# Patient Record
Sex: FEMALE | Race: WHITE | NOT HISPANIC OR LATINO | Employment: FULL TIME | URBAN - METROPOLITAN AREA
[De-identification: names, ages, dates, MRNs, and addresses within clinical notes are randomized per-mention and may not be internally consistent; named-entity substitution may affect disease eponyms.]

---

## 2017-01-31 ENCOUNTER — TRANSCRIBE ORDERS (OUTPATIENT)
Dept: ADMINISTRATIVE | Facility: HOSPITAL | Age: 52
End: 2017-01-31

## 2017-01-31 DIAGNOSIS — Z12.31 VISIT FOR SCREENING MAMMOGRAM: Primary | ICD-10-CM

## 2017-02-21 ENCOUNTER — HOSPITAL ENCOUNTER (OUTPATIENT)
Dept: RADIOLOGY | Age: 52
Discharge: HOME | End: 2017-02-21
Payer: COMMERCIAL

## 2017-02-21 DIAGNOSIS — Z12.31 VISIT FOR SCREENING MAMMOGRAM: ICD-10-CM

## 2017-02-21 PROCEDURE — G0202 SCR MAMMO BI INCL CAD: HCPCS

## 2017-05-12 ENCOUNTER — HOSPITAL ENCOUNTER (OUTPATIENT)
Facility: HOSPITAL | Age: 52
Setting detail: OBSERVATION
Discharge: HOME | End: 2017-05-13
Attending: EMERGENCY MEDICINE | Admitting: INTERNAL MEDICINE
Payer: COMMERCIAL

## 2017-05-12 ENCOUNTER — HOSPITAL ENCOUNTER (EMERGENCY)
Facility: HOSPITAL | Age: 52
Discharge: HOME | End: 2017-05-12
Attending: EMERGENCY MEDICINE | Admitting: EMERGENCY MEDICINE
Payer: COMMERCIAL

## 2017-05-12 ENCOUNTER — APPOINTMENT (EMERGENCY)
Dept: CT IMAGING | Facility: HOSPITAL | Age: 52
End: 2017-05-12
Payer: COMMERCIAL

## 2017-05-12 VITALS
OXYGEN SATURATION: 98 % | DIASTOLIC BLOOD PRESSURE: 84 MMHG | RESPIRATION RATE: 18 BRPM | WEIGHT: 173.5 LBS | SYSTOLIC BLOOD PRESSURE: 160 MMHG | TEMPERATURE: 98.3 F | BODY MASS INDEX: 30.73 KG/M2 | HEART RATE: 55 BPM

## 2017-05-12 DIAGNOSIS — R10.9 ABDOMINAL PAIN: Primary | ICD-10-CM

## 2017-05-12 DIAGNOSIS — R10.13 EPIGASTRIC PAIN: Primary | ICD-10-CM

## 2017-05-12 DIAGNOSIS — R11.2 NAUSEA & VOMITING: ICD-10-CM

## 2017-05-12 PROBLEM — K52.9 GASTROENTERITIS: Status: ACTIVE | Noted: 2017-05-12

## 2017-05-12 LAB
ALBUMIN SERPL BCP-MCNC: 3.7 G/DL (ref 3.5–5)
ALP SERPL-CCNC: 71 U/L (ref 46–116)
ALT SERPL W P-5'-P-CCNC: 26 U/L (ref 12–78)
ANION GAP SERPL CALCULATED.3IONS-SCNC: 14 MMOL/L (ref 4–13)
ANION GAP SERPL CALCULATED.3IONS-SCNC: 7 MMOL/L (ref 4–13)
AST SERPL W P-5'-P-CCNC: 45 U/L (ref 5–45)
BASOPHILS # BLD AUTO: 0.02 THOUSANDS/ΜL (ref 0–0.1)
BASOPHILS # BLD AUTO: 0.02 THOUSANDS/ΜL (ref 0–0.1)
BASOPHILS NFR BLD AUTO: 0 % (ref 0–1)
BASOPHILS NFR BLD AUTO: 0 % (ref 0–1)
BILIRUB SERPL-MCNC: 0.6 MG/DL (ref 0.2–1)
BUN SERPL-MCNC: 11 MG/DL (ref 5–25)
BUN SERPL-MCNC: 7 MG/DL (ref 5–25)
CALCIUM SERPL-MCNC: 10.3 MG/DL (ref 8.3–10.1)
CALCIUM SERPL-MCNC: 9.2 MG/DL (ref 8.3–10.1)
CHLORIDE SERPL-SCNC: 101 MMOL/L (ref 100–108)
CHLORIDE SERPL-SCNC: 104 MMOL/L (ref 100–108)
CLARITY, POC: CLEAR
CO2 SERPL-SCNC: 24 MMOL/L (ref 21–32)
CO2 SERPL-SCNC: 26 MMOL/L (ref 21–32)
COLOR, POC: YELLOW
CREAT SERPL-MCNC: 0.64 MG/DL (ref 0.6–1.3)
CREAT SERPL-MCNC: 0.96 MG/DL (ref 0.6–1.3)
EOSINOPHIL # BLD AUTO: 0.05 THOUSAND/ΜL (ref 0–0.61)
EOSINOPHIL # BLD AUTO: 0.19 THOUSAND/ΜL (ref 0–0.61)
EOSINOPHIL NFR BLD AUTO: 0 % (ref 0–6)
EOSINOPHIL NFR BLD AUTO: 2 % (ref 0–6)
ERYTHROCYTE [DISTWIDTH] IN BLOOD BY AUTOMATED COUNT: 12.9 % (ref 11.6–15.1)
ERYTHROCYTE [DISTWIDTH] IN BLOOD BY AUTOMATED COUNT: 13 % (ref 11.6–15.1)
EXT BILIRUBIN, UA: NEGATIVE
EXT BLOOD URINE: NEGATIVE
EXT GLUCOSE, UA: NEGATIVE
EXT KETONES: NEGATIVE
EXT NITRITE, UA: NEGATIVE
EXT PH, UA: 8.5
EXT PROTEIN, UA: 100
EXT SPECIFIC GRAVITY, UA: 1
EXT UROBILINOGEN: 0.2
GFR SERPL CREATININE-BSD FRML MDRD: >60 ML/MIN/1.73SQ M
GFR SERPL CREATININE-BSD FRML MDRD: >60 ML/MIN/1.73SQ M
GLUCOSE SERPL-MCNC: 124 MG/DL (ref 65–140)
GLUCOSE SERPL-MCNC: 97 MG/DL (ref 65–140)
HCT VFR BLD AUTO: 42.4 % (ref 34.8–46.1)
HCT VFR BLD AUTO: 46.8 % (ref 34.8–46.1)
HGB BLD-MCNC: 14.3 G/DL (ref 11.5–15.4)
HGB BLD-MCNC: 15.9 G/DL (ref 11.5–15.4)
LACTATE SERPL-SCNC: 2 MMOL/L (ref 0.5–2)
LIPASE SERPL-CCNC: 80 U/L (ref 73–393)
LIPASE SERPL-CCNC: 98 U/L (ref 73–393)
LYMPHOCYTES # BLD AUTO: 1.77 THOUSANDS/ΜL (ref 0.6–4.47)
LYMPHOCYTES # BLD AUTO: 2.01 THOUSANDS/ΜL (ref 0.6–4.47)
LYMPHOCYTES NFR BLD AUTO: 14 % (ref 14–44)
LYMPHOCYTES NFR BLD AUTO: 22 % (ref 14–44)
MCH RBC QN AUTO: 31.1 PG (ref 26.8–34.3)
MCH RBC QN AUTO: 31.1 PG (ref 26.8–34.3)
MCHC RBC AUTO-ENTMCNC: 33.7 G/DL (ref 31.4–37.4)
MCHC RBC AUTO-ENTMCNC: 34 G/DL (ref 31.4–37.4)
MCV RBC AUTO: 91 FL (ref 82–98)
MCV RBC AUTO: 92 FL (ref 82–98)
MONOCYTES # BLD AUTO: 0.44 THOUSAND/ΜL (ref 0.17–1.22)
MONOCYTES # BLD AUTO: 0.45 THOUSAND/ΜL (ref 0.17–1.22)
MONOCYTES NFR BLD AUTO: 3 % (ref 4–12)
MONOCYTES NFR BLD AUTO: 5 % (ref 4–12)
NEUTROPHILS # BLD AUTO: 11.58 THOUSANDS/ΜL (ref 1.85–7.62)
NEUTROPHILS # BLD AUTO: 5.66 THOUSANDS/ΜL (ref 1.85–7.62)
NEUTS SEG NFR BLD AUTO: 71 % (ref 43–75)
NEUTS SEG NFR BLD AUTO: 83 % (ref 43–75)
PLATELET # BLD AUTO: 306 THOUSANDS/UL (ref 149–390)
PLATELET # BLD AUTO: 351 THOUSANDS/UL (ref 149–390)
PMV BLD AUTO: 10.4 FL (ref 8.9–12.7)
PMV BLD AUTO: 10.6 FL (ref 8.9–12.7)
POTASSIUM SERPL-SCNC: 3.9 MMOL/L (ref 3.5–5.3)
POTASSIUM SERPL-SCNC: 5.2 MMOL/L (ref 3.5–5.3)
PROT SERPL-MCNC: 6.8 G/DL (ref 6.4–8.2)
RBC # BLD AUTO: 4.6 MILLION/UL (ref 3.81–5.12)
RBC # BLD AUTO: 5.12 MILLION/UL (ref 3.81–5.12)
SODIUM SERPL-SCNC: 137 MMOL/L (ref 136–145)
SODIUM SERPL-SCNC: 139 MMOL/L (ref 136–145)
WBC # BLD AUTO: 14.11 THOUSAND/UL (ref 4.31–10.16)
WBC # BLD AUTO: 8.08 THOUSAND/UL (ref 4.31–10.16)
WBC # BLD EST: NEGATIVE 10*3/UL

## 2017-05-12 PROCEDURE — 36415 COLL VENOUS BLD VENIPUNCTURE: CPT | Performed by: PHYSICIAN ASSISTANT

## 2017-05-12 PROCEDURE — 96375 TX/PRO/DX INJ NEW DRUG ADDON: CPT

## 2017-05-12 PROCEDURE — 85025 COMPLETE CBC W/AUTO DIFF WBC: CPT | Performed by: PHYSICIAN ASSISTANT

## 2017-05-12 PROCEDURE — 74177 CT ABD & PELVIS W/CONTRAST: CPT

## 2017-05-12 PROCEDURE — 96376 TX/PRO/DX INJ SAME DRUG ADON: CPT

## 2017-05-12 PROCEDURE — 96361 HYDRATE IV INFUSION ADD-ON: CPT

## 2017-05-12 PROCEDURE — 96374 THER/PROPH/DIAG INJ IV PUSH: CPT

## 2017-05-12 PROCEDURE — 83690 ASSAY OF LIPASE: CPT | Performed by: EMERGENCY MEDICINE

## 2017-05-12 PROCEDURE — 80048 BASIC METABOLIC PNL TOTAL CA: CPT | Performed by: EMERGENCY MEDICINE

## 2017-05-12 PROCEDURE — 83605 ASSAY OF LACTIC ACID: CPT | Performed by: EMERGENCY MEDICINE

## 2017-05-12 PROCEDURE — 83690 ASSAY OF LIPASE: CPT | Performed by: PHYSICIAN ASSISTANT

## 2017-05-12 PROCEDURE — 81002 URINALYSIS NONAUTO W/O SCOPE: CPT | Performed by: PHYSICIAN ASSISTANT

## 2017-05-12 PROCEDURE — 85025 COMPLETE CBC W/AUTO DIFF WBC: CPT | Performed by: EMERGENCY MEDICINE

## 2017-05-12 PROCEDURE — 99284 EMERGENCY DEPT VISIT MOD MDM: CPT

## 2017-05-12 PROCEDURE — 80053 COMPREHEN METABOLIC PANEL: CPT | Performed by: PHYSICIAN ASSISTANT

## 2017-05-12 RX ORDER — ONDANSETRON 2 MG/ML
INJECTION INTRAMUSCULAR; INTRAVENOUS
Status: COMPLETED
Start: 2017-05-12 | End: 2017-05-12

## 2017-05-12 RX ORDER — MORPHINE SULFATE 4 MG/ML
4 INJECTION, SOLUTION INTRAMUSCULAR; INTRAVENOUS ONCE
Status: COMPLETED | OUTPATIENT
Start: 2017-05-12 | End: 2017-05-12

## 2017-05-12 RX ORDER — MAGNESIUM HYDROXIDE/ALUMINUM HYDROXICE/SIMETHICONE 120; 1200; 1200 MG/30ML; MG/30ML; MG/30ML
30 SUSPENSION ORAL EVERY 4 HOURS PRN
Status: DISCONTINUED | OUTPATIENT
Start: 2017-05-12 | End: 2017-05-12 | Stop reason: HOSPADM

## 2017-05-12 RX ORDER — SODIUM CHLORIDE 9 MG/ML
125 INJECTION, SOLUTION INTRAVENOUS CONTINUOUS
Status: DISCONTINUED | OUTPATIENT
Start: 2017-05-12 | End: 2017-05-13 | Stop reason: HOSPADM

## 2017-05-12 RX ORDER — OXYCODONE HYDROCHLORIDE AND ACETAMINOPHEN 5; 325 MG/1; MG/1
1 TABLET ORAL EVERY 6 HOURS PRN
Qty: 12 TABLET | Refills: 0 | Status: SHIPPED | OUTPATIENT
Start: 2017-05-12 | End: 2017-05-22

## 2017-05-12 RX ORDER — MORPHINE SULFATE 10 MG/ML
6 INJECTION, SOLUTION INTRAMUSCULAR; INTRAVENOUS ONCE
Status: COMPLETED | OUTPATIENT
Start: 2017-05-12 | End: 2017-05-12

## 2017-05-12 RX ORDER — PANTOPRAZOLE SODIUM 40 MG/1
40 TABLET, DELAYED RELEASE ORAL DAILY
Qty: 30 TABLET | Refills: 0 | Status: SHIPPED | OUTPATIENT
Start: 2017-05-12 | End: 2018-07-18 | Stop reason: ALTCHOICE

## 2017-05-12 RX ORDER — ONDANSETRON 2 MG/ML
4 INJECTION INTRAMUSCULAR; INTRAVENOUS ONCE
Status: COMPLETED | OUTPATIENT
Start: 2017-05-12 | End: 2017-05-12

## 2017-05-12 RX ADMIN — ALUMINUM HYDROXIDE, MAGNESIUM HYDROXIDE, AND SIMETHICONE 30 ML: 200; 200; 20 SUSPENSION ORAL at 13:57

## 2017-05-12 RX ADMIN — SODIUM CHLORIDE 1000 ML: 0.9 INJECTION, SOLUTION INTRAVENOUS at 10:56

## 2017-05-12 RX ADMIN — MORPHINE SULFATE 6 MG: 10 INJECTION, SOLUTION INTRAMUSCULAR; INTRAVENOUS at 11:08

## 2017-05-12 RX ADMIN — MORPHINE SULFATE 4 MG: 4 INJECTION, SOLUTION INTRAMUSCULAR; INTRAVENOUS at 14:29

## 2017-05-12 RX ADMIN — IOHEXOL 50 ML: 240 INJECTION, SOLUTION INTRATHECAL; INTRAVASCULAR; INTRAVENOUS; ORAL at 11:10

## 2017-05-12 RX ADMIN — HYDROMORPHONE HYDROCHLORIDE 1 MG: 1 INJECTION, SOLUTION INTRAMUSCULAR; INTRAVENOUS; SUBCUTANEOUS at 22:50

## 2017-05-12 RX ADMIN — LIDOCAINE HYDROCHLORIDE 15 ML: 20 SOLUTION ORAL; TOPICAL at 13:57

## 2017-05-12 RX ADMIN — ONDANSETRON 4 MG: 2 INJECTION INTRAMUSCULAR; INTRAVENOUS at 11:06

## 2017-05-12 RX ADMIN — SODIUM CHLORIDE 1000 ML: 0.9 INJECTION, SOLUTION INTRAVENOUS at 23:29

## 2017-05-12 RX ADMIN — ONDANSETRON 4 MG: 2 INJECTION INTRAMUSCULAR; INTRAVENOUS at 22:49

## 2017-05-12 RX ADMIN — IOHEXOL 100 ML: 350 INJECTION, SOLUTION INTRAVENOUS at 12:35

## 2017-05-13 ENCOUNTER — APPOINTMENT (OUTPATIENT)
Dept: ULTRASOUND IMAGING | Facility: HOSPITAL | Age: 52
End: 2017-05-13
Payer: COMMERCIAL

## 2017-05-13 VITALS
OXYGEN SATURATION: 95 % | HEART RATE: 64 BPM | BODY MASS INDEX: 30.51 KG/M2 | TEMPERATURE: 99.1 F | HEIGHT: 63 IN | WEIGHT: 172.18 LBS | DIASTOLIC BLOOD PRESSURE: 55 MMHG | RESPIRATION RATE: 18 BRPM | SYSTOLIC BLOOD PRESSURE: 105 MMHG

## 2017-05-13 LAB
ANION GAP SERPL CALCULATED.3IONS-SCNC: 9 MMOL/L (ref 4–13)
BUN SERPL-MCNC: 7 MG/DL (ref 5–25)
CALCIUM SERPL-MCNC: 8.6 MG/DL (ref 8.3–10.1)
CHLORIDE SERPL-SCNC: 105 MMOL/L (ref 100–108)
CO2 SERPL-SCNC: 25 MMOL/L (ref 21–32)
CREAT SERPL-MCNC: 0.63 MG/DL (ref 0.6–1.3)
ERYTHROCYTE [DISTWIDTH] IN BLOOD BY AUTOMATED COUNT: 12.7 % (ref 11.6–15.1)
GFR SERPL CREATININE-BSD FRML MDRD: >60 ML/MIN/1.73SQ M
GLUCOSE SERPL-MCNC: 98 MG/DL (ref 65–140)
HCT VFR BLD AUTO: 39.3 % (ref 34.8–46.1)
HGB BLD-MCNC: 13 G/DL (ref 11.5–15.4)
LACTATE SERPL-SCNC: 0.6 MMOL/L (ref 0.5–2)
MCH RBC QN AUTO: 30.7 PG (ref 26.8–34.3)
MCHC RBC AUTO-ENTMCNC: 33.1 G/DL (ref 31.4–37.4)
MCV RBC AUTO: 93 FL (ref 82–98)
PLATELET # BLD AUTO: 280 THOUSANDS/UL (ref 149–390)
PMV BLD AUTO: 11 FL (ref 8.9–12.7)
POTASSIUM SERPL-SCNC: 3.9 MMOL/L (ref 3.5–5.3)
RBC # BLD AUTO: 4.24 MILLION/UL (ref 3.81–5.12)
SODIUM SERPL-SCNC: 139 MMOL/L (ref 136–145)
WBC # BLD AUTO: 11.5 THOUSAND/UL (ref 4.31–10.16)

## 2017-05-13 PROCEDURE — 99285 EMERGENCY DEPT VISIT HI MDM: CPT

## 2017-05-13 PROCEDURE — 80048 BASIC METABOLIC PNL TOTAL CA: CPT | Performed by: PHYSICIAN ASSISTANT

## 2017-05-13 PROCEDURE — 83605 ASSAY OF LACTIC ACID: CPT | Performed by: PHYSICIAN ASSISTANT

## 2017-05-13 PROCEDURE — 85027 COMPLETE CBC AUTOMATED: CPT | Performed by: PHYSICIAN ASSISTANT

## 2017-05-13 PROCEDURE — 76705 ECHO EXAM OF ABDOMEN: CPT

## 2017-05-13 RX ORDER — FLUOXETINE HYDROCHLORIDE 20 MG/1
40 CAPSULE ORAL DAILY
Status: DISCONTINUED | OUTPATIENT
Start: 2017-05-13 | End: 2017-05-13 | Stop reason: HOSPADM

## 2017-05-13 RX ORDER — DICYCLOMINE HYDROCHLORIDE 10 MG/1
10 CAPSULE ORAL
Status: DISCONTINUED | OUTPATIENT
Start: 2017-05-13 | End: 2017-05-13 | Stop reason: HOSPADM

## 2017-05-13 RX ORDER — OXYCODONE HYDROCHLORIDE AND ACETAMINOPHEN 5; 325 MG/1; MG/1
1 TABLET ORAL EVERY 6 HOURS PRN
Status: DISCONTINUED | OUTPATIENT
Start: 2017-05-13 | End: 2017-05-13

## 2017-05-13 RX ORDER — SODIUM CHLORIDE 9 MG/ML
125 INJECTION, SOLUTION INTRAVENOUS CONTINUOUS
Status: DISCONTINUED | OUTPATIENT
Start: 2017-05-13 | End: 2017-05-13 | Stop reason: HOSPADM

## 2017-05-13 RX ORDER — ONDANSETRON 2 MG/ML
4 INJECTION INTRAMUSCULAR; INTRAVENOUS EVERY 6 HOURS PRN
Status: DISCONTINUED | OUTPATIENT
Start: 2017-05-13 | End: 2017-05-13 | Stop reason: HOSPADM

## 2017-05-13 RX ORDER — PRAVASTATIN SODIUM 20 MG
20 TABLET ORAL DAILY
Status: DISCONTINUED | OUTPATIENT
Start: 2017-05-13 | End: 2017-05-13 | Stop reason: HOSPADM

## 2017-05-13 RX ORDER — B-COMPLEX WITH VITAMIN C
1 TABLET ORAL
Status: DISCONTINUED | OUTPATIENT
Start: 2017-05-13 | End: 2017-05-13 | Stop reason: HOSPADM

## 2017-05-13 RX ORDER — DICYCLOMINE HYDROCHLORIDE 10 MG/1
10 CAPSULE ORAL 3 TIMES DAILY PRN
Qty: 30 CAPSULE | Refills: 0 | Status: ON HOLD | OUTPATIENT
Start: 2017-05-13 | End: 2017-06-07 | Stop reason: ALTCHOICE

## 2017-05-13 RX ORDER — ACETAMINOPHEN 325 MG/1
650 TABLET ORAL EVERY 6 HOURS PRN
Status: DISCONTINUED | OUTPATIENT
Start: 2017-05-13 | End: 2017-05-13 | Stop reason: HOSPADM

## 2017-05-13 RX ORDER — PANTOPRAZOLE SODIUM 40 MG/1
40 TABLET, DELAYED RELEASE ORAL
Status: DISCONTINUED | OUTPATIENT
Start: 2017-05-13 | End: 2017-05-13 | Stop reason: HOSPADM

## 2017-05-13 RX ORDER — OXYCODONE HYDROCHLORIDE AND ACETAMINOPHEN 5; 325 MG/1; MG/1
1 TABLET ORAL EVERY 4 HOURS PRN
Status: DISCONTINUED | OUTPATIENT
Start: 2017-05-13 | End: 2017-05-13 | Stop reason: HOSPADM

## 2017-05-13 RX ADMIN — PANTOPRAZOLE SODIUM 40 MG: 40 TABLET, DELAYED RELEASE ORAL at 06:24

## 2017-05-13 RX ADMIN — DICYCLOMINE HYDROCHLORIDE 10 MG: 10 CAPSULE ORAL at 00:51

## 2017-05-13 RX ADMIN — DICYCLOMINE HYDROCHLORIDE 10 MG: 10 CAPSULE ORAL at 06:24

## 2017-05-13 RX ADMIN — OYSTER SHELL CALCIUM WITH VITAMIN D 1 TABLET: 500; 200 TABLET, FILM COATED ORAL at 11:28

## 2017-05-13 RX ADMIN — SODIUM CHLORIDE 125 ML/HR: 0.9 INJECTION, SOLUTION INTRAVENOUS at 08:42

## 2017-05-13 RX ADMIN — OXYCODONE HYDROCHLORIDE AND ACETAMINOPHEN 1 TABLET: 5; 325 TABLET ORAL at 09:16

## 2017-05-13 RX ADMIN — OXYCODONE HYDROCHLORIDE AND ACETAMINOPHEN 1 TABLET: 5; 325 TABLET ORAL at 00:51

## 2017-05-13 RX ADMIN — OXYCODONE HYDROCHLORIDE AND ACETAMINOPHEN 1 TABLET: 5; 325 TABLET ORAL at 05:10

## 2017-05-13 RX ADMIN — SODIUM CHLORIDE 125 ML/HR: 0.9 INJECTION, SOLUTION INTRAVENOUS at 00:44

## 2017-05-13 RX ADMIN — ONDANSETRON 4 MG: 2 INJECTION INTRAMUSCULAR; INTRAVENOUS at 04:56

## 2017-05-13 RX ADMIN — DICYCLOMINE HYDROCHLORIDE 10 MG: 10 CAPSULE ORAL at 11:28

## 2017-05-13 RX ADMIN — Medication 1 TABLET: at 08:46

## 2017-05-13 RX ADMIN — FLUOXETINE 40 MG: 20 CAPSULE ORAL at 08:46

## 2017-05-13 RX ADMIN — PRAVASTATIN SODIUM 20 MG: 20 TABLET ORAL at 08:46

## 2017-05-13 RX ADMIN — OYSTER SHELL CALCIUM WITH VITAMIN D 1 TABLET: 500; 200 TABLET, FILM COATED ORAL at 08:46

## 2017-05-13 RX ADMIN — OXYCODONE HYDROCHLORIDE AND ACETAMINOPHEN 1 TABLET: 5; 325 TABLET ORAL at 13:51

## 2017-05-15 ENCOUNTER — ALLSCRIPTS OFFICE VISIT (OUTPATIENT)
Dept: OTHER | Facility: OTHER | Age: 52
End: 2017-05-15

## 2017-05-18 ENCOUNTER — ALLSCRIPTS OFFICE VISIT (OUTPATIENT)
Dept: OTHER | Facility: OTHER | Age: 52
End: 2017-05-18

## 2017-06-06 ENCOUNTER — ANESTHESIA EVENT (OUTPATIENT)
Dept: GASTROENTEROLOGY | Facility: HOSPITAL | Age: 52
End: 2017-06-06
Payer: COMMERCIAL

## 2017-06-07 ENCOUNTER — ANESTHESIA (OUTPATIENT)
Dept: GASTROENTEROLOGY | Facility: HOSPITAL | Age: 52
End: 2017-06-07
Payer: COMMERCIAL

## 2017-06-07 ENCOUNTER — HOSPITAL ENCOUNTER (OUTPATIENT)
Facility: HOSPITAL | Age: 52
Setting detail: OUTPATIENT SURGERY
Discharge: HOME | End: 2017-06-07
Attending: SURGERY | Admitting: SURGERY
Payer: COMMERCIAL

## 2017-06-07 VITALS
TEMPERATURE: 98.5 F | RESPIRATION RATE: 18 BRPM | HEART RATE: 57 BPM | SYSTOLIC BLOOD PRESSURE: 106 MMHG | OXYGEN SATURATION: 96 % | DIASTOLIC BLOOD PRESSURE: 59 MMHG | WEIGHT: 172.5 LBS | HEIGHT: 63 IN | BODY MASS INDEX: 30.56 KG/M2

## 2017-06-07 DIAGNOSIS — K21.9 GASTROESOPHAGEAL REFLUX DISEASE WITHOUT ESOPHAGITIS: Primary | ICD-10-CM

## 2017-06-07 LAB — EXT PREGNANCY TEST URINE: NEGATIVE

## 2017-06-07 PROCEDURE — 81025 URINE PREGNANCY TEST: CPT | Performed by: ANESTHESIOLOGY

## 2017-06-07 RX ORDER — PROPOFOL 10 MG/ML
INJECTION, EMULSION INTRAVENOUS AS NEEDED
Status: DISCONTINUED | OUTPATIENT
Start: 2017-06-07 | End: 2017-06-07 | Stop reason: SURG

## 2017-06-07 RX ORDER — SODIUM CHLORIDE 9 MG/ML
125 INJECTION, SOLUTION INTRAVENOUS CONTINUOUS
Status: DISCONTINUED | OUTPATIENT
Start: 2017-06-07 | End: 2017-06-07 | Stop reason: HOSPADM

## 2017-06-07 RX ADMIN — SODIUM CHLORIDE 125 ML/HR: 0.9 INJECTION, SOLUTION INTRAVENOUS at 11:37

## 2017-06-07 RX ADMIN — LIDOCAINE HYDROCHLORIDE 50 MG: 20 INJECTION, SOLUTION INTRAVENOUS at 11:56

## 2017-06-07 RX ADMIN — PROPOFOL 100 MG: 10 INJECTION, EMULSION INTRAVENOUS at 11:56

## 2017-06-07 RX ADMIN — PROPOFOL 50 MG: 10 INJECTION, EMULSION INTRAVENOUS at 11:58

## 2017-07-28 ENCOUNTER — ALLSCRIPTS OFFICE VISIT (OUTPATIENT)
Dept: OTHER | Facility: OTHER | Age: 52
End: 2017-07-28

## 2017-07-28 ENCOUNTER — GENERIC CONVERSION - ENCOUNTER (OUTPATIENT)
Dept: OTHER | Facility: OTHER | Age: 52
End: 2017-07-28

## 2018-01-09 NOTE — MISCELLANEOUS
Assessment    1  Transition of care   2  Abdominal pain, epigastric (789 06) (R10 13)   3  Major depressive disorder with single episode, in full remission (296 26) (F32 5)    Plan  Abdominal pain    · Dicyclomine HCl - 10 MG Oral Capsule; TAKE 1 CAPSULE 3 TIMES DAILY PRN  X'S10 DAYS   Rx By: Terence Dias; Dispense: 0 Days ; #:90 Capsule; Refill: 0; For: Abdominal pain; TOVA = N; Record  Status post gastric bypass for obesity    · Omeprazole 20 MG Oral Capsule Delayed Release   Rx By: Urvashi Esteban; Dispense: 30 Days ; #:30 Capsule Delayed Release; Refill: 2; For: Status post gastric bypass for obesity; TOVA = N; Sent To: [id number]; Last Updated By: Jimmy Smith; 5/15/2017 8:18:11 AM    Discussion/Summary  Discussion Summary:   #1 transition of care  #2 epigastric pain  #3 status post gastric bypass  - I reviewed with patient  Unclear etiology of her epigastric pain  We discussed possible etiologies  Reportedly, there is no evidence of obstruction or other significant findings  RECOMMEND: Patient is to follow-up with her gastric bypass surgeon on Thursday  We will continue on Protonix for at least 40 mg a day  Consider EGD  Return to emergency department if pain should return  Recheck 6 months  #4 major depression - testing shows her to be had remission however patient notes an ancillary benefit of decreased hot flashes while on fluoxetine  Patient is worried about worsening symptoms as well as worsening depression if she should try to be weaned down  We'll continue on her present dose and recheck in 6 months  Patient follow-up as above  Patient call for problems or concerns in the interim  Counseling Documentation With Imm: The patient was counseled regarding diagnostic results, instructions for management, risk factor reductions, prognosis, patient and family education, impressions, risks and benefits of treatment options, importance of compliance with treatment     Medication SE Review and Pt Understands Tx: Possible side effects of new medications were reviewed with the patient/guardian today  The treatment plan was reviewed with the patient/guardian  The patient/guardian understands and agrees with the treatment plan      History of Present Illness  TCM Communication [facility]: The patient is being contacted for follow-up after hospitalization and APPOINT 5-15-17 AT 8:30 AM WITH DR CAZARES  She was hospitalized at and [address]  The dates of hospitalization:, date of admission: 5-12-17, date of discharge: 5-13-17  Diagnosis: GASTROENTERITIS  She was discharged to home  Medications reviewed and updated today  She scheduled a follow up appointment  Follow-up appointments with other specialists: DR KIRK 5- 97 14  The patient is currently experiencing symptoms  ABDONINAL TENDERNESS Counseling was provided to the patient  Communication performed and completed by FRANCISCO Serna LPN   HPI: As above  - 58-year-old female with a history of gastric bypass surgery presents for transition of care after admission for epigastric abdominal pain  - Patient states that symptoms began acutely on Friday morning  Patient developed mid abdominal cramping that worsen fairly quickly  By early morning, pain was 8/10  Patient tried lying down and relaxing but pain increased to 10 over 10 and was associated with dry heaving  Is been took patient to the emergency room where initial labs and CT scan was negative  Patient was discharged to home with a diagnosis of gastroenteritis  Back at home, symptoms persisted and  brought patient back to the emergency department where she was admitted for observation  On recheck, white count was elevated progressive labs were normal  Ultrasound of the right upper quadrant was also normal  Symptoms slowly resolved over the next 24 hours and patient was able to be discharged the next morning  Patient was restarted on Protonix 40 mg a day at time of discharge    - Since discharge, patient still has a mild discomfort in the epigastric area but states that pain has greatly improved  She has not had a bowel movement since Friday  She denies any worsening back pain, lightheadedness, chest pain or shortness of breath  She denies any fever or chills  - I reviewed available hospital notes/discharge summary, labs and study results with patient  - Patient states that she is doing well from a mood standpoint  She is compliant with medications  PHQ-9 done      Review of Systems  PHQ-9 Depression Scale: Over the past 2 weeks, how often have you been bothered by the following problems? 1 ) Little interest or pleasure in doing things? Not at all    4 ) Feeling tired or having little energy? Not at all    5 ) Poor appetite or overeating? Not at all    6 ) Feeling bad about yourself, or that you are a failure, or have let yourself or your family down? Not at all    7 ) Trouble concentrating on things, such as reading a newspaper or watching television? Not at all    8 ) Moving or speaking so slowly that other people could have noticed, or the opposite, moving or speaking faster than usual? Not at all    9 ) Thoughts that you would be better off dead or of hurting yourself in some way? Not at all  Score 0   Complete-Female:   Constitutional: as noted in HPI  Eyes: No complaints of eye pain, no red eyes, no eyesight problems, no discharge, no dry eyes, no itching of eyes  ENT: no complaints of earache, no loss of hearing, no nose bleeds, no nasal discharge, no sore throat, no hoarseness  Cardiovascular: No complaints of slow heart rate, no fast heart rate, no chest pain, no palpitations, no leg claudication, no lower extremity edema  Respiratory: No complaints of shortness of breath, no wheezing, no cough, no SOB on exertion, no orthopnea, no PND  Gastrointestinal: as noted in HPI     Genitourinary: No complaints of dysuria, no incontinence, no pelvic pain, no dysmenorrhea, no vaginal discharge or bleeding  Musculoskeletal: No complaints of arthralgias, no myalgias, no joint swelling or stiffness, no limb pain or swelling  Integumentary: No complaints of skin rash or lesions, no itching, no skin wounds, no breast pain or lump  Neurological: No complaints of headache, no confusion, no convulsions, no numbness, no dizziness or fainting, no tingling, no limb weakness, no difficulty walking  Psychiatric: Not suicidal, no sleep disturbance, no anxiety or depression, no change in personality, no emotional problems  Endocrine: No complaints of proptosis, no hot flashes, no muscle weakness, no deepening of the voice, no feelings of weakness  Hematologic/Lymphatic: No complaints of swollen glands, no swollen glands in the neck, does not bleed easily, does not bruise easily  Active Problems    1  Acute sinusitis (461 9) (J01 90)   2  Chest pain (786 50) (R07 9)   3  Depression (311) (F32 9)   4  Diverticula, colon (562 10) (K57 30)   5  Encounter for colorectal cancer screening (V76 51) (Z12 11,Z12 12)   6  Esophageal reflux (530 81) (K21 9)   7  Fatigue (780 79) (R53 83)   8  Flu vaccine need (V04 81) (Z23)   9  Heartburn (787 1) (R12)   10  Hemorrhoids, external (455 3) (K64 4)   11  Hiatal hernia (553 3) (K44 9)   12  Hypercholesterolemia (272 0) (E78 00)   13  Need for diphtheria-tetanus-pertussis (Tdap) vaccine (V06 1) (Z23)   14  Obesity (278 00) (E66 9)   15  Otalgia, unspecified laterality (388 70) (H92 09)   16  Postgastrectomy malabsorption (579 3) (K91 2,Z90 3)   17  Pre-operative cardiovascular examination (V72 81) (Z01 810)   18  Preoperative clearance (V72 84) (Z01 818)   19  Sea sickness (994 6) (T75 3XXA)   20  Status post gastric bypass for obesity (V45 86) (Y76 64)    Past Medical History    1  Former smoker (V15 82) (M20 661)   2  History of Morbid obesity (278 01) (E66 01)   3  History of Vitamin D insufficiency (268 9) (E55 9)    Surgical History    1   History of  Section   2  History of Gastric Surgery For Morbid Obesity Gastric Bypass   3  History of Repair Of Paraesophageal Hiatus Hernia   4  History of Tonsillectomy   5  History of Uterine Myomectomy  Surgical History Reviewed: The surgical history was reviewed and updated today  Family History  Mother    1  Family history of Breast CA  Father    2  Family history of Coronary arteriosclerosis   3  Family history of Hypertension (V17 49)   4  Family history of Prostate Cancer (V16 42)   5  Family history of Thyroid Disorder (V18 19)  Maternal Grandmother    6  Family history of Coronary arteriosclerosis  Paternal Grandmother    9  Family history of Diabetes Mellitus (V18 0)  Maternal Grandfather    8  Family history of Coronary arteriosclerosis  Family History Reviewed: The family history was reviewed and updated today  Social History    · Denied: History of Alcohol Use (History)   · Daily Coffee Consumption (2  Cups/Day)   · Denied: History of Daily Cola Consumption (___ Cans/Day)   · Daily Tea Consumption (___ Cups/Day)   · Denied: History of Drug Use   · Former smoker (V15 82) (Z87 891)   · Housewife or homemaker   · Marital History - Currently    · Uses Safety Equipment - Seatbelts  Social History Reviewed: The social history was reviewed and updated today  Current Meds   1  Calcium Citrate + D TABS; i po tid; Therapy: (Recorded:2015) to Recorded   2  Daily Multivitamin TABS; Celebrate; Therapy: (Andres Raines) to Recorded   3  FLUoxetine HCl - 20 MG Oral Capsule; TAKE 2 CAPSULES BY MOUTH EVERY DAY; Therapy: 60YSX2620 to (Ghada Harper)  Requested for: 13XNF2897; Last   Rx:2017 Ordered   4  Omeprazole 20 MG Oral Capsule Delayed Release; TAKE 1 CAPSULE DAILY; Therapy: 28WEJ6711 to (Khang Cha)  Requested for: 50ZSB2213; Last   Rx:94Xfm8222; Status: ACTIVE - Renewal Denied Ordered   5   Oxycodone-Acetaminophen 5-325 MG Oral Tablet; TAKE 1 TABLET EVERY 4 TO 6   HOURS AS NEEDED FOR PAIN Recorded   6  Pantoprazole Sodium 40 MG Oral Tablet Delayed Release; TAKE 1 TABLET BY MOUTH   EVERY DAY Recorded   7  Pravastatin Sodium 20 MG Oral Tablet; TAKE ONE TABLET BY MOUTH ONCE DAILY; Therapy: 50ZYV4975 to (Evaluate:57Gna7321)  Requested for: 43ORO7204; Last   Rx:18Jan2017 Ordered  Medication List Reviewed: The medication list was reviewed and updated today  Allergies    1  No Known Drug Allergies    Vitals  Signs   Recorded: 76FEB2419 08:17AM   Temperature: 98 1 F  Heart Rate: 66  Respiration: 18  Systolic: 639  Diastolic: 78  Height: 5 ft 3 in  Weight: 175 lb   BMI Calculated: 31  BSA Calculated: 1 83    Physical Exam    Constitutional   General appearance: No acute distress, well appearing and well nourished  Eyes   Conjunctiva and lids: No swelling, erythema or discharge  Pupils and irises: Equal, round, reactive to light  Ophthalmoscopic examination: Normal fundi and optic discs  Ears, Nose, Mouth, and Throat   External inspection of ears and nose: Normal     Otoscopic examination: Tympanic membranes translucent with normal light reflex  Canals patent without erythema  Nasal mucosa, septum, and turbinates: Normal without edema or erythema  Lips, teeth, and gums: Normal, good dentition  Oropharynx: Normal with no erythema, edema, exudate or lesions  Neck   Neck: Supple, symmetric, trachea midline, no masses  Thyroid: Normal, no thyromegaly  Pulmonary   Respiratory effort: No increased work of breathing or signs of respiratory distress  Auscultation of lungs: Clear to auscultation  Cardiovascular   Auscultation of heart: Normal rate and rhythm, normal S1 and S2, no murmurs  Carotid pulses: 2+ bilaterally  Abdominal aorta: Normal     Pedal pulses: 2+ bilaterally      Peripheral vascular exam: Normal     Examination of extremities for edema and/or varicosities: Normal     Abdomen   Abdomen: Abnormal   mild TTP over the epigastric area without G/R  No masses  Liver and spleen: No hepatomegaly or splenomegaly  Lymphatic   Palpation of lymph nodes in neck: No lymphadenopathy  Palpation of lymph nodes in other areas: No lymphadenopathy  Musculoskeletal   Gait and station: Normal     Digits and nails: Normal without clubbing or cyanosis  Joints, bones, and muscles: Normal     Skin   Skin and subcutaneous tissue: Normal without rashes or lesions  Neurologic   Cranial nerves: Cranial nerves II-XII intact  Cortical function: Normal mental status  Psychiatric   Judgment and insight: Normal     Orientation to person, place, and time: Normal     Recent and remote memory: Intact  Mood and affect: Normal   PH-9 = 0  Results/Data  PHQ-2 Adult Depression Screening 30IQE5114 08:21AM User, Ahs     Test Name Result Flag Reference   PHQ-2 Adult Depression Score 0     Over the last two weeks, how often have you been bothered by any of the following problems? Little interest or pleasure in doing things: Not at all - 0  Feeling down, depressed, or hopeless: Not at all - 0   PHQ-2 Adult Depression Screening Negative         Health Management  Diverticula, colon   COLONOSCOPY; every 10 years; Last 93DST0628; Next Due: 58Lqj7284; Active  Encounter for colorectal cancer screening   COLONOSCOPY; every 10 years; Last 74FOD5100; Next Due: 32Tng0872; Active  Hemorrhoids, external   COLONOSCOPY; every 10 years; Last 80OCD2082; Next Due: 53Rmk8402; Active    Future Appointments    Date/Time Provider Specialty Site   05/18/2017 02:45 PM FRANCISCO Martin   [facility] [place] [address] [facility]     Signatures   Electronically signed by : FRANCISCO Weiner ; May 16 2017  7:52AM EST                       (Author)

## 2018-01-12 VITALS
SYSTOLIC BLOOD PRESSURE: 114 MMHG | HEIGHT: 63 IN | RESPIRATION RATE: 18 BRPM | DIASTOLIC BLOOD PRESSURE: 78 MMHG | WEIGHT: 175 LBS | HEART RATE: 66 BPM | BODY MASS INDEX: 31.01 KG/M2 | TEMPERATURE: 98.1 F

## 2018-01-13 NOTE — PROGRESS NOTES
Message  Attended 9 month follow up meeting  Addressed both behavioral and dietary issues  Group discussion included the impact of tobacco use, alcohol use, psychiatric medications, relationships, body image and self esteem issues as it pertains to the weight loss surgery patient  During group discussion, reviewed vitamin recommendations, protein recommendations, portion sizes, balancing diet to include healthy carbohydrates, importance of exercise, and the bariatric rules for success  Overall pleased with weight loss and improved quality of life         Active Problems    1  Acute sinusitis (461 9) (J01 90)   2  Chest pain (786 50) (R07 9)   3  Depression (311) (F32 9)   4  Esophageal reflux (530 81) (K21 9)   5  Fatigue (780 79) (R53 83)   6  Flu vaccine need (V04 81) (Z23)   7  Heartburn (787 1) (R12)   8  Hiatal hernia (553 3) (K44 9)   9  Hypercholesterolemia (272 0) (E78 0)   10  Need for diphtheria-tetanus-pertussis (Tdap) vaccine (V06 1) (Z23)   11  Obesity (278 00) (E66 9)   12  Otalgia, unspecified laterality (388 70) (H92 09)   13  Postgastrectomy malabsorption (579 3) (K91 2,Z90 3)   14  Pre-operative cardiovascular examination (V72 81) (Z01 810)   15  Preoperative clearance (V72 84) (Z01 818)   16  Sea sickness (994 6) (T75 3XXA)   17  Status post gastric bypass for obesity (V45 86) (Z98 84)    Current Meds   1  Biotin 5000 MCG Oral Tablet Recorded   2  Calcium Citrate + D TABS; i po tid; Therapy: (Recorded:02Oct2015) to Recorded   3  Daily Multivitamin TABS; Celebrate; Therapy: (Glena Medin) to Recorded   4  FLUoxetine HCl - 20 MG Oral Capsule (PROzac); TAKE 2 CAPSULES BY MOUTH   EVERY DAY; Therapy: 47HCD5431 to (Nevaeh Saravia)  Requested for: 68IXK1756; Last   Rx:07Mar2016 Ordered   5  Iron Supplement TABS; Therapy: (29-29-69-33) to Recorded   6  Omeprazole 20 MG Oral Capsule Delayed Release; Therapy: (Recorded:14Jan2016) to Recorded   7   Pravastatin Sodium 20 MG Oral Tablet; TAKE ONE TABLET BY MOUTH ONCE DAILY; Therapy: 17DHH9287 to (Evaluate:80Vha8572)  Requested for: 10GPQ1390; Last   Rx:18Sss5647 Ordered    Allergies    1   No Known Drug Allergies    Signatures   Electronically signed by : GARRET Francisco; Mar 17 2016  3:54PM EST                       (Author)

## 2018-01-14 VITALS
BODY MASS INDEX: 30.56 KG/M2 | DIASTOLIC BLOOD PRESSURE: 70 MMHG | HEART RATE: 70 BPM | RESPIRATION RATE: 18 BRPM | TEMPERATURE: 98.8 F | WEIGHT: 172.5 LBS | HEIGHT: 63 IN | SYSTOLIC BLOOD PRESSURE: 132 MMHG

## 2018-01-14 VITALS
HEIGHT: 63 IN | WEIGHT: 168.5 LBS | TEMPERATURE: 97.5 F | SYSTOLIC BLOOD PRESSURE: 122 MMHG | DIASTOLIC BLOOD PRESSURE: 80 MMHG | BODY MASS INDEX: 29.86 KG/M2 | HEART RATE: 76 BPM | RESPIRATION RATE: 18 BRPM

## 2018-01-15 NOTE — PROGRESS NOTES
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[employer])  Patient is also tired of chewable vitamins DX Increased calorie /protein needs related to increased physical activity while training for [employer] ([employer]) as evidenced by patient report Intervention: Recommended patient increase protein intake to 70 to 75 grams per day and calories to 1400 per day  recommended Tennille nut water for hydration and CHO repletion while training and during race ( patient does not like G2)  Provided with samples of ProcCare capsules as they are not chewable Goals: Increase protein to 70 to 75 grams Hydrate during training and replete electrolyte with [place] nut water Monitoring/evaluation patient has my contact information for any further questions or concerns  She will f/u annually with PAJUANCARLOS  Active Problems    1  Abdominal pain (789 00) (R10 9)   2  Abdominal pain, epigastric (789 06) (R10 13)   3  Acute sinusitis (461 9) (J01 90)   4  Chest pain (786 50) (R07 9)   5  Depression (311) (F32 9)   6  Diverticula, colon (562 10) (K57 30)   7  Encounter for colorectal cancer screening (V76 51) (Z12 11,Z12 12)   8  Esophageal reflux (530 81) (K21 9)   9  Fatigue (780 79) (R53 83)   10  Flu vaccine need (V04 81) (Z23)   11  Heartburn (787 1) (R12)   12  Hemorrhoids, external (455 3) (K64 4)   13  Hiatal hernia (553 3) (K44 9)   14  Hypercholesterolemia (272 0) (E78 00)   15  Major depressive disorder with single episode, in full remission (296 26) (F32 5)   16  Need for diphtheria-tetanus-pertussis (Tdap) vaccine (V06 1) (Z23)   17  Otalgia, unspecified laterality (388 70) (H92 09)   18  Postgastrectomy malabsorption (579 3) (K91 2,Z90 3)   19  Pre-operative cardiovascular examination (V72 81) (Z01 810)   20   Preoperative clearance (V72 84) (Z01 818) 21  Sea sickness (994 6) (T75 3XXA)   22  Status post gastric bypass for obesity (V45 86) (Z98 84)   23  Transition of care    Past Medical History    1  Former smoker ( 82) (U56 499)   2  History of obesity (V13 89) (Z86 39)   3  History of Morbid obesity (278 01) (E66 01)   4  History of Vitamin D insufficiency (268 9) (E55 9)    Surgical History    1  History of  Section   2  History of Gastric Surgery For Morbid Obesity Gastric Bypass   3  History of Repair Of Paraesophageal Hiatus Hernia   4  History of Tonsillectomy   5  History of Uterine Myomectomy    Family History  Mother    1  Family history of Breast CA  Father    2  Family history of Coronary arteriosclerosis   3  Family history of Hypertension (V17 49)   4  Family history of Prostate Cancer (V16 42)   5  Family history of Thyroid Disorder (V18 19)  Maternal Grandmother    6  Family history of Coronary arteriosclerosis  Paternal Grandmother    9  Family history of Diabetes Mellitus (V18 0)  Maternal Grandfather    8  Family history of Coronary arteriosclerosis    Social History    · Denied: History of Alcohol Use (History)   · Daily Coffee Consumption (2  Cups/Day)   · Denied: History of Daily Cola Consumption (___ Cans/Day)   · Daily Tea Consumption (___ Cups/Day)   · Denied: History of Drug Use   · Former smoker ( 82) (Z87 891)   · Housewife or homemaker   · Marital History - Currently    · Uses Safety Equipment - Seatbelts    Current Meds   1  Calcium Citrate + D TABS; i po tid; Therapy: (Recorded:2015) to Recorded   2  Daily Multivitamin TABS; Celebrate; Therapy: (Elian Hugo) to Recorded   3  Fish Oil CAPS; Therapy: (Recorded:62Hqj1824) to Recorded   4  FLUoxetine HCl - 20 MG Oral Capsule (PROzac); TAKE 2 CAPSULES BY MOUTH EVERY   DAY; Therapy: 39EMG2650 to (Evaluate:2017)  Requested for: 27AER3420; Last   Rx:13Rtx8028 Ordered   5   Oxycodone-Acetaminophen 5-325 MG Oral Tablet; TAKE 1 TABLET EVERY 4 TO 6   HOURS AS NEEDED FOR PAIN Recorded   6  Pravastatin Sodium 20 MG Oral Tablet; TAKE ONE TABLET BY MOUTH ONCE DAILY; Therapy: 19PUL7789 to (Evaluate:33Ocs4858)  Requested for: 48AOP0617; Last   Rx:98Frt9298 Ordered    Allergies    1   No Known Drug Allergies    Future Appointments    Date/Time Provider Specialty Site   07/30/2018 08:30 AM Tamera Kelly, [address] [facility]     Signatures   Electronically signed by : GARRET Romo; Jul 28 2017  1:39PM EST                       (Author)    Electronically signed by : FRANCISCO Wilkins ; Jul 28 2017  2:17PM EST                       (Validation)

## 2018-01-15 NOTE — MISCELLANEOUS
Message  Pt called today because she was seen at [address] [place] [address] ER for severe abd pain  She said they did an US and a CT scan  Nothing found by them  Showed Anabella the PA and she didn't see anything abnormal  She told me to tell pt to increase PPI to BID go back to liquid diet for a few day and advance as tolerated  Also recommend ot to start daily Miralax  If no improvement in a week she should call the office to be seen  Pt understood and said she would  Active Problems    1  Acute sinusitis (461 9) (J01 90)   2  Chest pain (786 50) (R07 9)   3  Depression (311) (F32 9)   4  Esophageal reflux (530 81) (K21 9)   5  Fatigue (780 79) (R53 83)   6  Flu vaccine need (V04 81) (Z23)   7  Heartburn (787 1) (R12)   8  Hiatal hernia (553 3) (K44 9)   9  Hypercholesterolemia (272 0) (E78 0)   10  Need for diphtheria-tetanus-pertussis (Tdap) vaccine (V06 1) (Z23)   11  Obesity (278 00) (E66 9)   12  Otalgia, unspecified laterality (388 70) (H92 09)   13  Postgastrectomy malabsorption (579 3) (K91 2,Z90 3)   14  Pre-operative cardiovascular examination (V72 81) (Z01 810)   15  Preoperative clearance (V72 84) (Z01 818)   16  Sea sickness (994 6) (T75 3XXA)   17  Status post gastric bypass for obesity (V45 86) (Z98 84)    Current Meds   1  Biotin 5000 MCG Oral Tablet Recorded   2  Calcium Citrate + D TABS; i po tid; Therapy: (Recorded:02Oct2015) to Recorded   3  Daily Multivitamin TABS; Celebrate; Therapy: (Nilda Aguillon) to Recorded   4  FLUoxetine HCl - 20 MG Oral Capsule (PROzac); TAKE 2 CAPSULES BY MOUTH   EVERY DAY; Therapy: 86PIY2302 to ([id number])  Requested for: 35JTN3004; Last   Rx:07Mar2016 Ordered   5  Iron Supplement TABS; Therapy: (8397 2844) to Recorded   6  Omeprazole 20 MG Oral Capsule Delayed Release; Therapy: (Recorded:14Jan2016) to Recorded   7  Pravastatin Sodium 20 MG Oral Tablet; TAKE ONE TABLET BY MOUTH ONCE DAILY;    Therapy: 12XTR2867 to (Evaluate:17Cqg3148) Requested for: 12QFO3706; Last   Rx:20Oct2015 Ordered    Allergies    1   No Known Drug Allergies    Signatures   Electronically signed by : Valery Navarrete LPN; Apr 29 4745  9:27JO EST                       (Author)

## 2018-01-15 NOTE — PROGRESS NOTES
Assessment    1  Status post gastric bypass for obesity (V45 86) (Z98 84)   2  Postgastrectomy malabsorption (579 3) (K91 2)   3  Hypercholesterolemia (272 0) (E78 0)   4  Esophageal reflux (530 81) (K21 9)   5  Depression (311) (F32 9)   6  Encounter for preventive health examination (V70 0) (Z00 00)   7  History of Vitamin D insufficiency (268 9) (E55 9)    Plan  Depression, Health Maintenance, Esophageal reflux, Hypercholesterolemia,  Postgastrectomy malabsorption, Status post gastric bypass for  obesity    · (1) CBC/ PLT (NO DIFF); Status:Active; Requested [id number];    Perform:[facility]; Due:14Jun2017;Ordered; For:Depression, Health Maintenance, Esophageal reflux, Hypercholesterolemia, Postgastrectomy malabsorption, Status post gastric bypass for obesity; Ordered By:Nilda Lopez;   · (1) COMPREHENSIVE METABOLIC PANEL; Status:Active; Requested ZCR:[id number];    Perform:[facility]; Due:14Jun2017;Ordered; For:Depression, Health Maintenance, Esophageal reflux, Hypercholesterolemia, Postgastrectomy malabsorption, Status post gastric bypass for obesity; Ordered By:Nilda Lopez;   · (1) FERRITIN; Status:Active; Requested IPR:[id number];    Perform:[facility]; Due:14Jun2017;Ordered; For:Depression, Health Maintenance, Esophageal reflux, Hypercholesterolemia, Postgastrectomy malabsorption, Status post gastric bypass for obesity; Ordered By:Nilda Lopez;   · (1) FOLATE; Status:Active; Requested [id number]:[id number];    Perform:[facility]; Due:14Jun2017;Ordered; For:Depression, Health Maintenance, Esophageal reflux, Hypercholesterolemia, Postgastrectomy malabsorption, Status post gastric bypass for obesity; Ordered By:Nilda Lopez;   · (1) LIPID PANEL, FASTING; Status:Active; Requested MMA:[id number];    Perform:[facility]; Due:14Jun2017;Ordered; For:Depression, Health Maintenance, Esophageal reflux, Hypercholesterolemia, Postgastrectomy malabsorption, Status post gastric bypass for obesity;  Ordered By:Nilda Lopez; · (1) PTH N-TERMINAL (INTACT); Status:Active; Requested [id number]:[id number];    Perform:[facility]; Due:2017;Ordered; For:Depression, Health Maintenance, Esophageal reflux, Hypercholesterolemia, Postgastrectomy malabsorption, Status post gastric bypass for obesity; Ordered By:Jarvis Lopez;   · (1) VITAMIN A; Status:Active; Requested [id number]:[id number];    Perform:[facility]; Due:2017;Ordered; For:Depression, Health Maintenance, Esophageal reflux, Hypercholesterolemia, Postgastrectomy malabsorption, Status post gastric bypass for obesity; Ordered By:Anabella Lopez;   · (1) VITAMIN B1, WHOLE BLOOD; Status:Active; Requested [id number]:[id number];    Perform:[facility]; Due:2017;Ordered; For:Depression, Health Maintenance, Esophageal reflux, Hypercholesterolemia, Postgastrectomy malabsorption, Status post gastric bypass for obesity; Ordered By:Jarvis Lopez;   · (1) VITAMIN B12; Status:Active; Requested [id number];    Perform:[facility]; Due:2017;Ordered; For:Depression, Health Maintenance, Esophageal reflux, Hypercholesterolemia, Postgastrectomy malabsorption, Status post gastric bypass for obesity; Ordered By:Jarvis Lopez;   · (1) VITAMIN D 25-HYDROXY; Status:Active; Requested [id number];    Perform:[facility]; Due:2017;Ordered; For:Depression, Health Maintenance, Esophageal reflux, Hypercholesterolemia, Postgastrectomy malabsorption, Status post gastric bypass for obesity; Ordered By:Jarvis Lopez;    Discussion/Summary    Follow-up in 6 months  Follow diet as discussed  Get lab work done prior to your next office visit  Follow vitamin/mineral recommendations as reviewed with you  Exercise as tolerated  Call our office if you have any problems with abdominal pain especially if associated with fever, chills, nausea, vomiting, or any other concerns  1  s/p lap Peyton-en-y gastric bypass surgery-  56-year-old female, status post laparoscopic Peyton-en-Y gastric bypass surgery, 2015 by Dr Kay Hansen  Returns in routine followup  She is tolerating regular diet  A 24-hour diet recall was obtained from the patient  She is eating at least 60 g of protein per day  She is following 30/60 minute rule with her liquids and is drinking at least 64 ounces of fluid per day  She has been routinely, walking for exercise and notes she has decreased with the colder weather  I encouraged her to find an exercise  That she enjoys doing such as exercise DVDs or walking indoors or exercise classes to continue with her life-style changes for optimal results  She's done very well with her weight loss so far and has lost 61%  Excess Oddi weight loss, which is around the average targeted weight loss for this time frame    2  Malabsorption-Pt is at risk for vitamin and mineral deficiencies secondary to the malabsorption and restriction of intakes  Reviewed  Her most recent labs with her  Her  Vitamin A level was marginally lower  I advised her on food sources of this  This will be rechecked with routine labs    3  hypercholesterolemia-mildy low HDL-encouraged to continue with exercise but advised this may be a hereditary factor as well  4 reflux-has been controlled on ppi  I advised her to trial off her PPI now as she may not need it anymore  If no heartburn advised to stay off it  If she has heartburn she can stay on it until I see her at her follow-up in 6 months  5  h/o low vitamin V-acdwerbad-qvtxrcob current supplement  6  depression-mood appears stable on current regimen  Followed by another provider  Chief Complaint  Patient in office today for 3rd post op  She has no problems or concerns  Post-Op  Post-Op Bariatric Surgery:   Greer Vargas is status post laparoscopic Peyton-en-Y procedure, performed on 6/16/15   by Dr Rayfield Boeck  HPI: today's weight is 191  5 lb pounds, today's BMI is 33 9 and her total weight loss is 61% excess body weight loss pounds   The patient reports no nausea, no vomiting, no constipation, no diarrhea, no chest pain and no abdominal pain  Diet and Exercise: Diet history reviewed and discussed with the patient  Weight loss/gains to date discussed with the patient  Supplements: multivitamins and calcium  PE:   Abdominal exam: soft and no incisional hernia  Assessment:   Post-op, the patient is doing well  Plan: Activity restrictions: None  Instructions / Recommendations: recommended a daily protein intake of  grams, vitamin supplement(s) recommended, mineral supplement(s) recommended, recommended exercising at least 150 minutes per week, diet as advised and instructed to call the office for concerns, questions, or problems  The patient was instructed to follow up in 6 months  Review of Systems    Constitutional: planned weight loss, but not feeling poorly  Cardiovascular: no chest pain and no palpitations  Respiratory: no shortness of breath and no wheezing  Gastrointestinal: no abdominal pain, no nausea, no vomiting, no constipation and no diarrhea  Psychiatric: no anxiety and no depression  Active Problems    1  Acute sinusitis (461 9) (J01 90)   2  Chest pain (786 50) (R07 9)   3  Depression (311) (F32 9)   4  Esophageal reflux (530 81) (K21 9)   5  Fatigue (780 79) (R53 83)   6  Flu vaccine need (V04 81) (Z23)   7  Heartburn (787 1) (R12)   8  Hiatal hernia (553 3) (K44 9)   9  Hypercholesterolemia (272 0) (E78 0)   10  Need for diphtheria-tetanus-pertussis (Tdap) vaccine (V06 1) (Z23)   11  Obesity (278 00) (E66 9)   12  Otalgia, unspecified laterality (388 70) (H92 09)   13  Postgastrectomy malabsorption (579 3) (K91 2)   14  Pre-operative cardiovascular examination (V72 81) (Z01 810)   15  Preoperative clearance (V72 84) (Z01 818)   16  Sea sickness (994 6) (T75 3XXA)   17   Status post gastric bypass for obesity (V45 86) (Z98 84)    Social History    · Denied: History of Alcohol Use (History)   · Daily Coffee Consumption (2  Cups/Day)   · Denied: History of Daily Cola Consumption (___ Cans/Day)   · Daily Tea Consumption (___ Cups/Day)   · Denied: History of Drug Use   · Former smoker (R69 73) (U08 206)   · quit 2012: 20+ pack year hx  · Housewife or homemaker   · Marital History - Currently    · Uses Safety Equipment - Seatbelts  The social history was reviewed and updated today  Current Meds   1  Biotin 5000 MCG Oral Tablet Recorded   2  Calcium Citrate + D TABS; i po tid; Therapy: (Recorded:02Oct2015) to Recorded   3  Daily Multivitamin TABS; Celebrate; Therapy: (Tiera Avers) to Recorded   4  FLUoxetine HCl - 20 MG Oral Capsule; TAKE TWO CAPSULES BY MOUTH DAILY; Therapy: 11FOM3183 to (Evaluate:11Rtc6900)  Requested for: 70HCT3290; Last   Rx:18Nir4475 Ordered   5  Iron Supplement TABS; Therapy: [phone]) to Recorded   6  Omeprazole 20 MG Oral Capsule Delayed Release; TAKE 1 CAPSULE DAILY; Therapy: 40YQQ2863 to (Orelia Kehr)  Requested for: 53HJJ5171; Last   Rx:62Mnu7111 Ordered   7  Omeprazole 20 MG Oral Capsule Delayed Release; Therapy: (Recorded:14Jan2016) to Recorded   8  Pravastatin Sodium 20 MG Oral Tablet; TAKE ONE TABLET BY MOUTH ONCE DAILY; Therapy: 22GZX1986 to (Evaluate:22Tcs1284)  Requested for: 03SHD4969; Last   Rx:66Hcq8667 Ordered    The medication list was reviewed and updated today  Allergies    1  No Known Drug Allergies    Vitals   Recorded: 25GVJ7727 03:55PM   Temperature 98 2 F   Heart Rate 82   Respiration 20   Systolic 813   Diastolic 80   Height 5 ft 3 in   Weight 191 lb 8 0 oz   BMI Calculated 33 92   BSA Calculated 1 9     Physical Exam    Constitutional   General appearance: No acute distress, well appearing and well nourished  Eyes b/l conjunctiva without pallor  Ears, Nose, Mouth, and Throat oral mucosa moist    Pulmonary   Respiratory effort: No increased work of breathing or signs of respiratory distress  Auscultation of lungs: Clear to auscultation  Cardiovascular   Auscultation of heart: Normal rate and rhythm, normal S1 and S2, without murmurs  Abdomen soft, no incisional hernias appreciated     Musculoskeletal   Gait and station: Normal     Psychiatric   Orientation to person, place, and time: Normal     Mood and affect: Normal          Signatures   Electronically signed by : PHILIPPE Alatorre; Jan 14 2016  4:29PM EST                       (Author)    Electronically signed by : FRANCISCO Dial ; Jan 14 2016  4:36PM EST                       (Validation)

## 2018-01-16 NOTE — MISCELLANEOUS
Message  Patient called and requested Omeprazole had no refills, coming in for annual with aleida 7/19  Active Problems    1  Acute sinusitis (461 9) (J01 90)   2  Chest pain (786 50) (R07 9)   3  Depression (311) (F32 9)   4  Esophageal reflux (530 81) (K21 9)   5  Fatigue (780 79) (R53 83)   6  Flu vaccine need (V04 81) (Z23)   7  Heartburn (787 1) (R12)   8  Hiatal hernia (553 3) (K44 9)   9  Hypercholesterolemia (272 0) (E78 0)   10  Need for diphtheria-tetanus-pertussis (Tdap) vaccine (V06 1) (Z23)   11  Obesity (278 00) (E66 9)   12  Otalgia, unspecified laterality (388 70) (H92 09)   13  Postgastrectomy malabsorption (579 3) (K91 2,Z90 3)   14  Pre-operative cardiovascular examination (V72 81) (Z01 810)   15  Preoperative clearance (V72 84) (Z01 818)   16  Sea sickness (994 6) (T75 3XXA)   17  Status post gastric bypass for obesity (V45 86) (Z98 84)    Current Meds   1  Biotin 5000 MCG Oral Tablet Recorded   2  Calcium Citrate + D TABS; i po tid; Therapy: (Recorded:02Oct2015) to Recorded   3  Daily Multivitamin TABS; Celebrate; Therapy: (Zoila Soni) to Recorded   4  FLUoxetine HCl - 20 MG Oral Capsule (PROzac); TAKE 2 CAPSULES BY MOUTH   EVERY DAY; Therapy: 63KNX1217 to (Marthena Simmonds)  Requested for: 28MQK5262; Last   Rx:07Mar2016 Ordered   5  Iron Supplement TABS; Therapy: ([phone]) to Recorded   6  Omeprazole 20 MG Oral Capsule Delayed Release; Therapy: (Recorded:14Jan2016) to Recorded   7  Pravastatin Sodium 20 MG Oral Tablet; TAKE ONE TABLET BY MOUTH ONCE DAILY; Therapy: 77ANP0858 to (Evaluate:22Koh7506)  Requested for: 46VQP2902; Last   Rx:20Oct2015 Ordered    Allergies    1   No Known Drug Allergies    Plan  Status post gastric bypass for obesity    · Omeprazole 20 MG Oral Capsule Delayed Release; take 1 capsule daily    Signatures   Electronically signed by : Megan Heard, ; Jun 28 2016 12:58PM EST                       (Author)

## 2018-01-18 NOTE — MISCELLANEOUS
Provider Comments  Provider Comments:     Dear Elba Valle had a scheduled appointment at our office for 09/16/2016 that you called to cancel but did not reschedule for another day  It is very important that you follow up with us so that we can assess your physical and nutritional safety after your surgery  Please call our office at [phone] to reschedule your appointment       Sincerely,     [facility]            Signatures   Electronically signed by : Namrata Blanco, ; Sep 16 2016 12:14PM EST                       (Author)

## 2018-02-03 ENCOUNTER — APPOINTMENT (EMERGENCY)
Dept: RADIOLOGY | Facility: HOSPITAL | Age: 53
End: 2018-02-03
Payer: COMMERCIAL

## 2018-02-03 ENCOUNTER — HOSPITAL ENCOUNTER (EMERGENCY)
Facility: HOSPITAL | Age: 53
Discharge: HOME | End: 2018-02-03
Attending: EMERGENCY MEDICINE
Payer: COMMERCIAL

## 2018-02-03 VITALS
TEMPERATURE: 99 F | BODY MASS INDEX: 30.11 KG/M2 | HEART RATE: 64 BPM | RESPIRATION RATE: 16 BRPM | OXYGEN SATURATION: 99 % | WEIGHT: 170 LBS | SYSTOLIC BLOOD PRESSURE: 120 MMHG | DIASTOLIC BLOOD PRESSURE: 75 MMHG

## 2018-02-03 DIAGNOSIS — S82.841A BIMALLEOLAR ANKLE FRACTURE, RIGHT, CLOSED, INITIAL ENCOUNTER: Primary | ICD-10-CM

## 2018-02-03 PROCEDURE — 99283 EMERGENCY DEPT VISIT LOW MDM: CPT

## 2018-02-03 PROCEDURE — 73610 X-RAY EXAM OF ANKLE: CPT

## 2018-02-03 RX ORDER — ACETAMINOPHEN 325 MG/1
650 TABLET ORAL ONCE
Status: COMPLETED | OUTPATIENT
Start: 2018-02-03 | End: 2018-02-03

## 2018-02-03 RX ORDER — MORPHINE SULFATE 15 MG/1
15 TABLET ORAL ONCE
Status: COMPLETED | OUTPATIENT
Start: 2018-02-03 | End: 2018-02-03

## 2018-02-03 RX ORDER — MORPHINE SULFATE 15 MG/1
15 TABLET ORAL EVERY 4 HOURS PRN
Qty: 15 TABLET | Refills: 0 | Status: SHIPPED | OUTPATIENT
Start: 2018-02-03 | End: 2019-05-16 | Stop reason: ALTCHOICE

## 2018-02-03 RX ADMIN — ACETAMINOPHEN 650 MG: 325 TABLET, FILM COATED ORAL at 17:08

## 2018-02-03 RX ADMIN — MORPHINE SULFATE 15 MG: 15 TABLET ORAL at 17:39

## 2018-02-03 NOTE — ED NOTES
Patient transported to [address] [place] [address], [address] [place] [address]  02/03/18 0322

## 2018-02-03 NOTE — ED PROVIDER NOTES
History  Chief Complaint   Patient presents with    Fall     slipped on ice and has right ankle pain and swelling     76-year-old female presents with chief complaint of right ankle pain after slipping on the ice today  Patient reports she left her house and slipped on the icy walked away and came down on her ankle  Patient has pain over both the lateral and medial malleolus  Intact sensation the foot and normal perfusion  History provided by:  Patient   used: No    Ankle Injury   Location:  Right ankle  Quality:  Throbbin, aching  Severity:  Severe  Onset quality:  Sudden  Duration:  1 hour  Timing:  Constant  Progression:  Unchanged  Chronicity:  New  Context:  Fall on ice  Relieved by:  Immobilization, ice  Worsened by: Movement, palpation      Prior to Admission Medications   Prescriptions Last Dose Informant Patient Reported? Taking? Calcium Citrate-Vitamin D (CALCIUM CITRATE + D) 250-200 MG-UNIT TABS   Yes No   Sig: Take 1 tablet by mouth 3 (three) times a day   FLUoxetine (PROzac) 20 mg capsule   Yes No   Sig: Take 40 mg by mouth daily     MULTIPLE VITAMIN PO   Yes No   Sig: Take 1 tablet by mouth daily   pantoprazole (PROTONIX) 40 mg tablet   No No   Sig: Take 1 tablet by mouth daily for 30 days   pravastatin (PRAVACHOL) 20 mg tablet   Yes No   Sig: Take 20 mg by mouth daily        Facility-Administered Medications: None       Past Medical History:   Diagnosis Date    Abdominal pain     Chronic pain disorder     abdominal    Depression     Diverticula of colon     GERD (gastroesophageal reflux disease)     Hemorrhoids     Hiatal hernia     Hyperlipidemia     Intestinal malabsorption following gastrectomy     Obesity        Past Surgical History:   Procedure Laterality Date    ABDOMINAL SURGERY      gastric bipass     SECTION      (2)    COLONOSCOPY      ESOPHAGOGASTRODUODENOSCOPY      x2    GASTRIC BYPASS      OR EGD TRANSORAL BIOPSY SINGLE/MULTIPLE N/A 6/7/2017    Procedure: ESOPHAGOGASTRODUODENOSCOPY (EGD); Surgeon: Lucy Nam MD;  Location: [facility]; Service: Bariatrics    TONSILLECTOMY      UTERINE FIBROID SURGERY         History reviewed  No pertinent family history  I have reviewed and agree with the history as documented  Social History   Substance Use Topics    Smoking status: Former Smoker     Quit date: 2011    Smokeless tobacco: Never Used    Alcohol use No        Review of Systems   Musculoskeletal: Positive for arthralgias (right ankle pain)  Skin: Negative for color change and wound  Neurological: Negative for weakness and numbness  Physical Exam  ED Triage Vitals [02/03/18 1701]   Temperature Pulse Respirations Blood Pressure SpO2   99 °F (37 2 °C) 64 16 120/75 99 %      Temp Source Heart Rate Source Patient Position - Orthostatic VS BP Location FiO2 (%)   Oral Monitor Sitting Left arm --      Pain Score       8           Orthostatic Vital Signs  Vitals:    02/03/18 1701   BP: 120/75   Pulse: 64   Patient Position - Orthostatic VS: Sitting       Physical Exam   Constitutional: She is oriented to person, place, and time  She appears well-developed and well-nourished  She appears distressed  HENT:   Head: Normocephalic and atraumatic  Eyes: EOM are normal  Pupils are equal, round, and reactive to light  Neck: Normal range of motion  No JVD present  Cardiovascular: Normal rate, regular rhythm, normal heart sounds and intact distal pulses  Exam reveals no gallop and no friction rub  No murmur heard  Pulmonary/Chest: Effort normal and breath sounds normal  No respiratory distress  She has no wheezes  She has no rales  She exhibits no tenderness  Musculoskeletal: Normal range of motion  She exhibits tenderness (right ankle) and deformity (right ankle)  Neurological: She is alert and oriented to person, place, and time  Skin: Skin is warm and dry  Psychiatric: She has a normal mood and affect   Her behavior is normal  Judgment and thought content normal    Nursing note and vitals reviewed  ED Medications  Medications   acetaminophen (TYLENOL) tablet 650 mg (650 mg Oral Given 2/3/18 1708)   morphine (MSIR) IR tablet 15 mg (15 mg Oral Given 2/3/18 1739)       Diagnostic Studies  Results Reviewed     None                 XR ankle 3+ views RIGHT   Final Result by Cherelle Kang DO (02/03 1266)      Mildly displaced fractures of the medial malleolus and distal fibula  Widening of the medial ankle mortise  Workstation performed: [id number]          XR ankle 3+ views RIGHT  This film was interpreted independently by me  Bimalleolar ankle fracture  Procedures  Procedures       Phone Contacts  ED Phone Contact    ED Course  ED Course                                MDM  Number of Diagnoses or Management Options  Bimalleolar ankle fracture, right, closed, initial encounter: new and requires workup  Diagnosis management comments: Background: 47 y o  female with right ankle pain after injury    Differential DX includes but is not limited to: fracture vs contusion vs sprain     Plan: imaging, symptom control         Amount and/or Complexity of Data Reviewed  Tests in the radiology section of CPT®: ordered and reviewed  Independent visualization of images, tracings, or specimens: (This film was interpreted independently by me  Bimalleolar ankle fracture  )    Risk of Complications, Morbidity, and/or Mortality  General comments: I examined the patient after both stirrup and posterior short leg splint was placed on the right ankle by RN  Well placed  Normal sensation and perfusion in toes of foot            CritCare Time    Disposition  Final diagnoses:   Bimalleolar ankle fracture, right, closed, initial encounter     Time reflects when diagnosis was documented in both MDM as applicable and the Disposition within this note     Time User Action Codes Description Comment    2/3/2018  5:55 PM Jonathan Delarosa Add [J85 880S] Bimalleolar ankle fracture, right, closed, initial encounter       ED Disposition     ED Disposition Condition Comment    Discharge  Rahul Loose discharge to home/self care  Condition at discharge: Good        Follow-up Information     Follow up With Specialties Details Why Contact Info    your podiatrist   Schedule an appointment as soon as possible for a visit in 3 days          Discharge Medication List as of 2/3/2018  5:55 PM      START taking these medications    Details   morphine (MSIR) 15 mg tablet Take 1 tablet (15 mg total) by mouth every 4 (four) hours as needed for severe pain for up to 15 doses Max Daily Amount: 90 mg, Starting Sat 2/3/2018, Print         CONTINUE these medications which have NOT CHANGED    Details   Calcium Citrate-Vitamin D (CALCIUM CITRATE + D) 250-200 MG-UNIT TABS Take 1 tablet by mouth 3 (three) times a day, Until Discontinued, Historical Med      FLUoxetine (PROzac) 20 mg capsule Take 40 mg by mouth daily  , Until Discontinued, Historical Med      MULTIPLE VITAMIN PO Take 1 tablet by mouth daily, Until Discontinued, Historical Med      pantoprazole (PROTONIX) 40 mg tablet Take 1 tablet by mouth daily for 30 days, Starting 5/12/2017, Until Sun 6/11/17, Print      pravastatin (PRAVACHOL) 20 mg tablet Take 20 mg by mouth daily  , Until Discontinued, Historical Med           No discharge procedures on file      ED Provider  Electronically Signed by           Randy Saldaña MD  02/04/18 2386

## 2018-02-03 NOTE — DISCHARGE INSTRUCTIONS
Ankle Fracture   WHAT YOU NEED TO KNOW:   An ankle fracture is a break in 1 or more of the bones in your ankle  DISCHARGE INSTRUCTIONS:   Call 911 for any of the following:   · You feel lightheaded, short of breath, and have chest pain  · You cough up blood  Return to the emergency department if:   · Your leg feels warm, tender, and painful  It may look swollen and red  · Blood soaks through your bandage  · You have severe pain in your ankle  · Your cast feels too tight  · Your foot or toes are cold or numb  · Your foot or toenails turn blue or gray  Contact your healthcare provider if:   · Your splint feels too tight  · Your swelling has increased or returned  · You have a fever  · Your pain does not go away, even after treatment  · You have questions or concerns about your condition or care  Medicines: You may need any of the following:  · Acetaminophen  decreases pain and fever  It is available without a doctor's order  Ask how much to take and how often to take it  Follow directions  Acetaminophen can cause liver damage if not taken correctly  · NSAIDs , such as ibuprofen, help decrease swelling, pain, and fever  This medicine is available with or without a doctor's order  NSAIDs can cause stomach bleeding or kidney problems in certain people  If you take blood thinner medicine, always ask your healthcare provider if NSAIDs are safe for you  Always read the medicine label and follow directions  · Prescription pain medicine  may be given  Ask your healthcare provider how to take this medicine safely  · Take your medicine as directed  Contact your healthcare provider if you think your medicine is not helping or if you have side effects  Tell him or her if you are allergic to any medicine  Keep a list of the medicines, vitamins, and herbs you take  Include the amounts, and when and why you take them  Bring the list or the pill bottles to follow-up visits   Carry your medicine list with you in case of an emergency  Follow up with your healthcare provider in 1 to 2 days: Your fracture may need to be reduced (bones pushed back into place) or you may need surgery  Write down your questions so you remember to ask them during your visits  Support devices: You will be given a brace, cast, or splint to limit your movement and protect your ankle  You may need to use crutches to protect your ankle and decrease your pain as you move around  Do not remove your device and do not put weight on your injured ankle  Splint and cast care:  Cover the splint or cast before you bathe so it does not get wet  Tape 2 plastic trash bags to your skin above the cast  Try to keep your ankle out of the water as much as possible  Rest:  Rest your ankle so that it can heal  Return to normal activities as directed  Ice:  Apply ice on your ankle for 15 to 20 minutes every hour or as directed  Use an ice pack, or put crushed ice in a plastic bag  Cover it with a towel  Ice helps prevent tissue damage and decreases swelling and pain  Elevate:  Elevate your ankle above the level of your heart as often as you can  This will help decrease swelling and pain  Prop your ankle on pillows or blankets to keep it elevated comfortably  © 2017 [employer]  Information is for End User's use only and may not be sold, redistributed or otherwise used for commercial purposes  All illustrations and images included in CareNotes® are the copyrighted property of A [employer]  or [employer]  The above information is an  only  It is not intended as medical advice for individual conditions or treatments  Talk to your doctor, nurse or pharmacist before following any medical regimen to see if it is safe and effective for you

## 2018-02-08 ENCOUNTER — TELEPHONE (OUTPATIENT)
Dept: BARIATRICS | Facility: CLINIC | Age: 53
End: 2018-02-08

## 2018-02-08 NOTE — TELEPHONE ENCOUNTER
Pt called and left a message she wanted to check and see if it was okay to take 3 Asprin for one month post op ankle sx  Spoke with Dr Rakel Quintero and he said it was fine as long as she took her PPI BID  Called pt back and left a message on her VM with the advise and told her to call the office with any questions or concerns

## 2018-02-27 DIAGNOSIS — Z98.84 STATUS POST BARIATRIC SURGERY: Primary | ICD-10-CM

## 2018-02-28 RX ORDER — OMEPRAZOLE 20 MG/1
20 CAPSULE, DELAYED RELEASE ORAL 2 TIMES DAILY
Qty: 180 CAPSULE | Refills: 0 | Status: SHIPPED | OUTPATIENT
Start: 2018-02-28 | End: 2019-05-23 | Stop reason: ALTCHOICE

## 2018-03-22 ENCOUNTER — TRANSCRIBE ORDERS (OUTPATIENT)
Dept: RADIOLOGY | Facility: CLINIC | Age: 53
End: 2018-03-22

## 2018-03-28 DIAGNOSIS — E78.00 HYPERCHOLESTEROLEMIA: Primary | ICD-10-CM

## 2018-03-28 RX ORDER — PRAVASTATIN SODIUM 20 MG
20 TABLET ORAL DAILY
Qty: 90 TABLET | Refills: 3 | Status: SHIPPED | OUTPATIENT
Start: 2018-03-28 | End: 2018-03-29 | Stop reason: SDUPTHER

## 2018-03-29 DIAGNOSIS — E78.00 HYPERCHOLESTEROLEMIA: ICD-10-CM

## 2018-03-29 RX ORDER — PRAVASTATIN SODIUM 20 MG
TABLET ORAL
Qty: 30 TABLET | Refills: 2 | Status: SHIPPED | OUTPATIENT
Start: 2018-03-29 | End: 2019-04-02 | Stop reason: SDUPTHER

## 2018-06-05 DIAGNOSIS — F32.A DEPRESSION, UNSPECIFIED DEPRESSION TYPE: Primary | ICD-10-CM

## 2018-06-05 RX ORDER — FLUOXETINE HYDROCHLORIDE 20 MG/1
CAPSULE ORAL
Qty: 60 CAPSULE | Refills: 3 | Status: SHIPPED | OUTPATIENT
Start: 2018-06-05 | End: 2018-10-13 | Stop reason: SDUPTHER

## 2018-06-06 DIAGNOSIS — F32.A DEPRESSION, UNSPECIFIED DEPRESSION TYPE: ICD-10-CM

## 2018-07-18 ENCOUNTER — TELEPHONE (OUTPATIENT)
Dept: FAMILY MEDICINE CLINIC | Facility: CLINIC | Age: 53
End: 2018-07-18

## 2018-07-18 ENCOUNTER — OFFICE VISIT (OUTPATIENT)
Dept: FAMILY MEDICINE CLINIC | Facility: CLINIC | Age: 53
End: 2018-07-18
Payer: COMMERCIAL

## 2018-07-18 VITALS
HEIGHT: 63 IN | TEMPERATURE: 99.4 F | BODY MASS INDEX: 32.74 KG/M2 | HEART RATE: 72 BPM | SYSTOLIC BLOOD PRESSURE: 132 MMHG | DIASTOLIC BLOOD PRESSURE: 90 MMHG | WEIGHT: 184.8 LBS

## 2018-07-18 DIAGNOSIS — H69.83 ETD (EUSTACHIAN TUBE DYSFUNCTION), BILATERAL: Primary | ICD-10-CM

## 2018-07-18 DIAGNOSIS — Z98.84 STATUS POST GASTRIC BYPASS FOR OBESITY: ICD-10-CM

## 2018-07-18 DIAGNOSIS — E78.00 HYPERCHOLESTEROLEMIA: Primary | ICD-10-CM

## 2018-07-18 DIAGNOSIS — S03.00XA DISLOCATION OF TEMPOROMANDIBULAR JOINT, INITIAL ENCOUNTER: ICD-10-CM

## 2018-07-18 PROCEDURE — 99213 OFFICE O/P EST LOW 20 MIN: CPT | Performed by: FAMILY MEDICINE

## 2018-07-18 PROCEDURE — 3008F BODY MASS INDEX DOCD: CPT | Performed by: FAMILY MEDICINE

## 2018-07-18 NOTE — PROGRESS NOTES
Assessment/Plan:      Diagnoses and all orders for this visit:    ETD (Eustachian tube dysfunction), bilateral    Dislocation of temporomandibular joint, initial encounter      Discussion: I reviewed with pt  Pain  From TMJ and ?ETD  REC: start OTC flonase and tylenol as directed  No NSAIDs due to hx of gastric bypass  Recheck Friday if no change - earlier if worse    Subjective:     Patient ID: Mei Clark is a 46 y o  female  L>R ear pain x 2-3 days  Feels sl plugged  No vertigo, hearing loss or congestion  Pain does radiate down L neck  No fever/chills  Some discomfort with swallowing  Pt has been swelling      Earache    Pertinent negatives include no ear discharge, hearing loss or rhinorrhea  Review of Systems   Constitutional: Negative for chills, fatigue and fever  HENT: Positive for congestion and ear pain  Negative for ear discharge, hearing loss, postnasal drip, rhinorrhea, sinus pain, sinus pressure and trouble swallowing  Eyes: Negative  Respiratory: Negative  Musculoskeletal: Negative  Objective:     Physical Exam   Constitutional: She appears well-developed and well-nourished  HENT:   Head: Normocephalic and atraumatic  Right Ear: Hearing normal  No drainage or swelling  No foreign bodies  No mastoid tenderness  Tympanic membrane is not scarred, not perforated and not erythematous  A middle ear effusion is present  No decreased hearing is noted  Left Ear: Hearing normal  No drainage or swelling  No foreign bodies  No mastoid tenderness  Tympanic membrane is not scarred, not perforated and not erythematous  A middle ear effusion is present  No decreased hearing is noted  Nose: Mucosal edema (sl congestion) present  No nasal deformity  Right sinus exhibits no maxillary sinus tenderness and no frontal sinus tenderness  Left sinus exhibits no maxillary sinus tenderness and no frontal sinus tenderness     Mouth/Throat: Oropharynx is clear and moist    Eyes: Conjunctivae and EOM are normal  Pupils are equal, round, and reactive to light  Neck: Normal range of motion  Neck supple  Cardiovascular: Normal rate and intact distal pulses  Pulmonary/Chest: Effort normal and breath sounds normal    Lymphadenopathy:     She has no cervical adenopathy

## 2018-07-18 NOTE — PROGRESS NOTES
Assessment/Plan:      There are no diagnoses linked to this encounter  Subjective:     Patient ID: Berkley Mckeon is a 46 y o  female      HPI    Review of Systems      Objective:     Physical Exam

## 2018-07-19 ENCOUNTER — TELEPHONE (OUTPATIENT)
Dept: FAMILY MEDICINE CLINIC | Facility: CLINIC | Age: 53
End: 2018-07-19

## 2018-07-19 DIAGNOSIS — J32.9 SINUSITIS, UNSPECIFIED CHRONICITY, UNSPECIFIED LOCATION: Primary | ICD-10-CM

## 2018-07-19 PROBLEM — N84.0 ENDOMETRIAL POLYP: Status: ACTIVE | Noted: 2017-12-08

## 2018-07-19 PROBLEM — F32.5 MAJOR DEPRESSIVE DISORDER WITH SINGLE EPISODE, IN FULL REMISSION (HCC): Status: ACTIVE | Noted: 2017-05-16

## 2018-07-19 RX ORDER — AMOXICILLIN 875 MG/1
875 TABLET, COATED ORAL 2 TIMES DAILY
Qty: 20 TABLET | Refills: 0 | Status: SHIPPED | OUTPATIENT
Start: 2018-07-19 | End: 2018-07-29

## 2018-07-19 NOTE — TELEPHONE ENCOUNTER
Please call pt - there are 4 different [facility] in [facility] - find out which one she wants

## 2018-07-19 NOTE — TELEPHONE ENCOUNTER
Patient was told to call back tomorrow but patient can not wait  Patient states she is having bad ear pain,sinus pressure, post nasal drip and has a fever but not sure what her actual temperature is  Can something be called in?    [facility]

## 2018-07-30 ENCOUNTER — TELEPHONE (OUTPATIENT)
Dept: BARIATRICS | Facility: CLINIC | Age: 53
End: 2018-07-30

## 2018-10-13 DIAGNOSIS — F32.A DEPRESSION, UNSPECIFIED DEPRESSION TYPE: ICD-10-CM

## 2018-10-13 RX ORDER — FLUOXETINE HYDROCHLORIDE 20 MG/1
CAPSULE ORAL
Qty: 60 CAPSULE | Refills: 3 | Status: SHIPPED | OUTPATIENT
Start: 2018-10-13 | End: 2019-03-19 | Stop reason: SDUPTHER

## 2018-11-06 ENCOUNTER — TRANSCRIBE ORDERS (OUTPATIENT)
Dept: ADMINISTRATIVE | Facility: HOSPITAL | Age: 53
End: 2018-11-06

## 2018-11-06 DIAGNOSIS — Z12.31 VISIT FOR SCREENING MAMMOGRAM: Primary | ICD-10-CM

## 2018-12-11 ENCOUNTER — HOSPITAL ENCOUNTER (OUTPATIENT)
Dept: RADIOLOGY | Facility: MEDICAL CENTER | Age: 53
Discharge: HOME | End: 2018-12-11
Payer: COMMERCIAL

## 2018-12-11 VITALS — BODY MASS INDEX: 32.6 KG/M2 | HEIGHT: 63 IN | WEIGHT: 184 LBS

## 2018-12-11 DIAGNOSIS — Z12.31 VISIT FOR SCREENING MAMMOGRAM: ICD-10-CM

## 2018-12-11 PROCEDURE — 77067 SCR MAMMO BI INCL CAD: CPT

## 2018-12-26 ENCOUNTER — HOSPITAL ENCOUNTER (OUTPATIENT)
Dept: ULTRASOUND IMAGING | Facility: CLINIC | Age: 53
Discharge: HOME | End: 2018-12-26
Payer: COMMERCIAL

## 2018-12-26 VITALS — WEIGHT: 180 LBS | BODY MASS INDEX: 31.89 KG/M2 | HEIGHT: 63 IN

## 2018-12-26 DIAGNOSIS — R92.8 ABNORMAL MAMMOGRAM: ICD-10-CM

## 2018-12-26 PROCEDURE — 76642 ULTRASOUND BREAST LIMITED: CPT

## 2019-02-13 ENCOUNTER — TELEPHONE (OUTPATIENT)
Dept: FAMILY MEDICINE CLINIC | Facility: CLINIC | Age: 54
End: 2019-02-13

## 2019-02-13 ENCOUNTER — TRANSCRIBE ORDERS (OUTPATIENT)
Dept: ADMINISTRATIVE | Facility: HOSPITAL | Age: 54
End: 2019-02-13

## 2019-02-13 DIAGNOSIS — N63.0 LUMP OR MASS IN BREAST: Primary | ICD-10-CM

## 2019-02-13 DIAGNOSIS — Z80.3 FAMILY HISTORY OF MALIGNANT NEOPLASM OF BREAST: ICD-10-CM

## 2019-03-05 ENCOUNTER — TELEPHONE (OUTPATIENT)
Dept: FAMILY MEDICINE CLINIC | Facility: CLINIC | Age: 54
End: 2019-03-05

## 2019-03-05 DIAGNOSIS — R42 VERTIGO: Primary | ICD-10-CM

## 2019-03-05 NOTE — TELEPHONE ENCOUNTER
Patient called stating she saw her GYN doctor Dr Bryant and during the appointment he recommended patient see a neurologist  She made an appointment for April with [facility] but according to [address] [facility] needs a physician order from PCP  Patient is going to Neurology for dizzy spells and hot flashes

## 2019-03-19 DIAGNOSIS — F32.A DEPRESSION, UNSPECIFIED DEPRESSION TYPE: ICD-10-CM

## 2019-03-19 RX ORDER — FLUOXETINE HYDROCHLORIDE 20 MG/1
CAPSULE ORAL
Qty: 60 CAPSULE | Refills: 2 | Status: SHIPPED | OUTPATIENT
Start: 2019-03-19 | End: 2019-09-21 | Stop reason: SDUPTHER

## 2019-03-27 ENCOUNTER — PATIENT MESSAGE (OUTPATIENT)
Dept: BARIATRICS | Facility: CLINIC | Age: 54
End: 2019-03-27

## 2019-03-27 NOTE — TELEPHONE ENCOUNTER
Patient asked about once per day bariatric multivitamins and minerals, plans to continue with soft chew calcium   Responded with following email:    There are quite a few once per day options now available:  Procare has a once a day capsule:  chose 18 mg iron option- can also be found on Guanxi.me  Bariatric Advantage has a one a day option:  http://Gucash/ultra-solo-with-iron  Use validation code STLUKESBARIATRICS FOR DISCOUNT  Bariatric Fusion also has a one a day option:  https://bariatricfusion  com/default/1-per-day-multivitamin-capsule-with-iron html  Celebrate vitamins now has a one a day  https://celebrateAdvanced [facility] Institute/products/multi-complete-18?gmijjnu=9596299809388    You can purchase on line, or on [address] [facility] [address] or try on line bariatric store:   biNu  - use coupon code [id number] to save 15%   [employer] on line link to order vitamins : Idomoo

## 2019-04-02 DIAGNOSIS — E78.00 HYPERCHOLESTEROLEMIA: ICD-10-CM

## 2019-04-02 RX ORDER — PRAVASTATIN SODIUM 20 MG
20 TABLET ORAL DAILY
Qty: 90 TABLET | Refills: 1 | Status: SHIPPED | OUTPATIENT
Start: 2019-04-02 | End: 2019-09-21 | Stop reason: SDUPTHER

## 2019-04-11 ENCOUNTER — OFFICE VISIT (OUTPATIENT)
Dept: NEUROLOGY | Facility: CLINIC | Age: 54
End: 2019-04-11
Payer: COMMERCIAL

## 2019-04-11 VITALS
WEIGHT: 186.3 LBS | HEIGHT: 63 IN | RESPIRATION RATE: 12 BRPM | HEART RATE: 86 BPM | SYSTOLIC BLOOD PRESSURE: 125 MMHG | BODY MASS INDEX: 33.01 KG/M2 | DIASTOLIC BLOOD PRESSURE: 78 MMHG

## 2019-04-11 DIAGNOSIS — R42 POSTURAL DIZZINESS WITH NEAR SYNCOPE: Primary | ICD-10-CM

## 2019-04-11 DIAGNOSIS — R42 VERTIGO: ICD-10-CM

## 2019-04-11 DIAGNOSIS — R55 POSTURAL DIZZINESS WITH NEAR SYNCOPE: Primary | ICD-10-CM

## 2019-04-11 PROCEDURE — 99205 OFFICE O/P NEW HI 60 MIN: CPT | Performed by: PSYCHIATRY & NEUROLOGY

## 2019-05-03 ENCOUNTER — HOSPITAL ENCOUNTER (OUTPATIENT)
Dept: MRI IMAGING | Facility: HOSPITAL | Age: 54
Discharge: HOME | End: 2019-05-03
Attending: PSYCHIATRY & NEUROLOGY
Payer: COMMERCIAL

## 2019-05-03 DIAGNOSIS — R55 POSTURAL DIZZINESS WITH NEAR SYNCOPE: ICD-10-CM

## 2019-05-03 DIAGNOSIS — R42 VERTIGO: ICD-10-CM

## 2019-05-03 DIAGNOSIS — R42 POSTURAL DIZZINESS WITH NEAR SYNCOPE: ICD-10-CM

## 2019-05-03 PROCEDURE — A9585 GADOBUTROL INJECTION: HCPCS | Performed by: PSYCHIATRY & NEUROLOGY

## 2019-05-03 PROCEDURE — 70553 MRI BRAIN STEM W/O & W/DYE: CPT

## 2019-05-03 RX ADMIN — GADOBUTROL 8 ML: 604.72 INJECTION INTRAVENOUS at 18:10

## 2019-05-07 ENCOUNTER — HOSPITAL ENCOUNTER (OUTPATIENT)
Dept: NON INVASIVE DIAGNOSTICS | Facility: CLINIC | Age: 54
Discharge: HOME | End: 2019-05-07
Payer: COMMERCIAL

## 2019-05-07 DIAGNOSIS — R55 POSTURAL DIZZINESS WITH NEAR SYNCOPE: ICD-10-CM

## 2019-05-07 DIAGNOSIS — R42 POSTURAL DIZZINESS WITH NEAR SYNCOPE: ICD-10-CM

## 2019-05-07 PROCEDURE — 93880 EXTRACRANIAL BILAT STUDY: CPT | Performed by: SURGERY

## 2019-05-07 PROCEDURE — 93880 EXTRACRANIAL BILAT STUDY: CPT

## 2019-05-07 PROCEDURE — 93225 XTRNL ECG REC<48 HRS REC: CPT

## 2019-05-07 PROCEDURE — 93226 XTRNL ECG REC<48 HR SCAN A/R: CPT

## 2019-05-10 ENCOUNTER — TELEPHONE (OUTPATIENT)
Dept: NEUROLOGY | Facility: CLINIC | Age: 54
End: 2019-05-10

## 2019-05-14 PROCEDURE — 93227 XTRNL ECG REC<48 HR R&I: CPT | Performed by: INTERNAL MEDICINE

## 2019-05-15 LAB
BUN SERPL-MCNC: 17 MG/DL (ref 7–25)
CREAT SERPL-MCNC: 0.86 MG/DL (ref 0.5–1.05)
SL AMB EGFR AFRICAN AMERICAN: 88 ML/MIN/1.73M2
SL AMB EGFR NON AFRICAN AMERICAN: 76 ML/MIN/1.73M2
VIT B12 SERPL-MCNC: 694 PG/ML (ref 200–1100)

## 2019-05-16 ENCOUNTER — OFFICE VISIT (OUTPATIENT)
Dept: NEUROLOGY | Facility: CLINIC | Age: 54
End: 2019-05-16
Payer: COMMERCIAL

## 2019-05-16 VITALS
HEIGHT: 63 IN | BODY MASS INDEX: 33.66 KG/M2 | SYSTOLIC BLOOD PRESSURE: 110 MMHG | WEIGHT: 190 LBS | HEART RATE: 72 BPM | DIASTOLIC BLOOD PRESSURE: 70 MMHG

## 2019-05-16 DIAGNOSIS — R55 POSTURAL DIZZINESS WITH NEAR SYNCOPE: Primary | ICD-10-CM

## 2019-05-16 DIAGNOSIS — R42 POSTURAL DIZZINESS WITH NEAR SYNCOPE: Primary | ICD-10-CM

## 2019-05-16 DIAGNOSIS — D32.0 MENINGIOMA, CEREBRAL (HCC): ICD-10-CM

## 2019-05-16 DIAGNOSIS — R42 EPISODIC LIGHTHEADEDNESS: ICD-10-CM

## 2019-05-16 DIAGNOSIS — R42 VERTIGO: ICD-10-CM

## 2019-05-16 PROCEDURE — 99214 OFFICE O/P EST MOD 30 MIN: CPT | Performed by: PSYCHIATRY & NEUROLOGY

## 2019-05-23 ENCOUNTER — CONSULT (OUTPATIENT)
Dept: NEUROSURGERY | Facility: CLINIC | Age: 54
End: 2019-05-23
Payer: COMMERCIAL

## 2019-05-23 VITALS
SYSTOLIC BLOOD PRESSURE: 133 MMHG | WEIGHT: 185 LBS | TEMPERATURE: 98.1 F | DIASTOLIC BLOOD PRESSURE: 77 MMHG | HEIGHT: 63 IN | HEART RATE: 57 BPM | RESPIRATION RATE: 16 BRPM | BODY MASS INDEX: 32.78 KG/M2

## 2019-05-23 DIAGNOSIS — G93.89 MASS, BRAIN: Primary | ICD-10-CM

## 2019-05-23 DIAGNOSIS — D32.0 MENINGIOMA, CEREBRAL (HCC): ICD-10-CM

## 2019-05-23 PROCEDURE — 99204 OFFICE O/P NEW MOD 45 MIN: CPT | Performed by: NEUROLOGICAL SURGERY

## 2019-09-21 DIAGNOSIS — E78.00 HYPERCHOLESTEROLEMIA: ICD-10-CM

## 2019-09-21 DIAGNOSIS — F32.A DEPRESSION, UNSPECIFIED DEPRESSION TYPE: ICD-10-CM

## 2019-09-23 RX ORDER — FLUOXETINE HYDROCHLORIDE 20 MG/1
CAPSULE ORAL
Qty: 60 CAPSULE | Refills: 2 | Status: SHIPPED | OUTPATIENT
Start: 2019-09-23 | End: 2019-10-24 | Stop reason: SDUPTHER

## 2019-09-23 RX ORDER — PRAVASTATIN SODIUM 20 MG
20 TABLET ORAL DAILY
Qty: 30 TABLET | Refills: 5 | Status: SHIPPED | OUTPATIENT
Start: 2019-09-23 | End: 2019-11-21 | Stop reason: SDUPTHER

## 2019-10-02 ENCOUNTER — DOCUMENTATION (OUTPATIENT)
Dept: BARIATRICS | Facility: CLINIC | Age: 54
End: 2019-10-02

## 2019-10-02 VITALS — HEIGHT: 63 IN | BODY MASS INDEX: 33.63 KG/M2 | WEIGHT: 189.8 LBS

## 2019-10-02 NOTE — PROGRESS NOTES
Back to Basics Nutrition Class 2 - [place] [address]    Attended Back to Basics Nutrition Class 2 at the [place] [address] on 9/17/2019  Today's topic - Food Journaling, Nutrition Facts Label    Reviewed class expectations of participants (i e  arrive on time, complete homework prior to class starting, participation, confidentiality compliance)  Reviewed strategies to food journaling and the importance of staying on track  Reviewed post-op vitamins and minerals and discussed potential side effects of noncompliance over time  Handouts provided addressing these topics  Weighed patient

## 2019-10-02 NOTE — PROGRESS NOTES
Back to Basics Nutrition Class 3 - [facility]    Attended Back to Basics Nutrition Class 3 at the [facility] 9/24/2019  Today's topic - Dining Out, Meal Planning    Reviewed class expectations of participants (i e  arrive on time, complete homework prior to class starting, participation, confidentiality compliance)  Reviewed strategies on handling dining out at restaurants and provided bariatric-friendly menu item examples  Reviewed the importance of meal planning/prepping and strategies for how to get started and make it a long-lasting habit  Handouts provided addressing these topics  Weighed patient

## 2019-10-02 NOTE — PROGRESS NOTES
Back to Basics Nutrition Class 4 - [place] [address]    Attended Back to Basics Nutrition Class 4 at the [place] [address] on 10/1/2019  Today's topic - Exercise, Conclusions and Final Discussion    Reviewed class expectations of participants (i e  arrive on time, complete homework prior to class starting, participation, confidentiality compliance)  Reviewed information on the importance of exercise after weight loss surgery and strategies to overcoming exercise barriers  Discussed final thoughts on all Back to Basics topics  Handouts provided addressing these topics  Patient completed course evaluation  Weighed patient

## 2019-10-02 NOTE — PROGRESS NOTES
Back to Basics Nutrition Class 1 - [facility]    Attended Back to Basics Nutrition Class 1 at the [facility] on 9/10/2019  Today's topic - Basic Rules, Successful Habits for Maintainers    Pt received Back to Basics book at this class - "The Success Habits of Weight Loss Surgery Patients" by Shirlene Lane  Pt received a new Bariatric Manual at this class  Reviewed class expectations of participants (i e  arrive on time, complete homework prior to class starting, participation, confidentiality compliance)  Reviewed the basic rules and discussed the importance of following long-term  Addressed readiness to change  Reviewed confidentially form and had patient sign  Discussed goals and challenges/barriers of following the rules  Handouts provided addressing these topics  Weighed patient

## 2019-10-24 DIAGNOSIS — F32.A DEPRESSION, UNSPECIFIED DEPRESSION TYPE: ICD-10-CM

## 2019-10-24 RX ORDER — FLUOXETINE HYDROCHLORIDE 20 MG/1
CAPSULE ORAL
Qty: 60 CAPSULE | Refills: 2 | Status: SHIPPED | OUTPATIENT
Start: 2019-10-24 | End: 2019-12-24 | Stop reason: SDUPTHER

## 2019-11-04 ENCOUNTER — TELEPHONE (OUTPATIENT)
Dept: BARIATRICS | Facility: CLINIC | Age: 54
End: 2019-11-04

## 2019-11-04 NOTE — TELEPHONE ENCOUNTER
lm for pt to schedule an overdue f/u apt         ----- Message from Randa Emery RN sent at 2019  6:44 AM EST -----  Regardin year follow up appointment  Please contact patient to schedule a 4 year follow up appointment  Thank You

## 2019-11-08 ENCOUNTER — DOCUMENTATION (OUTPATIENT)
Dept: BARIATRICS | Facility: CLINIC | Age: 54
End: 2019-11-08

## 2019-11-08 NOTE — PROGRESS NOTES
patient attended 4/4 behavioral health sessions of back to basics program  Weight at last session was 189 4 lbs

## 2019-11-15 ENCOUNTER — HOSPITAL ENCOUNTER (OUTPATIENT)
Dept: MRI IMAGING | Facility: HOSPITAL | Age: 54
Discharge: HOME | End: 2019-11-15
Attending: NEUROLOGICAL SURGERY
Payer: COMMERCIAL

## 2019-11-15 DIAGNOSIS — G93.89 MASS, BRAIN: ICD-10-CM

## 2019-11-15 DIAGNOSIS — D32.0 MENINGIOMA, CEREBRAL (HCC): ICD-10-CM

## 2019-11-15 PROCEDURE — 70553 MRI BRAIN STEM W/O & W/DYE: CPT

## 2019-11-15 PROCEDURE — A9585 GADOBUTROL INJECTION: HCPCS | Performed by: NEUROLOGICAL SURGERY

## 2019-11-15 RX ADMIN — GADOBUTROL 8 ML: 604.72 INJECTION INTRAVENOUS at 19:00

## 2019-11-19 ENCOUNTER — OFFICE VISIT (OUTPATIENT)
Dept: NEUROSURGERY | Facility: CLINIC | Age: 54
End: 2019-11-19
Payer: COMMERCIAL

## 2019-11-19 VITALS
SYSTOLIC BLOOD PRESSURE: 128 MMHG | DIASTOLIC BLOOD PRESSURE: 76 MMHG | HEIGHT: 63 IN | RESPIRATION RATE: 16 BRPM | WEIGHT: 189.9 LBS | BODY MASS INDEX: 33.65 KG/M2 | HEART RATE: 64 BPM | TEMPERATURE: 98.4 F

## 2019-11-19 DIAGNOSIS — D32.0 MENINGIOMA, CEREBRAL (HCC): Primary | ICD-10-CM

## 2019-11-19 DIAGNOSIS — M54.2 NECK PAIN: ICD-10-CM

## 2019-11-19 PROCEDURE — 99213 OFFICE O/P EST LOW 20 MIN: CPT | Performed by: NEUROLOGICAL SURGERY

## 2019-11-19 NOTE — PROGRESS NOTES
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  There is no reaction of the underlying brain to this mass  I believe this can be treated conservatively and followed over time  Accordingly I have recommended a follow-up MRI in 1 year's time  She does have some complaints of upper extremity numbness and tingling with weakness this is not extreme at this time  It is sometimes the case that convexity meningiomas can be associated with other meningiomas in the neural axis  Is for these reasons that an MRI of the cervical spine at the time of the follow-up MRI would be indicated  Return in about 1 year (around 11/19/2020) for after tests completed  CHIEF COMPLAINT  Patient presents for 6 month follow up after test    [place]  Patient is a right-handed female who reports that everything began when she went to see her GYN because she was having dizziness for a couple months  She was assuming that she was having hot flashes, but she spoke with her friends they didn't agree with same symptoms she said  Her Gyn recommended against treating her hot flashes and advised her to go see her PCP and possibly consult with a Neurologist  She called her PCP office and they referred her to Dr Christine Thakkar (Neurology) who ordered the MRI and an ultrasound  She also reports using a Holter monitor  Dr Christine Thakkar went over her results and advised recommended an evaluation with a neurosurgeon for surveillance   She also suggested she have a follow up MRI in one year  When we reviewed her scan we advised an appointment within 2 weeks  She was seen on 5/23/19 for consult for a small 15 mm convexity meningioma  This appears to be low-grade in nature  She does describe episodes which are stereotypic and are associated with post episodes somnolence  These are associated with a rapid onset of the dizziness with diaphoresis similar to hot flashes although she does develop profound tiredness afterwards  Because of the meningioma the stereotypic nature of these symptoms with post episode somnolence I recommended an ambulatory EEG to ensure that these were not seizures  We will continue to follow patient with an MRI brain to ensure that there is no aggressive nature of this  She denies headaches  She complains of some numbness and tingling in her fingers and hands and on occasion has difficulty with crushing  She has not had an imaging study of her neck  She believes the symptoms are relatively plateaued at this point  She occasionally has numbness that wakes her from her sleep  REVIEW OF SYSTEMS  Review of Systems   Constitutional: Negative  HENT: Negative  Eyes: Negative  Respiratory: Negative  Cardiovascular: Negative  Gastrointestinal: Negative  Endocrine: Negative  Genitourinary: Negative  Musculoskeletal: Negative for neck pain (recently there's been some cracking and popping; back of the neck)  Skin: Negative  Allergic/Immunologic: Negative  Neurological: Negative  Hematological: Negative  Psychiatric/Behavioral: Negative  All other systems reviewed and are negative  I reviewed the ROS  I reviewed the ROS      MEDICAL HISTORY  Active Ambulatory Problems     Diagnosis Date Noted    Hypercholesterolemia     Esophageal reflux     Gastroenteritis 05/12/2017    Diverticula, colon 08/18/2016    Endometrial polyp 12/08/2017    Major depressive disorder with single episode, in full remission ([facility] [address] ) 2017    Postgastrectomy malabsorption 2015    Sea sickness 2014    Vertigo 2019    Postural dizziness with near syncope 2019    Meningioma, cerebral (HCC) 2019    Episodic lightheadedness 2019    Mass, brain 2019     Resolved Ambulatory Problems     Diagnosis Date Noted    No Resolved Ambulatory Problems     Past Medical History:   Diagnosis Date    Abdominal pain     Chronic pain disorder     Depression     Diverticula of colon     Former smoker     GERD (gastroesophageal reflux disease)     Hemorrhoids     Hiatal hernia     Hyperlipidemia     Intestinal malabsorption following gastrectomy     Morbid obesity ([facility] [address] )     Obesity     Vitamin D insufficiency        Past Surgical History:   Procedure Laterality Date    ABDOMINAL SURGERY      gastric bipass     SECTION      (2) ,      COLONOSCOPY      ESOPHAGOGASTRODUODENOSCOPY      x2    GASTRIC BYPASS  2015    HERNIA REPAIR  2015    Paraesophageal hiatus hernia  Managed by Jana Mireles (General Surgery)    [place] EGD TRANSORAL BIOPSY SINGLE/MULTIPLE N/A 2017    Procedure: ESOPHAGOGASTRODUODENOSCOPY (EGD); Surgeon: Nancy Ponce MD;  Location: [facility];   Service: [place] [address]    UTERINE FIBROID SURGERY         Social History     Tobacco Use   Smoking Status Former Smoker    Last attempt to quit:     Years since quittin 8   Smokeless Tobacco Never Used   Tobacco Comment    20+ pack year hx - As per [employer]        Social History     Substance and Sexual Activity   Alcohol Use No       Social History     Substance and Sexual Activity   Drug Use No       Vitals:    19 0800   BP: 128/76   BP Location: Right arm   Patient Position: Sitting   Cuff Size: Adult   Pulse: 64   Resp: 16   Temp: 98 4 °F (36 9 °C)   TempSrc: Oral   Weight: 86 1 kg (189 lb 14 4 oz)   Height: 5' 3" (1 6 m)         Current Outpatient Medications:     Calcium Citrate-Vitamin D (CALCIUM CITRATE + D) 250-200 MG-UNIT TABS, Take 1 tablet by mouth 3 (three) times a day, Disp: , Rfl:     FLUoxetine (PROzac) 20 mg capsule, 2 CAPS DAILY, Disp: 60 capsule, Rfl: 2    Melatonin 5 MG CAPS, Take 5 mg by mouth daily at bedtime , Disp: , Rfl:     MULTIPLE VITAMIN PO, Take 1 tablet by mouth daily, Disp: , Rfl:     pravastatin (PRAVACHOL) 20 mg tablet, TAKE 1 TABLET (20 MG TOTAL) BY MOUTH DAILY, Disp: 30 tablet, Rfl: 5     No Known Allergies     The following portions of the patient's history were updated by MA and reviewed by MD: allergies, current medications, past family history, past medical history, past social history, past surgical history and problem list       Physical Exam  Awake alert and oriented  Extraocular movements are intact  Pupils are equal round reactive to light and accommodate  Facial expression is intact in grimace and at rest  She has no drift  Her power in her upper extremities demonstrates that the right hand  is slightly weaker than the left (she is right-handed)  Her deep tendon reflexes are slightly brisk throughout the upper and lower extremities  She has no drift  The remainder of her power is 5/5 bilaterally  She does have some dermatomal decreased to fine touch and pinprick  She has some difficulty with balance with sidestepping after 3 steps  She can heel walk, toe walk, and perform tandem gait    RESULTS/DATA  An MRI of the brain from May 3, 2019 is compared with a study from 11/15/2019  This demonstrates a 15 mm convexity homogeneously contrast enhancing mass which has the desmoplastic tail  Each of these signs is consistent with a convexity meningioma  There is no abnormality in the C nature of the underlying grade to suggest that this is asymptomatic

## 2019-11-21 DIAGNOSIS — E78.00 HYPERCHOLESTEROLEMIA: ICD-10-CM

## 2019-11-21 RX ORDER — PRAVASTATIN SODIUM 20 MG
20 TABLET ORAL DAILY
Qty: 90 TABLET | Refills: 1 | Status: SHIPPED | OUTPATIENT
Start: 2019-11-21 | End: 2019-12-24 | Stop reason: SDUPTHER

## 2019-11-21 NOTE — PROGRESS NOTES
PT Evaluation     Today's date: 2019  Patient name: Brandon Upton  : 1965  MRN: [id number]  Referring provider: Mo Jack MD  Dx:   Encounter Diagnosis     ICD-10-CM    1  Neck pain M54 2 Ambulatory referral to Physical Therapy       Start Time: 1800  Stop Time: 1900  Total time in clinic (min): 60 minutes    Assessment  Assessment details: Brandon Upton is a 53 y o  female who presents with signs and symptoms consistent of chronic mechanical neck pain  Patient fits in the [facility] [place] of movement incoordination  Patient has weakness of the DNF, has increased pain with prolonged sitting, and feels fatigue in the cervical spine after a day of work  She does also present with loss of cervical motion, especially upper cervical rotation, and a positive ULTT-A on the left  Patient has been previously diagnosed with carpal tunnel syndrome on both wrists  Otherwise, patient is neurologically intact with symmetrical myotomes, MSR, and intact dermatomes  Due to these impairments, Patient has difficulty performing prolonged sitting, work related activities, and sitting/watching TV  Patient would benefit from skilled physical therapy to address the impairments, improve their level of function, and to improve their overall quality of life  Impairments: abnormal or restricted ROM, activity intolerance, difficulty understanding, lacks appropriate home exercise program, pain with function, poor posture  and poor body mechanics  Understanding of Dx/Px/POC: good   Prognosis: good    Goals  Short Term Goals: to be achieved by 4 weeks  1) Patient to be independent with basic HEP  2) Decrease pain to 3/10 at its worst   3) Increase cervical spine ROM by 5-10 degrees in all deficient planes    4) Improve DNF endurance to >10 seconds without aberrant movement  5) Improve CFLR to 40-45 degrees bilaterally      Long Term Goals: to be achieved by discharge  1) FOTO equal to or greater than 69   2) Patient to be independent with comprehensive HEP  3) Cervical spine ROM WNL all planes to improve a/iadls  4) Improve sitting and desk work to no limitation  5) Improve left ULTT-A to negative  Plan  Patient would benefit from: PT eval and skilled physical therapy  Planned therapy interventions: joint mobilization, manual therapy, neuromuscular re-education, therapeutic activities, therapeutic exercise, patient education and home exercise program  Frequency: 2x per week for 4-6 weeks  Treatment plan discussed with: patient        Subjective Evaluation    History of Present Illness  Mechanism of injury: History of Current Injury: Patient reports a few month history cervical spine pain  Patient denies a DAYRON but notes symptoms gradually occurred  Pain location/Descriptors: Patient frequently hears cracking and popping  She notes that her pain is from the occiput which radiates to the shoulders  Patient also notes numbness in the first 3 digits on both hands, which Dr Mikhail Doyle diagnosed her with carpal tunnel  Aggravating factors: Pops with turning, prolonged sitting, watching TV, Sitting at work, worsens with anxiety  Easing factors: Tylenol  24 HR pattern: Worsens as the day progresses  Imaging: MRI ordered of the cervical spine in 2020 to rule out possibility of cervical meningioma  Special Questions: Ren Berman denies a new onset of Dizziness, Dysphagia, Dysarthria, Drop attacks, Diplopia, Nausea and Ataxia  Patient goals:  Learn exercises to improve symptoms  Hobbies/Interest: Knit  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       Pain  Current pain rating: 3  At best pain ratin  At worst pain ratin      Diagnostic Tests  No diagnostic tests performed  Patient Goals  Patient goals for therapy: decreased pain, increased strength, independence with ADLs/IADLs and increased motion          Objective     Neurological Testing     Sensation   Cervical/Thoracic   Left   Intact: light touch    Right   Intact: light touch    Reflexes   Left   Biceps (C5/C6): normal (2+)  Brachioradialis (C6): normal (2+)  Triceps (C7): brisk (3+)  Emmanuel's reflex: negative    Right   Biceps (C5/C6): normal (2+)  Brachioradialis (C6): normal (2+)  Triceps (C7): brisk (3+)  Emmanuel's reflex: negative    Active Range of Motion   Cervical/Thoracic Spine       Cervical    Flexion: 40 degrees  with pain  Extension: 60 degrees      Left rotation: 75 degrees  Right rotation: 75 degrees    with pain    Thoracic    Left lateral flexion: 15 degrees       Right lateral flexion: 20 degrees     Strength/Myotome Testing   Cervical Spine     Left   Interossei strength (t1): 5    Right   Interossei strength (t1): 5    Left Shoulder     Planes of Motion   External rotation at 0°: 5     Right Shoulder     Planes of Motion   External rotation at 0°: 4+     Left Elbow   Flexion: 5  Extension: 5    Right Elbow   Flexion: 5  Extension: 5    Left Wrist/Hand   Wrist extension: 5  Wrist flexion: 5  Thumb extension: 5    Right Wrist/Hand   Wrist extension: 5  Wrist flexion: 5  Thumb extension: 5    Tests   Cervical   Negative alar ligament test, Sharp-Cindi test, transverse ligament test and VBI  Left   Positive cervical flexion-rotation test     Negative Spurling's Test A  Right   Positive cervical flexion-rotation test    Negative Spurling's Test A  Left Shoulder   Positive ULTT1  Right Shoulder   Negative ULTT1  Additional Tests Details  CFLR: 20 degrees R and 20 degrees L     DNF endurance: aberrant movement after 2 seconds, <10 seconds of proper hold                Precautions: Depression, meningioma,  Anxiety, back pain       Manual              SOR with light cervical traction             Lateral cervical glides             OA joint mobilizations                                           Exercise Diary              UBE             DNF holds             Cervical retraction in supine             Cervical retraction in prone             Cervical isometrics- all planes             3 finger rotation             UT stretch              LS stretch              TB W's             TB rows             TB extensions                                                                                                                                      Modalities                                                         Access Code: [email or web address]   Patient Portal:  [email or web address] ([email or web address]

## 2019-11-25 ENCOUNTER — EVALUATION (OUTPATIENT)
Dept: PHYSICAL THERAPY | Facility: CLINIC | Age: 54
End: 2019-11-25
Payer: COMMERCIAL

## 2019-11-25 DIAGNOSIS — M54.2 NECK PAIN: ICD-10-CM

## 2019-11-25 PROCEDURE — 97162 PT EVAL MOD COMPLEX 30 MIN: CPT | Performed by: PHYSICAL THERAPIST

## 2019-12-02 ENCOUNTER — APPOINTMENT (OUTPATIENT)
Dept: PHYSICAL THERAPY | Facility: CLINIC | Age: 54
End: 2019-12-02
Payer: COMMERCIAL

## 2019-12-05 ENCOUNTER — OFFICE VISIT (OUTPATIENT)
Dept: PHYSICAL THERAPY | Facility: CLINIC | Age: 54
End: 2019-12-05
Payer: COMMERCIAL

## 2019-12-05 DIAGNOSIS — M54.2 NECK PAIN: Primary | ICD-10-CM

## 2019-12-05 PROCEDURE — 97112 NEUROMUSCULAR REEDUCATION: CPT | Performed by: PHYSICAL THERAPIST

## 2019-12-05 PROCEDURE — 97140 MANUAL THERAPY 1/> REGIONS: CPT | Performed by: PHYSICAL THERAPIST

## 2019-12-05 PROCEDURE — 97110 THERAPEUTIC EXERCISES: CPT | Performed by: PHYSICAL THERAPIST

## 2019-12-05 NOTE — PROGRESS NOTES
Daily Note     Today's date: 2019  Patient name: Brandon Upton  : 1965  MRN: [id number]  Referring provider: Mo Jack MD  Dx:   Encounter Diagnosis     ICD-10-CM    1  Neck pain M54 2        Start Time:   Stop Time: 1800  Total time in clinic (min): 45 minutes    Subjective: Patient reports doing well after the last appointment but had a really bad day yesterday  Unable to identify a causative factor  Objective: See treatment diary below  Flexion: 50 degrees    Extension: 60 degrees      Left rotation: 60 degrees  Right rotation: 75 degrees    with pain      Assessment: Cervical flexion ROM 10 degrees improved from initial evaluation  L Rot more limited than R rot  Tolerated treatment well  Reviewed HEP to clear up confusion and promote compliance with HEP  Patient notes less hand numbness at night time  She states that she can decrease the numbness at night time with a wrist neutral position  Patient demonstrates difficulty with DNF endurance       Plan: Continue per plan of care        Precautions: Depression, meningioma,  Anxiety, back pain       Manual              SOR with light cervical traction 6'            Lateral cervical glides Gr II 3'             AA joint mobilizations Gr II 2'                                          Exercise Diary              UBE 3'/3'            DNF holds 10x5"            Cervical retraction in supine 20x5"            Cervical retraction in prone             Cervical isometrics- all planes SB only today             3 finger rotation             UT stretch              LS stretch  B 10x5"            TB W's             TB rows             TB extensions             Upper cervical SNAG B 10x 3"             Median N glides  10x                                                                                                           Modalities              MH  10'                                          1:1 with PT from 5156p  Updated HEP: [id number]

## 2019-12-09 ENCOUNTER — APPOINTMENT (OUTPATIENT)
Dept: PHYSICAL THERAPY | Facility: CLINIC | Age: 54
End: 2019-12-09
Payer: COMMERCIAL

## 2019-12-09 NOTE — PROGRESS NOTES
Daily Note     Today's date: 2019  Patient name: Ortega Jasso  : 1965  MRN: [id number]  Referring provider: Johana Ovalles MD  Dx: No diagnosis found  Subjective: ***      Objective: See treatment diary below      Assessment: Tolerated treatment {Tolerated treatment :}   Patient {assessment:[id number]}      Plan: {PLAN:3582241183}     Precautions: Depression, meningioma,  Anxiety, back pain       Manual              SOR with light cervical traction 6'            Lateral cervical glides Gr II 3'             AA joint mobilizations Gr II 2'                                          Exercise Diary              UBE 3'/3'            DNF holds 10x5"            Cervical retraction in supine 20x5"            Cervical retraction in prone             Cervical isometrics- all planes SB only today             3 finger rotation             UT stretch              LS stretch  B 10x5"            TB W's             TB rows             TB extensions             Upper cervical SNAG B 10x 3"             Median N glides  10x                                                                                                           Modalities              MH  10'                                          1:1 with PT from

## 2019-12-10 ENCOUNTER — OFFICE VISIT (OUTPATIENT)
Dept: PHYSICAL THERAPY | Facility: CLINIC | Age: 54
End: 2019-12-10
Payer: COMMERCIAL

## 2019-12-10 DIAGNOSIS — M54.2 NECK PAIN: Primary | ICD-10-CM

## 2019-12-10 PROCEDURE — 97110 THERAPEUTIC EXERCISES: CPT | Performed by: PHYSICAL THERAPIST

## 2019-12-10 PROCEDURE — 97140 MANUAL THERAPY 1/> REGIONS: CPT | Performed by: PHYSICAL THERAPIST

## 2019-12-10 NOTE — PROGRESS NOTES
Daily Note     Today's date: 12/10/2019  Patient name: Cira Tobin  : 1965  MRN: [id number]  Referring provider: Katherine Whitt MD  Dx:   Encounter Diagnosis     ICD-10-CM    1  Neck pain M54 2        Start Time:   Stop Time: 6469  Total time in clinic (min): 45 minutes    Subjective: Patient states she is feeling overall better  Mild soreness after last session  Objective: See treatment diary below      Assessment: Supine cervical retractions did cause paresthesias in UE  Modified technique to tolerance  R rotation is more limited than left today  This progressed with AA mobs and SNAGs  Tolerated treatment well  Patient would benefit from continued PT  At the end of treatment patient demonstrated 79 Deg rotation to left and 75 degrees to right  Plan: Continue per plan of care        Precautions: Depression, meningioma,  Anxiety, back pain       Manual  12/5 12/10           SOR with light cervical traction 6' 5'           Lateral cervical glides Gr II 3'  Gr II 3'           AA joint mobilizations Gr II 2' Gr II 2' B                                         Exercise Diary  12/5 12/10           UBE 3'/3' 3'/3'           DNF holds 10x5" 10x5"           Cervical retraction in supine 20x5" 20x5"           Cervical retraction in prone  2x10           Cervical isometrics- all planes SB only today             3 finger rotation             UT stretch              LS stretch  B 10x5"            TB W's  2x10 rtb           TB rows  2x10 btb           TB extensions             Upper cervical SNAG B 10x 3"  B 10x 3"            Median N glides  10x                                                                                                           Modalities    10'                                       1:1 with PT from [phone]

## 2019-12-12 ENCOUNTER — OFFICE VISIT (OUTPATIENT)
Dept: PHYSICAL THERAPY | Facility: CLINIC | Age: 54
End: 2019-12-12
Payer: COMMERCIAL

## 2019-12-12 DIAGNOSIS — M54.2 NECK PAIN: Primary | ICD-10-CM

## 2019-12-12 PROCEDURE — 97112 NEUROMUSCULAR REEDUCATION: CPT | Performed by: PHYSICAL THERAPIST

## 2019-12-12 PROCEDURE — 97140 MANUAL THERAPY 1/> REGIONS: CPT | Performed by: PHYSICAL THERAPIST

## 2019-12-12 NOTE — PROGRESS NOTES
Daily Note     Today's date: 2019  Patient name: Carlos Saldivar  : 1965  MRN: [id number]  Referring provider: Tahira Liu MD  Dx:   Encounter Diagnosis     ICD-10-CM    1  Neck pain M54 2        Start Time: 36  Stop Time: 1800  Total time in clinic (min): 45 minutes    Subjective: Patient states that she is feeling better  Objective: See treatment diary below      Assessment: Tolerated treatment well and denies much pain  She does fatigue with UQ strengthening  Patient exhibited good technique with therapeutic exercises, minimal cueing required for DNF endurance and DNE hold  Plan: Continue per plan of care        Precautions: Depression, meningioma,  Anxiety, back pain       Manual  12/5 12/10 12/12          SOR with light cervical traction 6' 5' 5'          Lateral cervical glides Gr II 3'  Gr II 3' Gr II 3'          AA joint mobilizations Gr II 2' Gr II 2' B Gr II 2'B                                        Exercise Diary  12/5 12/10 12/12          UBE 3'/3' 3'/3' 3'/3'          DNF holds 10x5" 10x5" 15x5"          Cervical retraction in supine 20x5" 20x5" 20x5"          Cervical retraction in prone  2x10 2x10          Cervical isometrics- all planes SB only today             3 finger rotation             UT stretch              LS stretch  B 10x5"  B 5x10"          TB W's  2x10 rtb 2x10           TB rows  2x10 btb 2x10 btb          TB extensions   2x10          Upper cervical SNAG B 10x 3"  B 10x 3"  B 10 x3"          Median N glides  10x            Wall push up plus   nv          Door way stretch   nv          Mid thoracic ext mobilization   nv                                                                  Modalities    10                                       1:1 with PT from [phone]

## 2019-12-16 ENCOUNTER — OFFICE VISIT (OUTPATIENT)
Dept: PHYSICAL THERAPY | Facility: CLINIC | Age: 54
End: 2019-12-16
Payer: COMMERCIAL

## 2019-12-16 DIAGNOSIS — M54.2 NECK PAIN: Primary | ICD-10-CM

## 2019-12-16 PROCEDURE — 97140 MANUAL THERAPY 1/> REGIONS: CPT | Performed by: PHYSICAL THERAPIST

## 2019-12-16 PROCEDURE — 97110 THERAPEUTIC EXERCISES: CPT | Performed by: PHYSICAL THERAPIST

## 2019-12-16 PROCEDURE — 97112 NEUROMUSCULAR REEDUCATION: CPT | Performed by: PHYSICAL THERAPIST

## 2019-12-16 NOTE — PROGRESS NOTES
Daily Note     Today's date: 2019  Patient name: Brandon Upton  : 1965  MRN: [id number]  Referring provider: Mo Jack MD  Dx:   Encounter Diagnosis     ICD-10-CM    1  Neck pain M54 2        Start Time:   Stop Time: 1800  Total time in clinic (min): 45 minutes    Subjective: Patient states that she is still feeling stiff in the neck with rotation  Objective: See treatment diary below      Assessment: Tolerated treatment well  Focused manual treatment on improving cervical rotation  Neck ROM improved to 65 degrees rotation to R and 70 to the left after MT  Patient started treatment with 58 deg of R rot and 65 deg of L rot  Progressed program with good tolerance today  Patient still fatigues quickly with neck endurance exercises  Patient would benefit from continued PT      Plan: Continue per plan of care        Precautions: Depression, meningioma,  Anxiety, back pain       Manual  12/5 12/10 12/12 12/16         SOR with light cervical traction 6' 5' 5'          Lateral cervical glides Gr II 3'  Gr II 3' Gr II 3' Gr II 4' B         AA joint mobilizations Gr II 2' Gr II 2' B Gr II 2'B Gr II 2" B         Cervical rotational/upslide glides    C2-7 Gr II 4' B                          Exercise Diary  12/5 12/10 12/12 12/16         UBE 3'/3' 3'/3' 3'/3' 3'/3'         DNF holds 10x5" 10x5" 15x5" 15x5"         Cervical retraction in supine 20x5" 20x5" 20x5"          Cervical retraction in prone  2x10 2x10 15x5"         Cervical isometrics- all planes SB only today             3 finger rotation             UT stretch              LS stretch  B 10x5"  B 5x10"          TB W's  2x10 rtb 2x10           TB rows  2x10 btb 2x10 btb          TB extensions   2x10          Upper cervical SNAG B 10x 3"  B 10x 3"  B 10 x3" B 10 x3"         Median N glides  10x            Wall push up plus   nv 15x         Door way stretch   nv 10x10"         Mid thoracic ext mobilization   nv 10x5" Modalities  12/5              10'                                       [facility]

## 2019-12-20 ENCOUNTER — OFFICE VISIT (OUTPATIENT)
Dept: PHYSICAL THERAPY | Facility: CLINIC | Age: 54
End: 2019-12-20
Payer: COMMERCIAL

## 2019-12-20 DIAGNOSIS — M54.2 NECK PAIN: Primary | ICD-10-CM

## 2019-12-20 PROCEDURE — 97110 THERAPEUTIC EXERCISES: CPT | Performed by: PHYSICAL THERAPIST

## 2019-12-20 PROCEDURE — 97112 NEUROMUSCULAR REEDUCATION: CPT | Performed by: PHYSICAL THERAPIST

## 2019-12-20 PROCEDURE — 97140 MANUAL THERAPY 1/> REGIONS: CPT | Performed by: PHYSICAL THERAPIST

## 2019-12-20 NOTE — PROGRESS NOTES
Daily Note     Today's date: 2019  Patient name: Carlos Saldivar  : 1965  MRN: [id number]  Referring provider: Tahira Liu MD  Dx:   Encounter Diagnosis     ICD-10-CM    1  Neck pain M54 2        Start Time: 1000  Stop Time: 1050  Total time in clinic (min): 50 minutes    Subjective: Patient reports feeling as though there is a pinched nerve in her shoulder this AM      Objective: See treatment diary below      Assessment: Tender point noted in left infraspinatus muscle  This was relieved with  STM over the region of tenderness  Patient tolerating more stretching and MT to the cervical spine with less discomfort and sensitivity  Continued scapular and postural strengthening without increase discomfort and correct mechanics  Progress as tolerated  Plan: Continue per plan of care        Precautions: Depression, meningioma,  Anxiety, back pain       Manual  12/5 12/10 12/12 12/16 12/20        SOR with light cervical traction 6' 5' 5'  5'         Lateral cervical glides Gr II 3'  Gr II 3' Gr II 3' Gr II 4' B         AA joint mobilizations Gr II 2' Gr II 2' B Gr II 2'B Gr II 2" B         Cervical rotational/upslide glides    C2-7 Gr II 4' B         STM to L infraspinatus mms belly      3'         CPA and UPA to C/S     Gr II+III C2-7 B 5'             Exercise Diary  12/5 12/10 12/12 12/16 12/20        UBE 3'/3' 3'/3' 3'/3' 3'/3' 3'/3'        DNF holds 10x5" 10x5" 15x5" 15x5" 15x5"        Cervical retraction in supine 20x5" 20x5" 20x5"  20x5"        Cervical retraction in prone  2x10 2x10 15x5" 20x3"        Cervical isometrics- all planes SB only today             3 finger rotation             UT stretch              LS stretch  B 10x5"  B 5x10"          TB W's  2x10 rtb 2x10   3x10 rtb        TB rows  2x10 btb 2x10 btb  3x10 blk tb        TB extensions   2x10          Upper cervical SNAG B 10x 3"  B 10x 3"  B 10 x3" B 10 x3" B 10x3"         Median N glides  10x            Wall push up plus   nv 15x Door way stretch   nv 10x10" 10x10"        Mid thoracic ext mobilization   nv 10x5"                                                                 Modalities  12/5              10'                                       1:1 with PT from [id number]

## 2019-12-22 NOTE — PROGRESS NOTES
[facility]    Today's date: 2019  Patient name: Janeth Bolwes  : 1965  MRN: [id number]  Referring provider: Connie Jj MD  Dx:   No diagnosis found  Assessment  Assessment details: Oh Zamzam reports a perceived improvement of ***% after 6 PT sessions  Functional status measure has improved to ***  Patient notes pain has reduced to ***  ROM of the cervical spine has improved in all planes but continues to be limited with rotation  DNF endurance is improving but patient still fatigues early  Patient denies any paresthesia's in the left UE  Prolonged sitting at work and watching TV continues to be challenging for patient  Overall, patient has made steady progress toward goals and would benefit from additional PT at this time  Impairments: abnormal or restricted ROM, activity intolerance, difficulty understanding, lacks appropriate home exercise program, pain with function, poor posture  and poor body mechanics  Understanding of Dx/Px/POC: good   Prognosis: good    Goals  Short Term Goals: to be achieved by 4 weeks  1) Patient to be independent with basic HEP  2) Decrease pain to 3/10 at its worst   3) Increase cervical spine ROM by 5-10 degrees in all deficient planes  4) Improve DNF endurance to >10 seconds without aberrant movement  5) Improve CFLR to 40-45 degrees bilaterally      Long Term Goals: to be achieved by discharge  1) FOTO equal to or greater than 69   2) Patient to be independent with comprehensive HEP  3) Cervical spine ROM WNL all planes to improve a/iadls  4) Improve sitting and desk work to no limitation  5) Improve left ULTT-A to negative  Plan  Patient would benefit from: PT eval and skilled physical therapy  Planned therapy interventions: joint mobilization, manual therapy, neuromuscular re-education, therapeutic activities, therapeutic exercise, patient education and home exercise program  Frequency: 2x per week for 4-6 weeks    Treatment plan discussed with: patient        Subjective Evaluation    History of Present Illness  Mechanism of injury: History of Current Injury: Patient reports a few month history cervical spine pain  Patient denies a DAYRON but notes symptoms gradually occurred  Pain location/Descriptors: Patient frequently hears cracking and popping  She notes that her pain is from the occiput which radiates to the shoulders  Patient also notes numbness in the first 3 digits on both hands, which Dr Mikhail Doyle diagnosed her with carpal tunnel  Aggravating factors: Pops with turning, prolonged sitting, watching TV, Sitting at work, worsens with anxiety  Easing factors: Tylenol  24 HR pattern: Worsens as the day progresses  Imaging: MRI ordered of the cervical spine in 2020 to rule out possibility of cervical meningioma  Special Questions: Ren Blackburnr denies a new onset of Dizziness, Dysphagia, Dysarthria, Drop attacks, Diplopia, Nausea and Ataxia  Patient goals:  Learn exercises to improve symptoms  Hobbies/Interest: Knit  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       Pain  Current pain rating: 3  At best pain ratin  At worst pain ratin      Diagnostic Tests  No diagnostic tests performed  Patient Goals  Patient goals for therapy: decreased pain, increased strength, independence with ADLs/IADLs and increased motion          Objective     Neurological Testing     Sensation   Cervical/Thoracic   Left   Intact: light touch    Right   Intact: light touch    Reflexes   Left   Biceps (C5/C6): normal (2+)  Brachioradialis (C6): normal (2+)  Triceps (C7): brisk (3+)  Emmanuel's reflex: negative    Right   Biceps (C5/C6): normal (2+)  Brachioradialis (C6): normal (2+)  Triceps (C7): brisk (3+)  Emmanuel's reflex: negative    Active Range of Motion   Cervical/Thoracic Spine       Cervical    Flexion: 40 degrees  with pain  Extension: 60 degrees      Left rotation: 75 degrees  Right rotation: 75 degrees with pain    Thoracic    Left lateral flexion: 15 degrees       Right lateral flexion: 20 degrees     Strength/Myotome Testing   Cervical Spine     Left   Interossei strength (t1): 5    Right   Interossei strength (t1): 5    Left Shoulder     Planes of Motion   External rotation at 0°: 5     Right Shoulder     Planes of Motion   External rotation at 0°: 4+     Left Elbow   Flexion: 5  Extension: 5    Right Elbow   Flexion: 5  Extension: 5    Left Wrist/Hand   Wrist extension: 5  Wrist flexion: 5  Thumb extension: 5    Right Wrist/Hand   Wrist extension: 5  Wrist flexion: 5  Thumb extension: 5    Tests   Cervical   Negative alar ligament test, Sharp-Cindi test, transverse ligament test and VBI  Left   Positive cervical flexion-rotation test     Negative Spurling's Test A  Right   Positive cervical flexion-rotation test    Negative Spurling's Test A  Left Shoulder   Positive ULTT1  Right Shoulder   Negative ULTT1  Additional Tests Details  CFLR: 20 degrees R and 20 degrees L     DNF endurance: aberrant movement after 2 seconds, <10 seconds of proper hold                Precautions: Depression, meningioma,  Anxiety, back pain     Manual  12/5 12/10 12/12 12/16 12/20        SOR with light cervical traction 6' 5' 5'  5'         Lateral cervical glides Gr II 3'  Gr II 3' Gr II 3' Gr II 4' B         AA joint mobilizations Gr II 2' Gr II 2' B Gr II 2'B Gr II 2" B         Cervical rotational/upslide glides    C2-7 Gr II 4' B         STM to L infraspinatus mms belly      3'         CPA and UPA to C/S     Gr II+III C2-7 B 5'             Exercise Diary  12/5 12/10 12/12 12/16 12/20        UBE 3'/3' 3'/3' 3'/3' 3'/3' 3'/3'        DNF holds 10x5" 10x5" 15x5" 15x5" 15x5"        Cervical retraction in supine 20x5" 20x5" 20x5"  20x5"        Cervical retraction in prone  2x10 2x10 15x5" 20x3"        Cervical isometrics- all planes SB only today             3 finger rotation             UT stretch              LS stretch  B 10x5"  B 5x10"          TB W's  2x10 rtb 2x10   3x10 rtb        TB rows  2x10 btb 2x10 btb  3x10 blk tb        TB extensions   2x10          Upper cervical SNAG B 10x 3"  B 10x 3"  B 10 x3" B 10 x3" B 10x3"         Median N glides  10x            Wall push up plus   nv 15x         Door way stretch   nv 10x10" 10x10"        Mid thoracic ext mobilization   nv 10x5"                                                                 Modalities  12/5              10'                                       Access Code: [email or web address]   Patient Portal:  [email or web address]

## 2019-12-23 ENCOUNTER — APPOINTMENT (OUTPATIENT)
Dept: PHYSICAL THERAPY | Facility: CLINIC | Age: 54
End: 2019-12-23
Payer: COMMERCIAL

## 2019-12-24 DIAGNOSIS — F32.A DEPRESSION, UNSPECIFIED DEPRESSION TYPE: ICD-10-CM

## 2019-12-24 DIAGNOSIS — E78.00 HYPERCHOLESTEROLEMIA: ICD-10-CM

## 2019-12-26 RX ORDER — FLUOXETINE HYDROCHLORIDE 20 MG/1
CAPSULE ORAL
Qty: 60 CAPSULE | Refills: 2 | Status: SHIPPED | OUTPATIENT
Start: 2019-12-26 | End: 2020-01-19

## 2019-12-26 RX ORDER — PRAVASTATIN SODIUM 20 MG
20 TABLET ORAL DAILY
Qty: 90 TABLET | Refills: 1 | Status: SHIPPED | OUTPATIENT
Start: 2019-12-26 | End: 2020-06-15

## 2019-12-27 ENCOUNTER — EVALUATION (OUTPATIENT)
Dept: PHYSICAL THERAPY | Facility: CLINIC | Age: 54
End: 2019-12-27
Payer: COMMERCIAL

## 2019-12-27 DIAGNOSIS — M54.2 NECK PAIN: Primary | ICD-10-CM

## 2019-12-27 PROCEDURE — 97140 MANUAL THERAPY 1/> REGIONS: CPT | Performed by: PHYSICAL THERAPIST

## 2019-12-27 PROCEDURE — 97112 NEUROMUSCULAR REEDUCATION: CPT | Performed by: PHYSICAL THERAPIST

## 2019-12-27 PROCEDURE — 97110 THERAPEUTIC EXERCISES: CPT | Performed by: PHYSICAL THERAPIST

## 2019-12-27 NOTE — PROGRESS NOTES
[id number]    Today's date: 2019  Patient name: Vidal Meeks  : 1965  MRN: [id number]  Referring provider: Jordyn Kelly MD  Dx:   Encounter Diagnosis     ICD-10-CM    1  Neck pain M54 2        Start Time: 900  Stop Time: 945  Total time in clinic (min): 45 minutes    Assessment  Assessment details: Jonathan Koroma reports mild progress after 6 PT sessions  Functional status measure has improved to 66  Patient notes pain continues to range from 3-7/10  ROM of the cervical spine has slightly improved but continues to be limited with rotation  DNF endurance is improving but patient still fatigues early  Patient denies any paresthesia's in the left UE  Prolonged sitting at work and watching TV continues to be challenging for patient  Overall, patient has made steady progress toward goals and would benefit from additional PT at this time  Patient will need more cervical, UQ, and postural strengthening to reach outlined goals  Impairments: abnormal or restricted ROM, activity intolerance, difficulty understanding, lacks appropriate home exercise program, pain with function, poor posture  and poor body mechanics  Understanding of Dx/Px/POC: good   Prognosis: good    Goals  Short Term Goals: to be achieved by 4 weeks  1) Patient to be independent with basic HEP -MET  2) Decrease pain to 3/10 at its worst - Not met  3) Increase cervical spine ROM by 5-10 degrees in all deficient planes  - Partially met  4) Improve DNF endurance to >10 seconds without aberrant movement - Partially met  5) Improve CFLR to 40-45 degrees bilaterally  -MET    Long Term Goals: to be achieved by discharge  1) FOTO equal to or greater than 69 - Partially met  2) Patient to be independent with comprehensive HEP - Partially met  3) Cervical spine ROM WNL all planes to improve a/iadls -Not met  4) Improve sitting and desk work to no limitation- Not met  5) Improve left ULTT-A to negative   - Not met      Plan  Patient would benefit from: PT eval and skilled physical therapy  Planned therapy interventions: joint mobilization, manual therapy, neuromuscular re-education, therapeutic activities, therapeutic exercise, patient education and home exercise program  Frequency: 2x per week for 4-6 weeks  Treatment plan discussed with: patient        Subjective Evaluation    History of Present Illness  Mechanism of injury: History of Current Injury: Patient reports a few month history cervical spine pain  Patient denies a DAYRON but notes symptoms gradually occurred  Pain location/Descriptors: Patient frequently hears cracking and popping  She notes that her pain is from the occiput which radiates to the shoulders  Patient also notes numbness in the first 3 digits on both hands, which Dr Aries Ewing diagnosed her with carpal tunnel  Aggravating factors: Pops with turning, prolonged sitting, watching TV, Sitting at work, worsens with anxiety  Easing factors: Tylenol  24 HR pattern: Worsens as the day progresses  Imaging: MRI ordered of the cervical spine in 2020 to rule out possibility of cervical meningioma  Special Questions: Oh Wyman denies a new onset of Dizziness, Dysphagia, Dysarthria, Drop attacks, Diplopia, Nausea and Ataxia  Patient goals:  Learn exercises to improve symptoms  Hobbies/Interest: Knit  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       Pain  Current pain rating: 3  At best pain ratin  At worst pain ratin      Diagnostic Tests  No diagnostic tests performed  Patient Goals  Patient goals for therapy: decreased pain, increased strength, independence with ADLs/IADLs and increased motion          Objective     Neurological Testing     Sensation   Cervical/Thoracic   Left   Intact: light touch    Right   Intact: light touch    Reflexes   Left   Biceps (C5/C6): normal (2+)  Brachioradialis (C6): normal (2+)  Triceps (C7): brisk (3+)  Emmanuel's reflex: negative    Right   Biceps (C5/C6): normal (2+)  Brachioradialis (C6): normal (2+)  Triceps (C7): brisk (3+)  Emmanuel's reflex: negative    Active Range of Motion   Cervical/Thoracic Spine       Cervical    Flexion: 52 degrees  with pain  Extension: 62 degrees      Left rotation: 70 degrees  Right rotation: 70 degrees    with pain    Thoracic    Left lateral flexion: 15 degrees       Right lateral flexion: 20 degrees     Strength/Myotome Testing   Cervical Spine     Left   Interossei strength (t1): 5    Right   Interossei strength (t1): 5    Left Shoulder     Planes of Motion   External rotation at 0°: 5     Right Shoulder     Planes of Motion   External rotation at 0°: 4+     Left Elbow   Flexion: 5  Extension: 5    Right Elbow   Flexion: 5  Extension: 5    Left Wrist/Hand   Wrist extension: 5  Wrist flexion: 5  Thumb extension: 5    Right Wrist/Hand   Wrist extension: 5  Wrist flexion: 5  Thumb extension: 5    Tests   Cervical   Negative alar ligament test, Sharp-Cindi test, transverse ligament test and VBI  Left   Positive cervical flexion-rotation test     Negative Spurling's Test A  Right   Positive cervical flexion-rotation test    Negative Spurling's Test A  Left Shoulder   Positive ULTT1  Right Shoulder   Negative ULTT1  Additional Tests Details  CFLR: 20 degrees R and 20 degrees L   40 degrees bilaterally at RE    DNF endurance: aberrant movement after 2 seconds, <10 seconds of proper hold     RE: 4 sec aberrant movement and 15 second max hold      Flowsheet Rows      Most Recent Value   PT/OT G-Codes   Current Score  66   Projected Score  69             Precautions: Depression, meningioma,  Anxiety, back pain     Manual  12/5 12/10 12/12 12/16 12/20 12/27       SOR with light cervical traction 6' 5' 5'  5'  5'       Lateral cervical glides Gr II 3'  Gr II 3' Gr II 3' Gr II 4' B         AA joint mobilizations Gr II 2' Gr II 2' B Gr II 2'B Gr II 2" B         Cervical rotational/upslide glides    C2-7 Gr II 4' B         STM to L infraspinatus mms belly      3'         CPA and UPA to C/S     Gr II+III C2-7 B 5'  Gr II+III C2-7 B 5'            Exercise Diary  12/5 12/10 12/12 12/16 12/20 12/27       UBE 3'/3' 3'/3' 3'/3' 3'/3' 3'/3' 3'/3'       DNF holds 10x5" 10x5" 15x5" 15x5" 15x5" 15x5"       Cervical retraction in supine 20x5" 20x5" 20x5"  20x5" 20x5"       Cervical retraction in prone  2x10 2x10 15x5" 20x3"        Cervical isometrics- all planes SB only today             3 finger rotation             UT stretch              LS stretch  B 10x5"  B 5x10"          TB W's  2x10 rtb 2x10   3x10 rtb        TB rows  2x10 btb 2x10 btb  3x10 blk tb        TB extensions   2x10          Upper cervical SNAG B 10x 3"  B 10x 3"  B 10 x3" B 10 x3" B 10x3"         Median N glides  10x            Wall push up plus   nv 15x         Door way stretch   nv 10x10" 10x10"        Mid thoracic ext mobilization   nv 10x5"         TB T's      NV                                                  Modalities  12/5            MH  10'                                       1:1 with PT from [id number]  Patient was re-evaluated today

## 2020-01-02 ENCOUNTER — OFFICE VISIT (OUTPATIENT)
Dept: PHYSICAL THERAPY | Facility: CLINIC | Age: 55
End: 2020-01-02
Payer: COMMERCIAL

## 2020-01-02 DIAGNOSIS — M54.2 NECK PAIN: Primary | ICD-10-CM

## 2020-01-02 PROCEDURE — 97140 MANUAL THERAPY 1/> REGIONS: CPT | Performed by: PHYSICAL THERAPIST

## 2020-01-02 PROCEDURE — 97110 THERAPEUTIC EXERCISES: CPT | Performed by: PHYSICAL THERAPIST

## 2020-01-02 PROCEDURE — 97112 NEUROMUSCULAR REEDUCATION: CPT | Performed by: PHYSICAL THERAPIST

## 2020-01-02 NOTE — PROGRESS NOTES
Daily Note     Today's date: 2020  Patient name: Brandon Upton  : 1965  MRN: [id number]  Referring provider: Mo Jack MD  Dx:   Encounter Diagnosis     ICD-10-CM    1  Neck pain M54 2        Start Time:   Stop Time: 519  Total time in clinic (min): 48 minutes    Subjective: Patient reports "my neck is actually doing good today " She did work today and did drive to [place]  (10 hours) 2 days ago to visit family and her neck was fine  Objective: See treatment diary below      Assessment: Tolerated treatment well  Initiated additional scapular strengthening program  Patient required cueing for correct mechanics  Overall, less discomfort will prolonged sitting (work and driving) over the past few days  Patient would benefit from continued PT  Progress strength program as tolerated  Plan: Continue per plan of care        Precautions: Depression, meningioma,  Anxiety, back pain     Manual  12/5 12/10 12/12 12/16 12/20 12/27 1/2      SOR with light cervical traction 6' 5' 5'  5'  5' 5'      Lateral cervical glides Gr II 3'  Gr II 3' Gr II 3' Gr II 4' B         AA joint mobilizations Gr II 2' Gr II 2' B Gr II 2'B Gr II 2" B         Cervical rotational/upslide glides    C2-7 Gr II 4' B         STM to L infraspinatus mms belly      3'         CPA and UPA to C/S     Gr II+III C2-7 B 5'  Gr II+III C2-7 B 5'  Gr II+III C2-7 B 5'           Exercise Diary  12/5 12/10 12/12 12/16 12/20 12/27 1/2      UBE 3'/3' 3'/3' 3'/3' 3'/3' 3'/3' 3'/3' 3'/3'      DNF holds 10x5" 10x5" 15x5" 15x5" 15x5" 15x5" 15x5"      Cervical retraction in supine 20x5" 20x5" 20x5"  20x5" 20x5" 20x5"      Cervical retraction in prone  2x10 2x10 15x5" 20x3"  20x3"      Cervical isometrics- all planes SB only today             3 finger rotation             UT stretch              LS stretch  B 10x5"  B 5x10"          TB W's  2x10 rtb 2x10   3x10 rtb  3x10 rtb      TB rows  2x10 btb 2x10 btb  3x10 blk tb  3x10 blk tb      TB extensions   2x10          Upper cervical SNAG B 10x 3"  B 10x 3"  B 10 x3" B 10 x3" B 10x3"   B 10x3"      Median N glides  10x            Wall push up plus   nv 15x         Door way stretch   nv 10x10" 10x10"  10x10      Mid thoracic ext mobilization   nv 10x5"         TB T's      2x10 ytb 2x10 ytb      Inclined mid trap       2x10      Y's on wall with ytb        nv      Cervical retraction isometric with MB against wall        nv          Modalities  12/5            MH  10'                                       1:1 with -[id number]

## 2020-01-06 ENCOUNTER — OFFICE VISIT (OUTPATIENT)
Dept: FAMILY MEDICINE CLINIC | Facility: CLINIC | Age: 55
End: 2020-01-06
Payer: COMMERCIAL

## 2020-01-06 VITALS
DIASTOLIC BLOOD PRESSURE: 84 MMHG | WEIGHT: 188.2 LBS | TEMPERATURE: 98.9 F | SYSTOLIC BLOOD PRESSURE: 124 MMHG | HEART RATE: 60 BPM | HEIGHT: 63 IN | BODY MASS INDEX: 33.35 KG/M2

## 2020-01-06 DIAGNOSIS — D32.0 MENINGIOMA, CEREBRAL (HCC): Primary | ICD-10-CM

## 2020-01-06 DIAGNOSIS — E78.00 HYPERCHOLESTEROLEMIA: ICD-10-CM

## 2020-01-06 DIAGNOSIS — F32.4 MAJOR DEPRESSIVE DISORDER WITH SINGLE EPISODE, IN PARTIAL REMISSION (HCC): ICD-10-CM

## 2020-01-06 DIAGNOSIS — M25.562 CHRONIC PAIN OF LEFT KNEE: ICD-10-CM

## 2020-01-06 DIAGNOSIS — G89.29 CHRONIC PAIN OF LEFT KNEE: ICD-10-CM

## 2020-01-06 PROCEDURE — 99214 OFFICE O/P EST MOD 30 MIN: CPT | Performed by: FAMILY MEDICINE

## 2020-01-06 NOTE — PROGRESS NOTES
Assessment/Plan:    Meningioma, cerebral (HCC)  Asymptomatic  Cont to monitor  F/u with neurosurgery  Hypercholesterolemia  Check labs  Urged diet control  Recheck 1y    Major depressive disorder with single episode, in partial remission (HCC)  Cont fluoxetine  Encouraged stress reduction  Recheck 1y  Earlier if worse       Diagnoses and all orders for this visit:    Meningioma, cerebral ([facility] [address] )    Chronic pain of left knee  Comments:  Pt to obtain records from ortho  Recheck knee xray  Orders:  -     XR knee 3 vw left non injury; Future    Hypercholesterolemia    Major depressive disorder with single episode, in partial remission ([facility] [address] )          Subjective:      Patient ID: Joan Wolfe is a 53 y o  female  f/u multiple med issues  - pt doing well  Some increased stress but otherwise is doing well  - recently found to have a small frontal meningioma during work up for other symptoms  Was seen by Neuro and Neuro surgery who agreed that the lesion is asymptomatic and can be watched at this point    - pt with hx of knee pain  Was found to have a femur lesion(?) on work up by ortho at [facility] in 2013  Was sent to " a bone cancer doctor" in [place] who told her not to worry about it but call if she starts to get discomfort without weight bearing  2m ago, pt started noting distal femur discomfort at bedtime  No worsening pain with weight bearing    - pt up to date with Gyn and is due for mammo    - no CV, resp, GI or other complaints      The following portions of the patient's history were reviewed and updated as appropriate:   She  has a past medical history of Abdominal pain, Chronic pain disorder, Depression, Diverticula of colon, Former smoker, GERD (gastroesophageal reflux disease), Hemorrhoids, Hiatal hernia, Hyperlipidemia, Intestinal malabsorption following gastrectomy, Morbid obesity ([facility] [address] ), Obesity, and Vitamin D insufficiency    She   Patient Active Problem List    Diagnosis Date Noted    [facility] 2019    Episodic lightheadedness 2019    Vertigo 2019    Postural dizziness with near syncope 2019    Endometrial polyp 2017    Major depressive disorder with single episode, in partial remission ([facility] ) 2017    Gastroenteritis 2017    Hypercholesterolemia     Esophageal reflux     Diverticula, colon 2016    Postgastrectomy malabsorption 2015    Sea sickness 2014     She  has a past surgical history that includes Abdominal surgery;  section; Uterine fibroid surgery; Gastric bypass (2015); Colonoscopy; Esophagogastroduodenoscopy; pr egd transoral biopsy single/multiple (N/A, 2017); Hernia repair (2015); and Tonsillectomy ()  She  reports that she quit smoking about 9 years ago  She has never used smokeless tobacco  She reports that she does not drink alcohol or use drugs  Current Outpatient Medications   Medication Sig Dispense Refill    Calcium Citrate-Vitamin D (CALCIUM CITRATE + D) 250-200 MG-UNIT TABS Take 1 tablet by mouth 3 (three) times a day      FLUoxetine (PROzac) 20 mg capsule 2 CAPS DAILY 60 capsule 2    Melatonin 5 MG CAPS Take 5 mg by mouth daily at bedtime       MULTIPLE VITAMIN PO Take 1 tablet by mouth daily      pravastatin (PRAVACHOL) 20 mg tablet TAKE 1 TABLET (20 MG TOTAL) BY MOUTH DAILY 90 tablet 1     No current facility-administered medications for this visit  She has No Known Allergies       Review of Systems   Constitutional: Negative  HENT: Negative  Eyes: Negative  Respiratory: Negative  Cardiovascular: Negative  Negative for leg swelling  Gastrointestinal: Negative  Genitourinary: Negative  Musculoskeletal: Positive for arthralgias (L distal femur and knee)  Neurological: Negative  Hematological: Negative  Psychiatric/Behavioral: Positive for dysphoric mood (occasional) and sleep disturbance (occasional)  Negative for suicidal ideas   The patient is not nervous/anxious  Objective:      /84   Pulse 60   Temp 98 9 °F (37 2 °C)   Ht 5' 3" (1 6 m)   Wt 85 4 kg (188 lb 3 2 oz)   BMI 33 34 kg/m²          Physical Exam   Constitutional: She is oriented to person, place, and time  She appears well-developed and well-nourished  HENT:   Head: Normocephalic and atraumatic  Right Ear: External ear normal    Left Ear: External ear normal    Nose: Nose normal    Mouth/Throat: Oropharynx is clear and moist  No oropharyngeal exudate  Eyes: Pupils are equal, round, and reactive to light  Conjunctivae and EOM are normal    Neck: Normal range of motion  Neck supple  No thyromegaly present  Cardiovascular: Normal rate, regular rhythm and intact distal pulses  No murmur heard  Pulmonary/Chest: Effort normal and breath sounds normal    Abdominal: Soft  She exhibits no distension  There is no tenderness  Musculoskeletal: Normal range of motion  Lymphadenopathy:     She has no cervical adenopathy  Neurological: She is alert and oriented to person, place, and time  She has normal reflexes  She displays normal reflexes  No cranial nerve deficit  She exhibits normal muscle tone  Coordination normal    Skin: Skin is warm and dry  She is not diaphoretic  Psychiatric: She has a normal mood and affect     PHQ-9 = 4 (partial remission)

## 2020-01-06 NOTE — PROGRESS NOTES
BMI Counseling: Body mass index is 33 34 kg/m²  The BMI is above normal  Exercise recommendations include exercising 3-5 times per week

## 2020-01-07 PROBLEM — G93.89 MASS, BRAIN: Status: RESOLVED | Noted: 2019-05-23 | Resolved: 2020-01-07

## 2020-01-07 PROBLEM — Z48.815 ENCOUNTER FOR SURGICAL AFTERCARE FOLLOWING SURGERY OF DIGESTIVE SYSTEM: Status: ACTIVE | Noted: 2020-01-07

## 2020-01-07 PROBLEM — F32.4 MAJOR DEPRESSIVE DISORDER WITH SINGLE EPISODE, IN PARTIAL REMISSION (HCC): Status: ACTIVE | Noted: 2017-05-16

## 2020-01-07 NOTE — PROGRESS NOTES
Assessment/Plan:    Encounter for surgical aftercare following surgery of digestive system  -s/p Peyton-En-Y Gastric Bypass with Dr Nevaeh Sales on 06/16/15  Overall doing well, but struggling with some weight regain  Initial: 271 5lbs  Current: 189lbs  EWL: 63%   Carmel: 168 5lbs in July 2017  Current BMI is Body mass index is 33 48 kg/m²  · Tolerating a regular diet-yes  · Eating at least 60 grams of protein per day-yes  · Following 30/60 minute rule with liquids-yes  · Drinking at least 64 ounces of fluid per day-no drinks mostly coffee and no water  Lengthy discussion about decreasing coffee and caffeine and increasing hydrating fluids  · Drinking carbonated beverages-no  · Sufficient exercise-no; in PT for neck pain  Advised slowly starting walking program or joining exercise classes at [employer] where she works  · Using NSAIDs regularly-no  · Using nicotine-no  · Using alcohol-very rarely  Advised about the risks of alcohol s/p bariatric surgery and recommend avoiding all alcohol      Postgastrectomy malabsorption  -At risk for malabsorption of vitamins/minerals secondary to malabsorption and restriction of intake from bariatric surgery  -Currently taking adequate postop bariatric surgery vitamin supplementation: Procare MVI with no iron, calcium citrate TID  -No recent bariatric labs   Vitamin B12 WNL in May 2019  -Next set of bariatric labs ordered for approximately now  -Patient received education about the importance of adhering to a lifelong supplementation regimen to avoid vitamin/mineral deficiencies       Hypercholesterolemia  -On statin  -Labs ordered by PCP yesterday  -Encourage healthy fat balance, fiber, and exercise    Weight gain following gastric bypass surgery  -Regain of about 20 5lbs from her carmel in July of 2017  -No exercise, limited hydrating fluids, mindless snacking  -Found back to basics very helpful and lost about 6lbs during that time  -Lengthy education today and she agrees to keep food records and f/u with RD and SW   -MWM referral if needs additional support  -Support groups and PEP rallies       Diagnoses and all orders for this visit:    Encounter for surgical aftercare following surgery of digestive system  -     CBC and differential; Future  -     Comprehensive metabolic panel; Future  -     Copper Level; Future  -     Ferritin; Future  -     Folate; Future  -     Iron Saturation %; Future  -     PTH, intact; Future  -     Vitamin A; Future  -     Vitamin B1, whole blood; Future  -     Vitamin B12; Future  -     Vitamin D 25 hydroxy; Future  -     Zinc; Future  -     Lipid panel; Future    Postgastrectomy malabsorption  -     CBC and differential; Future  -     Comprehensive metabolic panel; Future  -     Copper Level; Future  -     Ferritin; Future  -     Folate; Future  -     Iron Saturation %; Future  -     PTH, intact; Future  -     Vitamin A; Future  -     Vitamin B1, whole blood; Future  -     Vitamin B12; Future  -     Vitamin D 25 hydroxy; Future  -     Zinc; Future  -     Lipid panel; Future    Hypercholesterolemia  -     Lipid panel; Future          Subjective:      Patient ID: Jenna Garcia is a 53 y o  female  -s/p Peyton-En-Y Gastric Bypass with Dr Genoveva Caldwell on 06/16/15  Presents to the office today for overdue follow up  She was last seen by Dr Genoveva Caldwell in July 2017 after having an EGD in June 2017 for abdominal pain that was WNL  She completed back to basics in fall of 2019 and loved the nutrition component  She has regained about 20lbs from her carmel - related to no exercise, limited hydrating fluids  She is ready to get back on track  Tolerating diet without issues; denies N/V, dysphagia, reflux  Overall doing Well  Diet Recall:   Upon waking large 24oz   Coffee with skim milk  B - 2 scrambled eggs and cheese  Snack - greek yogurt  L - soup  D - meatloaf, or lean protein and vegetable    HS - popcorn, will crackers,     Fluids - drinks only coffee and decaf coffee or tea        The following portions of the patient's history were reviewed and updated as appropriate: allergies, current medications, past family history, past medical history, past social history, past surgical history and problem list     Review of Systems   Constitutional: Positive for unexpected weight change (Regain of 20lbs in last 2 5 years)  Negative for chills and fever  HENT: Negative for trouble swallowing  Respiratory: Negative for cough and shortness of breath  Cardiovascular: Negative for chest pain and palpitations  Gastrointestinal: Negative for abdominal pain, constipation, diarrhea, nausea and vomiting  Musculoskeletal: Positive for arthralgias, neck pain and neck stiffness  Neurological: Negative for dizziness  Psychiatric/Behavioral:        Denies anxiety and depression         Objective:      /80 (BP Location: Right arm, Patient Position: Sitting, Cuff Size: Large)   Pulse 66   Temp 98 6 °F (37 °C) (Tympanic)   Ht 5' 3" (1 6 m)   Wt 85 7 kg (189 lb)   BMI 33 48 kg/m²     Colonoscopy-Completed and due again in 08/17/26       Physical Exam   Constitutional: She is oriented to person, place, and time  She appears well-developed and well-nourished  No distress  HENT:   Head: Normocephalic and atraumatic  Eyes: Pupils are equal, round, and reactive to light  No scleral icterus  Cardiovascular: Normal rate, regular rhythm and normal heart sounds  Pulmonary/Chest: Effort normal and breath sounds normal  No respiratory distress  Abdominal: Soft  Bowel sounds are normal  She exhibits no distension  There is no tenderness  No incisional hernias appreciated   Musculoskeletal: She exhibits no edema  Neurological: She is alert and oriented to person, place, and time  Skin: Skin is warm and dry  Psychiatric: She has a normal mood and affect  Nursing note and vitals reviewed          BARRIERS: none identified    GOALS:   · Continued/Maintain healthy weight loss with good nutrition intakes  · Adequate hydration with at least 64oz  fluid intake  · Normal vitamin and mineral levels  · Exercise as tolerated  · Try pickles, sliced cucumber or carrot sticks with apple cider vinegar, carrots dipped in mustard, apple slices with a few almonds or hummus  · Decrease caffeine, try herbal tea instead of decaf    · Follow-up in 1 year  We kindly ask that your arrive 15 minutes before your scheduled appointment time with your provider to allow our staff to room you, get your vital signs and update your chart  · Follow diet as discussed  · Get lab work done in the next 2 weeks  You have been given a lab slip today  Please call the office if you need a replacement  It is recommended to check with your insurance BEFORE getting labs done to make sure they are covered by your policy  Also, please check with your PCP and other providers before getting labs to avoid duplicate labs  Make sure to HOLD any multivitamins that may contain biotin and any biotin supplements FOR 5 DAYS before any labs since it can affect the results  · Follow vitamin and mineral recommendations as reviewed with you  · Call our office if you have any problems with abdominal pain especially associated with fever, chills, nausea, vomiting or any other concerns  · All  Post-bariatric surgery patients should be aware that very small quantities of any alcohol can cause impairment and it is very possible not to feel the effect  The effect can be in the system for several hours  It is also a stomach irritant  · It is advised to AVOID alcohol, Nonsteroidal antiinflammatory drugs (NSAIDS) and nicotine of all forms   Any of these can cause stomach irritation/pain

## 2020-01-07 NOTE — ASSESSMENT & PLAN NOTE
-s/p Peyton-En-Y Gastric Bypass with Dr Kate Nieves on 06/16/15  Overall doing well, but struggling with some weight regain  Initial: 271 5lbs  Current: 189lbs  EWL: 63%   Joaquin: 168 5lbs in July 2017  Current BMI is Body mass index is 33 48 kg/m²  · Tolerating a regular diet-yes  · Eating at least 60 grams of protein per day-yes  · Following 30/60 minute rule with liquids-yes  · Drinking at least 64 ounces of fluid per day-no drinks mostly coffee and no water  Lengthy discussion about decreasing coffee and caffeine and increasing hydrating fluids  · Drinking carbonated beverages-no  · Sufficient exercise-no; in PT for neck pain  Advised slowly starting walking program or joining exercise classes at [employer] where she works  · Using NSAIDs regularly-no  · Using nicotine-no  · Using alcohol-very rarely   Advised about the risks of alcohol s/p bariatric surgery and recommend avoiding all alcohol

## 2020-01-07 NOTE — ASSESSMENT & PLAN NOTE
-At risk for malabsorption of vitamins/minerals secondary to malabsorption and restriction of intake from bariatric surgery  -Currently taking adequate postop bariatric surgery vitamin supplementation: Procare MVI with no iron, calcium citrate TID  -No recent bariatric labs   Vitamin B12 WNL in May 2019  -Next set of bariatric labs ordered for approximately now  -Patient received education about the importance of adhering to a lifelong supplementation regimen to avoid vitamin/mineral deficiencies

## 2020-01-08 ENCOUNTER — APPOINTMENT (OUTPATIENT)
Dept: RADIOLOGY | Facility: CLINIC | Age: 55
End: 2020-01-08
Payer: COMMERCIAL

## 2020-01-08 DIAGNOSIS — M25.562 CHRONIC PAIN OF LEFT KNEE: ICD-10-CM

## 2020-01-08 DIAGNOSIS — G89.29 CHRONIC PAIN OF LEFT KNEE: ICD-10-CM

## 2020-01-08 PROCEDURE — 73562 X-RAY EXAM OF KNEE 3: CPT

## 2020-01-09 ENCOUNTER — APPOINTMENT (OUTPATIENT)
Dept: PHYSICAL THERAPY | Facility: CLINIC | Age: 55
End: 2020-01-09
Payer: COMMERCIAL

## 2020-01-09 ENCOUNTER — OFFICE VISIT (OUTPATIENT)
Dept: BARIATRICS | Facility: CLINIC | Age: 55
End: 2020-01-09
Payer: COMMERCIAL

## 2020-01-09 VITALS
BODY MASS INDEX: 33.49 KG/M2 | WEIGHT: 189 LBS | SYSTOLIC BLOOD PRESSURE: 130 MMHG | TEMPERATURE: 98.6 F | DIASTOLIC BLOOD PRESSURE: 80 MMHG | HEIGHT: 63 IN | HEART RATE: 66 BPM

## 2020-01-09 DIAGNOSIS — Z90.3 POSTGASTRECTOMY MALABSORPTION: ICD-10-CM

## 2020-01-09 DIAGNOSIS — K91.2 POSTGASTRECTOMY MALABSORPTION: ICD-10-CM

## 2020-01-09 DIAGNOSIS — E78.00 HYPERCHOLESTEROLEMIA: ICD-10-CM

## 2020-01-09 DIAGNOSIS — Z48.815 ENCOUNTER FOR SURGICAL AFTERCARE FOLLOWING SURGERY OF DIGESTIVE SYSTEM: Primary | ICD-10-CM

## 2020-01-09 PROBLEM — R63.5 WEIGHT GAIN FOLLOWING GASTRIC BYPASS SURGERY: Status: ACTIVE | Noted: 2020-01-09

## 2020-01-09 PROBLEM — Z98.84 WEIGHT GAIN FOLLOWING GASTRIC BYPASS SURGERY: Status: ACTIVE | Noted: 2020-01-09

## 2020-01-09 PROCEDURE — 99214 OFFICE O/P EST MOD 30 MIN: CPT | Performed by: PHYSICIAN ASSISTANT

## 2020-01-09 NOTE — ASSESSMENT & PLAN NOTE
-Regain of about 20 5lbs from her carmel in July of 2017  -No exercise, limited hydrating fluids, mindless snacking  -Found back to basics very helpful and lost about 6lbs during that time  -Lengthy education today and she agrees to keep food records and f/u with RD and [facility]   -MWM referral if needs additional support  -Support groups and PEP rallies

## 2020-01-09 NOTE — PATIENT INSTRUCTIONS
GOALS:   · Continued/Maintain healthy weight loss with good nutrition intakes  · Adequate hydration with at least 64oz  fluid intake  · Normal vitamin and mineral levels  · Exercise as tolerated  · Try pickles, sliced cucumber or carrot sticks with apple cider vinegar, carrots dipped in mustard, apple slices with a few almonds or hummus  · Decrease caffeine, try herbal tea instead of decaf    · Follow-up in 1 year  We kindly ask that your arrive 15 minutes before your scheduled appointment time with your provider to allow our staff to room you, get your vital signs and update your chart  · Follow diet as discussed  · Get lab work done in the next 2 weeks  You have been given a lab slip today  Please call the office if you need a replacement  It is recommended to check with your insurance BEFORE getting labs done to make sure they are covered by your policy  Also, please check with your PCP and other providers before getting labs to avoid duplicate labs  Make sure to HOLD any multivitamins that may contain biotin and any biotin supplements FOR 5 DAYS before any labs since it can affect the results  · Follow vitamin and mineral recommendations as reviewed with you  · Call our office if you have any problems with abdominal pain especially associated with fever, chills, nausea, vomiting or any other concerns  · All  Post-bariatric surgery patients should be aware that very small quantities of any alcohol can cause impairment and it is very possible not to feel the effect  The effect can be in the system for several hours  It is also a stomach irritant  · It is advised to AVOID alcohol, Nonsteroidal antiinflammatory drugs (NSAIDS) and nicotine of all forms   Any of these can cause stomach irritation/pain

## 2020-01-16 ENCOUNTER — OFFICE VISIT (OUTPATIENT)
Dept: PHYSICAL THERAPY | Facility: CLINIC | Age: 55
End: 2020-01-16
Payer: COMMERCIAL

## 2020-01-16 DIAGNOSIS — M54.2 NECK PAIN: Primary | ICD-10-CM

## 2020-01-16 PROCEDURE — 97140 MANUAL THERAPY 1/> REGIONS: CPT | Performed by: PHYSICAL THERAPIST

## 2020-01-16 PROCEDURE — 97112 NEUROMUSCULAR REEDUCATION: CPT | Performed by: PHYSICAL THERAPIST

## 2020-01-16 PROCEDURE — 97110 THERAPEUTIC EXERCISES: CPT | Performed by: PHYSICAL THERAPIST

## 2020-01-16 NOTE — PROGRESS NOTES
Daily Note     Today's date: 2020  Patient name: Adrianna Fierro  : 1965  MRN: [id number]  Referring provider: Dave Szymanski MD  Dx:   Encounter Diagnosis     ICD-10-CM    1  Neck pain M54 2        Start Time: 3852  Stop Time: 1800  Total time in clinic (min): 45 minutes    Subjective: No new complaints today  Patient states that she is feeling good at her neck  Patient would prefer to do neck strengthening from a standing position  Objective: See treatment diary below      Assessment: Tolerated treatment well  Addressed exercises in standing position to accommodate patient's requests  Good compliance and performance of UQ strengthening program  Patient would benefit from continued PT      Plan: Continue per plan of care        Precautions: Depression, meningioma,  Anxiety, back pain     Manual  12/5 12/10 12/12 12/16 12/20 12/27 1/2 1/16     SOR with light cervical traction 6' 5' 5'  5'  5' 5' 5'     Lateral cervical glides Gr II 3'  Gr II 3' Gr II 3' Gr II 4' B         AA joint mobilizations Gr II 2' Gr II 2' B Gr II 2'B Gr II 2" B         Cervical rotational/upslide glides    C2-7 Gr II 4' B         STM to L infraspinatus mms belly      3'         CPA and UPA to C/S     Gr II+III C2-7 B 5'  Gr II+III C2-7 B 5'  Gr II+III C2-7 B 5'  Gr II+III C2-7 B 5'          Exercise Diary  12/5 12/10 12/12 12/16 12/20 12/27 1/2 1/16     UBE 3'/3' 3'/3' 3'/3' 3'/3' 3'/3' 3'/3' 3'/3' 3'/3'     DNF holds 10x5" 10x5" 15x5" 15x5" 15x5" 15x5" 15x5"      Cervical retraction in supine 20x5" 20x5" 20x5"  20x5" 20x5" 20x5"      Cervical retraction in prone  2x10 2x10 15x5" 20x3"  20x3"      Cervical isometrics- all planes SB only today             3 finger rotation             UT stretch              LS stretch  B 10x5"  B 5x10"          TB W's  2x10 rtb 2x10   3x10 rtb  3x10 rtb 3x10 rtb     TB rows  2x10 btb 2x10 btb  3x10 blk tb  3x10 blk tb 3x10 blk tb     TB extensions   2x10     3x10 blk tb     Upper cervical SNAG B 10x 3"  B 10x 3"  B 10 x3" B 10 x3" B 10x3"   B 10x3"      Median N glides  10x            Wall push up plus   nv 15x         Door way stretch   nv 10x10" 10x10"  10x10      Mid thoracic ext mobilization   nv 10x5"         TB T's      2x10 ytb 2x10 ytb      Inclined mid trap       2x10      Y's on wall with ytb        nv 10x on each     Cervical retraction isometric with MB against wall        nv With towel x10      Standing cervical DNF against wall        With towel x10          Modalities  12/5            MH  10'                                       1:1 with -6PM

## 2020-01-18 DIAGNOSIS — F32.A DEPRESSION, UNSPECIFIED DEPRESSION TYPE: ICD-10-CM

## 2020-01-19 RX ORDER — FLUOXETINE HYDROCHLORIDE 20 MG/1
CAPSULE ORAL
Qty: 60 CAPSULE | Refills: 0 | Status: SHIPPED | OUTPATIENT
Start: 2020-01-19 | End: 2020-02-17

## 2020-01-21 ENCOUNTER — APPOINTMENT (OUTPATIENT)
Dept: PHYSICAL THERAPY | Facility: CLINIC | Age: 55
End: 2020-01-21
Payer: COMMERCIAL

## 2020-01-29 NOTE — PROGRESS NOTES
PT-Discharge    Today's date: 2020  Patient name: Estella Poon  : 1965  MRN: [id number]  Referring provider: Markell Cam MD  Dx:   Encounter Diagnosis     ICD-10-CM    1  Neck pain M54 2        Start Time: 1740  Stop Time: 3013  Total time in clinic (min): 44 minutes    Assessment  Assessment details: Edmundo Hendrix reports good improvement with PT over the past 2 months  Patient has only had 2 PT sessions prior to last re-evaluation  Functional status measure continues around 66  She denies much discomfort in her neck and has minimal "popping " Her cervical spine ROM is now full at this time and pain free  Patient continues to deny paresthesia's in LUE  DNF endurance is overall improved  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, a chief complaint during the initial evaluation  Overall, patient has made steady progress toward goals and will be discharged to [facility]  Thank you for this referral      Understanding of Dx/Px/POC: good   Prognosis: good    Goals  Short Term Goals: to be achieved by 4 weeks  1) Patient to be independent with basic HEP -MET  2) Decrease pain to 3/10 at its worst -MET  3) Increase cervical spine ROM by 5-10 degrees in all deficient planes  -MET  4) Improve DNF endurance to >10 seconds without aberrant movement-MET  5) Improve CFLR to 40-45 degrees bilaterally  -MET    Long Term Goals: to be achieved by discharge  1) FOTO equal to or greater than 69 - Partially met  2) Patient to be independent with comprehensive HEP -MET  3) Cervical spine ROM WNL all planes to improve a/iadls  -MET  4) Improve sitting and desk work to no limitation-MET  5) Improve left ULTT-A to negative  -MET      Plan  Frequency: Discharge from PT to [facility]  Treatment plan discussed with: patient        Subjective Evaluation    History of Present Illness  Mechanism of injury: History of Current Injury: Patient reports a few month history cervical spine pain   Patient denies a DAYRON but notes symptoms gradually occurred  Pain location/Descriptors: Patient frequently hears cracking and popping  She notes that her pain is from the occiput which radiates to the shoulders  Patient also notes numbness in the first 3 digits on both hands, which Dr Wilmer Lanier diagnosed her with carpal tunnel  Aggravating factors: Pops with turning, prolonged sitting, watching TV, Sitting at work, worsens with anxiety  Easing factors: Tylenol  24 HR pattern: Worsens as the day progresses  Imaging: MRI ordered of the cervical spine in 2020 to rule out possibility of cervical meningioma  Special Questions: Amanda Lloyd denies a new onset of Dizziness, Dysphagia, Dysarthria, Drop attacks, Diplopia, Nausea and Ataxia  Patient goals:  Learn exercises to improve symptoms  Hobbies/Interest: Knit  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       Pain  Current pain ratin  At best pain ratin  At worst pain ratin      Diagnostic Tests  No diagnostic tests performed  Patient Goals  Patient goals for therapy: decreased pain, increased strength, independence with ADLs/IADLs and increased motion          Objective     Neurological Testing     Sensation   Cervical/Thoracic   Left   Intact: light touch    Right   Intact: light touch    Reflexes   Left   Biceps (C5/C6): normal (2+)  Brachioradialis (C6): normal (2+)  Triceps (C7): brisk (3+)  Emmanuel's reflex: negative    Right   Biceps (C5/C6): normal (2+)  Brachioradialis (C6): normal (2+)  Triceps (C7): brisk (3+)  Emmanuel's reflex: negative    Active Range of Motion   Cervical/Thoracic Spine       Cervical    Flexion: 50 degrees  with pain  Extension: 70 degrees      Left rotation: 70 degrees  Right rotation: 70 degrees    with pain    Thoracic    Left lateral flexion: 15 degrees       Right lateral flexion: 20 degrees     Strength/Myotome Testing   Cervical Spine     Left   Interossei strength (t1): 5    Right   Interossei strength (t1): 5    Left Shoulder     Planes of Motion   External rotation at 0°: 5     Right Shoulder     Planes of Motion   External rotation at 0°: 4+     Left Elbow   Flexion: 5  Extension: 5    Right Elbow   Flexion: 5  Extension: 5    Left Wrist/Hand   Wrist extension: 5  Wrist flexion: 5  Thumb extension: 5    Right Wrist/Hand   Wrist extension: 5  Wrist flexion: 5  Thumb extension: 5    Tests   Cervical   Negative alar ligament test, Sharp-Cindi test, transverse ligament test and VBI  Left   Negative Spurling's Test A and cervical flexion-rotation test      Right   Negative Spurling's Test A and cervical flexion-rotation test      Left Shoulder   Negative ULTT1  Right Shoulder   Negative ULTT1  Additional Tests Details  CFLR: 20 degrees R and 20 degrees L   40 degrees R and 48 L    DNF endurance: aberrant movement after 2 seconds, <10 seconds of proper hold     RE: 4 sec aberrant movement and 15 second max hold  2nd RE: 10 seconds without aberrant movements             Precautions: Depression, meningioma,  Anxiety, back pain     Manual  12/5 12/10 12/12 12/16 12/20 12/27 1/2 1/16 1/30    SOR with light cervical traction 6' 5' 5'  5'  5' 5' 5'     Lateral cervical glides Gr II 3'  Gr II 3' Gr II 3' Gr II 4' B         AA joint mobilizations Gr II 2' Gr II 2' B Gr II 2'B Gr II 2" B         Cervical rotational/upslide glides    C2-7 Gr II 4' B         STM to L infraspinatus mms belly      3'         CPA and UPA to C/S     Gr II+III C2-7 B 5'  Gr II+III C2-7 B 5'  Gr II+III C2-7 B 5'  Gr II+III C2-7 B 5'  Gr II+III C2-7 B 5'         Exercise Diary  12/5 12/10 12/12 12/16 12/20 12/27 1/2 1/16 1/30    UBE 3'/3' 3'/3' 3'/3' 3'/3' 3'/3' 3'/3' 3'/3' 3'/3' 3'/3'    DNF holds 10x5" 10x5" 15x5" 15x5" 15x5" 15x5" 15x5"  10x10"    Cervical retraction in supine 20x5" 20x5" 20x5"  20x5" 20x5" 20x5"      Cervical retraction in prone  2x10 2x10 15x5" 20x3"  20x3"      Cervical isometrics- all planes SB only today             3 finger rotation             UT stretch              LS stretch  B 10x5"  B 5x10"          TB W's  2x10 rtb 2x10   3x10 rtb  3x10 rtb 3x10 rtb 3x10 rtb     TB rows  2x10 btb 2x10 btb  3x10 blk tb  3x10 blk tb 3x10 blk tb 3x10 blk tb    TB extensions   2x10     3x10 blk tb     Upper cervical SNAG B 10x 3"  B 10x 3"  B 10 x3" B 10 x3" B 10x3"   B 10x3"  B 10x 3"     Median N glides  10x            Wall push up plus   nv 15x         Door way stretch   nv 10x10" 10x10"  10x10  10x10"    Mid thoracic ext mobilization   nv 10x5"         TB T's      2x10 ytb 2x10 ytb  3x10 ytb    Inclined mid trap       2x10      Y's on wall with ytb        nv 10x on each     Cervical retraction isometric with MB against wall        nv With towel x10      Standing cervical DNF against wall        With towel x10          Modalities  12/5            MH  10'                                       1:1 with PT from  [phone]

## 2020-01-30 ENCOUNTER — EVALUATION (OUTPATIENT)
Dept: PHYSICAL THERAPY | Facility: CLINIC | Age: 55
End: 2020-01-30
Payer: COMMERCIAL

## 2020-01-30 DIAGNOSIS — M54.2 NECK PAIN: Primary | ICD-10-CM

## 2020-01-30 PROCEDURE — 97110 THERAPEUTIC EXERCISES: CPT | Performed by: PHYSICAL THERAPIST

## 2020-01-30 PROCEDURE — 97112 NEUROMUSCULAR REEDUCATION: CPT | Performed by: PHYSICAL THERAPIST

## 2020-02-13 ENCOUNTER — OFFICE VISIT (OUTPATIENT)
Dept: BARIATRICS | Facility: CLINIC | Age: 55
End: 2020-02-13

## 2020-02-13 VITALS — WEIGHT: 193.6 LBS | HEIGHT: 63 IN | BODY MASS INDEX: 34.3 KG/M2

## 2020-02-13 DIAGNOSIS — Z98.84 S/P GASTRIC BYPASS: Primary | ICD-10-CM

## 2020-02-13 PROCEDURE — RECHECK

## 2020-02-13 NOTE — PROGRESS NOTES
Bariatric Follow Up Nutrition Note    Post-op    Type of surgery  Gastric bypass: laparoscopic  Surgery Date: 2015  Surgeon: Dr Noni Morrissey  53 y o   female    There were no vitals filed for this visit  Weight (last 2 days)     Date/Time   Weight    20 1550   87 8 (193 6)            Body mass index is 34 29 kg/m²  Height: 63 inches    Weight History:  Weight on Day of Initial Dietary Evaluation: 271 5 lbs  (BMI = 48 1)  Weight on Day of Metabolic and Bariatric Surgery: 256 4 lbs  (BMI = 45 4)  Current Weight: 193 6 lbs  (BMI = 34 3)  %EWL = 60%  Joaquin: 168 5 lbs  (BMI 29 8) 2017  Amount of Weight Regain: 20-25 lbs  Pt is here to discuss dietary habits and struggles with post-op weight regain  Pt recently attended back to basics with me at [facility] last year, which pt reports she found very helpful  Pt lost about 6 pounds through the back to basics sessions  Review of History and Medications   Past Medical History:   Diagnosis Date    Abdominal pain     Chronic pain disorder     abdominal    Depression     Diverticula of colon     Former smoker     Resolved 2012     GERD (gastroesophageal reflux disease)     Hemorrhoids     Hiatal hernia     Hyperlipidemia     Intestinal malabsorption following gastrectomy     Morbid obesity ([address] )     Resolved 10/2/2015     Obesity     Vitamin D insufficiency     Resolved 2016      Past Surgical History:   Procedure Laterality Date    ABDOMINAL SURGERY      gastric bipass    ANKLE SURGERY       SECTION      (2) ,      COLONOSCOPY      ESOPHAGOGASTRODUODENOSCOPY      x2    GASTRIC BYPASS  2015    HERNIA REPAIR  2015    Paraesophageal hiatus hernia  Managed by Shira Kelley (General Surgery)    [place] EGD TRANSORAL BIOPSY SINGLE/MULTIPLE N/A 2017    Procedure: ESOPHAGOGASTRODUODENOSCOPY (EGD); Surgeon: Jessica Larson MD;  Location: [facility];   Service: Bariatrics    TONSILLECTOMY  1984    UTERINE FIBROID SURGERY       Social History     Socioeconomic History    Marital status: /Civil Union     Spouse name: Not on file    Number of children: Not on file    Years of education: Not on file    Highest education level: Not on file   Occupational History    Not on file   Social Needs    Financial resource strain: Not on file    Food insecurity:     Worry: Not on file     Inability: Not on file    Transportation needs:     Medical: Not on file     Non-medical: Not on file   Tobacco Use    Smoking status: Former Smoker     Last attempt to quit: 2011     Years since quittin 1    Smokeless tobacco: Never Used    Tobacco comment: 20+ pack year hx - As per [employer]    Substance and Sexual Activity    Alcohol use: Yes     Frequency: Monthly or less    Drug use: No    Sexual activity: Not on file   Lifestyle    Physical activity:     Days per week: Not on file     Minutes per session: Not on file    Stress: Not on file   Relationships    Social connections:     Talks on phone: Not on file     Gets together: Not on file     Attends Mu-ism service: Not on file     Active member of club or organization: Not on file     Attends meetings of clubs or organizations: Not on file     Relationship status: Not on file    Intimate partner violence:     Fear of current or ex partner: Not on file     Emotionally abused: Not on file     Physically abused: Not on file     Forced sexual activity: Not on file   Other Topics Concern    Not on file   Social History Narrative    Daily coffee consumption (2 cups/day)    Denied: History of daily cola consumption (__cans/day)    Daily tea consumption (__cups/day)    Former smoker: quit  - As per [place] or homemaker - As per [employer]    Uses safety equipment - Seatbelts        Current Outpatient Medications:     Calcium Citrate-Vitamin D (CALCIUM CITRATE + D) 250-200 MG-UNIT TABS, Take 1 tablet by mouth 3 (three) times a day, Disp: , Rfl:     FLUoxetine (PROzac) 20 mg capsule, 2 CAPS DAILY, Disp: 60 capsule, Rfl: 0    Melatonin 5 MG CAPS, Take 5 mg by mouth daily at bedtime , Disp: , Rfl:     MULTIPLE VITAMIN PO, Take 1 tablet by mouth daily, Disp: , Rfl:     pravastatin (PRAVACHOL) 20 mg tablet, TAKE 1 TABLET (20 MG TOTAL) BY MOUTH DAILY, Disp: 90 tablet, Rfl: 1    Food Intake and Lifestyle Assessment:  Pt is following 30/60-minute rule - yes - pt states that she is at a minimum waiting 60 minutes to drink after eating  Pt is taking required post-operative vitamins and minerals - Pt is following up with TICO SCHULZ regarding post-op MVI status    Pt is getting at least 64 oz of sugar-free, caffeine-free, noncarbonated fluids daily - yes - pt states that she has been limiting her decaf coffee and will typically drink 1 cup regular coffee with FF half and half in the morning, 24 ounces decaf coffee with milk from [employer], 48 ounces herbal tea with Splenda     Pt is exercising - no - pt states that she does not have time to exercise - discussed possibly adding walking exercises during her 60-minute lunchbreak because she eats lunch at a different time    Dietary recall:  Breakfast 9:00am - 2 scrambled eggs and cheese sometimes cooked in the microwave sometimes with 1 tortilla  Snack 12:00pm - sunflower seeds; popcorn; almonds  Lunch 1:00pm - leftovers from night before; tuna fish  Dinner 6:00pm - meatloaf, or lean protein (chicken; turkey breast) and vegetable and a starch (typically rice or potatoes)  Snack 8:00pm - popcorn; will crackers; Poptart; crackers with peanut butter      Fluids - drinks only coffee and decaf coffee or tea    Protein supplement: none  Estimated protein intake per day: 60 grams  Meals eaten away from home: once per week  Typical meal pattern: 3 meals per day and 1-3 snacks per day  Eating Behaviors: Appropriate diet advancement, Appropriate portion sizes, Does not drink with meals and waits 30-minutes after meal before resuming drinking and Consumption of high calorie/ high fat foods  Cultural or Adventism considerations: n/a    Nutrition Diagnosis  Diagnosis: Overweight / Obesity (NC-3 3)  Related to: Not ready for diet / lifestyle change, Limited adherence to nutrition-related recommendations, Physical inactivity, Excessive energy intake and Altered GI function  As Evidenced by: BMI >25, Expected anthropometric outcomes are not achieved, Excessive energy intake and Unintentional weight gain      Interventions and Teaching   Patient educated on post-op nutrition guidelines  Patient educated and handouts provided    Capacity of post-surgery stomach  Adequate hydration  Weight loss plateaus/possibility of weight regain  Exercise  Nutrition considerations after surgery  Protein supplements  Meal planning and preparation  Appropriate carbohydrate, protein, and fat intake, and food/fluid choices to maximize safe weight loss, nutrient intake, and tolerance   Dietary and lifestyle changes  Possible problems with poor eating habits  Intuitive eating  Techniques for self monitoring and keeping daily food journal  Vitamin / Mineral supplementation     Education provided to: patient  Barriers to learning: No barriers identified  Readiness to change: Action:  (Changing behavior) and Relapse: (Returning to older behaviors and abandoning the new changes)   Comprehension: demonstrated understanding   Expected Compliance: good     Evaluation / Monitoring:  Eating pattern as discussed, Tolerance of nutrition prescription, Body weight, Lab values, Physical activity     Goals:  Pt is to only buy snacks that your family likes and you do not - try having a SF Jell-o or popsicle at night or brushing your teeth immediately after dinner to reduce nighttime snacking  Pt is to work on exercising during lunch break  Pt is to switch to all decaf tea  Pt is to log all meals and snacks until I see her next     Time Spent:   30 Minutes

## 2020-02-16 LAB
25(OH)D3 SERPL-MCNC: 41 NG/ML (ref 30–100)
ALBUMIN SERPL-MCNC: 4.1 G/DL (ref 3.6–5.1)
ALBUMIN/GLOB SERPL: 2.2 (CALC) (ref 1–2.5)
ALP SERPL-CCNC: 71 U/L (ref 37–153)
ALT SERPL-CCNC: 12 U/L (ref 6–29)
AST SERPL-CCNC: 16 U/L (ref 10–35)
BASOPHILS # BLD AUTO: 32 CELLS/UL (ref 0–200)
BASOPHILS NFR BLD AUTO: 0.8 %
BILIRUB SERPL-MCNC: 0.6 MG/DL (ref 0.2–1.2)
BUN SERPL-MCNC: 14 MG/DL (ref 7–25)
BUN/CREAT SERPL: ABNORMAL (CALC) (ref 6–22)
CALCIUM SERPL-MCNC: 9.6 MG/DL (ref 8.6–10.4)
CALCIUM SERPL-MCNC: 9.6 MG/DL (ref 8.6–10.4)
CHLORIDE SERPL-SCNC: 104 MMOL/L (ref 98–110)
CHOLEST SERPL-MCNC: 170 MG/DL
CHOLEST/HDLC SERPL: 2.7 (CALC)
CO2 SERPL-SCNC: 29 MMOL/L (ref 20–32)
COPPER SERPL-MCNC: 93 MCG/DL (ref 70–175)
CREAT SERPL-MCNC: 0.87 MG/DL (ref 0.5–1.05)
EOSINOPHIL # BLD AUTO: 140 CELLS/UL (ref 15–500)
EOSINOPHIL NFR BLD AUTO: 3.5 %
ERYTHROCYTE [DISTWIDTH] IN BLOOD BY AUTOMATED COUNT: 12.3 % (ref 11–15)
FERRITIN SERPL-MCNC: 11 NG/ML (ref 16–232)
FOLATE SERPL-MCNC: 22.2 NG/ML
GLOBULIN SER CALC-MCNC: 1.9 G/DL (CALC) (ref 1.9–3.7)
GLUCOSE SERPL-MCNC: 91 MG/DL (ref 65–99)
HCT VFR BLD AUTO: 39.4 % (ref 35–45)
HDLC SERPL-MCNC: 62 MG/DL
HGB BLD-MCNC: 13.1 G/DL (ref 11.7–15.5)
IRON SATN MFR SERPL: 28 % (CALC) (ref 16–45)
IRON SERPL-MCNC: 103 MCG/DL (ref 45–160)
LDLC SERPL CALC-MCNC: 94 MG/DL (CALC)
LYMPHOCYTES # BLD AUTO: 1724 CELLS/UL (ref 850–3900)
LYMPHOCYTES NFR BLD AUTO: 43.1 %
MCH RBC QN AUTO: 30.4 PG (ref 27–33)
MCHC RBC AUTO-ENTMCNC: 33.2 G/DL (ref 32–36)
MCV RBC AUTO: 91.4 FL (ref 80–100)
MONOCYTES # BLD AUTO: 292 CELLS/UL (ref 200–950)
MONOCYTES NFR BLD AUTO: 7.3 %
NEUTROPHILS # BLD AUTO: 1812 CELLS/UL (ref 1500–7800)
NEUTROPHILS NFR BLD AUTO: 45.3 %
NONHDLC SERPL-MCNC: 108 MG/DL (CALC)
PLATELET # BLD AUTO: 275 THOUSAND/UL (ref 140–400)
PMV BLD REES-ECKER: 11.1 FL (ref 7.5–12.5)
POTASSIUM SERPL-SCNC: 4.1 MMOL/L (ref 3.5–5.3)
PROT SERPL-MCNC: 6 G/DL (ref 6.1–8.1)
PTH-INTACT SERPL-MCNC: 49 PG/ML (ref 14–64)
RBC # BLD AUTO: 4.31 MILLION/UL (ref 3.8–5.1)
SL AMB EGFR AFRICAN AMERICAN: 87 ML/MIN/1.73M2
SL AMB EGFR NON AFRICAN AMERICAN: 75 ML/MIN/1.73M2
SODIUM SERPL-SCNC: 140 MMOL/L (ref 135–146)
TIBC SERPL-MCNC: 373 MCG/DL (CALC) (ref 250–450)
TRIGL SERPL-MCNC: 54 MG/DL
VIT A SERPL-MCNC: 49 MCG/DL (ref 38–98)
VIT B1 BLD-SCNC: 161 NMOL/L (ref 78–185)
VIT B12 SERPL-MCNC: 828 PG/ML (ref 200–1100)
WBC # BLD AUTO: 4 THOUSAND/UL (ref 3.8–10.8)
ZINC SERPL-MCNC: 65 MCG/DL (ref 60–130)

## 2020-02-17 ENCOUNTER — TELEPHONE (OUTPATIENT)
Dept: BARIATRICS | Facility: CLINIC | Age: 55
End: 2020-02-17

## 2020-02-17 DIAGNOSIS — Z48.815 ENCOUNTER FOR SURGICAL AFTERCARE FOLLOWING SURGERY OF DIGESTIVE SYSTEM: ICD-10-CM

## 2020-02-17 DIAGNOSIS — Z90.3 POSTGASTRECTOMY MALABSORPTION: Primary | ICD-10-CM

## 2020-02-17 DIAGNOSIS — K91.2 POSTGASTRECTOMY MALABSORPTION: Primary | ICD-10-CM

## 2020-02-17 DIAGNOSIS — F32.A DEPRESSION, UNSPECIFIED DEPRESSION TYPE: ICD-10-CM

## 2020-02-17 DIAGNOSIS — R79.0 LOW FERRITIN: ICD-10-CM

## 2020-02-17 RX ORDER — FLUOXETINE HYDROCHLORIDE 20 MG/1
CAPSULE ORAL
Qty: 60 CAPSULE | Refills: 0 | Status: SHIPPED | OUTPATIENT
Start: 2020-02-17 | End: 2020-03-05

## 2020-02-17 NOTE — TELEPHONE ENCOUNTER
Left message that most recent labs from 02/11/20 look great, except low ferritin (11)  Advised her to change to Procare MVI with 45mg of iron  As of now, she is doing Procare without any iron  Will repeat lab in 3 months and add additional Vitron C if no improvement  Repeat rest of labs prior to next annual visit

## 2020-03-04 DIAGNOSIS — F32.A DEPRESSION, UNSPECIFIED DEPRESSION TYPE: ICD-10-CM

## 2020-03-05 DIAGNOSIS — F32.A DEPRESSION, UNSPECIFIED DEPRESSION TYPE: ICD-10-CM

## 2020-03-05 RX ORDER — FLUOXETINE HYDROCHLORIDE 20 MG/1
CAPSULE ORAL
Qty: 180 CAPSULE | Refills: 1 | Status: SHIPPED | OUTPATIENT
Start: 2020-03-05 | End: 2020-03-05 | Stop reason: SDUPTHER

## 2020-03-05 RX ORDER — FLUOXETINE HYDROCHLORIDE 20 MG/1
40 CAPSULE ORAL DAILY
Qty: 180 CAPSULE | Refills: 1 | Status: SHIPPED | OUTPATIENT
Start: 2020-03-05 | End: 2021-04-13

## 2020-03-06 ENCOUNTER — TRANSCRIBE ORDERS (OUTPATIENT)
Dept: ADMINISTRATIVE | Facility: HOSPITAL | Age: 55
End: 2020-03-06

## 2020-03-06 DIAGNOSIS — N63.32 LUMP OF AXILLARY TAIL OF LEFT BREAST: Primary | ICD-10-CM

## 2020-03-06 DIAGNOSIS — N63.20 LEFT BREAST LUMP: ICD-10-CM

## 2020-03-13 ENCOUNTER — OFFICE VISIT (OUTPATIENT)
Dept: BARIATRICS | Facility: CLINIC | Age: 55
End: 2020-03-13

## 2020-03-13 VITALS — WEIGHT: 189.6 LBS | HEIGHT: 63 IN | BODY MASS INDEX: 33.59 KG/M2

## 2020-03-13 DIAGNOSIS — Z98.84 S/P GASTRIC BYPASS: Primary | ICD-10-CM

## 2020-03-13 PROCEDURE — RECHECK

## 2020-03-13 NOTE — PROGRESS NOTES
Bariatric Follow Up Nutrition Note    Post-op    Type of surgery  Gastric bypass: laparoscopic  Surgery Date: 2015  Surgeon: Dr Mack Johnson  63 y o   female    There were no vitals filed for this visit  Weight (last 2 days)     Date/Time   Weight    20 1457   86 (189 6)            Body mass index is 33 59 kg/m²  Height: 63 inches    Weight History:  Weight on Day of Initial Dietary Evaluation: 271 5 lbs  (BMI = 48 1)  Weight on Day of Metabolic and Bariatric Surgery: 256 4 lbs  (BMI = 45 4)  Current Weight: 189 6 lbs  (BMI = 33 6)  %EWL = 63%  Joaquin: 168 5 lbs  (BMI 29 8) 2017  Amount of Weight Regain: 20-25 lbs  Review of History and Medications   Past Medical History:   Diagnosis Date    Abdominal pain     Chronic pain disorder     abdominal    Depression     Diverticula of colon     Former smoker     Resolved 2012     GERD (gastroesophageal reflux disease)     Hemorrhoids     Hiatal hernia     Hyperlipidemia     Intestinal malabsorption following gastrectomy     Morbid obesity ([address] )     Resolved 10/2/2015     Obesity     Vitamin D insufficiency     Resolved 2016      Past Surgical History:   Procedure Laterality Date    ABDOMINAL SURGERY      gastric bipass    ANKLE SURGERY       SECTION      (2) ,      COLONOSCOPY      ESOPHAGOGASTRODUODENOSCOPY      x2    GASTRIC BYPASS  2015    HERNIA REPAIR  2015    Paraesophageal hiatus hernia  Managed by Richard Landeros (General Surgery)    [place] EGD TRANSORAL BIOPSY SINGLE/MULTIPLE N/A 2017    Procedure: ESOPHAGOGASTRODUODENOSCOPY (EGD); Surgeon: Harvinder Villagran MD;  Location: [facility];   Service: Bariatrics    TONSILLECTOMY  1984    UTERINE FIBROID SURGERY       Social History     Socioeconomic History    Marital status: /Civil Union     Spouse name: Not on file    Number of children: Not on file    Years of education: Not on file    Highest education level: Not on file   Occupational History    Not on file   Social Needs    Financial resource strain: Not on file    Food insecurity:     Worry: Not on file     Inability: Not on file    Transportation needs:     Medical: Not on file     Non-medical: Not on file   Tobacco Use    Smoking status: Former Smoker     Last attempt to quit: 2011     Years since quittin 2    Smokeless tobacco: Never Used    Tobacco comment: 20+ pack year hx - As per [employer]    Substance and Sexual Activity    Alcohol use: Yes     Frequency: Monthly or less    Drug use: No    Sexual activity: Not on file   Lifestyle    Physical activity:     Days per week: Not on file     Minutes per session: Not on file    Stress: Not on file   Relationships    Social connections:     Talks on phone: Not on file     Gets together: Not on file     Attends Episcopalian service: Not on file     Active member of club or organization: Not on file     Attends meetings of clubs or organizations: Not on file     Relationship status: Not on file    Intimate partner violence:     Fear of current or ex partner: Not on file     Emotionally abused: Not on file     Physically abused: Not on file     Forced sexual activity: Not on file   Other Topics Concern    Not on file   Social History Narrative    Daily coffee consumption (2 cups/day)    Denied: History of daily cola consumption (__cans/day)    Daily tea consumption (__cups/day)    Former smoker: quit  - As per [employer] or homemaker - As per [employer]    Uses safety equipment - Seatbelts        Current Outpatient Medications:     Calcium Citrate-Vitamin D (CALCIUM CITRATE + D) 250-200 MG-UNIT TABS, Take 1 tablet by mouth 3 (three) times a day, Disp: , Rfl:     FLUoxetine (PROzac) 20 mg capsule, Take 2 capsules (40 mg total) by mouth daily, Disp: 180 capsule, Rfl: 1    Melatonin 5 MG CAPS, Take 5 mg by mouth daily at bedtime , Disp: , Rfl:     MULTIPLE VITAMIN PO, Take 1 tablet by mouth daily, Disp: , Rfl:     pravastatin (PRAVACHOL) 20 mg tablet, TAKE 1 TABLET (20 MG TOTAL) BY MOUTH DAILY, Disp: 90 tablet, Rfl: 1    Food Intake and Lifestyle Assessment:  Pt is following 30/60-minute rule - yes - pt states that she is at a minimum waiting 60 minutes to drink after eating  Pt is taking required post-operative vitamins and minerals - Pt is following up with TICO SCHULZ regarding post-op MVI status; Pt reports that she is taking the ProCare MVI with 45 mg iron; discussed taking 1 Vitron C every other day and take with multivitamin     Pt is getting at least 64 oz of sugar-free, caffeine-free, noncarbonated fluids daily - yes - pt states that she has been limiting her decaf coffee and will typically drink 1 cup regular coffee with FF half and half in the morning, 24 ounces decaf coffee with milk from [employer], 48 ounces herbal tea with Splenda     Pt is exercising - yes - pt states that she started walking during lunch occasionally and is looking into a fitness program at a [employer] (16 weeks, 3 times per week)    Pt states that she eats breakfast and lunch at work and will log these meals and started logging fluid intake  Pt states that she stopped buying things to keep at home and will buy items that she doesn't like as much      Dietary recall:  Breakfast 9:00am - 2 scrambled eggs and cheese and banana; oatmeal with 1/2 banana and unsweetened applesauce  Snack 12:00pm - sometimes - sunflower seeds; almonds; sometimes veggie chips  Lunch 1:00pm - leftovers from night before; tuna fish; salad container with snow peas and lettuce and sesame dressing  Dinner 6:00pm - meatloaf, or lean protein (chicken; turkey breast) and vegetable and a starch (typically rice or potatoes); shrimp stir motta with veggies - pt states that she feels she has made some good changes around dinnertime  Snack 8:00pm - crackers with peanut butter      Fluids - drinks only coffee and decaf coffee or tea    Protein supplement: none  Estimated protein intake per day: 60 grams  Meals eaten away from home: once per week  Typical meal pattern: 3 meals per day and 1-3 snacks per day  Eating Behaviors: Appropriate diet advancement, Appropriate portion sizes, Does not drink with meals and waits 30-minutes after meal before resuming drinking and Consumption of high calorie/ high fat foods  Cultural or Cheondoism considerations: n/a    Nutrition Diagnosis  Diagnosis: Overweight / Obesity (NC-3 3)  Related to: Not ready for diet / lifestyle change, Limited adherence to nutrition-related recommendations, Physical inactivity, Excessive energy intake and Altered GI function  As Evidenced by: BMI >25, Expected anthropometric outcomes are not achieved, Excessive energy intake and Unintentional weight gain      Interventions and Teaching   Patient educated on post-op nutrition guidelines  Patient educated and handouts provided    Capacity of post-surgery stomach  Adequate hydration  Weight loss plateaus/possibility of weight regain  Exercise  Nutrition considerations after surgery  Protein supplements  Meal planning and preparation  Appropriate carbohydrate, protein, and fat intake, and food/fluid choices to maximize safe weight loss, nutrient intake, and tolerance   Dietary and lifestyle changes  Possible problems with poor eating habits  Intuitive eating  Techniques for self monitoring and keeping daily food journal  Vitamin / Mineral supplementation     Education provided to: patient  Barriers to learning: No barriers identified  Readiness to change: Action:  (Changing behavior) and Relapse: (Returning to older behaviors and abandoning the new changes)   Comprehension: demonstrated understanding   Expected Compliance: good     Evaluation / Monitoring:  Eating pattern as discussed, Tolerance of nutrition prescription, Body weight, Lab values, Physical activity     Goals:  Pt is to only buy snacks that your family likes and you do not - continue this  Pt is to work on exercising during lunch break - continue this; look into MMA program   Pt is to continue to increase decaf fluid intake  Pt is to continue to practice logging  Pt reports that she is taking the ProCare MVI with 45 mg iron  Pt is to add 1 Vitron C every other day and take with multivitamin     Pt is to return to see me (RD) in about 1 month       Time Spent:   30 Minutes

## 2020-03-18 ENCOUNTER — HOSPITAL ENCOUNTER (OUTPATIENT)
Dept: MAMMOGRAPHY | Facility: CLINIC | Age: 55
Discharge: HOME | End: 2020-03-18
Payer: COMMERCIAL

## 2020-03-18 ENCOUNTER — HOSPITAL ENCOUNTER (OUTPATIENT)
Dept: ULTRASOUND IMAGING | Facility: CLINIC | Age: 55
Discharge: HOME | End: 2020-03-18
Payer: COMMERCIAL

## 2020-03-18 VITALS — WEIGHT: 189 LBS | HEIGHT: 63 IN | BODY MASS INDEX: 33.49 KG/M2

## 2020-03-18 DIAGNOSIS — N63.32 LUMP OF AXILLARY TAIL OF LEFT BREAST: ICD-10-CM

## 2020-03-18 DIAGNOSIS — N63.20 LEFT BREAST LUMP: ICD-10-CM

## 2020-03-18 PROCEDURE — 77066 DX MAMMO INCL CAD BI: CPT

## 2020-03-18 PROCEDURE — G0279 TOMOSYNTHESIS, MAMMO: HCPCS

## 2020-03-18 PROCEDURE — 76642 ULTRASOUND BREAST LIMITED: CPT

## 2020-04-20 ENCOUNTER — TELEPHONE (OUTPATIENT)
Dept: BARIATRICS | Facility: CLINIC | Age: 55
End: 2020-04-20

## 2020-04-24 ENCOUNTER — OFFICE VISIT (OUTPATIENT)
Dept: BARIATRICS | Facility: CLINIC | Age: 55
End: 2020-04-24

## 2020-04-24 VITALS — BODY MASS INDEX: 33.49 KG/M2 | WEIGHT: 189 LBS | HEIGHT: 63 IN

## 2020-04-24 DIAGNOSIS — K91.2 POSTSURGICAL MALABSORPTION: Primary | ICD-10-CM

## 2020-04-24 PROCEDURE — RECHECK

## 2020-05-22 ENCOUNTER — OFFICE VISIT (OUTPATIENT)
Dept: BARIATRICS | Facility: CLINIC | Age: 55
End: 2020-05-22

## 2020-05-22 VITALS — WEIGHT: 190 LBS | BODY MASS INDEX: 33.66 KG/M2 | HEIGHT: 63 IN

## 2020-05-22 DIAGNOSIS — R63.5 ABNORMAL WEIGHT GAIN: ICD-10-CM

## 2020-05-22 DIAGNOSIS — K91.2 POSTSURGICAL MALABSORPTION: Primary | ICD-10-CM

## 2020-05-22 PROCEDURE — RECHECK

## 2020-05-29 RX ORDER — ESTRADIOL 0.03 MG/D
FILM, EXTENDED RELEASE TRANSDERMAL
COMMUNITY
Start: 2020-03-11

## 2020-06-02 ENCOUNTER — TELEMEDICINE (OUTPATIENT)
Dept: BARIATRICS | Facility: CLINIC | Age: 55
End: 2020-06-02
Payer: COMMERCIAL

## 2020-06-02 VITALS — BODY MASS INDEX: 33.31 KG/M2 | WEIGHT: 188 LBS | HEIGHT: 63 IN

## 2020-06-02 DIAGNOSIS — F32.4 MAJOR DEPRESSIVE DISORDER WITH SINGLE EPISODE, IN PARTIAL REMISSION (HCC): ICD-10-CM

## 2020-06-02 DIAGNOSIS — K91.2 POSTGASTRECTOMY MALABSORPTION: ICD-10-CM

## 2020-06-02 DIAGNOSIS — Z48.815 ENCOUNTER FOR SURGICAL AFTERCARE FOLLOWING SURGERY OF DIGESTIVE SYSTEM: ICD-10-CM

## 2020-06-02 DIAGNOSIS — E78.00 HYPERCHOLESTEROLEMIA: ICD-10-CM

## 2020-06-02 DIAGNOSIS — Z90.3 POSTGASTRECTOMY MALABSORPTION: ICD-10-CM

## 2020-06-02 DIAGNOSIS — E66.9 OBESITY (BMI 30.0-34.9): Primary | ICD-10-CM

## 2020-06-02 PROBLEM — E66.01 MORBID OBESITY (HCC): Status: ACTIVE | Noted: 2020-06-02

## 2020-06-02 PROBLEM — E66.811 OBESITY (BMI 30.0-34.9): Status: ACTIVE | Noted: 2020-06-02

## 2020-06-02 PROCEDURE — 3008F BODY MASS INDEX DOCD: CPT | Performed by: PHYSICIAN ASSISTANT

## 2020-06-02 PROCEDURE — 99214 OFFICE O/P EST MOD 30 MIN: CPT | Performed by: PHYSICIAN ASSISTANT

## 2020-06-15 DIAGNOSIS — E78.00 HYPERCHOLESTEROLEMIA: ICD-10-CM

## 2020-06-15 RX ORDER — PRAVASTATIN SODIUM 20 MG
20 TABLET ORAL DAILY
Qty: 90 TABLET | Refills: 1 | Status: SHIPPED | OUTPATIENT
Start: 2020-06-15 | End: 2021-05-27

## 2020-07-09 ENCOUNTER — TELEPHONE (OUTPATIENT)
Dept: OTHER | Facility: OTHER | Age: 55
End: 2020-07-09

## 2020-08-17 ENCOUNTER — TELEPHONE (OUTPATIENT)
Dept: DERMATOLOGY | Facility: CLINIC | Age: 55
End: 2020-08-17

## 2020-08-17 NOTE — TELEPHONE ENCOUNTER
Return call made to patient, LVM asking her to call us back if she would like to be added to the wait list as a new patient

## 2020-09-23 ENCOUNTER — TELEPHONE (OUTPATIENT)
Dept: FAMILY MEDICINE CLINIC | Facility: CLINIC | Age: 55
End: 2020-09-23

## 2020-09-23 NOTE — TELEPHONE ENCOUNTER
----- Message from Serg Arellano MD sent at 9/23/2020 11:39 AM EDT -----  Regarding: FW: Non-Urgent Medical Question  Contact: [phone]  Please print and fax her ECG  ----- Message -----  From: Prosper Turner MA  Sent: 9/23/2020  11:32 AM EDT  To: Serg Arellano MD  Subject: FW: Non-Urgent Medical Question                    ----- Message -----  From: Celia Corado  Sent: 9/23/2020  11:31 AM EDT  To: [id number] [facility]  Subject: RE: Non-Urgent Medical Question                  Would your office send Cheryl's EKG results to [address] [place] [address]  ? The contact info is in the attachment  Let me know if this is possible  Thank you and have a great day! Maykel Klein    ----- Message -----  From: Serg Arellano MD  Sent: 8/26/20 12:38 PM  To: Celia Corado  Subject: RE: Non-Urgent Medical Question    I can see her at 10am Friday  You should be getting a call from my office    Dr Bety Elizondo      ----- Message -----       [address]       Sent:8/26/2020 12:17 PM EDT         To:Rayray Baptiste MD    Subject:RE: Non-Urgent Medical Question    Trying to get Larissa home to hopefully see you  I just checked with her and she said she is not having any symptoms  She was stuffy but not as of today  Tomorrow she is out of quarantine  We can go get her to see you on Friday?       ----- Message -----       From:Rayray Baptiste MD       Sent:8/25/2020  9:57 PM EDT         Julio Keller    Subject:RE: Non-Urgent Medical Question    I don't want to be alarmist, but some young people with COVID do get some heart weakness after the recover  Given her lightheadedness, I would recommend having her seen    An EKG and exam may suffice to clear her to return to activities    Dr Bety Elizondo      ----- Message -----       T[address]       Sent:8/25/2020  4:33 PM EDT         To:Rayray Baptiste MD    Subject:RE: Non-Urgent Medical Question    By check out, what do mean exactly? They checked all her vitals and she is feeling ok  Do you mean blood work? An office visit? Being she is at [facility], where should we take her? There is a health center there and an athletic dept doctor, I think        ----- Message -----       From:Rayray Beasley MD       Sent:8/25/2020  3:56 PM EDT         To:[place] Valdez    Subject:RE: Non-Urgent Medical Question    She passed out? She should be checked out - she should not exercise/train before getting checked out    Dr Nestor Oliveira      ----- Message -----       Leda Roe Yesi Valdez       Sent:8/25/2020  1:24 PM EDT         To:Rayray Beasley MD    Subject:RE: Non-Urgent Medical Question    So Tabitha Gómez, up at [facility], tested positive for covid on 8/16  She is quarantined and doing well  Mild symptoms  She felt fine and excerised on Saturday, took a shower and fainted! She again is fine and the EMTs checked her out  That being said, how long should she wait before resuming excerise or training? Her quarantine is done this Thursday  Thank for the advise! Juliano Freeman      ----- Message -----       From:Rayray Beasley MD       Sent:8/6/2020 11:44 AM EDT         To:[place] Valdez    Subject:RE: Non-Urgent Medical Question    I have been using Dr Jamin Chapman at [facility] a lot    Dr Nestor Oliveira      ----- Message -----       Katie Staff       Sent:8/6/2020  9:00 AM EDT         To:MD Beryl Ibrahim Dr! Would you please give me a recommendation or two of dermatologists in the area? Thank you     Juliano Freeman       - - - DISCLAIMER - - -  [email or web address][This message may contain formatting that cannot be shown on this site ]

## 2020-10-12 DIAGNOSIS — Z20.828 EXPOSURE TO SARS-ASSOCIATED CORONAVIRUS: ICD-10-CM

## 2020-10-12 DIAGNOSIS — Z20.828 EXPOSURE TO SARS-ASSOCIATED CORONAVIRUS: Primary | ICD-10-CM

## 2020-10-12 PROCEDURE — U0003 INFECTIOUS AGENT DETECTION BY NUCLEIC ACID (DNA OR RNA); SEVERE ACUTE RESPIRATORY SYNDROME CORONAVIRUS 2 (SARS-COV-2) (CORONAVIRUS DISEASE [COVID-19]), AMPLIFIED PROBE TECHNIQUE, MAKING USE OF HIGH THROUGHPUT TECHNOLOGIES AS DESCRIBED BY CMS-2020-01-R: HCPCS | Performed by: FAMILY MEDICINE

## 2020-10-13 LAB — SARS-COV-2 RNA SPEC QL NAA+PROBE: NOT DETECTED

## 2020-11-10 ENCOUNTER — HOSPITAL ENCOUNTER (OUTPATIENT)
Dept: MRI IMAGING | Facility: HOSPITAL | Age: 55
Discharge: HOME | End: 2020-11-10
Attending: NEUROLOGICAL SURGERY

## 2020-11-16 ENCOUNTER — HOSPITAL ENCOUNTER (OUTPATIENT)
Dept: MRI IMAGING | Facility: HOSPITAL | Age: 55
Discharge: HOME | End: 2020-11-16
Attending: NEUROLOGICAL SURGERY
Payer: COMMERCIAL

## 2020-11-16 DIAGNOSIS — D32.0 MENINGIOMA, CEREBRAL (HCC): ICD-10-CM

## 2020-11-16 DIAGNOSIS — M54.2 NECK PAIN: ICD-10-CM

## 2020-11-16 PROCEDURE — 72156 MRI NECK SPINE W/O & W/DYE: CPT

## 2020-11-16 PROCEDURE — 70553 MRI BRAIN STEM W/O & W/DYE: CPT

## 2020-11-16 PROCEDURE — A9585 GADOBUTROL INJECTION: HCPCS | Performed by: NEUROLOGICAL SURGERY

## 2020-11-16 RX ADMIN — GADOBUTROL 8 ML: 604.72 INJECTION INTRAVENOUS at 16:16

## 2020-11-19 ENCOUNTER — TELEMEDICINE (OUTPATIENT)
Dept: NEUROSURGERY | Facility: CLINIC | Age: 55
End: 2020-11-19
Payer: COMMERCIAL

## 2020-11-19 ENCOUNTER — TELEPHONE (OUTPATIENT)
Dept: NEUROSURGERY | Facility: CLINIC | Age: 55
End: 2020-11-19

## 2020-11-19 DIAGNOSIS — D32.0 MENINGIOMA, CEREBRAL (HCC): Primary | ICD-10-CM

## 2020-11-19 PROCEDURE — 99214 OFFICE O/P EST MOD 30 MIN: CPT | Performed by: NURSE PRACTITIONER

## 2020-11-19 PROCEDURE — 1036F TOBACCO NON-USER: CPT | Performed by: NURSE PRACTITIONER

## 2021-01-05 DIAGNOSIS — Z20.822 EXPOSURE TO COVID-19 VIRUS: ICD-10-CM

## 2021-01-05 DIAGNOSIS — Z20.822 EXPOSURE TO COVID-19 VIRUS: Primary | ICD-10-CM

## 2021-01-05 PROCEDURE — U0003 INFECTIOUS AGENT DETECTION BY NUCLEIC ACID (DNA OR RNA); SEVERE ACUTE RESPIRATORY SYNDROME CORONAVIRUS 2 (SARS-COV-2) (CORONAVIRUS DISEASE [COVID-19]), AMPLIFIED PROBE TECHNIQUE, MAKING USE OF HIGH THROUGHPUT TECHNOLOGIES AS DESCRIBED BY CMS-2020-01-R: HCPCS | Performed by: FAMILY MEDICINE

## 2021-01-06 LAB — SARS-COV-2 RNA SPEC QL NAA+PROBE: NOT DETECTED

## 2021-01-11 ENCOUNTER — OFFICE VISIT (OUTPATIENT)
Dept: DERMATOLOGY | Facility: CLINIC | Age: 56
End: 2021-01-11
Payer: COMMERCIAL

## 2021-01-11 VITALS — BODY MASS INDEX: 34.02 KG/M2 | TEMPERATURE: 97.5 F | WEIGHT: 192 LBS | HEIGHT: 63 IN

## 2021-01-11 DIAGNOSIS — L30.4 INTERTRIGO: ICD-10-CM

## 2021-01-11 DIAGNOSIS — R20.2 NOTALGIA PARESTHETICA: Primary | ICD-10-CM

## 2021-01-11 DIAGNOSIS — D17.24 LIPOMA OF LEFT LOWER EXTREMITY: ICD-10-CM

## 2021-01-11 DIAGNOSIS — B35.3 TINEA PEDIS, UNSPECIFIED LATERALITY: ICD-10-CM

## 2021-01-11 PROCEDURE — 99204 OFFICE O/P NEW MOD 45 MIN: CPT | Performed by: DERMATOLOGY

## 2021-01-11 RX ORDER — CLINDAMYCIN PHOSPHATE 10 UG/ML
LOTION TOPICAL 2 TIMES DAILY
Qty: 60 ML | Refills: 3 | Status: SHIPPED | OUTPATIENT
Start: 2021-01-11 | End: 2022-04-01 | Stop reason: HOSPADM

## 2021-01-11 RX ORDER — TERBINAFINE HYDROCHLORIDE 250 MG/1
TABLET ORAL
Qty: 90 TABLET | Refills: 0 | Status: SHIPPED | OUTPATIENT
Start: 2021-01-11 | End: 2021-04-11

## 2021-01-11 NOTE — ASSESSMENT & PLAN NOTE
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  She is struggling with weight regain  Initial: 271 5lbs  Current: 193 5lbs  EWL: 60%   Joaquin: 168 5lbs in July 2017  Current BMI is Body mass index is 34 28 kg/m²  · Tolerating a regular diet-yes  · Eating at least 60 grams of protein per day-yes  · Following 30/60 minute rule with liquids-yes  · Drinking at least 64 ounces of fluid per day-yes  · Drinking carbonated beverages-no  · Sufficient exercise-no; starting PT soon and agrees to start walking  · Using NSAIDs regularly-no  · Using nicotine-no  · Using alcohol-very rarely   Advised about the risks of alcohol s/p bariatric surgery and recommend avoiding all alcohol  · She is up to date with colonoscopy and will f/u per GI

## 2021-01-11 NOTE — ASSESSMENT & PLAN NOTE
-Regain of about 25lbs from her carmel in July of 2017, up 4 5lbs in the last year  -She is ready to make diet and lifestyle changes and just started tracking again  -Recommend keep food records and work on dietary and lifestyle changes and f/u with RD and SW, attend support groups  -F/u with MWM again

## 2021-01-11 NOTE — PATIENT INSTRUCTIONS
NOTALGIA PARESTHETICA    Assessment and Plan:  Based on a thorough discussion of this condition and the management approach to it (including a comprehensive discussion of the known risks, side effects and potential benefits of treatment), the patient (family) agrees to implement the following specific plan:   Apply Triamcinolone 0 1% ointment twice a day to affected area      Assessment and Plan:  Notalgia paresthetica is a condition where itch and changed sensation arise in the areas of skin on the medial aspect of the shoulder blade on either side of the back  Notalgia means pain in the back, and paraesthetica refers to burning pain, tingling or itch  It is also called thoracic cutaneous nerve entrapment syndrome  The nerves which supply sensation to the upper back emerge from the spinal cord (2nd to 6th thoracic segments) and run a long course up through the thick muscles of the back  They make a right-angled turn before reaching the skin  The nerves appear to be vulnerable to compression or traction  Partial compression or injury leads to the symptoms  Initial injury to the nerves may include:   Back injury   Herniated or "slipped" disc   Herpes zoster (shingles)   Sunburn   Myelopathy (muscular disease)   Small fiber neuropathy    Notalgia paresthetica often starts after a period of intense exercise leading to muscular stiffness, or a period of inactivity  Sometimes, a specific injury may be recalled  It is characterized by intense itch on the medial border of one scapula or both, ie, between the shoulder blades  This itch can be intermittent or continuous  It is unrelieved by scratching, although the scratching and rubbing may be pleasurable  The affected area may spread to both shoulder blades and more widely over the back and shoulders  In many patients, there are no visible signs  Visible changes often arise from rubbing and scratching the affected area   These include:   Scratch marks   Hyperpigmentation (brown marks)   Hypopigmentation (white marks)   Lichen simplex (a type of eczema)   Scarring    There may be changed sensation in the affected area of skin in response to light touch, sharp objects, cold, and heat  There may be reduced or absent sweating in the affected area  Radiology such as X-ray, CT scan or MRI may demonstrate a degenerative vertebra or prolapsed disc in the area that corresponds to the nerve supply to the affected skin  In many cases, no abnormality is revealed  Treatment is not always necessary, and it is not always successful  Typical management may include the following:   Cooling lotions or creams as required (camphor and menthol)   Topical steroids to treat associated lichen simplex   Capsaicin cream - this depletes nerve endings of their chemical transmitters but requires frequent reapplication and causes unpleasant local side effects   Local anesthetic creams may provide temporary relief of symptoms   Amitriptyline or other oral tricyclic medications at night to help sleep and counteract neuropathic symptoms   Gabapentin, pregabalin or other anticonvulsants can relieve unpleasant burning or tingling sensations   Transcutaneous electrical nerve stimulation (TENS)   Surgical decompression of vertebral nerve impingement    Physical therapy with repetitive exercises and stretches for the upper back has been reported to be effective       While sitting, cross arms and bend forwards to stretch upper back   Arms at sides, raise shoulders and rotate them forwards and backwards   Arms straight, rotate forwards 360 degrees and backwards 360 degrees   Rotate upper body left and right until stretch is felt and hold   Massage the muscles besides the spine in the affected area        INTERTRIGO    Assessment and Plan:  Based on a thorough discussion of this condition and the management approach to it (including a comprehensive discussion of the known risks, side effects and potential benefits of treatment), the patient (family) agrees to implement the following specific plan:   Apply Clindamycin lotion twice a day to affected areas    Assessment and Plan  Intertrigo describes a rash in the flexures or body folds, such as behind the ears, in the folds of the neck, under the arms (axillae), under a protruding abdomen, in the groin, between the buttocks, in the finger webs or toe spaces  Although intertrigo may affect one skin fold, it is common for it to involve multiple sites  Intertrigo can affect males and females of any age  It is particularly common in people that are overweight or obese (see metabolic syndrome)  Other contributing factors are:   Genetic tendency to skin disease   Hyperhidrosis (excessive sweating)    Intertrigo can be acute (recent onset), relapsing (recurrent), or chronic (present for more than 6 weeks)  The exact appearance and behaviour depends on the underlying cause or causes  The skin affected by intertrigo is inflamed, ie reddened and uncomfortable  It may become moist and macerated, leading to fissuring (cracks) and peeling  Intertrigo is due to genetic and environmental factors   Flexural skin has relatively high surface temperature   Moisture from insensible water loss and sweating cannot evaporate due to occlusion   Friction from movement of adjacent skin results in chafing  The microorganisms that are normally resident on flexural skin, the microbiome, include corynebacteria, other bacteria and yeasts  These multiply in warm moist environments and may cause disease  We can classify intertrigo into infectious and inflammatory origin but there is often overlap     Infections tend to be unilateral and asymmetrical    Inflammatory disorders tend to be symmetrical affecting armpits, groins, under the breasts and the abdominal folds, except atopic dermatitis, which more often arises on the neck, and in elbow and knee creases  Investigations may be necessary to determine the cause of intertrigo   A swab for microscopy and culture of bacteria (microbiology)   A scraping for microscopy and culture of fungi (mycology)   A skin biopsy may be performed for histopathology if the skin condition is unusual or fails to respond to treatment  What is the treatment for intertrigo? Treatment depends on the underlying cause, if identified, and on which micro-organisms are present in the rash  Combinations are common   Sweating may be reduced with a gentle antiperspirant   Physical exertion should be followed by a bath and completely drying the skin folds using a hair dryer on cool setting, soft towel and/or corn starch powder   Triple paste contains petrolatum, zinc oxide, and aluminum acetate solution to reduce friction, irritation and sweating   Bacteria may be treated with topical antibiotics such as fusidic acid cream, mupirocin ointment, or oral antibiotics such as flucloxacillin and erythromycin   Yeasts and fungi may be treated with topical antifungals such as clotrimazole and terbinafine cream or oral antifungal agents such as itraconazole or terbinafine   Inflammatory skin diseases are often treated with low potency topical steroid creams such as hydrocortisone  More potent steroids are usually avoided in the flexures because they may cause skin thinning resulting in stretch marks (striae) and even ulcers  Calcineurin inhibitors such as tacrolimus ointment or pimecrolimus cream may also prove effective      TINEA PEDIS ("ATHLETE'S FOOT")      Assessment and Plan:  Based on a thorough discussion of this condition and the management approach to it (including a comprehensive discussion of the known risks, side effects and potential benefits of treatment), the patient (family) agrees to implement the following specific plan:   Take 1 terbinafine 250 mg tablet daily for 90 days    Tinea Pedis  Tinea pedis is a fungal infection of the foot and is in fact the most common fungal infection  Tinea pedis is caused by dermatophyte fungi with the three most common being Trichophyton (T ) rubrum, T  interdigitale and Epidermophyton floccosum  Tinea pedis most commonly involves the interdigital spaces, known as "athlete's foot " Other typical sites include the toenails, groin, and palms of the hands  There are four major manifestations of tinea pedis including chronic hyperkeratotic, chronic intertriginous, acute ulcerative and vesicobullous  Signs and symptoms include:    Itchiness, redness, and scaling between the toes   Scales covering the soles and sides of the feet   Blisters over the inner aspect of the feet    It is particularly common in hot, tropical, and urban environments where sweating in the feet facilitate fungal growth  Risk factors for development include:   Occlusive footwear   Excessive swearing   Diabetes or other underlying immunosuppression    Poor peripheral circulation     The diagnosis of tinea pedis can usually be made via good history and physical exam due to its characteristic clinical features  Diagnosis can be confirmed by examining skin scrapings under the microscope  Cultures are occasionally done but may not be necessary if fungi are seen under microscopy  Other diagnoses to consider if patients do not respond to therapy include psoriasis, contact dermatitis, and eczema  Tinea pedis can be treated with topical antifungal drugs applied to affected areas on a repeated basis (usually 2 twice a day) for 2 to 4 weeks  Common topical medications include topical ketoconazole, allylamines, butenafine, ciclopirox, and tolnaftate  In cases that do not respond to topical therapy, oral antifungal agents may be used which include terbinafine, itraconazole, fluconazole and griseofulvin   These oral agents are also used to treat tinea capitis (fungal infection of the scalp) and onychomycosis (fungal infection of the nails)  Those with pre-existing liver problems are usually screened for liver function prior to starting oral terbinafine  Tinea pedis can be prevented by making sure feet are clean and dry with protective footwear worn in communal facilities  Other recommendations are:   Using drying foot powders when wearing occlusive shoes    Thoroughly dry shoes and boots prior to wearing them    Making sure to clean contaminated bathroom floors with bleach    Treatment of family members and other close contacts        LIPOMA      Assessment and Plan:  Based on a thorough discussion of this condition and the management approach to it (including a comprehensive discussion of the known risks, side effects and potential benefits of treatment), the patient (family) agrees to implement the following specific plan:   Assured benign  No treatment necessary    Lipoma  A lipoma is a non-cancerous tumor that is made up of fat cells  It slowly grows under the skin in the subcutaneous tissue  A person may have a single lipoma or may have many lipomas  They are very common  Lipomas can occur in people of all ages, however, they tend to develop in adulthood and are most noticeable during middle age  They affect both sexes equally, although solitary lipomas are more common in women whilst multiple lipomas occur more frequently in men  The cause of lipomas is unknown  It is possible there may be genetic involvement as many patients with lipomas come from a family with a history of these tumors  Sometimes an injury such as a blunt blow to part of the body may trigger growth of a lipoma  People are often unaware of lipomas until they have grown large enough to become visible and palpable  This growth occurs slowly over several years   Some features of lipomas include:   A dome-shaped or egg-shaped lump about 2-10 cm in diameter (some may grow even larger)    It feels soft and smooth and is easily moved under the skin with the fingers    Some have a rubbery or doughy consistency    They are most common on the shoulders, neck, trunk and arms, but they can occur anywhere on the body where fat tissue is present  Most lipomas are symptomless, but some are painful on applying pressure  Lipomas that are tender or painful are usually angiolipomas  This means the lipoma has an increased number of small blood vessels  Painful lipomas are also a feature of adiposis dolorosa or Dercum disease  Diagnosis of lipoma is usually made clinically by finding a soft lump under the skin  However, if there is any doubt, a deep skin biopsy can be performed which will show typical histopathological features of lipoma and its variants  Most lipomas require no treatment  Most lipomas eventually stop growing and remain indefinitely without causing any problems   Occasionally, lipomas that interfere with the movement of adjacent muscles may require surgical removal  Several methods are available:   Simple surgical excision    Squeeze technique (a small incision is made over the lipoma and the fatty tissue is squeezed through the hole)    Liposuction

## 2021-01-11 NOTE — ASSESSMENT & PLAN NOTE
-On statin  -Lipid panel Feb 2020 WNL  -Encourage healthy fat balance, fiber, and exercise  -Repeat panel ordered

## 2021-01-11 NOTE — PROGRESS NOTES
[facility] Note     Patient Name: Jaxon Slater  Encounter Date: [phone]     Have you been cared for by a [facility] in the last 3 years and, if so, which one? No    · Have you traveled outside of the [address] [place] [address] in the past 3 months or outside of the [facility] area in the last 2 weeks? No     May we call your Preferred Phone number to discuss your specific medical information? Yes     May we leave a detailed message that includes your specific medical information? Yes      Today's Chief Concerns:   Concern #1:  Skin check   Concern #2:      Past Medical History:  Have you personally ever had or currently have any of the following? · Skin cancer (such as Melanoma, Basal Cell Carcinoma, Squamous Cell Carcinoma? (If Yes, please provide more detail)- No  · Eczema: No  · Psoriasis: No  · HIV/AIDS: No  · Hepatitis B or C: No  · Tuberculosis: No  · Systemic Immunosuppression such as Diabetes, Biologic or Immunotherapy, Chemotherapy, Organ Transplantation, Bone Marrow Transplantation (If YES, please provide more detail): No  · Radiation Treatment (If YES, please provide more detail): No  · Any other major medical conditions/concerns? (If Yes, which types)- No    Social History:     What is/was your primary occupation? [address] What are your hobbies/past-times? Home making    Family History:  Have any of your "first degree relatives" (parent, brother, sister, or child) had any of the following       · Skin cancer such as Melanoma or Merkel Cell Carcinoma or Pancreatic Cancer? YES, mother hx of unknown skin caner  · Eczema, Asthma, Hay Fever or Seasonal Allergies: No  · Psoriasis or Psoriatic Arthritis: No  · Do any other medical conditions seem to run in your family? If Yes, what condition and which relatives?   No    Current Medications:   (please update all dermatological medications before printing patient's AVS!)      Current Outpatient Medications:     Calcium Citrate-Vitamin D (CALCIUM CITRATE + D) 250-200 MG-UNIT TABS, Take 1 tablet by mouth 3 (three) times a day, Disp: , Rfl:     estradiol (VIVELLE-DOT) 0 025 MG/24HR, PLACE 1 PATCH ON THE SKIN 2 TIMES A WEEK, Disp: , Rfl:     FLUoxetine (PROzac) 20 mg capsule, Take 2 capsules (40 mg total) by mouth daily, Disp: 180 capsule, Rfl: 1    Melatonin 5 MG CAPS, Take 5 mg by mouth daily at bedtime , Disp: , Rfl:     MULTIPLE VITAMIN PO, Take 1 tablet by mouth daily, Disp: , Rfl:     pravastatin (PRAVACHOL) 20 mg tablet, TAKE 1 TABLET (20 MG TOTAL) BY MOUTH DAILY, Disp: 90 tablet, Rfl: 1    progesterone (PROMETRIUM) 100 MG capsule, Take 100 mg by mouth daily, Disp: , Rfl:       Review of Systems:  Have you recently had or currently have any of the following? If YES, what are you doing for the problem? · Fever, chills or unintended weight loss: No  · Sudden loss or change in your vision: No  · Nausea, vomiting or blood in your stool: No  · Painful or swollen joints: No  · Wheezing or cough: No  · Changing mole or non-healing wound: No  · Nosebleeds: No  · Excessive sweating: No  · Easy or prolonged bleeding? No  · Over the last 2 weeks, how often have you been bothered by the following problems? · Taking little interest or pleasure in doing things: 1 - Not at All  · Feeling down, depressed, or hopeless: 1 - Not at All  · Rapid heartbeat with epinephrine:  No    · FEMALES ONLY:    · Are you pregnant or planning to become pregnant? No  · Are you currently or planning to be nursing or breast feeding? No    · Any known allergies? No Known Allergies      Physical Exam:     Was a chaperone (Derm Clinical Assistant) present throughout the entire Physical Exam? Yes     Did the Dermatology Team specifically  the patient on the importance of a Full Skin Exam to be sure that nothing is missed clinically?  Yes}  o Did the patient ultimately request or accept a Full Skin Exam?  Yes  o Did the patient specifically refuse to have the areas "under-the-bra" examined by the Dermatologist? No  o Did the patient specifically refuse to have the areas "under-the-underwear" examined by the Dermatologist? No    CONSTITUTIONAL:   Vitals:    01/11/21 1100   Temp: 97 5 °F (36 4 °C)   TempSrc: Temporal   Weight: 87 1 kg (192 lb)   Height: 5' 3" (1 6 m)           PSYCH: Normal mood and affect  EYES: Normal conjunctiva  ENT: Normal lips and oral mucosa  CARDIOVASCULAR: No edema  RESPIRATORY: Normal respirations  HEME/LYMPH/IMMUNO:  No regional lymphadenopathy except as noted below in "ASSESSMENT AND PLAN BY DIAGNOSIS"      Hair, Scalp, Ears, Face Normal except as noted below in Assessment   Neck, Cervical Chain Nodes Normal except as noted below in Assessment   Right Arm/Hand/Fingers Normal except as noted below in Assessment   Left Arm/Hand/Fingers Normal except as noted below in Assessment   Chest/Breasts/Axillae Viewed areas Normal except as noted below in Assessment   Abdomen, Umbilicus Normal except as noted below in Assessment   Back/Spine Normal except as noted below in Assessment   Groin/Genitalia/Buttocks Normal except as noted below in Assessment   Right Leg, Foot, Toes Normal except as noted below in Assessment   Left Leg, Foot, Toes Normal except as noted below in Assessment            NOTALGIA PARESTHETICA    Physical Exam:   Anatomic Location Affected:  Left upper back   Morphological Description:  Few crusted papules   Pertinent Positives:   Pertinent Negatives:     Additional History of Present Condition:  No treatment    Assessment and Plan:  Based on a thorough discussion of this condition and the management approach to it (including a comprehensive discussion of the known risks, side effects and potential benefits of treatment), the patient (family) agrees to implement the following specific plan:   Apply Triamcinolone 0 1% ointment twice a day to affected area   Consider physical therapy or chiropractic treatment      Assessment and Plan:  Notalgia paresthetica is a condition where itch and changed sensation arise in the areas of skin on the medial aspect of the shoulder blade on either side of the back  Notalgia means pain in the back, and paraesthetica refers to burning pain, tingling or itch  It is also called thoracic cutaneous nerve entrapment syndrome  The nerves which supply sensation to the upper back emerge from the spinal cord (2nd to 6th thoracic segments) and run a long course up through the thick muscles of the back  They make a right-angled turn before reaching the skin  The nerves appear to be vulnerable to compression or traction  Partial compression or injury leads to the symptoms  Initial injury to the nerves may include:   Back injury   Herniated or "slipped" disc   Herpes zoster (shingles)   Sunburn   Myelopathy (muscular disease)   Small fiber neuropathy    Notalgia paresthetica often starts after a period of intense exercise leading to muscular stiffness, or a period of inactivity  Sometimes, a specific injury may be recalled  It is characterized by intense itch on the medial border of one scapula or both, ie, between the shoulder blades  This itch can be intermittent or continuous  It is unrelieved by scratching, although the scratching and rubbing may be pleasurable  The affected area may spread to both shoulder blades and more widely over the back and shoulders  In many patients, there are no visible signs  Visible changes often arise from rubbing and scratching the affected area  These include:   Scratch marks   Hyperpigmentation (brown marks)   Hypopigmentation (white marks)   Lichen simplex (a type of eczema)   Scarring    There may be changed sensation in the affected area of skin in response to light touch, sharp objects, cold, and heat   There may be reduced or absent sweating in the affected area     Radiology such as X-ray, CT scan or MRI may demonstrate a degenerative vertebra or prolapsed disc in the area that corresponds to the nerve supply to the affected skin  In many cases, no abnormality is revealed  Treatment is not always necessary, and it is not always successful  Typical management may include the following:   Cooling lotions or creams as required (camphor and menthol)   Topical steroids to treat associated lichen simplex   Capsaicin cream - this depletes nerve endings of their chemical transmitters but requires frequent reapplication and causes unpleasant local side effects   Local anesthetic creams may provide temporary relief of symptoms   Amitriptyline or other oral tricyclic medications at night to help sleep and counteract neuropathic symptoms   Gabapentin, pregabalin or other anticonvulsants can relieve unpleasant burning or tingling sensations   Transcutaneous electrical nerve stimulation (TENS)   Surgical decompression of vertebral nerve impingement    Physical therapy with repetitive exercises and stretches for the upper back has been reported to be effective   While sitting, cross arms and bend forwards to stretch upper back   Arms at sides, raise shoulders and rotate them forwards and backwards   Arms straight, rotate forwards 360 degrees and backwards 360 degrees   Rotate upper body left and right until stretch is felt and hold   Massage the muscles besides the spine in the affected area        INTERTRIGO    Physical Exam:   Anatomic Location Affected:  Lower abdomen   Morphological Description:  Mild erythema   Pertinent Positives:   Pertinent Negatives:     Additional History of Present Condition:  Occasionallt notices odor    Assessment and Plan:  Based on a thorough discussion of this condition and the management approach to it (including a comprehensive discussion of the known risks, side effects and potential benefits of treatment), the patient (family) agrees to implement the following specific plan:   Apply Clindamycin lotion twice a day to affected areas    Assessment and Plan  Intertrigo describes a rash in the flexures or body folds, such as behind the ears, in the folds of the neck, under the arms (axillae), under a protruding abdomen, in the groin, between the buttocks, in the finger webs or toe spaces  Although intertrigo may affect one skin fold, it is common for it to involve multiple sites  Intertrigo can affect males and females of any age  It is particularly common in people that are overweight or obese (see metabolic syndrome)  Other contributing factors are:   Genetic tendency to skin disease   Hyperhidrosis (excessive sweating)    Intertrigo can be acute (recent onset), relapsing (recurrent), or chronic (present for more than 6 weeks)  The exact appearance and behaviour depends on the underlying cause or causes  The skin affected by intertrigo is inflamed, ie reddened and uncomfortable  It may become moist and macerated, leading to fissuring (cracks) and peeling  Intertrigo is due to genetic and environmental factors   Flexural skin has relatively high surface temperature   Moisture from insensible water loss and sweating cannot evaporate due to occlusion   Friction from movement of adjacent skin results in chafing  The microorganisms that are normally resident on flexural skin, the microbiome, include corynebacteria, other bacteria and yeasts  These multiply in warm moist environments and may cause disease  We can classify intertrigo into infectious and inflammatory origin but there is often overlap   Infections tend to be unilateral and asymmetrical    Inflammatory disorders tend to be symmetrical affecting armpits, groins, under the breasts and the abdominal folds, except atopic dermatitis, which more often arises on the neck, and in elbow and knee creases      Investigations may be necessary to determine the cause of intertrigo   A swab for microscopy and culture of bacteria (microbiology)   A scraping for microscopy and culture of fungi (mycology)   A skin biopsy may be performed for histopathology if the skin condition is unusual or fails to respond to treatment  What is the treatment for intertrigo? Treatment depends on the underlying cause, if identified, and on which micro-organisms are present in the rash  Combinations are common   Sweating may be reduced with a gentle antiperspirant   Physical exertion should be followed by a bath and completely drying the skin folds using a hair dryer on cool setting, soft towel and/or corn starch powder   Triple paste contains petrolatum, zinc oxide, and aluminum acetate solution to reduce friction, irritation and sweating   Bacteria may be treated with topical antibiotics such as fusidic acid cream, mupirocin ointment, or oral antibiotics such as flucloxacillin and erythromycin   Yeasts and fungi may be treated with topical antifungals such as clotrimazole and terbinafine cream or oral antifungal agents such as itraconazole or terbinafine   Inflammatory skin diseases are often treated with low potency topical steroid creams such as hydrocortisone  More potent steroids are usually avoided in the flexures because they may cause skin thinning resulting in stretch marks (striae) and even ulcers  Calcineurin inhibitors such as tacrolimus ointment or pimecrolimus cream may also prove effective  TINEA PEDIS ("ATHLETE'S FOOT")with ONYCHOMYCOSIS    Physical Exam:   Anatomic Location Affected:  Light scale with moccasin distribute   Morphological Description:  mocassin pattern erythema and scale   Pertinent Positives:   Pertinent Negatives:     Additional History of Present Condition:  KOH +    Assessment and Plan:  Based on a thorough discussion of this condition and the management approach to it (including a comprehensive discussion of the known risks, side effects and potential benefits of treatment), the patient (family) agrees to implement the following specific plan:   Take terbinafine 250mg tablets once a day for 90 days to treat both toe nails and feet   After clear apply over the counter athletes foot medicine once weekly to feet and nails   Treat all shoes with over the counter antifungal foot spray   Avoid going barefoot   Follow up in 3 months if there has been no change in the nails    Tinea Pedis  Tinea pedis is a fungal infection of the foot and is in fact the most common fungal infection  Tinea pedis is caused by dermatophyte fungi with the three most common being Trichophyton (T ) rubrum, T  interdigitale and Epidermophyton floccosum  Tinea pedis most commonly involves the interdigital spaces, known as "athlete's foot " Other typical sites include the toenails, groin, and palms of the hands  There are four major manifestations of tinea pedis including chronic hyperkeratotic, chronic intertriginous, acute ulcerative and vesicobullous  Signs and symptoms include:    Itchiness, redness, and scaling between the toes   Scales covering the soles and sides of the feet   Blisters over the inner aspect of the feet    It is particularly common in hot, tropical, and urban environments where sweating in the feet facilitate fungal growth  Risk factors for development include:   Occlusive footwear   Excessive swearing   Diabetes or other underlying immunosuppression    Poor peripheral circulation     The diagnosis of tinea pedis can usually be made via good history and physical exam due to its characteristic clinical features  Diagnosis can be confirmed by examining skin scrapings under the microscope  Cultures are occasionally done but may not be necessary if fungi are seen under microscopy  Other diagnoses to consider if patients do not respond to therapy include psoriasis, contact dermatitis, and eczema      Tinea pedis can be treated with topical antifungal drugs applied to affected areas on a repeated basis (usually 2 twice a day) for 2 to 4 weeks  Common topical medications include topical ketoconazole, allylamines, butenafine, ciclopirox, and tolnaftate  In cases that do not respond to topical therapy, oral antifungal agents may be used which include terbinafine, itraconazole, fluconazole and griseofulvin  These oral agents are also used to treat tinea capitis (fungal infection of the scalp) and onychomycosis (fungal infection of the nails)  Those with pre-existing liver problems are usually screened for liver function prior to starting oral terbinafine  Tinea pedis can be prevented by making sure feet are clean and dry with protective footwear worn in communal facilities  Other recommendations are:   Using drying foot powders when wearing occlusive shoes    Thoroughly dry shoes and boots prior to wearing them    Making sure to clean contaminated bathroom floors with bleach    Treatment of family members and other close contacts        LIPOMA    Physical Exam:   Anatomic Location Affected:  Left thigh   Morphological Description:  2 cm round firm mobile subcutanous bnodule   Pertinent Positives:   Pertinent Negatives: Additional History of Present Condition:  none    Assessment and Plan:  Based on a thorough discussion of this condition and the management approach to it (including a comprehensive discussion of the known risks, side effects and potential benefits of treatment), the patient (family) agrees to implement the following specific plan:   No treatment necessary  Monitor for changes    Lipoma  A lipoma is a non-cancerous tumor that is made up of fat cells  It slowly grows under the skin in the subcutaneous tissue  A person may have a single lipoma or may have many lipomas  They are very common  Lipomas can occur in people of all ages, however, they tend to develop in adulthood and are most noticeable during middle age   They affect both sexes equally, although solitary lipomas are more common in women whilst multiple lipomas occur more frequently in men  The cause of lipomas is unknown  It is possible there may be genetic involvement as many patients with lipomas come from a family with a history of these tumors  Sometimes an injury such as a blunt blow to part of the body may trigger growth of a lipoma  People are often unaware of lipomas until they have grown large enough to become visible and palpable  This growth occurs slowly over several years  Some features of lipomas include:   A dome-shaped or egg-shaped lump about 2-10 cm in diameter (some may grow even larger)    It feels soft and smooth and is easily moved under the skin with the fingers    Some have a rubbery or doughy consistency    They are most common on the shoulders, neck, trunk and arms, but they can occur anywhere on the body where fat tissue is present  Most lipomas are symptomless, but some are painful on applying pressure  Lipomas that are tender or painful are usually angiolipomas  This means the lipoma has an increased number of small blood vessels  Painful lipomas are also a feature of adiposis dolorosa or Dercum disease  Diagnosis of lipoma is usually made clinically by finding a soft lump under the skin  However, if there is any doubt, a deep skin biopsy can be performed which will show typical histopathological features of lipoma and its variants  Most lipomas require no treatment  Most lipomas eventually stop growing and remain indefinitely without causing any problems   Occasionally, lipomas that interfere with the movement of adjacent muscles may require surgical removal  Several methods are available:   Simple surgical excision    Squeeze technique (a small incision is made over the lipoma and the fatty tissue is squeezed through the hole)    Liposuction    MELANOCYTIC NEVI ("Moles")    Physical Exam:   Anatomic Location Affected: Mostly on sun-exposed areas of the trunk and extremities   Morphological Description:  Scattered, 1-4mm round to ovoid, symmetrical-appearing, even bordered, skin colored to dark brown macules/papules, mostly in sun-exposed areas   Pertinent Positives:   Pertinent Negatives: Additional History of Present Condition:  Mom has had multiple skin cancers, has a [place] house in [facility] [address] and Plan:  Based on a thorough discussion of this condition and the management approach to it (including a comprehensive discussion of the known risks, side effects and potential benefits of treatment), the patient (family) agrees to implement the following specific plan:   Recommend yearly skin exams     Melanocytic Nevi  Melanocytic nevi ("moles") are caused by collections of the color producing skin cells, or melanocytes, in 1 area in the skin  They can range in color from pink to dark brown and be either raised or flat  Some moles are present at birth (I e , "congenital nevi"), while others come up later in life (i e , "acquired nevi")  Albino Scale exposure also stimulates the body to make more moles, ie the more sun you get the more moles you'll grow  Clinically distinguishing a healthy mole from melanoma may be difficult  The "ABCDE's" of moles have been suggested as a means of helping to alert a person to a suspicious mole and the possible increased risk of melanoma  Asymmetry: Healthy moles tend to be symmetric, while melanomas are often asymmetric  Asymmetry means if you draw a line through the mole, the two halves do not match in color, size, shape, or surface texture Any mole that starts to demonstrate "asymmetry" should be examined promptly by a board certified dermatologist      Border: Healthy moles tend to have discrete, even borders  The border of a melanoma often blends into the normal skin and does not sharply delineate the mole from normal skin    Any mole that starts to demonstrate "uneven borders" should be examined promptly     Color: Healthy moles tend to be one color throughout  Melanomas tend to be made up of different colors ranging from dark black, blue, white, or red  Any mole that demonstrates a color change should be examined promptly    Diameter: Healthy moles tend to be smaller than 0 6 cm in size; an exception are "congenital nevi" that can be larger  Melanomas tend to grow and can often be greater than 0 6 cm (1/4 of an inch, or the size of a pencil eraser)  This is only a guideline, and many normal moles may be larger than 0 6 cm without being unhealthy  Any mole that starts to change in size (small to bigger or bigger to smaller) should be examined promptly    Evolving: Healthy moles tend to "stay the same "  Melanomas may often show signs of change or evolution such as a change in size, shape, color, or elevation  Any mole that starts to itch, bleed, crust, burn, hurt, or ulcerate or demonstrate a change or evolution should be examined promptly by a board certified dermatologist       What are atypical moles or dysplastic nevi? Dysplastic moles are moles that have some of the ABCDE  changes listed above but  are not cancerous  Sometimes a biopsy and microscopic examination are needed to determine the difference  They may indicate an increased risk of melanoma in that person, especially if there is a family history of melanoma  What is a Melanoma? The main concern when looking at a new or changing mole it to evaluate whether it may be a melanoma  The appearance of a "new mole" remains one of the most reliable methods for identifying a malignant melanoma  A melanoma is a type of skin cancer that can be deadly if it spreads throughout the body  The prognosis of a Melanoma depends on how deep it has penetrated in the skin  If caught early, they generally will not have had time to grow into the deeper layers of the skin and they cure rate is then very high   Once the melanoma grows deeper into the skin, the cure rate drops dramatically  Therefore, early detection and removal of a malignant melanoma results in a much better chance of complete cure             Scribe Attestation    I,:  Bhavik Alejandro am acting as a scribe while in the presence of the attending physician :       I,:  John Floyd MD personally performed the services described in this documentation    as scribed in my presence :

## 2021-01-11 NOTE — ASSESSMENT & PLAN NOTE
-At risk for malabsorption of vitamins/minerals secondary to malabsorption and restriction of intake from bariatric surgery  -Currently taking adequate postop bariatric surgery vitamin supplementation: Bariatric Choice MVI w/ 45mg iron, Calcium citrate 500mg TID  -Last set of bariatric labs completed on 02/11/20 and showed ferritin low (11)  -Next set of bariatric labs ordered for approximately 2 weeks  -Patient received education about the importance of adhering to a lifelong supplementation regimen to avoid vitamin/mineral deficiencies

## 2021-01-12 ENCOUNTER — OFFICE VISIT (OUTPATIENT)
Dept: FAMILY MEDICINE CLINIC | Facility: CLINIC | Age: 56
End: 2021-01-12
Payer: COMMERCIAL

## 2021-01-12 ENCOUNTER — TELEPHONE (OUTPATIENT)
Dept: ADMINISTRATIVE | Facility: OTHER | Age: 56
End: 2021-01-12

## 2021-01-12 VITALS
HEART RATE: 76 BPM | TEMPERATURE: 98.4 F | DIASTOLIC BLOOD PRESSURE: 82 MMHG | BODY MASS INDEX: 34.73 KG/M2 | SYSTOLIC BLOOD PRESSURE: 120 MMHG | HEIGHT: 63 IN | WEIGHT: 196 LBS

## 2021-01-12 DIAGNOSIS — G89.29 CHRONIC LEFT-SIDED LOW BACK PAIN WITHOUT SCIATICA: ICD-10-CM

## 2021-01-12 DIAGNOSIS — D32.0 MENINGIOMA, CEREBRAL (HCC): ICD-10-CM

## 2021-01-12 DIAGNOSIS — K91.2 POSTGASTRECTOMY MALABSORPTION: ICD-10-CM

## 2021-01-12 DIAGNOSIS — Z00.00 ANNUAL PHYSICAL EXAM: Primary | ICD-10-CM

## 2021-01-12 DIAGNOSIS — F32.4 MAJOR DEPRESSIVE DISORDER WITH SINGLE EPISODE, IN PARTIAL REMISSION (HCC): ICD-10-CM

## 2021-01-12 DIAGNOSIS — M54.50 CHRONIC LEFT-SIDED LOW BACK PAIN WITHOUT SCIATICA: ICD-10-CM

## 2021-01-12 DIAGNOSIS — Z90.3 POSTGASTRECTOMY MALABSORPTION: ICD-10-CM

## 2021-01-12 PROCEDURE — 99396 PREV VISIT EST AGE 40-64: CPT | Performed by: FAMILY MEDICINE

## 2021-01-12 PROCEDURE — 3725F SCREEN DEPRESSION PERFORMED: CPT | Performed by: FAMILY MEDICINE

## 2021-01-12 NOTE — TELEPHONE ENCOUNTER
----- Message from Ronan Benedict sent at 1/12/2021  8:05 AM EST -----  Regarding: pap - fam med ismael  01/12/21 8:05 AM    Hello, our patient Avery Guardado has had Pap Smear (HPV) aka Cervical Cancer Screening completed/performed  Please assist in updating the patient chart by pulling the Care Everywhere (CE) document  The date of service is 2020       Thank you,  Arun Patel MA [facility] Anuradha Rosales

## 2021-01-12 NOTE — TELEPHONE ENCOUNTER
Upon review of the In Basket request we were able to locate, review, and update the patient chart as requested for Pap Smear (HPV) aka Cervical Cancer Screening  Any additional questions or concerns should be emailed to the Practice Liaisons via [email or web address] email, please do not reply via In Basket      Thank you  Ehsan Romero MA

## 2021-01-12 NOTE — PROGRESS NOTES
[address]    NAME: Nancie Meyer  AGE: 63 y o  SEX: female  : 1965     DATE: 2021     Assessment and Plan:     Problem List Items Addressed This Visit        Digestive    Postgastrectomy malabsorption     Due for repeat labs in Feb  Continue present care            Nervous and Auditory    Meningioma, cerebral (HCC)     Unchanged  F/u with neurosurgery            Other    Major depressive disorder with single episode, in partial remission ([facility] [address] )     Depression Screening Follow-up Plan: Patient's depression screening was positive with a PHQ-2 score of 0  Their PHQ-9 score was 3  Patient assessed for underlying major depression  They have no active suicidal ideations  Brief counseling provided and recommend additional follow-up/re-evaluation next office visit  Continue present care  Recheck 1y             Other Visit Diagnoses     Annual physical exam    -  Primary    Chronic left-sided low back pain without sciatica        Relevant Orders    Ambulatory referral to Physical Therapy          Immunizations and preventive care screenings were discussed with patient today  Appropriate education was printed on patient's after visit summary  Counseling:  · Exercise: the importance of regular exercise/physical activity was discussed  Recommend exercise 3-5 times per week for at least 30 minutes  · BMI Counseling: Body mass index is 34 72 kg/m²  The BMI is above normal  Nutrition recommendations include moderation in carbohydrate intake, increasing intake of lean protein, reducing intake of saturated fat and trans fat and reducing intake of cholesterol  Return in about 1 year (around 2022)       Chief Complaint:     Chief Complaint   Patient presents with    Physical Exam      History of Present Illness:     Adult Annual Physical   Patient here for a comprehensive physical exam  The patient reports problems - as below   - some intermittent L low back/ posterior-lateral hip pain, mostly when she goes to bed at night  No issues during the day  Way worsen with walking longer distances  Pain described as an ache, 4/10 at worst and does not radiate  - up to date with neurosurgery re: meningioma monitoring  Last Mri was unchanged  Pt without symptoms    - mood stable  PHQ done    Diet and Physical Activity  · Diet/Nutrition: faell off diet over the last 6-9m (during pandemic)      · Exercise: no formal exercise  Depression Screening  PHQ-9 Depression Screening    PHQ-9:   Frequency of the following problems over the past two weeks:      Little interest or pleasure in doing things: 0 - not at all  Feeling down, depressed, or hopeless: 0 - not at all  Trouble falling or staying asleep, or sleeping too much: 1 - several days  Feeling tired or having little energy: 0 - not at all  Poor appetite or overeatin - not at all  Feeling bad about yourself - or that you are a failure or have let yourself or your family down: 1 - several days  Trouble concentrating on things, such as reading the newspaper or watching television: 1 - several days  Moving or speaking so slowly that other people could have noticed  Or the opposite - being so fidgety or restless that you have been moving around a lot more than usual: 0 - not at all  Thoughts that you would be better off dead, or of hurting yourself in some way: 0 - not at all  PHQ-2 Score: 0  PHQ-9 Score: 3       General Health  · Sleep: 6-7h/night with freq awakenings at night  · Hearing: normal - bilateral   · Vision: most recent eye exam >1 year ago and wears glasses  · Dental: regular dental visits and brushes teeth twice daily  /GYN Health  · Patient is: postmenopausal  · Last menstrual period: 2y ago  · Contraceptive method: estrogen/progesterone for menopausal symptoms  Review of Systems:     Review of Systems   Constitutional: Negative  HENT: Negative      Eyes: Negative  Respiratory: Negative  Cardiovascular: Negative  Gastrointestinal: Negative  Endocrine: Negative  Genitourinary: Negative  Musculoskeletal: Positive for arthralgias  Negative for gait problem, joint swelling and myalgias  Skin: Negative  Allergic/Immunologic: Negative  Neurological: Negative  Hematological: Negative  Psychiatric/Behavioral: Negative  Past Medical History:     Past Medical History:   Diagnosis Date    Abdominal pain     Chronic pain disorder     abdominal    Depression     Diverticula of colon     Former smoker     Resolved 2012     GERD (gastroesophageal reflux disease)     Hemorrhoids     Hiatal hernia     Hyperlipidemia     Intestinal malabsorption following gastrectomy     Morbid obesity ([address] )     Resolved 10/2/2015     Obesity     Postgastrectomy malabsorption     Vitamin D insufficiency     Resolved 2016       Past Surgical History:     Past Surgical History:   Procedure Laterality Date    ABDOMINAL SURGERY      gastric bipass    ANKLE SURGERY       SECTION      (2) ,      COLONOSCOPY      ESOPHAGOGASTRODUODENOSCOPY      x2    GASTRIC BYPASS  2015    HERNIA REPAIR  2015    Paraesophageal hiatus hernia  Managed by Rosalinda Aw (General Surgery)    IA EGD TRANSORAL BIOPSY SINGLE/MULTIPLE N/A 2017    Procedure: ESOPHAGOGASTRODUODENOSCOPY (EGD); Surgeon: Benjamin Lugo MD;  Location: [facility];   Service: Bariatrics    TONSILLECTOMY  1984    UTERINE FIBROID SURGERY        Social History:        Social History     Socioeconomic History    Marital status: /Civil Union     Spouse name: None    Number of children: None    Years of education: None    Highest education level: None   Occupational History    None   Social Needs    Financial resource strain: None    Food insecurity     Worry: None     Inability: None    Transportation needs     Medical: None     Non-medical: None Tobacco Use    Smoking status: Former Smoker     Types: Cigarettes     Quit date: 2011     Years since quitting: 10 0    Smokeless tobacco: Never Used    Tobacco comment: 20+ pack year hx - As per [employer]    Substance and Sexual Activity    Alcohol use: Yes     Frequency: Monthly or less    Drug use: No    Sexual activity: None   Lifestyle    Physical activity     Days per week: None     Minutes per session: None    Stress: None   Relationships    Social connections     Talks on phone: None     Gets together: None     Attends Denominational service: None     Active member of club or organization: None     Attends meetings of clubs or organizations: None     Relationship status: None    Intimate partner violence     Fear of current or ex partner: None     Emotionally abused: None     Physically abused: None     Forced sexual activity: None   Other Topics Concern    None   Social History Narrative    Daily coffee consumption (2 cups/day)    Denied: History of daily cola consumption (__cans/day)    Daily tea consumption (__cups/day)    Former smoker: quit 2012 - As per [employer] or homemaker - As per [employer]    Uses safety equipment - Seatbelts       Family History:     Family History   Problem Relation Age of Onset    Breast cancer Mother 32    Prostate cancer Father 48    Hypertension Father     Heart disease Father         Coronary arteriosclerosis     Hyperlipidemia Father     Heart disease Maternal Grandmother         Coronary arteriosclerosis     Heart disease Maternal Grandfather         Coronary arteriosclerosis     Diabetes Paternal Grandmother     No Known Problems Brother     No Known Problems Daughter     No Known Problems Daughter     No Known Problems Paternal Aunt     Stroke Neg Hx     Thyroid disease Neg Hx       Current Medications:     Current Outpatient Medications   Medication Sig Dispense Refill    Calcium Citrate-Vitamin D (CALCIUM CITRATE + D) 250-200 MG-UNIT TABS Take 1 tablet by mouth 3 (three) times a day      clindamycin (CLEOCIN T) 1 % lotion Apply topically 2 (two) times a day To groin as needed for rash/odor 60 mL 3    estradiol (VIVELLE-DOT) 0 025 MG/24HR PLACE 1 PATCH ON THE SKIN 2 TIMES A WEEK      FLUoxetine (PROzac) 20 mg capsule Take 2 capsules (40 mg total) by mouth daily 180 capsule 1    Melatonin 5 MG CAPS Take 5 mg by mouth daily at bedtime       MULTIPLE VITAMIN PO Take 1 tablet by mouth daily      pravastatin (PRAVACHOL) 20 mg tablet TAKE 1 TABLET (20 MG TOTAL) BY MOUTH DAILY 90 tablet 1    progesterone (PROMETRIUM) 100 MG capsule Take 100 mg by mouth daily      terbinafine (LamISIL) 250 mg tablet Take 1 tablet daily for 90 days 90 tablet 0    triamcinolone (KENALOG) 0 1 % ointment Apply topically 2 (two) times a day To back as needed for itching 454 g 0     No current facility-administered medications for this visit  Allergies:     No Known Allergies   Physical Exam:     /82   Pulse 76   Temp 98 4 °F (36 9 °C)   Ht 5' 3" (1 6 m)   Wt 88 9 kg (196 lb)   BMI 34 72 kg/m²     Physical Exam  Vitals signs reviewed  Constitutional:       Appearance: She is well-developed  HENT:      Head: Normocephalic and atraumatic  Right Ear: Tympanic membrane, ear canal and external ear normal       Left Ear: Tympanic membrane, ear canal and external ear normal    Eyes:      Extraocular Movements: Extraocular movements intact  Conjunctiva/sclera: Conjunctivae normal       Pupils: Pupils are equal, round, and reactive to light  Neck:      Musculoskeletal: Normal range of motion and neck supple  No muscular tenderness  Thyroid: No thyromegaly  Vascular: No JVD  Cardiovascular:      Rate and Rhythm: Normal rate and regular rhythm  Pulses: Normal pulses  Heart sounds: Normal heart sounds  No murmur  Pulmonary:      Effort: Pulmonary effort is normal  No respiratory distress        Breath sounds: Normal breath sounds  No wheezing  Abdominal:      General: Bowel sounds are normal  There is no distension  Palpations: Abdomen is soft  There is no mass  Tenderness: There is no abdominal tenderness  Musculoskeletal: Normal range of motion  General: Tenderness (over L poserior hip/ SI joint) present  No swelling or deformity  Right lower leg: No edema  Left lower leg: No edema  Lymphadenopathy:      Cervical: No cervical adenopathy  Skin:     General: Skin is warm  Capillary Refill: Capillary refill takes less than 2 seconds  Neurological:      General: No focal deficit present  Mental Status: She is alert and oriented to person, place, and time  Cranial Nerves: No cranial nerve deficit  Sensory: No sensory deficit  Motor: No abnormal muscle tone  Coordination: Coordination normal       Deep Tendon Reflexes: Reflexes normal    Psychiatric:         Mood and Affect: Mood normal          Behavior: Behavior normal          Thought Content: Thought content normal          Judgment: Judgment normal       Comments: PHQ-9 Depression Screening    PHQ-9:   Frequency of the following problems over the past two weeks:      Little interest or pleasure in doing things: 0 - not at all  Feeling down, depressed, or hopeless: 0 - not at all  Trouble falling or staying asleep, or sleeping too much: 1 - several days  Feeling tired or having little energy: 0 - not at all  Poor appetite or overeatin - not at all  Feeling bad about yourself - or that you are a failure or have let   yourself or your family down: 1 - several days  Trouble concentrating on things, such as reading the newspaper or watching   television: 1 - several days  Moving or speaking so slowly that other people could have noticed   Or the   opposite - being so fidgety or restless that you have been moving around a   lot more than usual: 0 - not at all  Thoughts that you would be better off dead, or of hurting yourself in some   way: 0 - not at all  PHQ-2 Score: 0  PHQ-9 Score: 2333 MD David Rowe

## 2021-01-12 NOTE — PATIENT INSTRUCTIONS

## 2021-01-12 NOTE — ASSESSMENT & PLAN NOTE
Depression Screening Follow-up Plan: Patient's depression screening was positive with a PHQ-2 score of 0  Their PHQ-9 score was 3  Patient assessed for underlying major depression  They have no active suicidal ideations  Brief counseling provided and recommend additional follow-up/re-evaluation next office visit  Continue present care   Recheck 1y

## 2021-01-14 ENCOUNTER — OFFICE VISIT (OUTPATIENT)
Dept: BARIATRICS | Facility: CLINIC | Age: 56
End: 2021-01-14
Payer: COMMERCIAL

## 2021-01-14 ENCOUNTER — TELEPHONE (OUTPATIENT)
Dept: BARIATRICS | Facility: CLINIC | Age: 56
End: 2021-01-14

## 2021-01-14 VITALS — WEIGHT: 193.5 LBS | HEIGHT: 63 IN | BODY MASS INDEX: 34.29 KG/M2

## 2021-01-14 DIAGNOSIS — R63.5 WEIGHT GAIN FOLLOWING GASTRIC BYPASS SURGERY: ICD-10-CM

## 2021-01-14 DIAGNOSIS — Z48.815 ENCOUNTER FOR SURGICAL AFTERCARE FOLLOWING SURGERY OF DIGESTIVE SYSTEM: Primary | ICD-10-CM

## 2021-01-14 DIAGNOSIS — K91.2 POSTGASTRECTOMY MALABSORPTION: ICD-10-CM

## 2021-01-14 DIAGNOSIS — Z90.3 POSTGASTRECTOMY MALABSORPTION: ICD-10-CM

## 2021-01-14 DIAGNOSIS — E78.00 HYPERCHOLESTEROLEMIA: ICD-10-CM

## 2021-01-14 DIAGNOSIS — Z98.84 WEIGHT GAIN FOLLOWING GASTRIC BYPASS SURGERY: ICD-10-CM

## 2021-01-14 PROCEDURE — 1036F TOBACCO NON-USER: CPT | Performed by: PHYSICIAN ASSISTANT

## 2021-01-14 PROCEDURE — 3008F BODY MASS INDEX DOCD: CPT | Performed by: PHYSICIAN ASSISTANT

## 2021-01-14 PROCEDURE — 99214 OFFICE O/P EST MOD 30 MIN: CPT | Performed by: PHYSICIAN ASSISTANT

## 2021-01-14 NOTE — PROGRESS NOTES
PT Evaluation     Today's date: 1/15/2021  Patient name: Carmella Dixon  : 1965  MRN: [id number]  Referring provider: Shahriar Calzada*  Dx:   Encounter Diagnosis     ICD-10-CM    1  Chronic midline low back pain without sciatica  M54 5     G89 29    2  Chronic left-sided low back pain without sciatica  M54 5 Ambulatory referral to Physical Therapy    G89 29        Start Time: 0815  Stop Time: 915  Total time in clinic (min): 60 minutes    Assessment  Assessment details: Carmella Dixon is a 63 y o  female who presents with signs and symptoms consistent of waxing/waning chronic LBP (left worse than right) with intermittent LLE symptoms to the knee  Patient presents with pain during lumbar spine ROM testing (flexion, R SB), weakness in hips/pelvic region, hypomobility in Lower LS, and +neural tension test on the L (Femoral and slump)  Pt PMH is significant 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 which was evaluated nearly 1 year ago  She should get this reassessed by oncology in the near future should her anterior thigh pain not subside with treatment  Patient fits into a treatment based classification of Functional optimization with exercise and conditioning secondary to low pain severity and irritability  Patient current functional impairments include pain with lying down at night and transfers  Pt would like to prevent future disability and progression of problem  Patient would benefit from skilled physical therapy to address the impairments, improve their level of function, and to improve their overall quality of life      Impairments: abnormal or restricted ROM, activity intolerance, lacks appropriate home exercise program, pain with function, poor posture  and poor body mechanics  Understanding of Dx/Px/POC: good   Prognosis: good    Goals  STG: To be achieved in 4 -6 weeks  1)Improve L/S ROM to full and pain free in all planes  2)Reduce pain by 50%  3)Improve joint mobility of lower lumbar spine to WNL  4)Improve B hip strength in lower extremity by 1/2 MMT    LTG: To be achieved at Discharge  1)Patient is independent with HEP  2)Improve FOTO to 77  3)Improve tolerance to prolonged sitting, standing, and walking to prior level of function      Plan  Patient would benefit from: PT eval and skilled physical therapy  Planned therapy interventions: joint mobilization, manual therapy, neuromuscular re-education, therapeutic activities, therapeutic exercise and home exercise program  Frequency: 2x per week for 4-6 weeks  Treatment plan discussed with: patient        Subjective Evaluation    History of Present Illness  Mechanism of injury: History of Current Injury: Pt referred to PT from PCP with waxing and waning lower back pain  She admits to sedentary lifestyle  Symptoms more pronounced on the left side which can be on the right side as well  Pt does report intermittent posterior LLE symptoms  Pt notes that she has had a 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 which was examined last year by an orthopedic oncologist  Pt denies numbness/tingling  Pt report periodic "twinges" in the lumbar spine when moving in certain positions  Pain location/Descriptors: P1: L lower back >R  Intermittent posterior LLE discomfort to the knee  P2: Dull ache in the anterior left femur  P2 is independent of P1  Aggravating factors: Lying down at night, difficulty describing   Easing factors: Changing position; lying flat on back  24 HR pattern: Worse at night  Imaging: None  Special Questions: Denies any bowel/bladder or saddle anesthesia  Mild weakness as per patient  Patient goals:  Manage low back pain (prevent future disability/health issues)  Also wants to help reduce her CTS     Hobbies/Interest: Knits, crochets frequently   Occupation: Works at [employer]    Pain  Current pain ratin  At best pain ratin  At worst pain ratin    Treatments  Current treatment: physical therapy  Patient Goals  Patient goals for therapy: increased strength, independence with ADLs/IADLs, increased motion and decreased pain          Objective     Active Range of Motion     Lumbar   Flexion: 120 (pain at 90) degrees   Extension: 25 degrees   Left lateral flexion: 35 degrees       Right lateral flexion: 25 degrees  with pain  Left rotation:  WFL  Right rotation:  [facility]    Additional Active Range of Motion Details  Normal quadrant   *Pain when return to neutral from full flexion   *Pain with R side sending   *Pain at 90 degrees of L/S flexion    Strength/Myotome Testing     Left Hip   Planes of Motion   Flexion: 3+  Abduction: 3+  External rotation: 4-    Right Hip   Planes of Motion   Flexion: 3+  Abduction: 3+  External rotation: 4-    Left Knee   Flexion: 5  Extension: 5    Right Knee   Flexion: 5  Extension: 5    Left Ankle/Foot   Dorsiflexion: 5  Plantar flexion: 5    Right Ankle/Foot   Dorsiflexion: 5  Plantar flexion: 5    Tests     Lumbar     Left   Positive femoral stretch, passive SLR and slump test      Right   Negative femoral stretch, passive SLR and slump test      Additional Tests Details  Passive SLR: positive for neural tension  Femoral Nerve stretch: positive for neural tension- sidelying with max knee flexion (reaches -45 deg of hip extension on L )  Slump: positive for neural tension L vs R     Joint mobility:  Restricted CPA and B UPA at L4/5 region with comparable sign     Sit to stand: increased pain L LS    Functional Assessment      Squat    Left valgus, left tibial anterior translation beyond toes, right valgus and right tibial anterior translation beyond toes         Flowsheet Rows      Most Recent Value   PT/OT G-Codes   Current Score  65   Projected Score  77             Precautions: Meningioma, H/O Depression       Manuals             CPA L4/5             UPA L4/5                                       Neuro Re-Ed             Femoral N glide             90/90 N glide             Bridge             Clam shell Ther Ex             Bicycle             Standing hip abduction             Standing hip extension                                                                              Ther Activity                                       Gait Training                                       Modalities                                        Pt was given a HEP with verbal and written instruction x 10 minutes   Access Code: [email or web address]   Patient Portal:  [email or web address]

## 2021-01-14 NOTE — PROGRESS NOTES
Virtual Regular Visit      Assessment/Plan:    Problem List Items Addressed This Visit        Digestive    Postgastrectomy malabsorption     -At risk for malabsorption of vitamins/minerals secondary to malabsorption and restriction of intake from bariatric surgery  -NOT/Currently taking adequate postop bariatric surgery vitamin supplementation: Bariatric Choice MVI w/ 45mg iron, Calcium citrate 500mg TID  -Last set of bariatric labs completed on 02/11/20 and showed ferritin low (11)  -Next set of bariatric labs ordered for approximately 2 weeks  -Patient received education about the importance of adhering to a lifelong supplementation regimen to avoid vitamin/mineral deficiencies               Other    Hypercholesterolemia     -On statin  -Lipid panel Feb 2020 WNL  -Encourage healthy fat balance, fiber, and exercise  -Repeat panel ordered         Encounter for surgical aftercare following surgery of digestive system - Primary     -s/p Peyton-En-Y Gastric Bypass with Dr Daniel Robertson on 06/16/15  She is struggling with weight regain  Initial: 271 5lbs  Current: 193 5lbs  EWL: 60%   Carmel: 168 5lbs in July 2017  Current BMI is Body mass index is 34 28 kg/m²  · Tolerating a regular diet-yes  · Eating at least 60 grams of protein per day-yes  · Following 30/60 minute rule with liquids-yes  · Drinking at least 64 ounces of fluid per day-yes  · Drinking carbonated beverages-no  · Sufficient exercise-no; starting PT soon and agrees to start walking  · Using NSAIDs regularly-no  · Using nicotine-no  · Using alcohol-very rarely   Advised about the risks of alcohol s/p bariatric surgery and recommend avoiding all alcohol         Weight gain following gastric bypass surgery     -Regain of about 25lbs from her carmel in July of 2017, up 4 5lbs in the last year  -She is ready to make diet and lifestyle changes and just started tracking again  -Recommend keep food records and work on dietary and lifestyle changes and f/u with FIDELIA and SW, attend support groups  -F/u with [facility] again                    Reason for visit is   Chief Complaint   Patient presents with    Virtual Regular Visit        Encounter provider Snow Santillan PA-C    Provider located at [address] [id number]      Recent Visits  Date Type Provider Dept   01/12/21 Office Visit Tia Mondragon MD [place] recent visits within past 7 days and meeting all other requirements     Today's Visits  Date Type Provider Dept   01/14/21 Office Visit Snow Santillan PA-C [facility]   Showing today's visits and meeting all other requirements     Future Appointments  No visits were found meeting these conditions  Showing future appointments within next 150 days and meeting all other requirements        The patient was identified by name and date of birth  Rafat Segovia was informed that this is a telemedicine visit and that the visit is being conducted through [employer] and patient was informed that this is a secure, HIPAA-compliant platform  She agrees to proceed     My office door was closed  No one else was in the room  She acknowledged consent and understanding of privacy and security of the video platform  The patient has agreed to participate and understands they can discontinue the visit at any time  Patient is aware this is a billable service  Subjective  Rafat Segovia is a 63 y o  female  s/p Peyton-En-Y Gastric Bypass with Dr Leonard Tate on 06/16/15  Struggling with weight regain; she consulted with [facility] in June, but did not decide on a preferred option d/t cost  She is up another 4 5lbs in the last year  Admits to holiday baking and snacking  She is ready to make changes and just started food logging again and increased water intake  She will start PT for lower back soon and agrees to start walking as able    She feels stress with 3 teenage kids and  diagnosed with DM  HPI     Past Medical History:   Diagnosis Date    Abdominal pain     Chronic pain disorder     abdominal    Depression     Diverticula of colon     Former smoker     Resolved 2012     GERD (gastroesophageal reflux disease)     Hemorrhoids     Hiatal hernia     Hyperlipidemia     Intestinal malabsorption following gastrectomy     Morbid obesity ([address] )     Resolved 10/2/2015     Obesity     Postgastrectomy malabsorption     Vitamin D insufficiency     Resolved 2016        Past Surgical History:   Procedure Laterality Date    ABDOMINAL SURGERY      gastric bipass    ANKLE SURGERY       SECTION      (2) ,      COLONOSCOPY      ESOPHAGOGASTRODUODENOSCOPY      x2    GASTRIC BYPASS  2015    HERNIA REPAIR  2015    Paraesophageal hiatus hernia  Managed by [facility] (General Surgery)    SD EGD TRANSORAL BIOPSY SINGLE/MULTIPLE N/A 2017    Procedure: ESOPHAGOGASTRODUODENOSCOPY (EGD); Surgeon: Hossein Whaley MD;  Location: [facility];   Service: [facility]         Current Outpatient Medications   Medication Sig Dispense Refill    Calcium Citrate-Vitamin D (CALCIUM CITRATE + D) 250-200 MG-UNIT TABS Take 1 tablet by mouth 3 (three) times a day      clindamycin (CLEOCIN T) 1 % lotion Apply topically 2 (two) times a day To groin as needed for rash/odor 60 mL 3    estradiol (VIVELLE-DOT) 0 025 MG/24HR PLACE 1 PATCH ON THE SKIN 2 TIMES A WEEK      FLUoxetine (PROzac) 20 mg capsule Take 2 capsules (40 mg total) by mouth daily 180 capsule 1    Melatonin 5 MG CAPS Take 5 mg by mouth daily at bedtime       MULTIPLE VITAMIN PO Take 1 tablet by mouth daily      pravastatin (PRAVACHOL) 20 mg tablet TAKE 1 TABLET (20 MG TOTAL) BY MOUTH DAILY 90 tablet 1    progesterone (PROMETRIUM) 100 MG capsule Take 100 mg by mouth daily      terbinafine (LamISIL) 250 mg tablet Take 1 tablet daily for 90 days 90 tablet 0    triamcinolone (KENALOG) 0 1 % ointment Apply topically 2 (two) times a day To back as needed for itching 454 g 0     No current facility-administered medications for this visit  No Known Allergies    Review of Systems   Constitutional: Positive for fever and unexpected weight change  Negative for chills  HENT: Positive for trouble swallowing  Respiratory: Negative for cough and shortness of breath  Cardiovascular: Negative for chest pain and palpitations  Gastrointestinal: Negative for abdominal pain, constipation, diarrhea, nausea and vomiting  Musculoskeletal: Positive for back pain  Skin: Negative for rash  Neurological: Negative for dizziness, light-headedness and headaches  Psychiatric/Behavioral: The patient is nervous/anxious  Video Exam:  GEN: awake, alert, non-diaphoretic, no psychomotor agitation, no acute distress    HEENT :Head: atraumatic, normocephalic, no rashes noted, no lesions noted    Eyes: NO redness, discharge, swelling, or lesions    Nose: NO redness, swelling, discharge, deformity, or impetigo/crusting    Skin: no lesions, wounds, erythema, or cyanosis noted on face or hands    Cardiopulmonary: no increased respiratory effort, speaking in clear sentences, I:E ratio WNL    Neuro: speech normal rate and rhythm, orientation arrived to appointment on time with no prompting, moved both upper extremities equally    Pysch: appearance, behavior, and attitude- well groomed, pleasant, cooperative      Vitals:    01/14/21 0829   Weight: 87 8 kg (193 lb 8 oz)   Height: 5' 3" (1 6 m)       Physical Exam     I spent 25 minutes with patient today in which greater than 50% of the time was spent in counseling/coordination of care regarding post op care, weight loss, vitamins, diet, exercise      VIRTUAL VISIT DISCLAIMER    April Deras acknowledges that she has consented to an online visit or consultation   She understands that the online visit is based solely on information provided by her, and that, in the absence of a face-to-face physical evaluation by the physician, the diagnosis she receives is both limited and provisional in terms of accuracy and completeness  This is not intended to replace a full medical face-to-face evaluation by the physician  Janeth Bowles understands and accepts these terms

## 2021-01-15 ENCOUNTER — EVALUATION (OUTPATIENT)
Dept: PHYSICAL THERAPY | Facility: CLINIC | Age: 56
End: 2021-01-15
Payer: COMMERCIAL

## 2021-01-15 DIAGNOSIS — M54.50 CHRONIC LEFT-SIDED LOW BACK PAIN WITHOUT SCIATICA: ICD-10-CM

## 2021-01-15 DIAGNOSIS — M54.50 CHRONIC MIDLINE LOW BACK PAIN WITHOUT SCIATICA: Primary | ICD-10-CM

## 2021-01-15 DIAGNOSIS — G89.29 CHRONIC MIDLINE LOW BACK PAIN WITHOUT SCIATICA: Primary | ICD-10-CM

## 2021-01-15 DIAGNOSIS — G89.29 CHRONIC LEFT-SIDED LOW BACK PAIN WITHOUT SCIATICA: ICD-10-CM

## 2021-01-15 PROCEDURE — 97162 PT EVAL MOD COMPLEX 30 MIN: CPT | Performed by: PHYSICAL THERAPIST

## 2021-01-15 PROCEDURE — 97110 THERAPEUTIC EXERCISES: CPT | Performed by: PHYSICAL THERAPIST

## 2021-01-18 ENCOUNTER — APPOINTMENT (OUTPATIENT)
Dept: PHYSICAL THERAPY | Facility: CLINIC | Age: 56
End: 2021-01-18
Payer: COMMERCIAL

## 2021-01-18 NOTE — PROGRESS NOTES
PT Evaluation B/L Wrist    Today's date: 2021  Patient name: Drew Garcia  : 1965  MRN: [id number]  Referring provider: Erica Russell*  Dx: No diagnosis found  Assessment  Assessment details: Drew Garcia is a 63 y o  female who presents with signs and symptoms consistent of bilateral carpal tunnel syndrome  Patient presents with discomfort in wrist/hands, night time paresthesias, positive neural tension tests, and limited ROM  Due to these impairments, Patient has difficulty performing work related activities and job functions, sleeping at night, and performing fine motor ADLs with hands  Patient would benefit from skilled physical therapy to address the impairments, improve their level of function, and to improve their overall quality of life  Impairments: abnormal or restricted ROM, activity intolerance, impaired physical strength, lacks appropriate home exercise program and pain with function  Understanding of Dx/Px/POC: good   Prognosis: good    Goals  STG: to be achieved in 4-6 weeks  1)Reduce pain by 50% in bilateral elbows  2)Improve pain free  strength by symmetrical bilaterally  3)Improve flexibility to wrist muscles to WNL     LTG: to be achieved at discharge   1)Improve FOTO to greater than or equal to ***  2)Improve ADL's in related activities to maximal level of function   3)Improve  strength to pain free with maximal force  Plan  Plan details: Combine treatment for lumbar spine and wrist/hand going forward  Patient would benefit from: skilled physical therapy and PT eval  Planned modality interventions: low level laser therapy  Planned therapy interventions: joint mobilization, manual therapy, neuromuscular re-education, patient education, therapeutic activities, therapeutic exercise and home exercise program  Frequency: 2x per week for 4-6 weeks    Treatment plan discussed with: patient        Subjective Evaluation    History of Present Illness  Mechanism of injury: History of Current Injury: Patient is self referred for bilateral carpal tunnel like symptoms     Pain location/Descriptors: ***  Aggravating factors: ***  Easing factors: ***  24 HR pattern: ***  Imaging: ***  Special Questions: ***  Patient goals:  ***            Objective          Precautions: Meningioma, H/O Depression       Manuals                                                                 Neuro Re-Ed                                                                                                        Ther Ex                                                                                                                     Ther Activity                                       Gait Training                                       Modalities

## 2021-01-21 ENCOUNTER — OFFICE VISIT (OUTPATIENT)
Dept: PHYSICAL THERAPY | Facility: CLINIC | Age: 56
End: 2021-01-21
Payer: COMMERCIAL

## 2021-01-21 DIAGNOSIS — G89.29 CHRONIC LEFT-SIDED LOW BACK PAIN WITHOUT SCIATICA: Primary | ICD-10-CM

## 2021-01-21 DIAGNOSIS — M54.50 CHRONIC LEFT-SIDED LOW BACK PAIN WITHOUT SCIATICA: Primary | ICD-10-CM

## 2021-01-21 DIAGNOSIS — G89.29 CHRONIC MIDLINE LOW BACK PAIN WITHOUT SCIATICA: ICD-10-CM

## 2021-01-21 DIAGNOSIS — M54.50 CHRONIC MIDLINE LOW BACK PAIN WITHOUT SCIATICA: ICD-10-CM

## 2021-01-21 PROCEDURE — 97112 NEUROMUSCULAR REEDUCATION: CPT | Performed by: PHYSICAL THERAPIST

## 2021-01-21 PROCEDURE — 97140 MANUAL THERAPY 1/> REGIONS: CPT | Performed by: PHYSICAL THERAPIST

## 2021-01-21 PROCEDURE — 97110 THERAPEUTIC EXERCISES: CPT | Performed by: PHYSICAL THERAPIST

## 2021-01-21 NOTE — PROGRESS NOTES
Daily Note     Today's date: 2021  Patient name: Rashard Noguera  : 1965  MRN: [id number]  Referring provider: Rocky Marrero*  Dx:   Encounter Diagnosis     ICD-10-CM    1  Chronic left-sided low back pain without sciatica  M54 5     G89 29    2  Chronic midline low back pain without sciatica  M54 5     G89 29        Start Time:   Stop Time: 1900  Total time in clinic (min): 45 minutes    Subjective: No new complaints today  Objective: See treatment diary below      Assessment: Tenderness present at lower lumbar spine (L4/5) during CPA and UPA mobilizations  Pt challenged with lumbopelvic strengthening program  Updated HEP in today's session  Quad stretching reproduced comparable anterior thigh ache which makes be believe her symptoms are a result of neural tension and muscle tension  Tolerated treatment well  Patient would benefit from continued PT  Plan: Continue per plan of care        Precautions: Meningioma, H/O Depression       Manuals             CPA L4/5 Gr II+III 4'             UPA L4/5 Gr II + III 4'                                       Neuro Re-Ed             Femoral N glide             90/90 N glide 15x on each             Bridge 1x10 arms down, 1x10 arms across chest            Clam shell  2x10 btb                                                    Ther Ex             Bicycle 6'             Standing hip abduction             Standing hip extension             Quad stretch  5x15"                                                                 Ther Activity                                       Gait Training                                       Modalities                                         1:1 with PT from 620-7pm

## 2021-01-22 NOTE — PROGRESS NOTES
PT Evaluation B/L Wrist    Today's date: 2021  Patient name: Aria Malik  : 1965  MRN: [id number]  Referring provider: Jesica Martinez  Dx:   Encounter Diagnosis     ICD-10-CM    1  Bilateral carpal tunnel syndrome  G56 03        Start Time: 8203  Stop Time: 1700  Total time in clinic (min): 45 minutes    Assessment  Assessment details: Aria Malik is a 63 y o  female who presents with signs and symptoms consistent of bilateral carpal tunnel syndrome  Patient presents with discomfort in wrist/hands, night time paresthesias, positive neural tension tests, and limited ROM at wrist Due to these impairments, Patient has difficulty performing work related activities and job functions, sleeping at night, and performing fine motor ADLs with hands  Patient would benefit from skilled physical therapy to address the impairments, improve their level of function, and to improve their overall quality of life  Impairments: abnormal or restricted ROM, activity intolerance, impaired physical strength, lacks appropriate home exercise program and pain with function  Understanding of Dx/Px/POC: good   Prognosis: good    Goals  STG: to be achieved in 4-6 weeks  1)Reduce pain by 50% in bilateral hands/fingers  2)Improve pain free  strength by symmetrical bilaterally  3)Improve flexibility to wrist muscles to WNL     LTG: to be achieved at discharge   1)Improve FOTO to greater than or equal to 72  2)Improve ADL's in related activities to maximal level of function   3)Patient will demonstrate a full night of sleep without paresthesia in hands      Plan  Plan details: Combine treatment for lumbar spine and wrist/hand going forward     Patient would benefit from: skilled physical therapy and PT eval  Planned modality interventions: low level laser therapy  Planned therapy interventions: joint mobilization, manual therapy, neuromuscular re-education, patient education, therapeutic activities, therapeutic exercise and home exercise program  Frequency: 2x per week for 4 weeks  Treatment plan discussed with: patient        Subjective Evaluation    History of Present Illness  Mechanism of injury: History of Current Injury: Patient is self referred for bilateral carpal tunnel like symptoms  Pt notes symptoms that have waxed and waned for 1 year  She mostly notices pins and needles at night and hands falling asleep  Pt was evaluated by her neurosurgeon previously for this who diagnosed it, according to patient  Pt is right hand dominant  No inciting event or trauma  Pain location/Descriptors: Bilateral hands R worse than L  Parethesias and pain in thumb, index, and ring finger (palmar aspect)   Aggravating factors: Using mouse while at work, typing, sleeping, knitting, crocheting  Easing factors: Using night splints  24 HR pattern: worse at night  Imaging: None  Special Questions: Denies weakness, denies clumisness, denies dropping items        Pain  Current pain ratin  At worst pain ratin    Hand dominance: right      Diagnostic Tests  No diagnostic tests performed  Treatments  Current treatment: physical therapy  Patient Goals  Patient goals for therapy: independence with ADLs/IADLs, increased motion and decreased pain          Objective     Neurological Testing     Sensation     Wrist/Hand   Left   Intact: light touch    Right   Intact: light touch    Reflexes   Left   Emmanuel's reflex: negative    Right   Emmanuel's reflex: negative    Active Range of Motion     Left Wrist   Wrist flexion: 85 degrees   Wrist extension: 60 degrees     Right Wrist   Wrist flexion: 80 degrees   Wrist extension: 50 degrees with pain    Strength/Myotome Testing     Left Wrist/Hand   Wrist extension: 5  Wrist flexion: 4-    Right Wrist/Hand   Wrist extension: 5  Wrist flexion: 4-    Tests     Left Shoulder   Positive ULTT1  Negative ULTT2  Right Shoulder   Positive ULTT1  Negative ULTT2       Left Wrist/Hand   Positive Phalen's sign and Tinel's sign (medial nerve)  Right Wrist/Hand   Positive Phalen's sign and Tinel's sign (medial nerve)       Additional Tests Details  Reverse phalen's sign: + bilaterally at 15 seconds    General Comments:      Wrist/Hand Comments  Pain free  strength:  R: 60 lbs  L: 50 lbs            Precautions: Meningioma, H/O Depression     Manuals 1/21 1/25           CPA L4/5 Gr II+III 4'             UPA L4/5 Gr II + III 4'             IASTM LLL  CT region 3' B           STM to Carpal tunnel   4' B           Neuro Re-Ed             Femoral N glide             90/90 N glide 15x on each             Bridge 1x10 arms down, 1x10 arms across chest            Clam shell  2x10 btb             Median N glides  NV           Median N rocking  10x                        Ther Ex             Bicycle 6'             Standing hip abduction             Standing hip extension             Quad stretch  5x15"             Wrist extension MWM  15x                                                  Ther Activity                                       Gait Training                                       Modalities                                       HEP for CTS: Your Access Code  [email or web address]

## 2021-01-25 ENCOUNTER — OFFICE VISIT (OUTPATIENT)
Dept: PHYSICAL THERAPY | Facility: CLINIC | Age: 56
End: 2021-01-25
Payer: COMMERCIAL

## 2021-01-25 DIAGNOSIS — G56.03 BILATERAL CARPAL TUNNEL SYNDROME: Primary | ICD-10-CM

## 2021-01-25 PROCEDURE — 97140 MANUAL THERAPY 1/> REGIONS: CPT | Performed by: PHYSICAL THERAPIST

## 2021-01-25 PROCEDURE — 97110 THERAPEUTIC EXERCISES: CPT | Performed by: PHYSICAL THERAPIST

## 2021-01-25 PROCEDURE — 97161 PT EVAL LOW COMPLEX 20 MIN: CPT | Performed by: PHYSICAL THERAPIST

## 2021-01-28 ENCOUNTER — OFFICE VISIT (OUTPATIENT)
Dept: PHYSICAL THERAPY | Facility: CLINIC | Age: 56
End: 2021-01-28
Payer: COMMERCIAL

## 2021-01-28 DIAGNOSIS — M54.50 CHRONIC MIDLINE LOW BACK PAIN WITHOUT SCIATICA: ICD-10-CM

## 2021-01-28 DIAGNOSIS — M54.50 CHRONIC LEFT-SIDED LOW BACK PAIN WITHOUT SCIATICA: ICD-10-CM

## 2021-01-28 DIAGNOSIS — G89.29 CHRONIC LEFT-SIDED LOW BACK PAIN WITHOUT SCIATICA: ICD-10-CM

## 2021-01-28 DIAGNOSIS — G89.29 CHRONIC MIDLINE LOW BACK PAIN WITHOUT SCIATICA: ICD-10-CM

## 2021-01-28 DIAGNOSIS — G56.03 BILATERAL CARPAL TUNNEL SYNDROME: Primary | ICD-10-CM

## 2021-01-28 PROCEDURE — 97112 NEUROMUSCULAR REEDUCATION: CPT | Performed by: PHYSICAL THERAPIST

## 2021-01-28 PROCEDURE — 97110 THERAPEUTIC EXERCISES: CPT | Performed by: PHYSICAL THERAPIST

## 2021-01-28 PROCEDURE — 97140 MANUAL THERAPY 1/> REGIONS: CPT | Performed by: PHYSICAL THERAPIST

## 2021-01-28 NOTE — PROGRESS NOTES
Daily Note     Today's date: 2021  Patient name: Marlon Gillis  : 1965  MRN: [id number]  Referring provider: Wilian Ariza*  Dx:   Encounter Diagnosis     ICD-10-CM    1  Bilateral carpal tunnel syndrome  G56 03    2  Chronic left-sided low back pain without sciatica  M54 5     G89 29    3  Chronic midline low back pain without sciatica  M54 5     G89 29        Start Time: 1615  Stop Time: 1700  Total time in clinic (min): 45 minutes    Subjective: Application of night time splint for the right wrist is effective at managing her tingling according to patient  Encouraged her to buy same one for the left wrist/hand  Objective: See treatment diary below      Assessment: Combined treatment today for B CTS and lower back pain  Tolerated treatment well  Less neural tension of median nerve present compared to initial evaluation  Pt denies much LLE symptoms (posterior or anterior)  Pt continues to be tender and hypomobile at lower lumbar segments  Progressed lumbar stabilization program  Patient demonstrated fatigue post treatment and would benefit from continued PT  Plan: Continue per plan of care        Precautions: Meningioma, H/O Depression     Manuals           CPA L4/5 Gr II+III 4'   Gr III 3'          UPA L4/5 Gr II + III 4'   GrIII 3'          IASTM LLL  CT region 3' B CT region 6' B          STM to Carpal tunnel   4' B 4' B          Neuro Re-Ed             Femoral N glide             90/90 N glide 15x on each             Bridge 1x10 arms down, 1x10 arms across chest  2x10 3"           Clam shell  2x10 btb             Median N glides  NV seated 2x10 B          Median N rocking  10x 2 sets of 10           TrA March    10x          TrA DL lowering- knees bent   10x           Ther Ex             Bicycle 6'             Standing hip abduction   nv with tb          Standing hip extension   nv with tb           Quad stretch  5x15"             Wrist extension MWM  15x LTR   10x ea                                    Ther Activity             Hip hinge    nv                       Gait Training                                       Modalities                                       1:1 with PT from 415-5pm

## 2021-02-01 ENCOUNTER — APPOINTMENT (OUTPATIENT)
Dept: PHYSICAL THERAPY | Facility: CLINIC | Age: 56
End: 2021-02-01
Payer: COMMERCIAL

## 2021-02-02 ENCOUNTER — APPOINTMENT (OUTPATIENT)
Dept: PHYSICAL THERAPY | Facility: CLINIC | Age: 56
End: 2021-02-02
Payer: COMMERCIAL

## 2021-02-03 ENCOUNTER — APPOINTMENT (OUTPATIENT)
Dept: PHYSICAL THERAPY | Facility: CLINIC | Age: 56
End: 2021-02-03
Payer: COMMERCIAL

## 2021-02-04 ENCOUNTER — APPOINTMENT (OUTPATIENT)
Dept: PHYSICAL THERAPY | Facility: CLINIC | Age: 56
End: 2021-02-04
Payer: COMMERCIAL

## 2021-02-08 ENCOUNTER — OFFICE VISIT (OUTPATIENT)
Dept: PHYSICAL THERAPY | Facility: CLINIC | Age: 56
End: 2021-02-08
Payer: COMMERCIAL

## 2021-02-08 DIAGNOSIS — G89.29 CHRONIC MIDLINE LOW BACK PAIN WITHOUT SCIATICA: ICD-10-CM

## 2021-02-08 DIAGNOSIS — G89.29 CHRONIC LEFT-SIDED LOW BACK PAIN WITHOUT SCIATICA: ICD-10-CM

## 2021-02-08 DIAGNOSIS — G56.03 BILATERAL CARPAL TUNNEL SYNDROME: Primary | ICD-10-CM

## 2021-02-08 DIAGNOSIS — M54.50 CHRONIC LEFT-SIDED LOW BACK PAIN WITHOUT SCIATICA: ICD-10-CM

## 2021-02-08 DIAGNOSIS — M54.50 CHRONIC MIDLINE LOW BACK PAIN WITHOUT SCIATICA: ICD-10-CM

## 2021-02-08 PROCEDURE — 97140 MANUAL THERAPY 1/> REGIONS: CPT | Performed by: PHYSICAL THERAPIST

## 2021-02-08 PROCEDURE — 97110 THERAPEUTIC EXERCISES: CPT | Performed by: PHYSICAL THERAPIST

## 2021-02-08 PROCEDURE — 97112 NEUROMUSCULAR REEDUCATION: CPT | Performed by: PHYSICAL THERAPIST

## 2021-02-08 NOTE — PROGRESS NOTES
Daily Note     Today's date: 2021  Patient name: Rafat Segovia  : 1965  MRN: [id number]  Referring provider: Thong Costello*  Dx:   Encounter Diagnosis     ICD-10-CM    1  Bilateral carpal tunnel syndrome  G56 03    2  Chronic left-sided low back pain without sciatica  M54 5     G89 29    3  Chronic midline low back pain without sciatica  M54 5     G89 29        Start Time: 1615  Stop Time: 1700  Total time in clinic (min): 45 minutes    Subjective: Pt notes increased back pain after walking with daughter at several [employer] tours last week  Objective: See treatment diary below      Assessment: Lower lumbar spine continues to demonstrates more hypomobility and discomfort than upper segments  Pt able to tolerate joint mobilizations but these do reproduce lumbar spine discomfort  Pt denies any left thigh discomfort  Pt encouraged to continue strengthen core and abdominal region  Tolerated treatment well  Patient would benefit from continued PT      Plan: Continue per plan of care        Precautions: Meningioma, H/O Depression     Manuals  2         CPA L4/5 Gr II+III 4'   Gr III 3' GrIII 4'         UPA L4/5 Gr II + III 4'   GrIII 3' GrIII 4'         Neutral gap    Gr III4'          IASTM LLL  CT region 3' B CT region 6' B          STM to Carpal tunnel   4' B 4' B          Neuro Re-Ed             Femoral N glide             90/90 N glide 15x on each             Bridge 1x10 arms down, 1x10 arms across chest  2x10 3"  2x10 3"          TrA with PB    20x5"         Clam shell  2x10 btb             Median N glides  NV seated 2x10 B          Median N rocking  10x 2 sets of 10           TrA March    10x          TrA DL lowering- knees bent   10x           Ther Ex             Bicycle 6'    6'         Standing hip abduction   nv with tb ytb 10x B         Standing hip extension   nv with tb  ytb 10x B          Quad stretch  5x15"             Wrist extension MWM  15x           LTR   10x ea 10x each                                   Ther Activity             Hip hinge    nv                       Gait Training                                       Modalities                                       1:1 with PT from 415-5pm

## 2021-02-11 ENCOUNTER — OFFICE VISIT (OUTPATIENT)
Dept: PHYSICAL THERAPY | Facility: CLINIC | Age: 56
End: 2021-02-11
Payer: COMMERCIAL

## 2021-02-11 DIAGNOSIS — M54.50 CHRONIC LEFT-SIDED LOW BACK PAIN WITHOUT SCIATICA: ICD-10-CM

## 2021-02-11 DIAGNOSIS — M54.50 CHRONIC MIDLINE LOW BACK PAIN WITHOUT SCIATICA: ICD-10-CM

## 2021-02-11 DIAGNOSIS — G89.29 CHRONIC MIDLINE LOW BACK PAIN WITHOUT SCIATICA: ICD-10-CM

## 2021-02-11 DIAGNOSIS — G89.29 CHRONIC LEFT-SIDED LOW BACK PAIN WITHOUT SCIATICA: ICD-10-CM

## 2021-02-11 DIAGNOSIS — G56.03 BILATERAL CARPAL TUNNEL SYNDROME: Primary | ICD-10-CM

## 2021-02-11 PROCEDURE — 97110 THERAPEUTIC EXERCISES: CPT | Performed by: PHYSICAL THERAPIST

## 2021-02-11 PROCEDURE — 97112 NEUROMUSCULAR REEDUCATION: CPT | Performed by: PHYSICAL THERAPIST

## 2021-02-11 PROCEDURE — 97140 MANUAL THERAPY 1/> REGIONS: CPT | Performed by: PHYSICAL THERAPIST

## 2021-02-11 PROCEDURE — 97530 THERAPEUTIC ACTIVITIES: CPT | Performed by: PHYSICAL THERAPIST

## 2021-02-11 NOTE — PROGRESS NOTES
Daily Note     Today's date: 2021  Patient name: Mouna Scanlon  : 1965  MRN: [id number]  Referring provider: Gaylyn Sever*  Dx:   Encounter Diagnosis     ICD-10-CM    1  Bilateral carpal tunnel syndrome  G56 03    2  Chronic left-sided low back pain without sciatica  M54 5     G89 29    3  Chronic midline low back pain without sciatica  M54 5     G89 29        Start Time: 1615  Stop Time: 1700  Total time in clinic (min): 45 minutes    Subjective: Pt notes that her back is feeling better  She wasn't too sore after last session despite discomfort with joint mobilizations  Pt notes decreasing hand discomfort, although she did experience mild tingling last evening  Objective: See treatment diary below  L/S flexion: 120 degrees pain free  Extension: 40 degrees with pain at end range  Assessment: Tenderness in lower lumbar spine is improving along with segmental mobility  Lumbar spine ROM has improved objective with flexion and extension  No pain with flexion at this time  Initiated hip hinge to decrease aberrant movement during transfers  Prescribed  isometric to improve  strength without repetitive nature of grasping  Tolerated treatment well  Patient would benefit from continued PT  Plan: Continue per plan of care        Precautions: Meningioma, H/O Depression     Manuals         CPA L4/5 Gr II+III 4'   Gr III 3' GrIII 4' GrIII 4'        UPA L4/5 Gr II + III 4'   GrIII 3' GrIII 4' GrIII 4'        Neutral gap    Gr III4'          IASTM LLL  CT region 4' B CT region 6' B          STM to Carpal tunnel   4' B 4' B          Neuro Re-Ed             Femoral N glide             90/90 N glide 15x on each             Bridge 1x10 arms down, 1x10 arms across chest  2x10 3"  2x10 3"  2x10 3"  With PB        TrA with PB    20x5" 20x5"        Side bridge      5x B        Clam shell  2x10 btb             Median N glides  NV seated 2x10 B          Median N rocking 10x 2 sets of 10           TrA March    10x          TrA DL lowering- knees bent   10x            isometric with Putty and weight             Ther Ex             Bicycle 6'    6' 6' lvl 3         Standing hip abduction   nv with tb ytb 10x B         Standing hip extension   nv with tb  ytb 10x B          Quad stretch  5x15"             Wrist extension MWM  15x           LTR   10x ea 10x each 10x each        Prone press up     10x5"                     Ther Activity             Hip hinge    nv  2x10 KB nv                     Gait Training                                       Modalities                                       1:1 with PT from 415-5pm

## 2021-02-15 ENCOUNTER — OFFICE VISIT (OUTPATIENT)
Dept: PHYSICAL THERAPY | Facility: CLINIC | Age: 56
End: 2021-02-15
Payer: COMMERCIAL

## 2021-02-15 DIAGNOSIS — M54.50 CHRONIC MIDLINE LOW BACK PAIN WITHOUT SCIATICA: ICD-10-CM

## 2021-02-15 DIAGNOSIS — G89.29 CHRONIC LEFT-SIDED LOW BACK PAIN WITHOUT SCIATICA: Primary | ICD-10-CM

## 2021-02-15 DIAGNOSIS — G89.29 CHRONIC MIDLINE LOW BACK PAIN WITHOUT SCIATICA: ICD-10-CM

## 2021-02-15 DIAGNOSIS — M54.50 CHRONIC LEFT-SIDED LOW BACK PAIN WITHOUT SCIATICA: Primary | ICD-10-CM

## 2021-02-15 PROCEDURE — 97140 MANUAL THERAPY 1/> REGIONS: CPT | Performed by: PHYSICAL THERAPIST

## 2021-02-15 PROCEDURE — 97112 NEUROMUSCULAR REEDUCATION: CPT | Performed by: PHYSICAL THERAPIST

## 2021-02-15 PROCEDURE — 97110 THERAPEUTIC EXERCISES: CPT | Performed by: PHYSICAL THERAPIST

## 2021-02-15 PROCEDURE — 97530 THERAPEUTIC ACTIVITIES: CPT | Performed by: PHYSICAL THERAPIST

## 2021-02-15 NOTE — PROGRESS NOTES
Daily Note     Today's date: 2/15/2021  Patient name: Ortega Jasso  : 1965  MRN: [id number]  Referring provider: Blaze Maxwell*  Dx:   Encounter Diagnosis     ICD-10-CM    1  Chronic left-sided low back pain without sciatica  M54 5     G89 29    2  Chronic midline low back pain without sciatica  M54 5     G89 29        Start Time: 1810  Stop Time: 1900  Total time in clinic (min): 50 minutes    Subjective: Pt does c/o of residual discomfort in the lumbar spine after taking daughter on tour of [employer] and walking nearly a mile  Objective: See treatment diary below      Assessment: Progressed L/S program with good tolerance  Pt demonstrated excellent mechanics with program today  Tolerated treatment well  Patient exhibited good technique with therapeutic exercises and would benefit from continued PT  Plan: Continue per plan of care        Precautions: Meningioma, H/O Depression     Manuals 1/21 1/25 1/28 2/8 2/11 2/15       CPA L4/5 Gr II+III 4'   Gr III 3' GrIII 4' GrIII 4' GrIII 4'       UPA L4/5 Gr II + III 4'   GrIII 3' GrIII 4' GrIII 4' GrIII 4'       Neutral gap    Gr III4'          IASTM LLL  CT region 4' B CT region 6' B          STM to Carpal tunnel   4' B 4' B   4'       Neuro Re-Ed             Femoral N glide             90/90 N glide 15x on each             Bridge 1x10 arms down, 1x10 arms across chest  2x10 3"  2x10 3"  2x10 3"  With PB        TrA with PB    20x5" 20x5" 20x5"       Side bridge      5x B        Clam shell  2x10 btb             Median N glides  NV seated 2x10 B          Median N rocking  10x 2 sets of 10           TrA March    10x          TrA DL lowering- knees bent   10x           Anti-rotation with TB and TrA      BLK 2x10 B       Bird dog      2x10        isometric with Putty and weight             Ther Ex             Bicycle 6'    6' 6' lvl 3  6' lvl 3       Standing hip abduction   nv with tb ytb 10x B         Standing hip extension   nv with tb  ytb 10x B Quad stretch  5x15"             Wrist extension MWM  15x           LTR   10x ea 10x each 10x each        Prone press up     10x5"                     Ther Activity             Hip hinge    nv  2x10 2x10 blk KB                      Gait Training                                       Modalities                                       1:1 with PT from 620-7PM

## 2021-02-18 ENCOUNTER — APPOINTMENT (OUTPATIENT)
Dept: PHYSICAL THERAPY | Facility: CLINIC | Age: 56
End: 2021-02-18
Payer: COMMERCIAL

## 2021-02-22 ENCOUNTER — APPOINTMENT (OUTPATIENT)
Dept: PHYSICAL THERAPY | Facility: CLINIC | Age: 56
End: 2021-02-22
Payer: COMMERCIAL

## 2021-02-25 ENCOUNTER — OFFICE VISIT (OUTPATIENT)
Dept: PHYSICAL THERAPY | Facility: CLINIC | Age: 56
End: 2021-02-25
Payer: COMMERCIAL

## 2021-02-25 DIAGNOSIS — G56.03 BILATERAL CARPAL TUNNEL SYNDROME: ICD-10-CM

## 2021-02-25 DIAGNOSIS — M54.50 CHRONIC LEFT-SIDED LOW BACK PAIN WITHOUT SCIATICA: Primary | ICD-10-CM

## 2021-02-25 DIAGNOSIS — M54.50 CHRONIC MIDLINE LOW BACK PAIN WITHOUT SCIATICA: ICD-10-CM

## 2021-02-25 DIAGNOSIS — G89.29 CHRONIC MIDLINE LOW BACK PAIN WITHOUT SCIATICA: ICD-10-CM

## 2021-02-25 DIAGNOSIS — G89.29 CHRONIC LEFT-SIDED LOW BACK PAIN WITHOUT SCIATICA: Primary | ICD-10-CM

## 2021-02-25 PROCEDURE — 97110 THERAPEUTIC EXERCISES: CPT | Performed by: PHYSICAL THERAPIST

## 2021-02-25 PROCEDURE — 97530 THERAPEUTIC ACTIVITIES: CPT | Performed by: PHYSICAL THERAPIST

## 2021-02-25 PROCEDURE — 97140 MANUAL THERAPY 1/> REGIONS: CPT | Performed by: PHYSICAL THERAPIST

## 2021-02-25 PROCEDURE — 97112 NEUROMUSCULAR REEDUCATION: CPT | Performed by: PHYSICAL THERAPIST

## 2021-02-25 NOTE — PROGRESS NOTES
Daily Note     Today's date: 2021  Patient name: Nicole aVldez  : 1965  MRN: [id number]  Referring provider: Torey García*  Dx:   Encounter Diagnosis     ICD-10-CM    1  Chronic left-sided low back pain without sciatica  M54 5     G89 29    2  Chronic midline low back pain without sciatica  M54 5     G89 29    3  Bilateral carpal tunnel syndrome  G56 03        Start Time: 1820  Stop Time: 1900  Total time in clinic (min): 40 minutes    Subjective: Pt reports increased lower back pain after picking up dog excrement  She also notes that she has experienced some night time tingling in hands but not pain  Pt admits to needing to be more compliant with HEP  Objective: See treatment diary below       Assessment: Updated HEP to reflect a more comprehensive program  Pt's lumbar spine is improving with mobility and sensitivity, noted during  and UPAs  Pt tolerated lumbopelvic stability program well  Patient demonstrated fatigue post treatment and exhibited good technique with therapeutic exercises  Pt will be re-evaluated next week  Plan: Continue per plan of care        Precautions: Meningioma, H/O Depression     Manuals 1/21 1/25 1/28 2/8 2/11 2/15 2/25      CPA L4/5 Gr II+III 4'   Gr III 3' GrIII 4' GrIII 4' GrIII 4' GrIII 4'      UPA L4/5 Gr II + III 4'   GrIII 3' GrIII 4' GrIII 4' GrIII 4' GrIII 4'      Neutral gap    Gr III4'          IASTM LLL  CT region 4' B CT region 6' B          STM to Carpal tunnel   4' B 4' B   4' 3' x2 B      Neuro Re-Ed             Femoral N glide             90/90 N glide 15x on each             Bridge 1x10 arms down, 1x10 arms across chest  2x10 3"  2x10 3"  2x10 3"  With PB  2x10 3"  With PB      TrA with PB    20x5" 20x5" 20x5"       Side bridge      5x B        Clam shell  2x10 btb             Median N glides  NV seated 2x10 B          Median N rocking  10x 2 sets of 10           TrA March    10x          TrA DL lowering- knees bent   10x Anti-rotation with TB and TrA      BLK 2x10 B       Bird dog      2x10 20x       isometric with Putty and weight             Ther Ex             Bicycle 6'    6' 6' lvl 3  6' lvl 3 6" lvl 3      Standing hip abduction   nv with tb ytb 10x B         Standing hip extension   nv with tb  ytb 10x B          Quad stretch  5x15"             Wrist extension MWM  15x           LTR   10x ea 10x each 10x each        Prone press up     10x5"  10x5"                   Ther Activity             Hip hinge    nv  2x10 2x10 blk KB   2x10 blk KB                     Gait Training                                       Modalities                                       1:1 with PT from 620-7PM  Access Code: [facility]   Patient Portal:  [email or web address]

## 2021-02-28 NOTE — PROGRESS NOTES
PT-Discharge  Today's date: 3/1/2021  Patient name: Karla Coles  : 1965  MRN: [id number]  Referring provider: Swati Sloan  Dx:   Encounter Diagnosis     ICD-10-CM    1  Chronic left-sided low back pain without sciatica  M54 5     G89 29    2  Chronic midline low back pain without sciatica  M54 5     G89 29    3  Bilateral carpal tunnel syndrome  G56 03        Start Time: 1745  Stop Time:   Total time in clinic (min): 57 minutes    Assessment  Assessment details: Damon Jansen reports a progressive improvement in both lower back pain and hand pain over the course of the last 6 weeks  Pt's anterior thigh discomfort has decreased over the course of PT along with her mobility during femoral nerve neural tension  Her lumbar spine ROM has improved significantly in all planes of motion with minimal discomfort  Lumbopelvic and hip weakness is present at this time  Pt has demonstrated improvements in the ability to transfer with more fluidity and less discomfort  Functional status measure has improved to 83  Patient notes pain has reduced to 0-2/10  Pt also admits to decreasing night pain of her hands; however, her symptoms have slightly worsened over the past week when she wasn't wearing her night splint  Her  strength and wrist ROM has improved with treatment  She continues to experience discomfort and paresthesia's with ULTT-A bilaterally, Phalen's test, and reverse Phalen's test  Overall, patient has made steady progress toward goals but would like to continue to manage her symptoms at home on her own  Pt is welcome to return if symptoms don't resolve during her HEP             Understanding of Dx/Px/POC: good   Prognosis: good    Goals  STG: To be achieved in 4 -6 weeks  1)Improve L/S ROM to full and pain free in all planes-MET  2)Reduce pain by 50%-MET  3)Improve joint mobility of lower lumbar spine to WNL-MET  4)Improve B hip strength in lower extremity by 1/2 MMT- Partially met    LTG: To be achieved at Discharge  1)Patient is independent with HEP-MET  2)Improve FOTO to 77-MET  3)Improve tolerance to prolonged sitting, standing, and walking to prior level of function-MET    HANDS:  Goals  STG: to be achieved in 4-6 weeks  1)Reduce pain by 50% in bilateral hands/fingers-  2)Improve pain free  strength by symmetrical bilaterally- Partially met  3)Improve flexibility to wrist muscles to WNL -MET    LTG: to be achieved at discharge   1)Improve FOTO to greater than or equal to 72  2)Improve ADL's in related activities to maximal level of function - Partially met  3)Patient will demonstrate a full night of sleep without paresthesia in hands- Partially met      Plan  Frequency: Discharge to [facility]  Treatment plan discussed with: patient        Subjective Evaluation    History of Present Illness  Mechanism of injury: History of Current Injury: Pt referred to PT from PCP with waxing and waning lower back pain  She admits to sedentary lifestyle  Symptoms more pronounced on the left side which can be on the right side as well  Pt does report intermittent posterior LLE symptoms  Pt notes that she has had a 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 which was examined last year by an orthopedic oncologist  Pt denies numbness/tingling  Pt report periodic "twinges" in the lumbar spine when moving in certain positions  Pain location/Descriptors: P1: L lower back >R  Intermittent posterior LLE discomfort to the knee  P2: Dull ache in the anterior left femur  P2 is independent of P1  Aggravating factors: Lying down at night, difficulty describing   Easing factors: Changing position; lying flat on back  24 HR pattern: Worse at night  Imaging: None  Special Questions: Denies any bowel/bladder or saddle anesthesia  Mild weakness as per patient  Patient goals:  Manage low back pain (prevent future disability/health issues)  Also wants to help reduce her CTS     Hobbies/Interest: aung [employer]es   Occupation: Works at [employer]    HANDS:  History of Present Illness  Mechanism of injury: History of Current Injury: Patient is self referred for bilateral carpal tunnel like symptoms  Pt notes symptoms that have waxed and waned for 1 year  She mostly notices pins and needles at night and hands falling asleep  Pt was evaluated by her neurosurgeon previously for this who diagnosed it, according to patient  Pt is right hand dominant  No inciting event or trauma  Pain location/Descriptors: Bilateral hands R worse than L  Parethesias and pain in thumb, index, and ring finger (palmar aspect)   Aggravating factors: Using mouse while at work, typing, sleeping, knitting, crocheting     Easing factors: Using night splints  24 HR pattern: worse at night  Imaging: None  Special Questions: Denies weakness, denies clumisness, denies dropping items        Pain  Current pain ratin  At worst pain ratin    Hand dominance: right      Diagnostic Tests  No diagnostic tests performed  Treatments  Current treatment: physical therapy  Patient Goals  Patient goals for therapy: independence with ADLs/IADLs, increased motion and decreased pain      Pain  Current pain ratin  At best pain ratin  At worst pain ratin    Treatments  Current treatment: physical therapy  Patient Goals  Patient goals for therapy: increased strength, independence with ADLs/IADLs, increased motion and decreased pain          Objective     Active Range of Motion     Lumbar   Flexion: 120 degrees   Extension: 45 degrees   Left lateral flexion: 40 degrees       Right lateral flexion: 35 degrees  with pain  Left rotation:  WFL  Right rotation:  [facility]    Additional Active Range of Motion Details  Normal quadrant   *Pain when return to neutral from full flexion   *Pain with R side sending   *Pain at 90 degrees of L/S flexion    Strength/Myotome Testing     Left Hip   Planes of Motion   Flexion: 3+  Abduction: 3+  External rotation: 4-    Right Hip   Planes of Motion   Flexion: 3+  Abduction: 3+  External rotation: 4-    Left Knee   Flexion: 5  Extension: 5    Right Knee   Flexion: 5  Extension: 5    Left Ankle/Foot   Dorsiflexion: 5  Plantar flexion: 5    Right Ankle/Foot   Dorsiflexion: 5  Plantar flexion: 5    Tests     Lumbar     Left   Positive femoral stretch and passive SLR  Negative slump test      Right   Negative femoral stretch, passive SLR and slump test      Additional Tests Details  Passive SLR: positive for neural tension  Femoral Nerve stretch: positive for neural tension- sidelying with max knee flexion (reaches -45 deg of hip extension on L )  Slump: positive for neural tension L vs R     Joint mobility:  Restricted CPA and B UPA at L4/5 region with comparable sign     Sit to stand: increased pain L LS    Functional Assessment      Squat    Left valgus, left tibial anterior translation beyond toes, right valgus and right tibial anterior translation beyond toes  B/L HANDS Objective     Neurological Testing     Sensation     Wrist/Hand   Left   Intact: light touch    Right   Intact: light touch    Reflexes   Left   Emmanuel's reflex: negative    Right   Emmanuel's reflex: negative    Active Range of Motion     Left Wrist   Wrist flexion: 95 degrees   Wrist extension: 72 degrees     Right Wrist   Wrist flexion: 84 degrees   Wrist extension: 64 degres    Strength/Myotome Testing     Left Wrist/Hand   Wrist extension: 5  Wrist flexion: 5    Right Wrist/Hand   Wrist extension: 5  Wrist flexion: 5    Tests     Left Shoulder   Positive ULTT1  Negative ULTT2  Right Shoulder   Positive ULTT1  Negative ULTT2  Left Wrist/Hand   Positive Phalen's sign and Tinel's sign (medial nerve)  Right Wrist/Hand   Positive Phalen's sign and Tinel's sign (medial nerve)       Additional Tests Details  Reverse phalen's sign: + bilaterally at 15 seconds    General Comments:      Wrist/Hand Comments  Pain free  strength:  R: 65lbs  L: 55 lbs    Flowsheet Rows      Most Recent Value   PT/OT G-Codes   Current Score  80   Projected Score  72           Precautions: Meningioma, H/O Depression     Manuals 1/21 1/25 1/28 2/8 2/11 2/15 2/25 3/1     CPA L4/5 Gr II+III 4'   Gr III 3' GrIII 4' GrIII 4' GrIII 4' GrIII 4'      UPA L4/5 Gr II + III 4'   GrIII 3' GrIII 4' GrIII 4' GrIII 4' GrIII 4'      Neutral gap    Gr III4'          IASTM LLL  CT region 4' B CT region 6' B          STM to Carpal tunnel   4' B 4' B   4' 3' x2 B      Neuro Re-Ed             Femoral N glide             90/90 N glide 15x on each             Bridge 1x10 arms down, 1x10 arms across chest  2x10 3"  2x10 3"  2x10 3"  With PB  2x10 3"  With PB      TrA with PB    20x5" 20x5" 20x5"       Side bridge      5x B        Clam shell  2x10 btb             Median N glides  NV seated 2x10 B          Median N rocking  10x 2 sets of 10           TrA March    10x          TrA DL lowering- knees bent   10x           Anti-rotation with TB and TrA      BLK 2x10 B       Bird dog      2x10 20x       isometric with Putty and weight             Ther Ex             Bicycle 6'    6' 6' lvl 3  6' lvl 3 6" lvl 3 7' lvl 3      Standing hip abduction   nv with tb ytb 10x B         Standing hip extension   nv with tb  ytb 10x B          Quad stretch  5x15"             Wrist extension MWM  15x           LTR   10x ea 10x each 10x each        Prone press up     10x5"  10x5"                   Ther Activity             Hip hinge    nv  2x10 2x10 blk KB   2x10 blk KB                     Gait Training                                       Modalities

## 2021-03-01 ENCOUNTER — EVALUATION (OUTPATIENT)
Dept: PHYSICAL THERAPY | Facility: CLINIC | Age: 56
End: 2021-03-01
Payer: COMMERCIAL

## 2021-03-01 DIAGNOSIS — M54.50 CHRONIC LEFT-SIDED LOW BACK PAIN WITHOUT SCIATICA: Primary | ICD-10-CM

## 2021-03-01 DIAGNOSIS — G89.29 CHRONIC MIDLINE LOW BACK PAIN WITHOUT SCIATICA: ICD-10-CM

## 2021-03-01 DIAGNOSIS — G56.03 BILATERAL CARPAL TUNNEL SYNDROME: ICD-10-CM

## 2021-03-01 DIAGNOSIS — G89.29 CHRONIC LEFT-SIDED LOW BACK PAIN WITHOUT SCIATICA: Primary | ICD-10-CM

## 2021-03-01 DIAGNOSIS — M54.50 CHRONIC MIDLINE LOW BACK PAIN WITHOUT SCIATICA: ICD-10-CM

## 2021-03-01 PROCEDURE — 97140 MANUAL THERAPY 1/> REGIONS: CPT | Performed by: PHYSICAL THERAPIST

## 2021-03-01 PROCEDURE — 97110 THERAPEUTIC EXERCISES: CPT | Performed by: PHYSICAL THERAPIST

## 2021-03-01 PROCEDURE — 97530 THERAPEUTIC ACTIVITIES: CPT | Performed by: PHYSICAL THERAPIST

## 2021-03-01 PROCEDURE — 97112 NEUROMUSCULAR REEDUCATION: CPT | Performed by: PHYSICAL THERAPIST

## 2021-03-04 ENCOUNTER — APPOINTMENT (OUTPATIENT)
Dept: PHYSICAL THERAPY | Facility: CLINIC | Age: 56
End: 2021-03-04
Payer: COMMERCIAL

## 2021-03-22 DIAGNOSIS — Z20.822 EXPOSURE TO COVID-19 VIRUS: Primary | ICD-10-CM

## 2021-03-30 DIAGNOSIS — Z23 ENCOUNTER FOR IMMUNIZATION: ICD-10-CM

## 2021-04-09 ENCOUNTER — IMMUNIZATIONS (OUTPATIENT)
Dept: FAMILY MEDICINE CLINIC | Facility: HOSPITAL | Age: 56
End: 2021-04-09

## 2021-04-09 DIAGNOSIS — Z23 ENCOUNTER FOR IMMUNIZATION: Primary | ICD-10-CM

## 2021-04-09 PROCEDURE — 91300 SARS-COV-2 / COVID-19 MRNA VACCINE (PFIZER-BIONTECH) 30 MCG: CPT

## 2021-04-09 PROCEDURE — 0001A SARS-COV-2 / COVID-19 MRNA VACCINE (PFIZER-BIONTECH) 30 MCG: CPT

## 2021-04-13 DIAGNOSIS — F32.A DEPRESSION, UNSPECIFIED DEPRESSION TYPE: ICD-10-CM

## 2021-04-13 RX ORDER — FLUOXETINE HYDROCHLORIDE 20 MG/1
CAPSULE ORAL
Qty: 180 CAPSULE | Refills: 1 | Status: SHIPPED | OUTPATIENT
Start: 2021-04-13 | End: 2022-01-17

## 2021-05-04 ENCOUNTER — IMMUNIZATIONS (OUTPATIENT)
Dept: FAMILY MEDICINE CLINIC | Facility: HOSPITAL | Age: 56
End: 2021-05-04

## 2021-05-04 DIAGNOSIS — Z23 ENCOUNTER FOR IMMUNIZATION: Primary | ICD-10-CM

## 2021-05-04 PROCEDURE — 91300 SARS-COV-2 / COVID-19 MRNA VACCINE (PFIZER-BIONTECH) 30 MCG: CPT

## 2021-05-04 PROCEDURE — 0002A SARS-COV-2 / COVID-19 MRNA VACCINE (PFIZER-BIONTECH) 30 MCG: CPT

## 2021-05-27 DIAGNOSIS — E78.00 HYPERCHOLESTEROLEMIA: ICD-10-CM

## 2021-05-27 RX ORDER — PRAVASTATIN SODIUM 20 MG
TABLET ORAL
Qty: 90 TABLET | Refills: 1 | Status: SHIPPED | OUTPATIENT
Start: 2021-05-27 | End: 2021-11-30

## 2021-08-26 ENCOUNTER — HOSPITAL ENCOUNTER (OUTPATIENT)
Dept: MAMMOGRAPHY | Facility: CLINIC | Age: 56
Discharge: HOME | End: 2021-08-26
Payer: COMMERCIAL

## 2021-08-26 VITALS — WEIGHT: 193 LBS | HEIGHT: 63 IN | BODY MASS INDEX: 34.2 KG/M2

## 2021-08-26 DIAGNOSIS — Z12.31 ENCOUNTER FOR SCREENING MAMMOGRAM FOR MALIGNANT NEOPLASM OF BREAST: ICD-10-CM

## 2021-08-26 PROCEDURE — 77067 SCR MAMMO BI INCL CAD: CPT

## 2021-08-26 PROCEDURE — 77063 BREAST TOMOSYNTHESIS BI: CPT

## 2021-09-07 ENCOUNTER — HOSPITAL ENCOUNTER (OUTPATIENT)
Dept: ULTRASOUND IMAGING | Facility: CLINIC | Age: 56
Discharge: HOME | End: 2021-09-07
Payer: COMMERCIAL

## 2021-09-07 ENCOUNTER — HOSPITAL ENCOUNTER (OUTPATIENT)
Dept: MAMMOGRAPHY | Facility: CLINIC | Age: 56
Discharge: HOME | End: 2021-09-07
Payer: COMMERCIAL

## 2021-09-07 VITALS — BODY MASS INDEX: 34.2 KG/M2 | HEIGHT: 63 IN | WEIGHT: 193 LBS

## 2021-09-07 DIAGNOSIS — R92.8 ABNORMAL MAMMOGRAM: ICD-10-CM

## 2021-09-07 PROCEDURE — 77065 DX MAMMO INCL CAD UNI: CPT

## 2021-09-07 PROCEDURE — G0279 TOMOSYNTHESIS, MAMMO: HCPCS

## 2021-09-07 PROCEDURE — 76642 ULTRASOUND BREAST LIMITED: CPT

## 2021-09-09 ENCOUNTER — TELEPHONE (OUTPATIENT)
Dept: FAMILY MEDICINE CLINIC | Facility: CLINIC | Age: 56
End: 2021-09-09

## 2021-09-09 PROCEDURE — U0003 INFECTIOUS AGENT DETECTION BY NUCLEIC ACID (DNA OR RNA); SEVERE ACUTE RESPIRATORY SYNDROME CORONAVIRUS 2 (SARS-COV-2) (CORONAVIRUS DISEASE [COVID-19]), AMPLIFIED PROBE TECHNIQUE, MAKING USE OF HIGH THROUGHPUT TECHNOLOGIES AS DESCRIBED BY CMS-2020-01-R: HCPCS | Performed by: NURSE PRACTITIONER

## 2021-09-09 PROCEDURE — U0005 INFEC AGEN DETEC AMPLI PROBE: HCPCS | Performed by: NURSE PRACTITIONER

## 2021-09-09 NOTE — TELEPHONE ENCOUNTER
Patient called  She is taking care of her covid positive daughter and now she is having HA, nausea, and fatigue   She is asking if you could order a covid test  She is supposed to go out with people this weekend and wants to make sure she is ok

## 2021-11-02 ENCOUNTER — OFFICE VISIT (OUTPATIENT)
Dept: DERMATOLOGY | Facility: CLINIC | Age: 56
End: 2021-11-02
Payer: COMMERCIAL

## 2021-11-02 VITALS — BODY MASS INDEX: 35.43 KG/M2 | TEMPERATURE: 98.2 F | WEIGHT: 200 LBS

## 2021-11-02 DIAGNOSIS — D48.9 NEOPLASM OF UNCERTAIN BEHAVIOR: Primary | ICD-10-CM

## 2021-11-02 PROCEDURE — 99213 OFFICE O/P EST LOW 20 MIN: CPT | Performed by: DERMATOLOGY

## 2021-11-02 PROCEDURE — 1036F TOBACCO NON-USER: CPT | Performed by: DERMATOLOGY

## 2021-11-10 ENCOUNTER — TELEPHONE (OUTPATIENT)
Dept: FAMILY MEDICINE CLINIC | Facility: CLINIC | Age: 56
End: 2021-11-10

## 2021-11-15 ENCOUNTER — TELEPHONE (OUTPATIENT)
Dept: FAMILY MEDICINE CLINIC | Facility: CLINIC | Age: 56
End: 2021-11-15

## 2021-11-29 DIAGNOSIS — E78.00 HYPERCHOLESTEROLEMIA: ICD-10-CM

## 2021-11-30 RX ORDER — PRAVASTATIN SODIUM 20 MG
TABLET ORAL
Qty: 90 TABLET | Refills: 1 | Status: SHIPPED | OUTPATIENT
Start: 2021-11-30 | End: 2022-06-16

## 2021-12-07 ENCOUNTER — HOSPITAL ENCOUNTER (OUTPATIENT)
Dept: MRI IMAGING | Facility: HOSPITAL | Age: 56
Discharge: HOME | End: 2021-12-07
Payer: COMMERCIAL

## 2021-12-07 DIAGNOSIS — D32.0 MENINGIOMA, CEREBRAL (HCC): ICD-10-CM

## 2021-12-07 PROCEDURE — G1004 CDSM NDSC: HCPCS

## 2021-12-07 PROCEDURE — 70553 MRI BRAIN STEM W/O & W/DYE: CPT

## 2021-12-07 PROCEDURE — A9585 GADOBUTROL INJECTION: HCPCS | Performed by: NURSE PRACTITIONER

## 2021-12-07 RX ADMIN — GADOBUTROL 9 ML: 604.72 INJECTION INTRAVENOUS at 20:07

## 2021-12-09 ENCOUNTER — TELEMEDICINE (OUTPATIENT)
Dept: NEUROSURGERY | Facility: CLINIC | Age: 56
End: 2021-12-09
Payer: COMMERCIAL

## 2021-12-09 DIAGNOSIS — D32.0 MENINGIOMA, CEREBRAL (HCC): Primary | ICD-10-CM

## 2021-12-09 PROCEDURE — 99214 OFFICE O/P EST MOD 30 MIN: CPT | Performed by: PHYSICIAN ASSISTANT

## 2021-12-09 PROCEDURE — 1036F TOBACCO NON-USER: CPT | Performed by: PHYSICIAN ASSISTANT

## 2022-01-11 ENCOUNTER — OFFICE VISIT (OUTPATIENT)
Dept: DERMATOLOGY | Age: 57
End: 2022-01-11
Payer: COMMERCIAL

## 2022-01-11 VITALS — TEMPERATURE: 98.2 F | BODY MASS INDEX: 36.14 KG/M2 | WEIGHT: 204 LBS

## 2022-01-11 DIAGNOSIS — R20.2 NOTALGIA PARESTHETICA: Primary | ICD-10-CM

## 2022-01-11 DIAGNOSIS — L28.1 PRURIGO NODULARIS: ICD-10-CM

## 2022-01-11 PROCEDURE — 11900 INJECT SKIN LESIONS </W 7: CPT | Performed by: DERMATOLOGY

## 2022-01-11 RX ORDER — PROGESTERONE 100 MG/1
100 CAPSULE ORAL DAILY
COMMUNITY
Start: 2021-04-09 | End: 2022-01-19 | Stop reason: ALTCHOICE

## 2022-01-11 RX ORDER — ASCORBIC ACID 500 MG
TABLET ORAL
COMMUNITY

## 2022-01-11 RX ORDER — CLOBETASOL PROPIONATE 0.5 MG/G
CREAM TOPICAL DAILY
Qty: 60 G | Refills: 1 | Status: SHIPPED | OUTPATIENT
Start: 2022-01-11 | End: 2022-04-01 | Stop reason: HOSPADM

## 2022-01-11 NOTE — PROGRESS NOTES
[facility] Follow Up Note    Patient Name: Rahul Moody  Encounter Date: 01/11/22    Today's Chief Concerns:  Eulalio Alin Concern #1:  Prurigo nodule f/u    Current Medications:    Current Outpatient Medications:     Calcium Citrate-Vitamin D (CALCIUM CITRATE + D) 250-200 MG-UNIT TABS, Take 1 tablet by mouth 3 (three) times a day, Disp: , Rfl:     clindamycin (CLEOCIN T) 1 % lotion, Apply topically 2 (two) times a day To groin as needed for rash/odor, Disp: 60 mL, Rfl: 3    estradiol (VIVELLE-DOT) 0 025 MG/24HR, PLACE 1 PATCH ON THE SKIN 2 TIMES A WEEK, Disp: , Rfl:     FLUoxetine (PROzac) 20 mg capsule, TAKE 2 CAPSULES BY MOUTH EVERY DAY (Patient taking differently: Take 20 mg by mouth daily  ), Disp: 180 capsule, Rfl: 1    Melatonin 5 MG CAPS, Take 5 mg by mouth daily at bedtime , Disp: , Rfl:     MULTIPLE VITAMIN PO, Take 1 tablet by mouth daily, Disp: , Rfl:     pravastatin (PRAVACHOL) 20 mg tablet, TAKE 1 TABLET BY MOUTH EVERY DAY, Disp: 90 tablet, Rfl: 1    progesterone (PROMETRIUM) 100 MG capsule, Take 100 mg by mouth daily, Disp: , Rfl:     Progesterone 100 MG CAPS, Take 100 mg by mouth daily, Disp: , Rfl:     triamcinolone (KENALOG) 0 1 % ointment, Apply topically 2 (two) times a day To back as needed for itching, Disp: 454 g, Rfl: 0    Vitamin E 100 UNIT/GM CREA, Take by mouth, Disp: , Rfl:     scopolamine (TRANSDERM-SCOP) 1 mg/3 days TD 72 hr patch, Place 1 patch on the skin every third day (Patient not taking: Reported on 1/11/2022 ), Disp: 5 patch, Rfl: 0    CONSTITUTIONAL:   Vitals:    01/11/22 0955   Temp: 98 2 °F (36 8 °C)   TempSrc: Temporal   Weight: 92 5 kg (204 lb)     Specific Alerts:    Have you been seen by a [facility] Dermatologist in the last 3 years? YES    Are you pregnant or planning to become pregnant? No    Are you currently or planning to be nursing or breast feeding?  No    No Known Allergies    May we call your Preferred Phone number to discuss your specific medical information? YES    May we leave a detailed message that includes your specific medical information? YES    Have you traveled outside of the [facility] in the past 3 months? YES    Do you currently have a pacemaker or defibrillator? No    Do you have any artificial heart valves, joints, plates, screws, rods, stents, pins, etc? YES   - If Yes, were any placed within the last 2 years? Screws in ankle    Do you require any medications prior to a surgical procedure? No   - If Yes, for which procedure? - If Yes, what medications to you require? Are you taking any medications that cause you to bleed more easily ("blood thinners") No    Have you ever experienced a rapid heartbeat with epinephrine? No    Have you ever been treated with "gold" (gold sodium thiomalate) therapy? No    [facility] can help with wrinkles, "laugh lines," facial volume loss, "double chin," "love handles," age spots, and more  Are you interested in learning today about some of the skin enhancement procedures that we offer? (If Yes, please provide more detail) No    Review of Systems:  Have you recently had or currently have any of the following?     · Fever or chills: No  · Night Sweats: YES  · Headaches: No  · Weight Gain: No  · Weight Loss: No  · Blurry Vision: No  · Nausea: No  · Vomiting: No  · Diarrhea: No  · Blood in Stool: No  · Abdominal Pain: No  · Itchy Skin: YES  · Painful Joints: No  · Swollen Joints: No  · Muscle Pain: No  · Irregular Mole: No  · Sun Burn: No  · Dry Skin: YES  · Skin Color Changes: No  · Scar or Keloid: No  · Cold Sores/Fever Blisters: No  · Bacterial Infections/MRSA: No  · Anxiety: No  · Depression: No  · Suicidal or Homicidal Thoughts: No      PSYCH: Normal mood and affect  EYES: Normal conjunctiva  ENT: Normal lips and oral mucosa  CARDIOVASCULAR: No edema  RESPIRATORY: Normal respirations  HEME/LYMPH/IMMUNO:  No regional lymphadenopathy except as noted below in ASSESSMENT AND PLAN BY DIAGNOSIS    FULL ORGAN SYSTEM SKIN EXAM (SKIN)   Hair, Scalp, Ears, Face Normal except as noted below in Assessment   Neck, Cervical Chain Nodes Normal except as noted below in Assessment   Right Arm/Hand/Fingers Normal except as noted below in Assessment   Left Arm/Hand/Fingers Normal except as noted below in Assessment   Back/Spine Normal except as noted below in Assessment       NOTALGIA PARESTHETICA    Physical Exam:   Anatomic Location Affected:  Right mid back   Morphological Description:  Few linear crusts   Pertinent Positives:   Pertinent Negatives: Additional History of Present Condition:  Patient states being very itchy on her back, was seen in January 2021 where she was prescribed triamcinolone 0 1% ointment  Patient states the medication makes her feel very sticky and tends not to use it due to being an ointment  Assessment and Plan:  Based on a thorough discussion of this condition and the management approach to it (including a comprehensive discussion of the known risks, side effects and potential benefits of treatment), the patient (family) agrees to implement the following specific plan:   Clobetasol cream - apply once daily to affected area on back   Consider chiropractic treatment   Kenalog injection done in office today; written and verbal consent obtained  Use moisturizer like Eucerin,Cerave or Aveeno Cream 3 times a day for the dry skin (cetaphil brand lighter)         Follow up in 2 months    Assessment and Plan:  Notalgia paresthetica is a condition where itch and changed sensation arise in the areas of skin on the medial aspect of the shoulder blade on either side of the back  Notalgia means pain in the back, and paraesthetica refers to burning pain, tingling or itch  It is also called thoracic cutaneous nerve entrapment syndrome      The nerves which supply sensation to the upper back emerge from the spinal cord (2nd to 6th thoracic segments) and run a long course up through the thick muscles of the back  They make a right-angled turn before reaching the skin  The nerves appear to be vulnerable to compression or traction  Partial compression or injury leads to the symptoms  Initial injury to the nerves may include:   Back injury   Herniated or "slipped" disc   Herpes zoster (shingles)   Sunburn   Myelopathy (muscular disease)   Small fiber neuropathy    Notalgia paresthetica often starts after a period of intense exercise leading to muscular stiffness, or a period of inactivity  Sometimes, a specific injury may be recalled  It is characterized by intense itch on the medial border of one scapula or both, ie, between the shoulder blades  This itch can be intermittent or continuous  It is unrelieved by scratching, although the scratching and rubbing may be pleasurable  The affected area may spread to both shoulder blades and more widely over the back and shoulders  In many patients, there are no visible signs  Visible changes often arise from rubbing and scratching the affected area  These include:   Scratch marks   Hyperpigmentation (brown marks)   Hypopigmentation (white marks)   Lichen simplex (a type of eczema)   Scarring    There may be changed sensation in the affected area of skin in response to light touch, sharp objects, cold, and heat  There may be reduced or absent sweating in the affected area  Radiology such as X-ray, CT scan or MRI may demonstrate a degenerative vertebra or prolapsed disc in the area that corresponds to the nerve supply to the affected skin  In many cases, no abnormality is revealed  Treatment is not always necessary, and it is not always successful   Typical management may include the following:   Cooling lotions or creams as required (camphor and menthol)   Topical steroids to treat associated lichen simplex   Capsaicin cream - this depletes nerve endings of their chemical transmitters but requires frequent reapplication and causes unpleasant local side effects   Local anesthetic creams may provide temporary relief of symptoms   Amitriptyline or other oral tricyclic medications at night to help sleep and counteract neuropathic symptoms   Gabapentin, pregabalin or other anticonvulsants can relieve unpleasant burning or tingling sensations   Transcutaneous electrical nerve stimulation (TENS)   Surgical decompression of vertebral nerve impingement    Physical therapy with repetitive exercises and stretches for the upper back has been reported to be effective   While sitting, cross arms and bend forwards to stretch upper back   Arms at sides, raise shoulders and rotate them forwards and backwards   Arms straight, rotate forwards 360 degrees and backwards 360 degrees   Rotate upper body left and right until stretch is felt and hold   Massage the muscles besides the spine in the affected area    PRURIGO NODULARIS    Physical Exam:   Anatomic Location Affected:  Right forearm   Morphological Description:  Pink keratotic nodule   Pertinent Positives:   Pertinent Negatives: Additional History of Present Condition:  Patient states spot of concern she was seen in office for in November has not gone away  Continually is itchy and bothersome  Assessment and Plan:  Based on a thorough discussion of this condition and the management approach to it (including a comprehensive discussion of the known risks, side effects and potential benefits of treatment), the patient (family) agrees to implement the following specific plan:   Kenalog injection done in office today; written and verbal consent obtained    What is nodular prurigo? Nodular prurigo is a skin condition characterised by very itchy firm lumps  It is the most severe form of prurigo  It is not known why these lumps appear  It is also called prurigo nodularis  Nodular prurigo is very difficult to treat effectively      Who gets nodular prurigo? Nodular prurigo can occur at all ages but mainly in adults aged 19-56 years  Both sexes are equally affected  Up to 80% of patients have a personal or family history of atopic dermatitis, asthma or hay fever (compared to about 25% of the normal population)  It has been associated with internal disease including iron deficiency anaemia, chronic renal failure, gluten enteropathy, HIV infection and many other diverse conditions  What is the cause of nodular prurigo? The cause of nodular prurigo is unknown  It is uncertain whether scratching leads to the nodules or if the nodules appear before they are scratched  The reason for the nodules, the inflammation and the increased activity and size of nerves in the skin is under investigation but remains unknown  Nodular prurigo may commence as an insect bite reaction or another form of dermatitis  In some cases, nodular prurigo has been associated with brachioradial pruritus, which is due to compression or traction of spinal nerves  This theory may explain why local treatment is not always successful  It has been speculated that widespread nodular prurigo may also follow sensitisation of the spinal nerves and that the nodules appear because of scratching  What are the clinical features of nodular prurigo? The individual prurigo nodule is a firm lump, 1-3 cm in diameter, often with a raised warty surface  The early lesion may start as a smaller red itchy bump  Crusting and scaling may cover recently scratched lesions  Older lesions may be darker or paler than surrounding skin  The skin in between the nodules is often dry  The itch is often very intense, often for hours on end, leading to vigorous scratching and sometimes secondary infection  Nodular prurigo lesions are usually grouped and numerous but may vary in number from 2-200  Nodular prurigo tends to be symmetrically distributed   They usually start on the lower arms and legs and are worse on the outer aspects  The trunk, face and even palms can also be affected  Sometimes the prurigo nodules are most obvious on the cape area (neck, shoulders and upper arms)  New nodules appear from time to time, but existing nodules may regress spontaneously to leave scars  Nodular prurigo often runs a long course and can lead to significant stress and depression  How is nodular prurigo diagnosed? In most cases, the diagnosis is made clinically due to the degree of itch and the typical appearance of the nodules  A skin biopsy may be useful to confirm the diagnosis of nodular prurigo  Under the microscope, the skin is enormously thickened and may appear quite abnormal, sometimes resembling squamous cell skin cancer  The nerve fibres and nerve endings in the skin are markedly increased in size  The skin is inflamed and there is an increased number of neural mediators known to cause itching and nerve growth  Direct immunofluorescence looking for antibody deposition in the skin is usually negative  Rarely, the blistering disease bullous pemphigoid can present as nodular prurigo (pemphigoid nodularis)  In this case, immunofluorescence reveals immunoglobulins in the basement membrane zone below the epidermis  The prurigo nodules can be present for weeks or months before any blisters appear  It is important to identify underlying diseases that are associated with nodular prurigo; blood tests may include full blood count, liver, kidney and thyroid function tests  Patch testing may be worthwhile if contact allergy is considered possible  What is the treatment for nodular prurigo? Unfortunately, nodular prurigo is one of the more resistant conditions skin specialists are called upon to treat  Local treatments that may be tried include:   Emollients applied liberally and frequently to cool and soothe itchy skin - menthol or phenol may be added   Oral antihistamines at night to reduce itch and allow sleep   Ultrapotent topical steroid creams  To enhance their effect, apply under occlusion; cover with a plastic or hydrocolloid adhesive dressing and leave this in place for several days   Corticosteroid injections (triamcinolone acetonide 10-40 mg/mL) into thicker nodules   Coal tar ointment as a steroid alternative   Calcipotriol ointment (topical vitamin D3), usually used for psoriasis, can be applied twice daily   Capsaicin cream, which induces itching and burning until eventually, the itch stops completely -- it requires repeated applications four to six times daily   Cryotherapy, which may shrink the nodules and reduce their itch   Treatments with pulsed dye laser, which may reduce the vascularity of individual lesions     Antiseptic or antibiotic ointment may be used on infected nodules, and oral antibiotics (usually flucloxacillin) are indicated for significant secondary bacterial infection (cellulitis)  Systemic treatments that may be helpful for more severe disease are listed below, in no particular order  Combination treatment is frequently recommended   Phototherapy (UVB and PUVA)    Tricyclic antidepressants such as amitriptyline or doxepin    Anticonvulsants used for neuropathic pain and itch, such as gabapentin or pregabalin    Naltrexone, an opiate antagonist (this counteracts the narcotic effect of morphine, heroin and similar drugs), has been reported to reduce itching in some subjects   Oral steroids    Methotrexate    Thalidomide, which is reserved for very severe cases and may be difficult to access   Ciclosporin, which may reduce the lumps and the itching but its use is limited by side effects   Systemic retinoids such as acitretin or isotretinoin, which may shrink the nodules and reduce the severity of the itch      PROCEDURE:  INTRALESIONAL STEROID INJECTION (KENALOG INJECTION)    Purpose: Triamcinolone is a synthetic glucocorticoid corticosteroid that has marked anti-inflammatory action  It is prepared in sterile aqueous suspension suitable for injecting directly into a lesion on or immediately below the skin to treat a dermal inflammatory process  Indications: It is indicated for alopecia areata; inflammatory acne cysts; discoid lupus erythematosus; keloids and hypertrophic scars; inflammatory lesions of granuloma annulare, lichen planus, lichen simplex chronicus (neurodermatitis), psoriatic plaques, and other localized inflammatory skin conditions  Potential Side Effects: I understand that triamcinolone injection can potentially cause early and/or delayed adverse effects such as:    Pain    Impaired wound healing    Increased hair growth    Bleeding    White or brown marks    Steroid acne    Infection    Telangiectasia    Skin thinning    Cutaneous and subcutaneous lipoatrophy (most common) appearing as skin indentations or dimples around the injection sites a few weeks after treatment     PROCEDURE NOTE:  After verbal and written consent were obtained, the to-be-treated area was wiped and cleaned with rubbing alcohol 70%  Then, a total of 0 5 mL of Kenalog (diluted with 0 5 mL lidocaine w/ epi) CONCENTRATION:  10 mg/mL   (Lot# [id number]; Expiration March 2023, NDC#: 5252-1907-18) was injected intralesionally into a total of 2 lesion/s on the following anatomic areas:  Right mid back, right forearm using a 1-mL syringe and a 30-gauge needle  There was less than 1 mL of blood loss and little to no discomfort  The area was bandaged with a Band-aid  The patient tolerated the procedure well and remained in the office for observation  With no signs of an adverse reaction, the patient was eventually discharged from clinic        Scribe Attestation    I,:  Jenelle Liao am acting as a scribe while in the presence of the attending physician :       I,:  Brenden Hager MD personally performed the services described in this documentation as scribed in my presence :

## 2022-01-11 NOTE — PATIENT INSTRUCTIONS
NOTALGIA PARESTHETICA    Assessment and Plan:  Based on a thorough discussion of this condition and the management approach to it (including a comprehensive discussion of the known risks, side effects and potential benefits of treatment), the patient (family) agrees to implement the following specific plan:   Clobetasol cream - apply once daily to affected area on back   Consider chiropractic treatment   Kenalog injection done in office today; written and verbal consent obtained  Use moisturizer like Eucerin,Cerave or Aveeno Cream 3 times a day for the dry skin (cetaphil brand lighter)         Follow up in 2 months    Assessment and Plan:  Notalgia paresthetica is a condition where itch and changed sensation arise in the areas of skin on the medial aspect of the shoulder blade on either side of the back  Notalgia means pain in the back, and paraesthetica refers to burning pain, tingling or itch  It is also called thoracic cutaneous nerve entrapment syndrome  The nerves which supply sensation to the upper back emerge from the spinal cord (2nd to 6th thoracic segments) and run a long course up through the thick muscles of the back  They make a right-angled turn before reaching the skin  The nerves appear to be vulnerable to compression or traction  Partial compression or injury leads to the symptoms  Initial injury to the nerves may include:   Back injury   Herniated or "slipped" disc   Herpes zoster (shingles)   Sunburn   Myelopathy (muscular disease)   Small fiber neuropathy    Notalgia paresthetica often starts after a period of intense exercise leading to muscular stiffness, or a period of inactivity  Sometimes, a specific injury may be recalled  It is characterized by intense itch on the medial border of one scapula or both, ie, between the shoulder blades  This itch can be intermittent or continuous  It is unrelieved by scratching, although the scratching and rubbing may be pleasurable  The affected area may spread to both shoulder blades and more widely over the back and shoulders  In many patients, there are no visible signs  Visible changes often arise from rubbing and scratching the affected area  These include:   Scratch marks   Hyperpigmentation (brown marks)   Hypopigmentation (white marks)   Lichen simplex (a type of eczema)   Scarring    There may be changed sensation in the affected area of skin in response to light touch, sharp objects, cold, and heat  There may be reduced or absent sweating in the affected area  Radiology such as X-ray, CT scan or MRI may demonstrate a degenerative vertebra or prolapsed disc in the area that corresponds to the nerve supply to the affected skin  In many cases, no abnormality is revealed  Treatment is not always necessary, and it is not always successful  Typical management may include the following:   Cooling lotions or creams as required (camphor and menthol)   Topical steroids to treat associated lichen simplex   Capsaicin cream - this depletes nerve endings of their chemical transmitters but requires frequent reapplication and causes unpleasant local side effects   Local anesthetic creams may provide temporary relief of symptoms   Amitriptyline or other oral tricyclic medications at night to help sleep and counteract neuropathic symptoms   Gabapentin, pregabalin or other anticonvulsants can relieve unpleasant burning or tingling sensations   Transcutaneous electrical nerve stimulation (TENS)   Surgical decompression of vertebral nerve impingement    Physical therapy with repetitive exercises and stretches for the upper back has been reported to be effective       While sitting, cross arms and bend forwards to stretch upper back   Arms at sides, raise shoulders and rotate them forwards and backwards   Arms straight, rotate forwards 360 degrees and backwards 360 degrees   Rotate upper body left and right until stretch is felt and hold   Massage the muscles besides the spine in the affected area    PRURIGO NODULARIS    Assessment and Plan:  Based on a thorough discussion of this condition and the management approach to it (including a comprehensive discussion of the known risks, side effects and potential benefits of treatment), the patient (family) agrees to implement the following specific plan:   Kenalog injection done in office today; written and verbal consent obtained    What is nodular prurigo? Nodular prurigo is a skin condition characterised by very itchy firm lumps  It is the most severe form of prurigo  It is not known why these lumps appear  It is also called prurigo nodularis  Nodular prurigo is very difficult to treat effectively  Who gets nodular prurigo? Nodular prurigo can occur at all ages but mainly in adults aged 19-56 years  Both sexes are equally affected  Up to 80% of patients have a personal or family history of atopic dermatitis, asthma or hay fever (compared to about 25% of the normal population)  It has been associated with internal disease including iron deficiency anaemia, chronic renal failure, gluten enteropathy, HIV infection and many other diverse conditions  What is the cause of nodular prurigo? The cause of nodular prurigo is unknown  It is uncertain whether scratching leads to the nodules or if the nodules appear before they are scratched  The reason for the nodules, the inflammation and the increased activity and size of nerves in the skin is under investigation but remains unknown  Nodular prurigo may commence as an insect bite reaction or another form of dermatitis  In some cases, nodular prurigo has been associated with brachioradial pruritus, which is due to compression or traction of spinal nerves  This theory may explain why local treatment is not always successful   It has been speculated that widespread nodular prurigo may also follow sensitisation of the spinal nerves and that the nodules appear because of scratching  What are the clinical features of nodular prurigo? The individual prurigo nodule is a firm lump, 1-3 cm in diameter, often with a raised warty surface  The early lesion may start as a smaller red itchy bump  Crusting and scaling may cover recently scratched lesions  Older lesions may be darker or paler than surrounding skin  The skin in between the nodules is often dry  The itch is often very intense, often for hours on end, leading to vigorous scratching and sometimes secondary infection  Nodular prurigo lesions are usually grouped and numerous but may vary in number from 2-200  Nodular prurigo tends to be symmetrically distributed  They usually start on the lower arms and legs and are worse on the outer aspects  The trunk, face and even palms can also be affected  Sometimes the prurigo nodules are most obvious on the cape area (neck, shoulders and upper arms)  New nodules appear from time to time, but existing nodules may regress spontaneously to leave scars  Nodular prurigo often runs a long course and can lead to significant stress and depression  How is nodular prurigo diagnosed? In most cases, the diagnosis is made clinically due to the degree of itch and the typical appearance of the nodules  A skin biopsy may be useful to confirm the diagnosis of nodular prurigo  Under the microscope, the skin is enormously thickened and may appear quite abnormal, sometimes resembling squamous cell skin cancer  The nerve fibres and nerve endings in the skin are markedly increased in size  The skin is inflamed and there is an increased number of neural mediators known to cause itching and nerve growth  Direct immunofluorescence looking for antibody deposition in the skin is usually negative  Rarely, the blistering disease bullous pemphigoid can present as nodular prurigo (pemphigoid nodularis)   In this case, immunofluorescence reveals immunoglobulins in the basement membrane zone below the epidermis  The prurigo nodules can be present for weeks or months before any blisters appear  It is important to identify underlying diseases that are associated with nodular prurigo; blood tests may include full blood count, liver, kidney and thyroid function tests  Patch testing may be worthwhile if contact allergy is considered possible  What is the treatment for nodular prurigo? Unfortunately, nodular prurigo is one of the more resistant conditions skin specialists are called upon to treat  Local treatments that may be tried include:   Emollients applied liberally and frequently to cool and soothe itchy skin - menthol or phenol may be added   Oral antihistamines at night to reduce itch and allow sleep   Ultrapotent topical steroid creams  To enhance their effect, apply under occlusion; cover with a plastic or hydrocolloid adhesive dressing and leave this in place for several days   Corticosteroid injections (triamcinolone acetonide 10-40 mg/mL) into thicker nodules   Coal tar ointment as a steroid alternative   Calcipotriol ointment (topical vitamin D3), usually used for psoriasis, can be applied twice daily   Capsaicin cream, which induces itching and burning until eventually, the itch stops completely -- it requires repeated applications four to six times daily   Cryotherapy, which may shrink the nodules and reduce their itch   Treatments with pulsed dye laser, which may reduce the vascularity of individual lesions     Antiseptic or antibiotic ointment may be used on infected nodules, and oral antibiotics (usually flucloxacillin) are indicated for significant secondary bacterial infection (cellulitis)  Systemic treatments that may be helpful for more severe disease are listed below, in no particular order  Combination treatment is frequently recommended     Phototherapy (UVB and PUVA)    Tricyclic antidepressants such as amitriptyline or doxepin    Anticonvulsants used for neuropathic pain and itch, such as gabapentin or pregabalin    Naltrexone, an opiate antagonist (this counteracts the narcotic effect of morphine, heroin and similar drugs), has been reported to reduce itching in some subjects   Oral steroids    Methotrexate    Thalidomide, which is reserved for very severe cases and may be difficult to access   Ciclosporin, which may reduce the lumps and the itching but its use is limited by side effects   Systemic retinoids such as acitretin or isotretinoin, which may shrink the nodules and reduce the severity of the itch  PROCEDURE:  INTRALESIONAL STEROID INJECTION (KENALOG INJECTION)    Purpose: Triamcinolone is a synthetic glucocorticoid corticosteroid that has marked anti-inflammatory action  It is prepared in sterile aqueous suspension suitable for injecting directly into a lesion on or immediately below the skin to treat a dermal inflammatory process  Indications: It is indicated for alopecia areata; inflammatory acne cysts; discoid lupus erythematosus; keloids and hypertrophic scars; inflammatory lesions of granuloma annulare, lichen planus, lichen simplex chronicus (neurodermatitis), psoriatic plaques, and other localized inflammatory skin conditions  Potential Side Effects: I understand that triamcinolone injection can potentially cause early and/or delayed adverse effects such as:    Pain    Impaired wound healing    Increased hair growth    Bleeding    White or brown marks    Steroid acne    Infection    Telangiectasia    Skin thinning    Cutaneous and subcutaneous lipoatrophy (most common) appearing as skin indentations or dimples around the injection sites a few weeks after treatment     PROCEDURE NOTE:  After verbal and written consent were obtained, the to-be-treated area was wiped and cleaned with rubbing alcohol 70%  There was less than 1 mL of blood loss and little to no discomfort    The area was bandaged with a Band-aid  The patient tolerated the procedure well and remained in the office for observation  With no signs of an adverse reaction, the patient was eventually discharged from clinic

## 2022-01-17 DIAGNOSIS — F32.A DEPRESSION, UNSPECIFIED DEPRESSION TYPE: ICD-10-CM

## 2022-01-17 RX ORDER — FLUOXETINE HYDROCHLORIDE 20 MG/1
CAPSULE ORAL
Qty: 180 CAPSULE | Refills: 1 | Status: SHIPPED | OUTPATIENT
Start: 2022-01-17

## 2022-01-19 ENCOUNTER — OFFICE VISIT (OUTPATIENT)
Dept: FAMILY MEDICINE CLINIC | Facility: CLINIC | Age: 57
End: 2022-01-19
Payer: COMMERCIAL

## 2022-01-19 VITALS
HEART RATE: 74 BPM | TEMPERATURE: 98.2 F | DIASTOLIC BLOOD PRESSURE: 80 MMHG | WEIGHT: 204 LBS | SYSTOLIC BLOOD PRESSURE: 120 MMHG | BODY MASS INDEX: 36.14 KG/M2 | HEIGHT: 63 IN

## 2022-01-19 DIAGNOSIS — D32.0 MENINGIOMA, CEREBRAL (HCC): ICD-10-CM

## 2022-01-19 DIAGNOSIS — Z00.00 ANNUAL PHYSICAL EXAM: Primary | ICD-10-CM

## 2022-01-19 DIAGNOSIS — Z90.3 POSTGASTRECTOMY MALABSORPTION: ICD-10-CM

## 2022-01-19 DIAGNOSIS — R63.5 WEIGHT GAIN: ICD-10-CM

## 2022-01-19 DIAGNOSIS — R63.5 WEIGHT GAIN FOLLOWING GASTRIC BYPASS SURGERY: ICD-10-CM

## 2022-01-19 DIAGNOSIS — Z98.84 WEIGHT GAIN FOLLOWING GASTRIC BYPASS SURGERY: ICD-10-CM

## 2022-01-19 DIAGNOSIS — K91.2 POSTGASTRECTOMY MALABSORPTION: ICD-10-CM

## 2022-01-19 DIAGNOSIS — E78.00 HYPERCHOLESTEROLEMIA: ICD-10-CM

## 2022-01-19 PROCEDURE — 3008F BODY MASS INDEX DOCD: CPT | Performed by: FAMILY MEDICINE

## 2022-01-19 PROCEDURE — 3725F SCREEN DEPRESSION PERFORMED: CPT | Performed by: FAMILY MEDICINE

## 2022-01-19 PROCEDURE — 99396 PREV VISIT EST AGE 40-64: CPT | Performed by: FAMILY MEDICINE

## 2022-01-19 PROCEDURE — 1036F TOBACCO NON-USER: CPT | Performed by: FAMILY MEDICINE

## 2022-01-19 NOTE — ASSESSMENT & PLAN NOTE
Check labs  Discussed diet compliance and increasing exercise   Recheck 1y - earlier if weight goes up

## 2022-01-19 NOTE — PATIENT INSTRUCTIONS

## 2022-01-19 NOTE — PROGRESS NOTES
[address]    NAME: Trever Ruffin  AGE: 61 y o  SEX: female  : 1965     DATE: 2022     Assessment and Plan:     Problem List Items Addressed This Visit        Digestive    Postgastrectomy malabsorption     Check labs  Discussed diet compliance  Recheck 1y or prn         Relevant Orders    CBC (Includes Diff/Plt) (Refl)    Comprehensive metabolic panel    Ferritin    PTH, intact    Vitamin A    Vitamin B1, whole blood    Vitamin D 25 hydroxy    Vitamin B12/Folate, Serum Panel    TSH, 3rd generation with Free T4 reflex       Nervous and Auditory    Meningioma, cerebral (HCC)     Up to date with monitoring  MRI last Dec unchanged  F/u with Neurosurgery            Other    Hypercholesterolemia     Check labs  Continue to monitor         Relevant Orders    Lipid Panel with Direct LDL reflex    Weight gain following gastric bypass surgery     Check labs  Discussed diet compliance and increasing exercise  Recheck 1y - earlier if weight goes up           Other Visit Diagnoses     Annual physical exam    -  Primary    Weight gain        Relevant Orders    TSH, 3rd generation with Free T4 reflex          Immunizations and preventive care screenings were discussed with patient today  Appropriate education was printed on patient's after visit summary  Counseling:  · Exercise: the importance of regular exercise/physical activity was discussed  Recommend exercise 3-5 times per week for at least 30 minutes  · BMI Counseling: Body mass index is 36 14 kg/m²  The BMI is above normal  Nutrition recommendations include reducing portion sizes, consuming healthier snacks, moderation in carbohydrate intake, increasing intake of lean protein, reducing intake of saturated fat and trans fat and reducing intake of cholesterol  No follow-ups on file       Chief Complaint:     Chief Complaint   Patient presents with    Physical Exam History of Present Illness:     Adult Annual Physical   Patient here for a comprehensive physical exam  The patient reports no problems  - starting to gain weight  Diet and Physical Activity  · Diet/Nutrition: post gastrectomy diet  · Exercise: no formal exercise  Depression Screening  PHQ-2/9 Depression Screening    Little interest or pleasure in doing things: 0 - not at all  Feeling down, depressed, or hopeless: 0 - not at all  Trouble falling or staying asleep, or sleeping too much: 0 - not at all  Feeling tired or having little energy: 0 - not at all  Poor appetite or overeatin - not at all  Feeling bad about yourself - or that you are a failure or have let yourself or your family down: 0 - not at all  Trouble concentrating on things, such as reading the newspaper or watching television: 0 - not at all  Moving or speaking so slowly that other people could have noticed  Or the opposite - being so fidgety or restless that you have been moving around a lot more than usual: 0 - not at all  Thoughts that you would be better off dead, or of hurting yourself in some way: 0 - not at all  PHQ-9 Score: 0   PHQ-9 Interpretation: No or Minimal depression        General Health  · Sleep: sleeps well and gets 7-8 hours of sleep on average  · Hearing: normal - bilateral   · Vision: no vision problems, most recent eye exam <1 year ago and wears glasses  · Dental: regular dental visits and brushes teeth twice daily  /GYN Health  · Patient is: postmenopausal  · Last menstrual period: >4y ago     Review of Systems:     Review of Systems   Constitutional: Negative  HENT: Negative  Eyes: Negative  Respiratory: Negative  Cardiovascular: Negative  Gastrointestinal: Negative  Endocrine: Negative  Genitourinary: Negative  Musculoskeletal: Negative  Skin: Negative  Allergic/Immunologic: Negative  Neurological: Negative  Hematological: Negative      Psychiatric/Behavioral: Negative  Past Medical History:     Past Medical History:   Diagnosis Date    Abdominal pain     Chronic pain disorder     abdominal    Depression     Diverticula of colon     Former smoker     Resolved 2012     GERD (gastroesophageal reflux disease)     Hemorrhoids     Hiatal hernia     Hyperlipidemia     Intestinal malabsorption following gastrectomy     Morbid obesity ([address] )     Resolved 10/2/2015     Obesity     Postgastrectomy malabsorption     Vitamin D insufficiency     Resolved 2016       Past Surgical History:     Past Surgical History:   Procedure Laterality Date    ABDOMINAL SURGERY      gastric bipass    ANKLE SURGERY       SECTION      (2) ,      COLONOSCOPY      ESOPHAGOGASTRODUODENOSCOPY      x2    GASTRIC BYPASS  2015    HERNIA REPAIR  2015    Paraesophageal hiatus hernia  Managed by Mardene Simmonds (General Surgery)    OK EGD TRANSORAL BIOPSY SINGLE/MULTIPLE N/A 2017    Procedure: ESOPHAGOGASTRODUODENOSCOPY (EGD); Surgeon: Saeid Silva MD;  Location: [facility]; Service: Bariatrics    TONSILLECTOMY  1984    UTERINE FIBROID SURGERY        Social History:     Social History     Socioeconomic History    Marital status: /Civil Union     Spouse name: None    Number of children: None    Years of education: None    Highest education level: None   Occupational History    None   Tobacco Use    Smoking status: Former Smoker     Types: Cigarettes     Quit date:      Years since quittin 0    Smokeless tobacco: Never Used    Tobacco comment: 20+ pack year hx - As per [employer]    Vaping Use    Vaping Use: Never used   Substance and Sexual Activity    Alcohol use:  Yes    Drug use: No    Sexual activity: None   Other Topics Concern    None   Social History Narrative    Daily coffee consumption (2 cups/day)    Denied: History of daily cola consumption (__cans/day)    Daily tea consumption (__cups/day)    Former smoker: quit 2012 - As per [employer]    Housewife or homemaker - As per [employer]    Uses safety equipment - Seatbelts      Social Determinants of Health     Financial Resource Strain: Not on file   Food Insecurity: Not on file   Transportation Needs: Not on file   Physical Activity: Not on file   Stress: Not on file   Social Connections: Not on file   Intimate Partner Violence: Not on file   Housing Stability: Not on file      Family History:     Family History   Problem Relation Age of Onset    Breast cancer Mother 32    Prostate cancer Father 48    Hypertension Father     Heart disease Father         Coronary arteriosclerosis     Hyperlipidemia Father     Heart disease Maternal Grandmother         Coronary arteriosclerosis     Heart disease Maternal Grandfather         Coronary arteriosclerosis     Diabetes Paternal Grandmother     No Known Problems Brother     No Known Problems Daughter     No Known Problems Daughter     No Known Problems Paternal Aunt     Stroke Neg Hx     Thyroid disease Neg Hx       Current Medications:     Current Outpatient Medications   Medication Sig Dispense Refill    Calcium Citrate-Vitamin D (CALCIUM CITRATE + D) 250-200 MG-UNIT TABS Take 1 tablet by mouth 3 (three) times a day      clindamycin (CLEOCIN T) 1 % lotion Apply topically 2 (two) times a day To groin as needed for rash/odor 60 mL 3    clobetasol (TEMOVATE) 0 05 % cream Apply topically in the morning 60 g 1    estradiol (VIVELLE-DOT) 0 025 MG/24HR PLACE 1 PATCH ON THE SKIN 2 TIMES A WEEK      FLUoxetine (PROzac) 20 mg capsule TAKE 2 CAPSULES BY MOUTH EVERY  capsule 1    Melatonin 5 MG CAPS Take 5 mg by mouth daily at bedtime       MULTIPLE VITAMIN PO Take 1 tablet by mouth daily      pravastatin (PRAVACHOL) 20 mg tablet TAKE 1 TABLET BY MOUTH EVERY DAY 90 tablet 1    progesterone (PROMETRIUM) 100 MG capsule Take 100 mg by mouth daily      Vitamin E 100 UNIT/GM CREA Take by mouth       No current facility-administered medications for this visit  Allergies:     No Known Allergies   Physical Exam:     /80   Pulse 74   Temp 98 2 °F (36 8 °C)   Ht 5' 3" (1 6 m)   Wt 92 5 kg (204 lb)   BMI 36 14 kg/m²     Physical Exam  Vitals reviewed  Constitutional:       Appearance: She is well-developed  HENT:      Head: Normocephalic and atraumatic  Right Ear: Tympanic membrane, ear canal and external ear normal       Left Ear: Tympanic membrane, ear canal and external ear normal    Eyes:      Extraocular Movements: Extraocular movements intact  Conjunctiva/sclera: Conjunctivae normal       Pupils: Pupils are equal, round, and reactive to light  Neck:      Thyroid: No thyromegaly  Vascular: No JVD  Cardiovascular:      Rate and Rhythm: Normal rate and regular rhythm  Pulses: Normal pulses  Heart sounds: Normal heart sounds  No murmur heard  Pulmonary:      Effort: Pulmonary effort is normal       Breath sounds: Normal breath sounds  No wheezing  Abdominal:      General: Bowel sounds are normal  There is no distension  Palpations: Abdomen is soft  There is no mass  Tenderness: There is no abdominal tenderness  Musculoskeletal:         General: No swelling, tenderness or deformity  Normal range of motion  Cervical back: Normal range of motion and neck supple  No muscular tenderness  Right lower leg: No edema  Left lower leg: No edema  Lymphadenopathy:      Cervical: No cervical adenopathy  Skin:     General: Skin is warm  Capillary Refill: Capillary refill takes less than 2 seconds  Neurological:      General: No focal deficit present  Mental Status: She is alert and oriented to person, place, and time  Cranial Nerves: No cranial nerve deficit  Sensory: No sensory deficit  Motor: No weakness or abnormal muscle tone        Coordination: Coordination normal       Gait: Gait normal       Deep Tendon Reflexes: Reflexes normal    Psychiatric:         Mood and Affect: Mood normal          Behavior: Behavior normal          Thought Content: Thought content normal          Judgment: Judgment normal       Comments: PHQ-2/9 Depression Screening    Little interest or pleasure in doing things: 0 - not at all  Feeling down, depressed, or hopeless: 0 - not at all  Trouble falling or staying asleep, or sleeping too much: 0 - not at all  Feeling tired or having little energy: 0 - not at all  Poor appetite or overeatin - not at all  Feeling bad about yourself - or that you are a failure or have let   yourself or your family down: 0 - not at all  Trouble concentrating on things, such as reading the newspaper or watching   television: 0 - not at all  Moving or speaking so slowly that other people could have noticed   Or the   opposite - being so fidgety or restless that you have been moving around a   lot more than usual: 0 - not at all  Thoughts that you would be better off dead, or of hurting yourself in some   way: 0 - not at all  PHQ-9 Score: 0   PHQ-9 Interpretation: No or Minimal depression                Julio Yusuf MD  [facility]

## 2022-02-23 ENCOUNTER — APPOINTMENT (OUTPATIENT)
Dept: LAB | Age: 57
End: 2022-02-23

## 2022-02-23 DIAGNOSIS — Z90.3 POSTGASTRECTOMY MALABSORPTION: ICD-10-CM

## 2022-02-23 DIAGNOSIS — R63.5 WEIGHT GAIN: ICD-10-CM

## 2022-02-23 DIAGNOSIS — Z02.1 PRE-EMPLOYMENT DRUG SCREENING: ICD-10-CM

## 2022-02-23 DIAGNOSIS — K91.2 POSTGASTRECTOMY MALABSORPTION: ICD-10-CM

## 2022-02-23 LAB
25(OH)D3 SERPL-MCNC: 53.8 NG/ML (ref 30–100)
ALBUMIN SERPL BCP-MCNC: 4.3 G/DL (ref 3.5–5)
ALP SERPL-CCNC: 75 U/L (ref 46–116)
ALT SERPL W P-5'-P-CCNC: 28 U/L (ref 12–78)
ANION GAP SERPL CALCULATED.3IONS-SCNC: 7 MMOL/L (ref 4–13)
AST SERPL W P-5'-P-CCNC: 22 U/L (ref 5–45)
BASOPHILS # BLD AUTO: 0.02 THOUSANDS/ΜL (ref 0–0.1)
BASOPHILS NFR BLD AUTO: 0 % (ref 0–1)
BILIRUB SERPL-MCNC: 0.44 MG/DL (ref 0.2–1)
BUN SERPL-MCNC: 17 MG/DL (ref 5–25)
CALCIUM SERPL-MCNC: 10 MG/DL (ref 8.3–10.1)
CHLORIDE SERPL-SCNC: 103 MMOL/L (ref 100–108)
CO2 SERPL-SCNC: 27 MMOL/L (ref 21–32)
CREAT SERPL-MCNC: 0.81 MG/DL (ref 0.6–1.3)
EOSINOPHIL # BLD AUTO: 0.14 THOUSAND/ΜL (ref 0–0.61)
EOSINOPHIL NFR BLD AUTO: 2 % (ref 0–6)
ERYTHROCYTE [DISTWIDTH] IN BLOOD BY AUTOMATED COUNT: 12.3 % (ref 11.6–15.1)
FERRITIN SERPL-MCNC: 23 NG/ML (ref 8–388)
FOLATE SERPL-MCNC: >20 NG/ML (ref 3.1–17.5)
GFR SERPL CREATININE-BSD FRML MDRD: 80 ML/MIN/1.73SQ M
GLUCOSE SERPL-MCNC: 104 MG/DL (ref 65–140)
HCT VFR BLD AUTO: 46.8 % (ref 34.8–46.1)
HGB BLD-MCNC: 15.2 G/DL (ref 11.5–15.4)
IMM GRANULOCYTES # BLD AUTO: 0.01 THOUSAND/UL (ref 0–0.2)
IMM GRANULOCYTES NFR BLD AUTO: 0 % (ref 0–2)
LYMPHOCYTES # BLD AUTO: 2.46 THOUSANDS/ΜL (ref 0.6–4.47)
LYMPHOCYTES NFR BLD AUTO: 39 % (ref 14–44)
MCH RBC QN AUTO: 30.6 PG (ref 26.8–34.3)
MCHC RBC AUTO-ENTMCNC: 32.5 G/DL (ref 31.4–37.4)
MCV RBC AUTO: 94 FL (ref 82–98)
MONOCYTES # BLD AUTO: 0.4 THOUSAND/ΜL (ref 0.17–1.22)
MONOCYTES NFR BLD AUTO: 6 % (ref 4–12)
NEUTROPHILS # BLD AUTO: 3.24 THOUSANDS/ΜL (ref 1.85–7.62)
NEUTS SEG NFR BLD AUTO: 53 % (ref 43–75)
NRBC BLD AUTO-RTO: 0 /100 WBCS
PLATELET # BLD AUTO: 319 THOUSANDS/UL (ref 149–390)
PMV BLD AUTO: 10.7 FL (ref 8.9–12.7)
POTASSIUM SERPL-SCNC: 4.1 MMOL/L (ref 3.5–5.3)
PROT SERPL-MCNC: 7.1 G/DL (ref 6.4–8.2)
PTH-INTACT SERPL-MCNC: 105 PG/ML (ref 18.4–80.1)
RBC # BLD AUTO: 4.97 MILLION/UL (ref 3.81–5.12)
SODIUM SERPL-SCNC: 137 MMOL/L (ref 136–145)
TSH SERPL DL<=0.05 MIU/L-ACNC: 2.24 UIU/ML (ref 0.36–3.74)
VIT B12 SERPL-MCNC: 1109 PG/ML (ref 100–900)
WBC # BLD AUTO: 6.27 THOUSAND/UL (ref 4.31–10.16)

## 2022-02-23 PROCEDURE — 86735 MUMPS ANTIBODY: CPT

## 2022-02-23 PROCEDURE — 82306 VITAMIN D 25 HYDROXY: CPT

## 2022-02-23 PROCEDURE — 80053 COMPREHEN METABOLIC PANEL: CPT

## 2022-02-23 PROCEDURE — 84425 ASSAY OF VITAMIN B-1: CPT

## 2022-02-23 PROCEDURE — 82746 ASSAY OF FOLIC ACID SERUM: CPT

## 2022-02-23 PROCEDURE — 86480 TB TEST CELL IMMUN MEASURE: CPT

## 2022-02-23 PROCEDURE — 82607 VITAMIN B-12: CPT

## 2022-02-23 PROCEDURE — 86765 RUBEOLA ANTIBODY: CPT

## 2022-02-23 PROCEDURE — 84443 ASSAY THYROID STIM HORMONE: CPT

## 2022-02-23 PROCEDURE — 84590 ASSAY OF VITAMIN A: CPT

## 2022-02-23 PROCEDURE — 83970 ASSAY OF PARATHORMONE: CPT

## 2022-02-23 PROCEDURE — 85025 COMPLETE CBC W/AUTO DIFF WBC: CPT

## 2022-02-23 PROCEDURE — 82728 ASSAY OF FERRITIN: CPT

## 2022-02-23 PROCEDURE — 36415 COLL VENOUS BLD VENIPUNCTURE: CPT

## 2022-02-23 PROCEDURE — 86762 RUBELLA ANTIBODY: CPT

## 2022-02-23 PROCEDURE — 86787 VARICELLA-ZOSTER ANTIBODY: CPT

## 2022-02-24 LAB
MEV IGG SER QL: NORMAL
MUV IGG SER QL: NORMAL
RUBV IGG SERPL IA-ACNC: 144.3 IU/ML
VZV IGG SER IA-ACNC: NORMAL

## 2022-02-25 LAB
GAMMA INTERFERON BACKGROUND BLD IA-ACNC: 0.04 IU/ML
M TB IFN-G BLD-IMP: NEGATIVE
M TB IFN-G CD4+ BCKGRND COR BLD-ACNC: 0 IU/ML
M TB IFN-G CD4+ BCKGRND COR BLD-ACNC: 0.01 IU/ML
MITOGEN IGNF BCKGRD COR BLD-ACNC: >10 IU/ML

## 2022-03-05 LAB — VIT A SERPL-MCNC: 40.7 UG/DL (ref 20.1–62)

## 2022-03-08 LAB — VIT B1 BLD-SCNC: 193.1 NMOL/L (ref 66.5–200)

## 2022-04-01 ENCOUNTER — CONSULT (OUTPATIENT)
Dept: ENDOCRINOLOGY | Facility: CLINIC | Age: 57
End: 2022-04-01
Payer: COMMERCIAL

## 2022-04-01 VITALS
HEART RATE: 62 BPM | SYSTOLIC BLOOD PRESSURE: 130 MMHG | WEIGHT: 203 LBS | DIASTOLIC BLOOD PRESSURE: 72 MMHG | BODY MASS INDEX: 35.97 KG/M2 | HEIGHT: 63 IN

## 2022-04-01 DIAGNOSIS — K91.2 POSTGASTRECTOMY MALABSORPTION: ICD-10-CM

## 2022-04-01 DIAGNOSIS — E66.9 OBESITY (BMI 30.0-34.9): ICD-10-CM

## 2022-04-01 DIAGNOSIS — E55.9 VITAMIN D DEFICIENCY: ICD-10-CM

## 2022-04-01 DIAGNOSIS — E21.3 HYPERPARATHYROIDISM (HCC): Primary | ICD-10-CM

## 2022-04-01 DIAGNOSIS — Z90.3 POSTGASTRECTOMY MALABSORPTION: ICD-10-CM

## 2022-04-01 PROCEDURE — 99204 OFFICE O/P NEW MOD 45 MIN: CPT | Performed by: INTERNAL MEDICINE

## 2022-04-01 PROCEDURE — 3008F BODY MASS INDEX DOCD: CPT | Performed by: INTERNAL MEDICINE

## 2022-04-01 RX ORDER — MULTIVITAMIN WITH IRON
TABLET ORAL
COMMUNITY

## 2022-04-01 NOTE — PROGRESS NOTES
New consult note      CC:hyperparathyroidism    History of Present Illness:   61 yr female with obesity s/p RYGB 2016, post bypass malabsorption, HLD, cervical spondylosis, lt frontal meningioma and recently found elevated PTH  She lost 110 lbs post bypass  But regained some weight over the years  She describes some postprandial symptoms consistent with hypoglycemia  She takes 1500mg calcium citrate along with vitamin D daily  She does not have symptoms suggesting hypercalcemia  She is perimenopausal       Patient Active Problem List   Diagnosis    Hypercholesterolemia    Esophageal reflux    Gastroenteritis    Endometrial polyp    Major depressive disorder with single episode, in partial remission (HCC)    Postgastrectomy malabsorption    Sea sickness    Vertigo    Postural dizziness with near syncope    Meningioma, cerebral (HCC)    Episodic lightheadedness    Encounter for surgical aftercare following surgery of digestive system    Weight gain following gastric bypass surgery    Obesity (BMI 30 0-34  9)    Tinea pedis     Past Medical History:   Diagnosis Date    Abdominal pain     Chronic pain disorder     abdominal    Depression     Diverticula of colon     Former smoker     Resolved 2012     GERD (gastroesophageal reflux disease)     Hemorrhoids     Hiatal hernia     Hyperlipidemia     Intestinal malabsorption following gastrectomy     Morbid obesity ([address] )     Resolved 10/2/2015     Obesity     Postgastrectomy malabsorption     Vitamin D insufficiency     Resolved 2016       Past Surgical History:   Procedure Laterality Date    ABDOMINAL SURGERY      gastric bipass    ANKLE SURGERY       SECTION      (2) ,      COLONOSCOPY      ESOPHAGOGASTRODUODENOSCOPY      x2    GASTRIC BYPASS  2015    HERNIA REPAIR  2015    Paraesophageal hiatus hernia   Managed by Larry Arce (General Surgery)    GA EGD TRANSORAL BIOPSY SINGLE/MULTIPLE N/A 2017    Procedure: ESOPHAGOGASTRODUODENOSCOPY (EGD); Surgeon: Rogelio Orona MD;  Location: [facility]; Service: [facility]        Family History   Problem Relation Age of Onset    Breast cancer Mother 32    Prostate cancer Father 48    Hypertension Father     Heart disease Father         Coronary arteriosclerosis     Hyperlipidemia Father     Heart disease Maternal Grandmother         Coronary arteriosclerosis     Heart disease Maternal Grandfather         Coronary arteriosclerosis     Diabetes Paternal Grandmother     No Known Problems Brother     No Known Problems Daughter     No Known Problems Daughter     No Known Problems Paternal Aunt     Stroke Neg Hx     Thyroid disease Neg Hx      Social History     Tobacco Use    Smoking status: Former Smoker     Types: Cigarettes     Quit date:      Years since quittin 2    Smokeless tobacco: Never Used    Tobacco comment: 20+ pack year hx - As per [employer]    Substance Use Topics    Alcohol use: Yes     No Known Allergies      Review of Systems   Constitutional: Positive for fatigue  HENT: Negative  Eyes: Negative  Respiratory: Negative  Cardiovascular: Negative  Gastrointestinal: Negative  Endocrine: Negative  Musculoskeletal: Negative  Skin: Negative  Allergic/Immunologic: Negative  Neurological: Negative  Hematological: Negative  Psychiatric/Behavioral: Negative            Current Outpatient Medications:     Calcium Citrate-Vitamin D (CALCIUM CITRATE + D) 250-200 MG-UNIT TABS, Take 1 tablet by mouth 3 (three) times a day, Disp: , Rfl:     estradiol (VIVELLE-DOT) 0 025 MG/24HR, PLACE 1 PATCH ON THE SKIN 2 TIMES A WEEK, Disp: , Rfl:     FLUoxetine (PROzac) 20 mg capsule, TAKE 2 CAPSULES BY MOUTH EVERY DAY (Patient taking differently: Take 20 mg by mouth daily  ), Disp: 180 capsule, Rfl: 1    Magnesium 250 MG TABS, Take by mouth, Disp: , Rfl:    Melatonin 5 MG CAPS, Take 5 mg by mouth daily at bedtime , Disp: , Rfl:     MULTIPLE VITAMIN PO, Take 1 tablet by mouth daily, Disp: , Rfl:     Omega-3 Fatty Acids (Fish Oil) 600 MG CAPS, Take by mouth, Disp: , Rfl:     pravastatin (PRAVACHOL) 20 mg tablet, TAKE 1 TABLET BY MOUTH EVERY DAY, Disp: 90 tablet, Rfl: 1    progesterone (PROMETRIUM) 100 MG capsule, Take 100 mg by mouth daily, Disp: , Rfl:     Vitamin E 100 UNIT/GM CREA, Take by mouth (Patient not taking: Reported on 4/1/2022 ), Disp: , Rfl:     Physical Exam:  Body mass index is 35 96 kg/m²  /72 (BP Location: Left arm, Patient Position: Sitting, Cuff Size: Large)   Pulse 62   Ht 5' 3" (1 6 m)   Wt 92 1 kg (203 lb)   BMI 35 96 kg/m²        Physical Exam  Constitutional:       Appearance: She is well-developed  HENT:      Head: Normocephalic  Eyes:      Pupils: Pupils are equal, round, and reactive to light  Neck:      Thyroid: No thyromegaly  Cardiovascular:      Rate and Rhythm: Normal rate  Heart sounds: Normal heart sounds  Pulmonary:      Effort: Pulmonary effort is normal       Breath sounds: Normal breath sounds  Abdominal:      General: Bowel sounds are normal       Palpations: Abdomen is soft  Musculoskeletal:         General: No deformity  Cervical back: Normal range of motion  Skin:     Capillary Refill: Capillary refill takes less than 2 seconds  Coloration: Skin is not pale  Findings: No rash  Neurological:      Mental Status: She is alert and oriented to person, place, and time           Labs:     Lab Results   Component Value Date    [id number] 2 240 02/23/2022       Lab Results   Component Value Date    CREATININE 0 81 02/23/2022    CREATININE 0 87 02/11/2020    CREATININE 0 86 05/14/2019    BUN 17 02/23/2022     06/17/2015    K 4 1 02/23/2022     02/23/2022    CO2 27 02/23/2022     eGFR   Date Value Ref Range Status   02/23/2022 80 ml/min/1 73sq m Final       Lab Results Component Value Date    ALT 28 02/23/2022    AST 22 02/23/2022    ALKPHOS 75 02/23/2022       Lab Results   Component Value Date    CHOLESTEROL 170 02/11/2020     Lab Results   Component Value Date    HDL 62 02/11/2020     Lab Results   Component Value Date    TRIG 54 02/11/2020     No results found for: NONHDLC      Impression:  1  Hyperparathyroidism ([address] )    2  Postgastrectomy malabsorption    3  Obesity (BMI 30 0-34 9)    4  Vitamin D deficiency         Plan:    Diagnoses and all orders for this visit:    Hyperparathyroidism ([address] )  She has normocalcemic hyperparathyroidism  We need to establish if there is a component of calcium malabsorption causing this clinical picture  As there is no significant hypercalcemia, I am not overly concerned about morbidity  Advised to increase calcium intake to 2 5-3 gm/day in divided doses by adding TUMS to her calcium citrate  Continue vitamin D and vitamin C to aid absorption of calcium  Advised weight bearing exercise  Will repeat listed labs in 3 months based on which will decide work up for primary hyperparathyroidism  Follow up in 3 months  -     Ambulatory Referral to Endocrinology  -     Comprehensive metabolic panel; Future  -     PTH, intact; Future  -     Phosphorus; Future  -     Calcium, urine, 24 hour; Future  -     Creatinine, urine, 24 hour; Future  -     Calcium, ionized; Future    Postgastrectomy malabsorption  She is on relevant multivitamin tabs  She seems to have mils postprandial hypoglycemia symptoms since 2018  Advised low carb diet -not more than 1/3 of total calories to come from carbohydrates  Obesity (BMI 30 0-34  9)    Vitamin D deficiency  -     Vitamin D 25 hydroxy; Future        I have spent 50 minutes with patient today in which greater than 50% of this time was spent in counseling/coordination of care  All questioned fully answered  She will call me if any problems arise      Educated/ Counseled patient on diagnostic test results, prognosis, risk vs benefit of treatment options, importance of treatment compliance, healthy life and lifestyle choices        [address]

## 2022-05-24 NOTE — TELEPHONE ENCOUNTER
"Clinic Care Coordination Contact    Clinic Care Coordination Contact  OUTREACH    Referral Information:  Referral Source: PCP    Primary Diagnosis: Psychosocial    Chief Complaint   Patient presents with     Clinic Care Coordination - Initial     SW        Universal Utilization: CC reached out to patient for assessment.    Clinic Utilization  No PCP office visit in Past Year: No  Utilization    Hospital Admissions  2             ED Visits  4             No Show Count (past year)  8                Current as of: 5/20/2022  5:33 PM              Clinical Concerns:  Current Medical Concerns:   Health Maintenance Due   Topic Date Due     PREVENTIVE CARE VISIT  Never done     COLORECTAL CANCER SCREENING  Never done     ZOSTER IMMUNIZATION (1 of 2) Never done     MAMMO SCREENING  12/08/2013     PAP  01/25/2015     PHQ-9  01/26/2018     ADVANCE CARE PLANNING  10/14/2018     LIPID  04/27/2020     Current Behavioral Concerns: Patient shares she \"is an addict\". Patient has not used drugs since 4/3/22.   Education Provided to patient: [facility] provided education on the role of CC and [facility] worker.    Pain  Pain (GOAL):: No  Health Maintenance Reviewed:    Clinical Pathway: None    Medication Management:  Medication review status: Medications reviewed and no changes reported per patient.           Functional Status:  Dependent ADLs:: Independent    Living Situation:  Current living arrangement:: I live alone  Type of residence:: Town home    Lifestyle & Psychosocial Needs:    Social Determinants of Health     Tobacco Use: Low Risk      Smoking Tobacco Use: Never Smoker     Smokeless Tobacco Use: Never Used   Alcohol Use: Not At Risk     Frequency of Alcohol Consumption: Never     Average Number of Drinks: Patient does not drink     Frequency of Binge Drinking: Never   Financial Resource Strain: Low Risk      Difficulty of Paying Living Expenses: Not very hard   Food Insecurity: No Food Insecurity     Worried About Running Out of " Her gyno saw her yesterday   She wants her to see a neuro for the dizzy spells and hot flashes  She wants to rule anything else       Can you recommend any?     please advise Food in the Last Year: Never true     Ran Out of Food in the Last Year: Never true   Transportation Needs: No Transportation Needs     Lack of Transportation (Medical): No     Lack of Transportation (Non-Medical): No   Physical Activity: Not on file   Stress: Not on file   Social Connections: Not on file   Intimate Partner Violence: Not on file   Depression: Not on file   Housing Stability: Not on file     Diet:: Regular  Significant changes in sleep pattern (GOAL): Yes (talked to pcp this morning)  Transportation means:: Regular car     Chemical Dependency Status: Current Concern  Chemical Dependency Management: AA  Informal Support system::  (I don't have much)       Patient shares she is interested in [facility] worker to help with budgeting, getting out into the community and finding social groups    Patient and CC discussed that once patient will get to choose their [facility] agency and then suggest qualifications for her individual worker. She was hopeful at this    Patient shares that she enjoys gardening and would like to join a group that gardens maybe    Patient shares that she has drug dependency and that her drug of choice is cocaine    Patient shares she has not used since 4/3/22. Patient shares that she attends an NA group in [place].      Discussed that patient does not have a [facility]  and this may be required in order to get an [facility] worker. [facility] shared they will look into this    Patient has MA through the [facility] and no other services at this    Patient shares she does not work and has SSI     Resources and Interventions:  Current Resources:      Community Resources: None  Supplies Currently Used at Home: None  Equipment Currently Used at Home: none  Employment Status: unemployed       [facility] will mail out [facility] information for patient to review.     [facility] will look into how to obtain an [facility] worker.        Referrals Placed: Community Resources     Goals:    Goals        General      Resources (pt-stated)      Notes - Note edited  5/24/2022  2:25 PM by Amina Tomas     Goal Statement: I will obtain an [facility] worker to improve my life  Date goal set: 5/24/22    Date to Achieve By: 8/24/22  Patient expressed understanding of goal: Yes    Action steps to achieve this goal  1. I will review resources sent by [facility]  2. I will call [facility] provider to find the right agency for me  3. I will work with my [facility] worker to make changes in my life  4. I will follow up with the care coordination team              Patient/Caregiver understanding: Patient is in agreement with this plan       Future Appointments              In 1 week Melonie Srivastava MD [facility] [place]    In 1 month [id number]    In 1 month Michelle Amezquita MD [facility]          Plan: Care Coordination will follow up in one week.     CALIN Garcia   [facility] - Clinic Care Coordination  [phone]

## 2022-06-16 DIAGNOSIS — E78.00 HYPERCHOLESTEROLEMIA: ICD-10-CM

## 2022-06-16 RX ORDER — PRAVASTATIN SODIUM 20 MG
TABLET ORAL
Qty: 90 TABLET | Refills: 1 | Status: SHIPPED | OUTPATIENT
Start: 2022-06-16

## 2022-06-24 DIAGNOSIS — T75.3XXA SEA SICKNESS, INITIAL ENCOUNTER: Primary | ICD-10-CM

## 2022-06-24 RX ORDER — SCOLOPAMINE TRANSDERMAL SYSTEM 1 MG/1
1 PATCH, EXTENDED RELEASE TRANSDERMAL
Qty: 4 PATCH | Refills: 0 | Status: SHIPPED | OUTPATIENT
Start: 2022-06-24 | End: 2022-07-08 | Stop reason: ALTCHOICE

## 2022-07-08 ENCOUNTER — HOSPITAL ENCOUNTER (OUTPATIENT)
Dept: RADIOLOGY | Facility: HOSPITAL | Age: 57
Discharge: HOME | End: 2022-07-08
Payer: COMMERCIAL

## 2022-07-08 ENCOUNTER — OFFICE VISIT (OUTPATIENT)
Dept: FAMILY MEDICINE CLINIC | Facility: CLINIC | Age: 57
End: 2022-07-08
Payer: COMMERCIAL

## 2022-07-08 VITALS
HEIGHT: 63 IN | WEIGHT: 203.5 LBS | TEMPERATURE: 97.9 F | HEART RATE: 67 BPM | SYSTOLIC BLOOD PRESSURE: 122 MMHG | BODY MASS INDEX: 36.06 KG/M2 | DIASTOLIC BLOOD PRESSURE: 82 MMHG | OXYGEN SATURATION: 97 %

## 2022-07-08 DIAGNOSIS — M54.16 LUMBAR BACK PAIN WITH RADICULOPATHY AFFECTING LEFT LOWER EXTREMITY: Primary | ICD-10-CM

## 2022-07-08 DIAGNOSIS — M54.16 LUMBAR BACK PAIN WITH RADICULOPATHY AFFECTING LEFT LOWER EXTREMITY: ICD-10-CM

## 2022-07-08 PROCEDURE — 72100 X-RAY EXAM L-S SPINE 2/3 VWS: CPT

## 2022-07-08 PROCEDURE — 99214 OFFICE O/P EST MOD 30 MIN: CPT | Performed by: NURSE PRACTITIONER

## 2022-07-08 RX ORDER — CYCLOBENZAPRINE HCL 5 MG
5 TABLET ORAL
Qty: 10 TABLET | Refills: 0 | Status: SHIPPED | OUTPATIENT
Start: 2022-07-08

## 2022-07-08 RX ORDER — CALCIUM CARBONATE 200(500)MG
2 TABLET,CHEWABLE ORAL DAILY
COMMUNITY

## 2022-07-08 NOTE — PROGRESS NOTES
Assessment/Plan:     Diagnoses and all orders for this visit:    Lumbar back pain with radiculopathy affecting left lower extremity  -     XR spine lumbar 2 or 3 views injury; Future  -     cyclobenzaprine (FLEXERIL) 5 mg tablet; Take 1 tablet (5 mg total) by mouth daily at bedtime    Other orders  -     calcium carbonate (TUMS) 500 mg chewable tablet; Chew 2 tablets daily      Discussed with patient plan to obtain lumbar back x-ray to further evaluate  Discussed with patient plan to treat with a muscle relaxant at bedtime  Discussed with patient plan to refer to physical therapy if x-ray results are normal   Patient instructed to call if no improvement in 72 hours or symptoms worsen    Subjective:      Patient ID: Arias Swift is a 61 y o  female  61 y  o female presenting with left-sided back pain that radiates into her hip and buttocks and occasionally in to left client for the past week  Patient reports that she was on a cruise, where she had a bad slip and fall  She reports that there was water on the decking of the ship of the ship and she slipped and fell hard on to her lower back  She has been sore across her lower back which is now concentrated into the left hip and groin  She did go to the ship's medical officer which instructed her to use Tylenol and ice with the injury most likely being a muscle strain  She does have some limited mobility issues on a ambulating related to pain  She denies any issue with bowel or bladder function        Family History   Problem Relation Age of Onset    Breast cancer Mother 32    Prostate cancer Father 48    Hypertension Father     Heart disease Father         Coronary arteriosclerosis     Hyperlipidemia Father     Heart disease Maternal Grandmother         Coronary arteriosclerosis     Heart disease Maternal Grandfather         Coronary arteriosclerosis     Diabetes Paternal Grandmother     No Known Problems Brother     No Known Problems Daughter    Idalia Paula No Known Problems Daughter     No Known Problems Paternal Aunt     Stroke Neg Hx     Thyroid disease Neg Hx      Social History     Socioeconomic History    Marital status: /Civil Union     Spouse name: Not on file    Number of children: Not on file    Years of education: Not on file    Highest education level: Not on file   Occupational History    Not on file   Tobacco Use    Smoking status: Former Smoker     Types: Cigarettes     Quit date:      Years since quittin 5    Smokeless tobacco: Never Used    Tobacco comment: 20+ pack year hx - As per [employer]    Vaping Use    Vaping Use: Never used   Substance and Sexual Activity    Alcohol use:  Yes    Drug use: No    Sexual activity: Not on file   Other Topics Concern    Not on file   Social History Narrative    Daily coffee consumption (2 cups/day)    Denied: History of daily cola consumption (__cans/day)    Daily tea consumption (__cups/day)    Former smoker: quit  - As per [employer] or homemaker - As per [employer]    Uses safety equipment - Seatbelts      Social Determinants of Health     Financial Resource Strain: Not on file   Food Insecurity: Not on file   Transportation Needs: Not on file   Physical Activity: Not on file   Stress: Not on file   Social Connections: Not on file   Intimate Partner Violence: Not on file   Housing Stability: Not on file     E-Cigarette/Vaping    E-Cigarette Use Never User      E-Cigarette/Vaping Substances     Past Medical History:   Diagnosis Date    Abdominal pain     Chronic pain disorder     abdominal    Depression     Diverticula of colon     Former smoker     Resolved 2012     GERD (gastroesophageal reflux disease)     Hemorrhoids     Hiatal hernia     Hyperlipidemia     Intestinal malabsorption following gastrectomy     [address] [address]     Resolved 10/2/2015     Obesity     Postgastrectomy malabsorption     Vitamin D insufficiency     Resolved 2016 Past Surgical History:   Procedure Laterality Date    ABDOMINAL SURGERY      gastric bipass    ANKLE SURGERY       SECTION      (2) ,      COLONOSCOPY      ESOPHAGOGASTRODUODENOSCOPY      x2    GASTRIC BYPASS  2015    HERNIA REPAIR  2015    Paraesophageal hiatus hernia  Managed by Gabriel Dudley (General Surgery)    AR EGD TRANSORAL BIOPSY SINGLE/MULTIPLE N/A 2017    Procedure: ESOPHAGOGASTRODUODENOSCOPY (EGD); Surgeon: Bob Shaffer MD;  Location: [facility]; Service: [place] [address]    UTERINE FIBROID SURGERY       No Known Allergies    Current Outpatient Medications:     calcium carbonate (TUMS) 500 mg chewable tablet, Chew 2 tablets daily, Disp: , Rfl:     Calcium Citrate-Vitamin D 250-200 MG-UNIT TABS, Take 1 tablet by mouth 3 (three) times a day, Disp: , Rfl:     cyclobenzaprine (FLEXERIL) 5 mg tablet, Take 1 tablet (5 mg total) by mouth daily at bedtime, Disp: 10 tablet, Rfl: 0    estradiol (VIVELLE-DOT) 0 025 MG/24HR, PLACE 1 PATCH ON THE SKIN 2 TIMES A WEEK, Disp: , Rfl:     FLUoxetine (PROzac) 20 mg capsule, TAKE 2 CAPSULES BY MOUTH EVERY DAY (Patient taking differently: Take 20 mg by mouth daily), Disp: 180 capsule, Rfl: 1    Melatonin 5 MG CAPS, Take 5 mg by mouth daily at bedtime , Disp: , Rfl:     MULTIPLE VITAMIN PO, Take 1 tablet by mouth daily, Disp: , Rfl:     Omega-3 Fatty Acids (Fish Oil) 600 MG CAPS, Take by mouth, Disp: , Rfl:     pravastatin (PRAVACHOL) 20 mg tablet, TAKE 1 TABLET BY MOUTH EVERY DAY, Disp: 90 tablet, Rfl: 1    progesterone (PROMETRIUM) 100 MG capsule, Take 100 mg by mouth daily, Disp: , Rfl:     Magnesium 250 MG TABS, Take by mouth (Patient not taking: Reported on 2022), Disp: , Rfl:     Vitamin E 100 UNIT/GM CREA, Take by mouth (Patient not taking: No sig reported), Disp: , Rfl:     Review of Systems   Constitutional: Negative  Respiratory: Negative  Cardiovascular: Negative  Gastrointestinal: Negative  Genitourinary: Negative  Musculoskeletal: Positive for back pain and gait problem  Neurological: Negative for weakness and numbness  Psychiatric/Behavioral: Negative  Objective:    /82   Pulse 67   Temp 97 9 °F (36 6 °C)   Ht 5' 3" (1 6 m)   Wt 92 3 kg (203 lb 8 oz)   SpO2 97%   BMI 36 05 kg/m² (Reviewed)     Physical Exam  Vitals reviewed  Constitutional:       General: She is not in acute distress  Appearance: She is well-developed and well-groomed  She is not ill-appearing  HENT:      Head: Normocephalic and atraumatic  Eyes:      General: Lids are normal       Extraocular Movements: Extraocular movements intact  Conjunctiva/sclera: Conjunctivae normal       Pupils: Pupils are equal, round, and reactive to light  Neck:      Trachea: Trachea and phonation normal    Cardiovascular:      Rate and Rhythm: Normal rate and regular rhythm  Heart sounds: Normal heart sounds  Pulmonary:      Effort: Pulmonary effort is normal       Breath sounds: Normal breath sounds  Musculoskeletal:         General: Tenderness present  Lumbar back: Tenderness present  Normal range of motion  Negative right straight leg raise test and negative left straight leg raise test         Back:       Left lower leg: No edema  Skin:     General: Skin is warm and dry  Capillary Refill: Capillary refill takes less than 2 seconds  Neurological:      General: No focal deficit present  Mental Status: She is alert and oriented to person, place, and time  Motor: Motor function is intact  Gait: Gait abnormal ( antalgic)  Psychiatric:         Mood and Affect: Mood normal          Behavior: Behavior normal  Behavior is cooperative  Thought Content:  Thought content normal

## 2022-07-14 ENCOUNTER — OFFICE VISIT (OUTPATIENT)
Dept: ENDOCRINOLOGY | Facility: CLINIC | Age: 57
End: 2022-07-14
Payer: COMMERCIAL

## 2022-07-14 VITALS
BODY MASS INDEX: 36.11 KG/M2 | DIASTOLIC BLOOD PRESSURE: 90 MMHG | HEIGHT: 63 IN | SYSTOLIC BLOOD PRESSURE: 130 MMHG | HEART RATE: 60 BPM | WEIGHT: 203.8 LBS

## 2022-07-14 DIAGNOSIS — M54.16 LUMBAR BACK PAIN WITH RADICULOPATHY AFFECTING LEFT LOWER EXTREMITY: Primary | ICD-10-CM

## 2022-07-14 DIAGNOSIS — K91.2 POSTGASTRECTOMY MALABSORPTION: Primary | ICD-10-CM

## 2022-07-14 DIAGNOSIS — E66.9 CLASS 2 OBESITY WITHOUT SERIOUS COMORBIDITY WITH BODY MASS INDEX (BMI) OF 36.0 TO 36.9 IN ADULT, UNSPECIFIED OBESITY TYPE: ICD-10-CM

## 2022-07-14 DIAGNOSIS — Z90.3 POSTGASTRECTOMY MALABSORPTION: Primary | ICD-10-CM

## 2022-07-14 PROBLEM — E66.812 CLASS 2 OBESITY WITHOUT SERIOUS COMORBIDITY WITH BODY MASS INDEX (BMI) OF 36.0 TO 36.9 IN ADULT: Status: ACTIVE | Noted: 2020-06-02

## 2022-07-14 PROCEDURE — 99214 OFFICE O/P EST MOD 30 MIN: CPT | Performed by: INTERNAL MEDICINE

## 2022-07-14 NOTE — PROGRESS NOTES
Follow up Patient Progress Note      CC:hyperparathyroidism     History of Present Illness:   61 yr female with obesity s/p RYGB 2016, post bypass malabsorption, HLD, cervical spondylosis, lt frontal meningioma and recently found elevated PTH  Last visit was 22  She is taking 2 5gm calcium daily and vitamin D since last visit  She also did 24 hr urine collection last week but results from quest showed normal calcium corrected to 9 1mg/dL, nml ionized calcium, notmal bit D 61ng/dL, nml phosphorus 3 5mg/dL and elevated urine calcium 430mg/dL on 1600cc with urine cr 1 2mg(significantly >serum cr 0 8mg/dl)  She lost 110 lbs post bypass  since last visit, weight is stable 203lbs      She is perimenopausal     Patient Active Problem List   Diagnosis    Hypercholesterolemia    Esophageal reflux    Gastroenteritis    Endometrial polyp    Major depressive disorder with single episode, in partial remission (HCC)    Postgastrectomy malabsorption    Sea sickness    Vertigo    Postural dizziness with near syncope    Meningioma, cerebral (HCC)    Episodic lightheadedness    Encounter for surgical aftercare following surgery of digestive system    Weight gain following gastric bypass surgery    Class 2 obesity without serious comorbidity with body mass index (BMI) of 36 0 to 36 9 in adult    Tinea pedis     Past Medical History:   Diagnosis Date    Abdominal pain     Chronic pain disorder     abdominal    Depression     Diverticula of colon     Former smoker     Resolved 2012     GERD (gastroesophageal reflux disease)     Hemorrhoids     Hiatal hernia     Hyperlipidemia     Intestinal malabsorption following gastrectomy     Morbid obesity ([address] )     Resolved 10/2/2015     Obesity     Postgastrectomy malabsorption     Vitamin D insufficiency     Resolved 2016       Past Surgical History:   Procedure Laterality Date    ABDOMINAL SURGERY      gastric bipass    ANKLE SURGERY       SECTION      (2) ,      COLONOSCOPY      ESOPHAGOGASTRODUODENOSCOPY      x2    GASTRIC BYPASS  2015    HERNIA REPAIR  2015    Paraesophageal hiatus hernia  Managed by Freida Lund (General Surgery)    SD EGD TRANSORAL BIOPSY SINGLE/MULTIPLE N/A 2017    Procedure: ESOPHAGOGASTRODUODENOSCOPY (EGD); Surgeon: Heidi Wade MD;  Location: [facility]; Service: [facility]        Family History   Problem Relation Age of Onset    Breast cancer Mother 32    Prostate cancer Father 48    Hypertension Father     Heart disease Father         Coronary arteriosclerosis     Hyperlipidemia Father     Heart disease Maternal Grandmother         Coronary arteriosclerosis     Heart disease Maternal Grandfather         Coronary arteriosclerosis     Diabetes Paternal Grandmother     No Known Problems Brother     No Known Problems Daughter     No Known Problems Daughter     No Known Problems Paternal Aunt     Stroke Neg Hx     Thyroid disease Neg Hx      Social History     Tobacco Use    Smoking status: Former Smoker     Types: Cigarettes     Quit date:      Years since quittin 5    Smokeless tobacco: Never Used    Tobacco comment: 20+ pack year hx - As per [employer]    Substance Use Topics    Alcohol use: Yes     No Known Allergies      Review of Systems   Constitutional: Positive for fatigue  HENT: Negative  Eyes: Negative  Respiratory: Negative  Cardiovascular: Negative  Gastrointestinal: Negative  Endocrine: Negative  Musculoskeletal: Negative  Skin: Negative  Allergic/Immunologic: Negative  Neurological: Negative  Hematological: Negative  Psychiatric/Behavioral: Negative            Current Outpatient Medications:     calcium carbonate (TUMS) 500 mg chewable tablet, Chew 2 tablets daily, Disp: , Rfl:     Calcium Citrate-Vitamin D 250-200 MG-UNIT TABS, Take 1 tablet by mouth 3 (three) times a day, Disp: , Rfl:     estradiol (VIVELLE-DOT) 0 025 MG/24HR, PLACE 1 PATCH ON THE SKIN 2 TIMES A WEEK, Disp: , Rfl:     FLUoxetine (PROzac) 20 mg capsule, TAKE 2 CAPSULES BY MOUTH EVERY DAY (Patient taking differently: Take 20 mg by mouth daily), Disp: 180 capsule, Rfl: 1    Magnesium 250 MG TABS, Take by mouth, Disp: , Rfl:     Melatonin 5 MG CAPS, Take 5 mg by mouth daily at bedtime , Disp: , Rfl:     MULTIPLE VITAMIN PO, Take 1 tablet by mouth daily, Disp: , Rfl:     Omega-3 Fatty Acids (Fish Oil) 600 MG CAPS, Take by mouth, Disp: , Rfl:     pravastatin (PRAVACHOL) 20 mg tablet, TAKE 1 TABLET BY MOUTH EVERY DAY, Disp: 90 tablet, Rfl: 1    progesterone (PROMETRIUM) 100 MG capsule, Take 100 mg by mouth daily, Disp: , Rfl:     Vitamin E 100 UNIT/GM CREA, Take by mouth, Disp: , Rfl:     cyclobenzaprine (FLEXERIL) 5 mg tablet, Take 1 tablet (5 mg total) by mouth daily at bedtime (Patient not taking: Reported on 7/14/2022), Disp: 10 tablet, Rfl: 0    Physical Exam:  Body mass index is 36 1 kg/m²  /90   Pulse 60   Ht 5' 3" (1 6 m)   Wt 92 4 kg (203 lb 12 8 oz)   BMI 36 10 kg/m²        Physical Exam  Constitutional:       Appearance: She is well-developed  HENT:      Head: Normocephalic  Eyes:      Pupils: Pupils are equal, round, and reactive to light  Neck:      Thyroid: No thyromegaly  Cardiovascular:      Rate and Rhythm: Normal rate  Heart sounds: Normal heart sounds  Pulmonary:      Effort: Pulmonary effort is normal       Breath sounds: Normal breath sounds  Abdominal:      General: Bowel sounds are normal       Palpations: Abdomen is soft  Musculoskeletal:         General: No deformity  Cervical back: Normal range of motion  Skin:     Capillary Refill: Capillary refill takes less than 2 seconds  Coloration: Skin is not pale  Findings: No rash  Neurological:      Mental Status: She is alert and oriented to person, place, and time           Labs: Lab Results   Component Value Date    RQF6AJBNODQV 2 240 02/23/2022       Lab Results   Component Value Date    CREATININE 0 81 02/23/2022    CREATININE 0 87 02/11/2020    CREATININE 0 86 05/14/2019    BUN 17 02/23/2022     06/17/2015    K 4 1 02/23/2022     02/23/2022    CO2 27 02/23/2022     eGFR   Date Value Ref Range Status   02/23/2022 80 ml/min/1 73sq m Final       Lab Results   Component Value Date    ALT 28 02/23/2022    AST 22 02/23/2022    ALKPHOS 75 02/23/2022       Lab Results   Component Value Date    CHOLESTEROL 170 02/11/2020     Lab Results   Component Value Date    HDL 62 02/11/2020     Lab Results   Component Value Date    TRIG 54 02/11/2020     No results found for: NONHDLC      Impression:  1  Postgastrectomy malabsorption    2  Class 2 obesity without serious comorbidity with body mass index (BMI) of 36 0 to 36 9 in adult, unspecified obesity type         Plan:    Diagnoses and all orders for this visit:    Postgastrectomy malabsorption  She has normal PTH levels on adequate calcium replacement and vitamin D replacement therapies  Advised to continue  I suspect 24 hr calcium was due to error in collection  Advised to continue mulitvitamin, calcium 2gm/day and vitamin D supplementation  Advised carb consistency to avoid hypoglycemia  Class 2 obesity without serious comorbidity with body mass index (BMI) of 36 0 to 36 9 in adult, unspecified obesity type        I have spent 35 minutes with patient today in which greater than 50% of this time was spent in counseling/coordination of care  All questioned fully answered  She will call me if any problems arise  Educated/ Counseled patient on diagnostic test results, prognosis, risk vs benefit of treatment options, importance of treatment compliance, healthy life and lifestyle choices        [address]

## 2022-07-15 LAB
25(OH)D3 SERPL-MCNC: 61 NG/ML (ref 30–100)
ALBUMIN SERPL-MCNC: 4.2 G/DL (ref 3.6–5.1)
ALBUMIN/GLOB SERPL: 2.2 (CALC) (ref 1–2.5)
ALP SERPL-CCNC: 63 U/L (ref 37–153)
ALT SERPL-CCNC: 17 U/L (ref 6–29)
AST SERPL-CCNC: 24 U/L (ref 10–35)
BILIRUB SERPL-MCNC: 0.6 MG/DL (ref 0.2–1.2)
BUN SERPL-MCNC: 20 MG/DL (ref 7–25)
BUN/CREAT SERPL: NORMAL (CALC) (ref 6–22)
CA-I SERPL-MCNC: 5.2 MG/DL (ref 4.8–5.6)
CALCIUM 24H UR-MCNC: 458 MG/24 H (ref 35–250)
CALCIUM PRE 500 MG CA PO UR-SCNC: 28.6 MG/DL
CALCIUM SERPL-MCNC: 9.3 MG/DL (ref 8.6–10.4)
CALCIUM SERPL-MCNC: 9.3 MG/DL (ref 8.6–10.4)
CHLORIDE SERPL-SCNC: 103 MMOL/L (ref 98–110)
CO2 SERPL-SCNC: 27 MMOL/L (ref 20–32)
CREAT 24H UR-MRATE: 1.23 G/24 H (ref 0.5–2.15)
CREAT SERPL-MCNC: 0.81 MG/DL (ref 0.5–1.03)
GFR/BSA.PRED SERPLBLD CYS-BASED-ARV: 84 ML/MIN/1.73M2
GLOBULIN SER CALC-MCNC: 1.9 G/DL (CALC) (ref 1.9–3.7)
GLUCOSE SERPL-MCNC: 96 MG/DL (ref 65–99)
PHOSPHATE SERPL-MCNC: 3.5 MG/DL (ref 2.5–4.5)
POTASSIUM SERPL-SCNC: 4.4 MMOL/L (ref 3.5–5.3)
PROT SERPL-MCNC: 6.1 G/DL (ref 6.1–8.1)
PTH-INTACT SERPL-MCNC: 54 PG/ML (ref 16–77)
SODIUM SERPL-SCNC: 140 MMOL/L (ref 135–146)
SPECIMEN VOL 24H UR: 1600 ML

## 2022-07-26 ENCOUNTER — EVALUATION (OUTPATIENT)
Dept: PHYSICAL THERAPY | Facility: CLINIC | Age: 57
End: 2022-07-26
Payer: COMMERCIAL

## 2022-07-26 DIAGNOSIS — M54.16 LUMBAR BACK PAIN WITH RADICULOPATHY AFFECTING LEFT LOWER EXTREMITY: Primary | ICD-10-CM

## 2022-07-26 DIAGNOSIS — M54.16 LUMBAR RADICULOPATHY: ICD-10-CM

## 2022-07-26 PROCEDURE — 97162 PT EVAL MOD COMPLEX 30 MIN: CPT | Performed by: PHYSICAL THERAPIST

## 2022-07-26 NOTE — PROGRESS NOTES
PT Evaluation     Today's date: 2022  Patient name: Harvey Taylor  : 1965  MRN: [id number]  Referring provider: Venus Ford MD  Dx:   Encounter Diagnosis     ICD-10-CM    1  Lumbar back pain with radiculopathy affecting left lower extremity  M54 16 Ambulatory Referral to Physical Therapy   2  Lumbar radiculopathy  M54 16                   Assessment  Assessment details: Harvey Taylor is a 61 y o  female who presents with signs and symptoms consistent of referring diagnosis  Patient presents with bilateral lower back pain radiating into the left posterolateral hip to mid hamstring region  Pain was elicited with both lumbar spine examination and hip examination as symptoms were fairly irritable  She does present with an extension preference as this reduce peripheral symptoms  However, patient also present with pain during passive hip ROM and LAURENCE/FADIR  Otherwise, patient was neurologically intact with symmetrical reflexes, dermatomes, and myotomes without red flags  Due to these impairments, Patient has difficulty performing transfers, prolonged standing, prolonged walking, and lifting  She has been avoiding certain activities due to pain  Patient would benefit from skilled physical therapy to address the impairments, improve their level of function, and to improve their overall quality of life  Primary movement impairments:  1)Decreased lumbar spine/left hip ROM  2)Poor motor control of gluteals on the L (likely due to pain)   3)L/S joint hypomobility  And decreased flexibility of L hip   Impairments: abnormal muscle firing, abnormal or restricted ROM, activity intolerance, impaired physical strength, lacks appropriate home exercise program, pain with function and weight-bearing intolerance  Understanding of Dx/Px/POC: good   Prognosis: good    Goals  Short Term Goals: to be achieved by 4 weeks  1) Patient to be independent with basic HEP    2) Decrease pain to 2/10 at its worst   3) Increase lumbar spine and left hip ROM by 5-10 degrees   4) Increase LLE strength (left hip) by 1/2 MMT grade in all deficient planes  Long Term Goals: to be achieved by discharge  1) FOTO equal to or greater than expected  2) Ambulation to improve to maximal level of function  2) Sit to stand transfers will improve to maximal level of function       Plan  Patient would benefit from: skilled physical therapy and PT eval  Planned therapy interventions: joint mobilization, manual therapy, neuromuscular re-education, patient education, therapeutic activities, therapeutic exercise and home exercise program  Frequency: 2x per week for 4-6 weeks  Treatment plan discussed with: patient        Subjective Evaluation    History of Present Illness  Mechanism of injury: History of Current Injury: Pt referred to PT due to acute on chronic LBP with LLE symptoms after slipping and falling on a cruise at end of   She was prescribed a muscle relaxant on 22  Pt was previously evaluated for this condition in 2021 with successful treatment  Her symptoms at that time were waxing/waning and occurring in bilateral LE, worse on the left  Pt recently had XR which suggest age related changes  Initially, both legs were painful  Pain location/Descriptors: P1: Sharp posterior buttock and lateral hip which radiates to mid femur region  Denies N&T  Aggravating factors: transferring into and out of car, sitting for too long, prolonged standing, lifting  Easing factors: Tylenol,   24 HR pattern: Depending on movement  Imaging: XR   Special Questions: Denies weakness or N&T  Denies any bowel/bladder changes  Patient goals:  Walking, live with less pain, lifting pain free  Hobbies/Interest: Walking but hasn't been able to recently     Occupation: Work at [employer]    Pain  Current pain ratin  At worst pain ratin      Diagnostic Tests  X-ray: normal  Treatments  No previous or current treatments  Patient Goals  Patient goals for therapy: increased strength, decreased pain, increased motion and independence with ADLs/IADLs          Objective     Neurological Testing     Sensation     Lumbar   Left   Intact: light touch    Right   Intact: light touch    Reflexes   Left   Patellar (L4): normal (2+)  Achilles (S1): normal (2+)  Clonus sign: negative    Right   Patellar (L4): normal (2+)  Achilles (S1): normal (2+)  Clonus sign: negative    Active Range of Motion     Lumbar   Flexion: 90 degrees  with pain  Extension: 25 degrees   Left lateral flexion: 25 degrees    with pain  Right lateral flexion: 20 degrees  with pain    Additional Active Range of Motion Details  Pain with L/S extension, L SB, R SB, Rot B  LLE pain with repeated flexion     Repeated extension: No LE symptoms but mild LBP      Strength/Myotome Testing     Lumbar   Left   Heel walk: normal  Toe walk: normal    Right   Heel walk: normal  Toe walk: normal    Left Hip   Planes of Motion   Flexion: 4  Extension: 4  Abduction: 4-  External rotation: 4-    Right Hip   Planes of Motion   Flexion: 5  Extension: 5  Abduction: 5  External rotation: 5    Left Knee   Flexion: 5  Extension: 5    Right Knee   Flexion: 5  Extension: 5    Left Ankle/Foot   Dorsiflexion: 5  Plantar flexion: 5  Great toe extension: 5    Right Ankle/Foot   Dorsiflexion: 5  Plantar flexion: 5  Great toe extension: 5    Tests     Lumbar     Left   Positive passive SLR and quadrant  Right   Positive quadrant  Negative passive SLR  Left Hip   Positive LAURENCE and FADIR       Additional Tests Details  +neural tension with Left SLR at 80 deg  +LAURENCE, FADIR, and quadrant test of left hip  +Pain with OP into Left hip flexion (past 90 deg) , end range ER (60 deg) , and mid range IR (10 deg)   *Moderate tenderness to gluteals and GT bursa on the L     *L1-5 joint hypomobility upon CPA                  Precautions: Meningioma, H/O Depression      Manuals             CPA of L/S             L hip LAD Caudal glides                          Neuro Re-Ed             TrA bridge             TrA hold c/ PB             TrA clam shell             TrA SLR             Standing hip abduction             Standing hip extension                          Ther Ex             Bicycle             LTR             Piriformis stretch             Hamstring stretch                                                                 Ther Activity             Lateral walks             Leg press             Gait Training                                       Modalities                                         [employer]  Access Code: [email or web address]

## 2022-07-27 ENCOUNTER — OFFICE VISIT (OUTPATIENT)
Dept: DERMATOLOGY | Facility: CLINIC | Age: 57
End: 2022-07-27
Payer: COMMERCIAL

## 2022-07-27 VITALS — WEIGHT: 201.9 LBS | TEMPERATURE: 97.6 F | HEIGHT: 63 IN | BODY MASS INDEX: 35.77 KG/M2

## 2022-07-27 DIAGNOSIS — D18.01 CHERRY ANGIOMA: ICD-10-CM

## 2022-07-27 DIAGNOSIS — D22.60 MULTIPLE BENIGN MELANOCYTIC NEVI OF UPPER EXTREMITY, LOWER EXTREMITY, AND TRUNK: ICD-10-CM

## 2022-07-27 DIAGNOSIS — D22.70 MULTIPLE BENIGN MELANOCYTIC NEVI OF UPPER EXTREMITY, LOWER EXTREMITY, AND TRUNK: ICD-10-CM

## 2022-07-27 DIAGNOSIS — D48.5 NEOPLASM OF UNCERTAIN BEHAVIOR OF SKIN: Primary | ICD-10-CM

## 2022-07-27 DIAGNOSIS — L73.8 SEBACEOUS HYPERPLASIA: ICD-10-CM

## 2022-07-27 DIAGNOSIS — D22.5 MULTIPLE BENIGN MELANOCYTIC NEVI OF UPPER EXTREMITY, LOWER EXTREMITY, AND TRUNK: ICD-10-CM

## 2022-07-27 PROCEDURE — 88305 TISSUE EXAM BY PATHOLOGIST: CPT | Performed by: STUDENT IN AN ORGANIZED HEALTH CARE EDUCATION/TRAINING PROGRAM

## 2022-07-27 PROCEDURE — 88342 IMHCHEM/IMCYTCHM 1ST ANTB: CPT | Performed by: STUDENT IN AN ORGANIZED HEALTH CARE EDUCATION/TRAINING PROGRAM

## 2022-07-27 PROCEDURE — 11103 TANGNTL BX SKIN EA SEP/ADDL: CPT | Performed by: STUDENT IN AN ORGANIZED HEALTH CARE EDUCATION/TRAINING PROGRAM

## 2022-07-27 PROCEDURE — 11102 TANGNTL BX SKIN SINGLE LES: CPT | Performed by: STUDENT IN AN ORGANIZED HEALTH CARE EDUCATION/TRAINING PROGRAM

## 2022-07-27 PROCEDURE — 88341 IMHCHEM/IMCYTCHM EA ADD ANTB: CPT | Performed by: STUDENT IN AN ORGANIZED HEALTH CARE EDUCATION/TRAINING PROGRAM

## 2022-07-27 PROCEDURE — 99214 OFFICE O/P EST MOD 30 MIN: CPT | Performed by: STUDENT IN AN ORGANIZED HEALTH CARE EDUCATION/TRAINING PROGRAM

## 2022-07-27 NOTE — PROGRESS NOTES
[facility] Follow Up Note    Patient Name: Benton Fonseca  Encounter Date: 7/27/2022    Today's Chief Concerns:  Elisabeth Domínguez Concern #1:  Area on right forearm    Concern #2:  Area of concern on face right temple and inner left eye    Concern #3:  Full skin check     Current Medications:    Current Outpatient Medications:     calcium carbonate (TUMS) 500 mg chewable tablet, Chew 2 tablets daily, Disp: , Rfl:     Calcium Citrate-Vitamin D 250-200 MG-UNIT TABS, Take 1 tablet by mouth 3 (three) times a day, Disp: , Rfl:     estradiol (VIVELLE-DOT) 0 025 MG/24HR, PLACE 1 PATCH ON THE SKIN 2 TIMES A WEEK, Disp: , Rfl:     FLUoxetine (PROzac) 20 mg capsule, TAKE 2 CAPSULES BY MOUTH EVERY DAY (Patient taking differently: Take 20 mg by mouth daily), Disp: 180 capsule, Rfl: 1    Melatonin 5 MG CAPS, Take 5 mg by mouth daily at bedtime , Disp: , Rfl:     MULTIPLE VITAMIN PO, Take 1 tablet by mouth daily, Disp: , Rfl:     Omega-3 Fatty Acids (Fish Oil) 600 MG CAPS, Take by mouth, Disp: , Rfl:     pravastatin (PRAVACHOL) 20 mg tablet, TAKE 1 TABLET BY MOUTH EVERY DAY, Disp: 90 tablet, Rfl: 1    progesterone (PROMETRIUM) 100 MG capsule, Take 100 mg by mouth daily, Disp: , Rfl:     cyclobenzaprine (FLEXERIL) 5 mg tablet, Take 1 tablet (5 mg total) by mouth daily at bedtime (Patient not taking: No sig reported), Disp: 10 tablet, Rfl: 0    Magnesium 250 MG TABS, Take by mouth (Patient not taking: Reported on 7/27/2022), Disp: , Rfl:     Vitamin E 100 UNIT/GM CREA, Take by mouth (Patient not taking: Reported on 7/27/2022), Disp: , Rfl:     CONSTITUTIONAL:   Vitals:    07/27/22 0742   Temp: 97 6 °F (36 4 °C)   TempSrc: Temporal   Weight: 91 6 kg (201 lb 14 4 oz)   Height: 5' 3" (1 6 m)           Specific Alerts:    Have you been seen by a [facility] Dermatologist in the last 3 years? YES    Are you pregnant or planning to become pregnant? No    Are you currently or planning to be nursing or breast feeding? No    No Known Allergies    May we call your Preferred Phone number to discuss your specific medical information? YES    May we leave a detailed message that includes your specific medical information? YES    Have you traveled outside of the [facility] in the past 3 months? YES    Do you currently have a pacemaker or defibrillator? No    Do you have any artificial heart valves, joints, plates, screws, rods, stents, pins, etc? YES screws in ankle    - If Yes, were any placed within the last 2 years? no    Do you require any medications prior to a surgical procedure? No   - If Yes, for which procedure? - If Yes, what medications to you require? Are you taking any medications that cause you to bleed more easily ("blood thinners") No    Have you ever experienced a rapid heartbeat with epinephrine? No    Have you ever been treated with "gold" (gold sodium thiomalate) therapy? No    [facility] can help with wrinkles, "laugh lines," facial volume loss, "double chin," "love handles," age spots, and more  Are you interested in learning today about some of the skin enhancement procedures that we offer? (If Yes, please provide more detail) No    Review of Systems:  Have you recently had or currently have any of the following?     · Fever or chills: No  · Night Sweats: No  · Headaches: No  · Weight Gain: No  · Weight Loss: No  · Blurry Vision: No  · Nausea: No  · Vomiting: No  · Diarrhea: No  · Blood in Stool: No  · Abdominal Pain: No  · Itchy Skin: yes  · Painful Joints: No  · Swollen Joints: No  · Muscle Pain: YES  · Irregular Mole: No  · Sun Burn: No  · Dry Skin: YES  · Skin Color Changes: No  · Scar or Keloid: No  · Cold Sores/Fever Blisters: No  · Bacterial Infections/MRSA: No  · Anxiety: No  · Depression: No  · Suicidal or Homicidal Thoughts: No      PSYCH: Normal mood and affect  EYES: Normal conjunctiva  ENT: Normal lips and oral mucosa  CARDIOVASCULAR: No edema  RESPIRATORY: Normal respirations  HEME/LYMPH/IMMUNO:  No regional lymphadenopathy except as noted below in ASSESSMENT AND PLAN BY DIAGNOSIS    FULL ORGAN SYSTEM SKIN EXAM (SKIN)   Hair, Scalp, Ears, Face Normal except as noted below in Assessment   Neck, Cervical Chain Nodes Normal except as noted below in Assessment   Right Arm/Hand/Fingers Normal except as noted below in Assessment   Left Arm/Hand/Fingers Normal except as noted below in Assessment   Chest//Axillae Viewed areas Normal except as noted below in Assessment   Abdomen, Umbilicus Normal except as noted below in Assessment   Back/Spine Normal except as noted below in Assessment   Buttocks Viewed areas Normal except as noted below in Assessment   Right Leg, Foot, Toes Normal except as noted below in Assessment   Left Leg, Foot, Toes Normal except as noted below in Assessment   Pt deferred breast and groin exam    NEOPLASM OF UNCERTAIN BEHAVIOR OF SKIN    Physical Exam:   (Anatomic Location); (Size and Morphological Description); (Differential Diagnosis):  o Specimen A: right forearm; skin; tangential biopsy; 61year old female with a 0 5 x 0 5 cm pink scaly papule; differential diagnosis: prurigo nodule versus squamous cell carcinoma   o Specimen B: right temple; skin; tangential biopsy; 61year old female with 0 6 x 0 6 cm pink plaque; Differential diagnosis: actinic keratosis versus basal cell carcinoma verus melanocytic    Pertinent Positives:   Pertinent Negatives: Additional History of Present Condition:  Patient states area was looked at twice here and injected without improvement  Patient states area itchy and bothersome  Assessment and Plan:   I have discussed with the patient that a sample of skin via a "skin biopsy would be potentially helpful to further make a specific diagnosis under the microscope     Based on a thorough discussion of this condition and the management approach to it (including a comprehensive discussion of the known risks, side effects and potential benefits of treatment), the patient (family) agrees to implement the following specific plan:    o Procedure:  Skin Biopsy  After a thorough discussion of treatment options and risk/benefits/alternatives (including but not limited to local pain, scarring, dyspigmentation, blistering, possible superinfection, and inability to confirm a diagnosis via histopathology), verbal and written consent were obtained and portion of the rash was biopsied for tissue sample  See below for consent that was obtained from patient and subsequent Procedure Note  PROCEDURE TANGENTIAL (SHAVE) BIOPSY NOTE X 2:     Performing Physician: Princella Bosworth Anatomic Location; Clinical Description with size (cm); Pre-Op Diagnosis:   o Specimen A: right forearm; skin; tangential biopsy; 61year old female with a 0 5 x 0 5 cm pink scaly papule; differential diagnosis: prurigo nodule versus squamous cell carcinoma   o Specimen B: right temple; skin; tangential biopsy; 61year old female with 0 6 x 0 6 cm pink plaque; Differential diagnosis: actinic keratosis versus basal cell carcinoma verus melanocytic    Post-op diagnosis: Same      Local anesthesia: 1% Lidocaine HCL      Topical anesthesia: None     Hemostasis: Aluminum chloride       After obtaining informed consent  at which time there was a discussion about the purpose of biopsy  and low risks of infection and bleeding  The area was prepped and draped in the usual fashion  Anesthesia was obtained with 1% lidocaine with epinephrine  A shave biopsy to an appropriate sampling depth was obtained by Shave (Dermablade or 15 blade) The resulting wound was covered with surgical ointment and bandaged appropriately  The patient tolerated the procedure well without complications and was without signs of functional compromise  Specimen has been sent for review by Dermatopathology      Standard post-procedure care has been explained and has been included in written form within the patient's copy of Informed Consent  INFORMED CONSENT DISCUSSION AND POST-OPERATIVE INSTRUCTIONS FOR PATIENT    I   RATIONALE FOR PROCEDURE  I understand that a skin biopsy allows the Dermatologist to test a lesion or rash under the microscope to obtain a diagnosis  It usually involves numbing the area with numbing medication and removing a small piece of skin; sometimes the area will be closed with sutures  In this specific procedure, sutures are not usually needed  If any sutures are placed, then they are usually need to be removed in 2 weeks or less  I understand that my Dermatologist recommends that a skin "shave" biopsy be performed today  A local anesthetic, similar to the kind that a dentist uses when filling a cavity, will be injected with a very small needle into the skin area to be sampled  The injected skin and tissue underneath "will go to sleep and become numb so no pain should be felt afterwards  An instrument shaped like a tiny "razor blade" (shave biopsy instrument) will be used to cut a small piece of tissue and skin from the area so that a sample of tissue can be taken and examined more closely under the microscope  A slight amount of bleeding will occur, but it will be stopped with direct pressure and a pressure bandage and any other appropriate methods  I understands that a scar will form where the wound was created  Surgical ointment will be applied to help protect the wound  Sutures are not usually needed      II   RISKS AND POTENTIAL COMPLICATIONS   I understand the risks and potential complications of a skin biopsy include but are not limited to the following:   Bleeding   Infection   Pain   Scar/keloid   Skin discoloration   Incomplete Removal   Recurrence   Nerve Damage/Numbness/Loss of Function   Allergic Reaction to Anesthesia   Biopsies are diagnostic procedures and based on findings additional treatment or evaluation may be required   Loss or destruction of specimen resulting in no additional findings    My Dermatologist has explained to me the nature of the condition, the nature of the procedure, and the benefits to be reasonably expected compared with alternative approaches  My Dermatologist has discussed the likelihood of major risks or complications of this procedure including the specific risks listed above, such as bleeding, infection, and scarring/keloid  I understand that a scar is expected after this procedure  I understand that my physician cannot predict if the scar will form a "keloid," which extends beyond the borders of the wound that is created  A keloid is a thick, painful, and bumpy scar  A keloid can be difficult to treat, as it does not always respond well to therapy, which includes injecting cortisone directly into the keloid every few weeks  While this usually reduces the pain and size of the scar, it does not eliminate it  I understand that photographs may be taken before and after the procedure  These will be maintained as part of the medical providers confidential records and may not be made available to me  I further authorize the medical provider to use the photographs for teaching purposes or to illustrate scientific papers, books, or lectures if in his/her judgment, medical research, education, or science may benefit from its use  I have had an opportunity to fully inquire about the risks and benefits of this procedure and its alternatives  I have been given ample time and opportunity to ask questions and to seek a second opinion if I wished to do so  I acknowledge that there have specifically been no guarantees as to the cosmetic results from the procedure  I am aware that with any procedure there is always the possibility of an unexpected complication  III  POST-PROCEDURAL CARE (WHAT YOU WILL NEED TO DO "AFTER THE BIOPSY" TO OPTIMIZE HEALING)     Keep the area clean and dry    Try NOT to remove the bandage or get it wet for the first 24 hours   Gently clean the area and apply surgical ointment (such as Vaseline petrolatum ointment, which is available "over the counter" and not a prescription) to the biopsy site for up to 2 weeks straight  This acts to protect the wound from the outside world   Sutures are not usually placed in this procedure  If any sutures were placed, return for suture removal as instructed (generally 1 week for the face, 2 weeks for the body)   Take Acetaminophen (Tylenol) for discomfort, if no contraindications  Ibuprofen or aspirin could make bleeding worse   Call our office immediately for signs of infection: fever, chills, increased redness, warmth, tenderness, discomfort/pain, or pus or foul smell coming from the wound  WHAT TO DO IF THERE IS ANY BLEEDING? If a small amount of bleeding is noticed, place a clean cloth over the area and apply firm pressure for ten minutes  Check the wound after 10 minutes of direct pressure  If bleeding persists, try one more time for an additional 10 minutes of direct pressure on the area  If the bleeding becomes heavier or does not stop after the second attempt, or if you have any other questions about this procedure, then please call your [facility] Dermatologist by calling [phone] (SKIN)  I hereby acknowledge that I have reviewed and verified the site with my Dermatologist and have requested and authorized my Dermatologist to proceed with the procedure  MELANOCYTIC NEVI ("Moles")    Physical Exam:   Anatomic Location Affected:   Scattered across body   Morphological Description:  Scattered, 1-4mm round to ovoid, symmetrical-appearing, even bordered, skin colored to dark brown macules/papules, mostly in sun-exposed areas   Pertinent Positives:   Pertinent Negatives:     Additional History of Present Condition:  Present on exam     Assessment and Plan:  Based on a thorough discussion of this condition and the management approach to it (including a comprehensive discussion of the known risks, side effects and potential benefits of treatment), the patient (family) agrees to implement the following specific plan:   Reassured benign      Melanocytic Nevi  Melanocytic nevi ("moles") are tan or brown, raised or flat areas of the skin which have an increased number of melanocytes  Melanocytes are the cells in our body which make pigment and account for skin color  Some moles are present at birth (I e , "congenital nevi"), while others come up later in life (i e , "acquired nevi")  The sun can stimulate the body to make more moles  Sunburns are not the only thing that triggers more moles  Chronic sun exposure can do it too  Clinically distinguishing a healthy mole from melanoma may be difficult, even for experienced dermatologists  The "ABCDE's" of moles have been suggested as a means of helping to alert a person to a suspicious mole and the possible increased risk of melanoma  The suggestions for raising alert are as follows:    Asymmetry: Healthy moles tend to be symmetric, while melanomas are often asymmetric  Asymmetry means if you draw a line through the mole, the two halves do not match in color, size, shape, or surface texture  Asymmetry can be a result of rapid enlargement of a mole, the development of a raised area on a previously flat lesion, scaling, ulceration, bleeding or scabbing within the mole  Any mole that starts to demonstrate "asymmetry" should be examined promptly by a board certified dermatologist      Border: Healthy moles tend to have discrete, even borders  The border of a melanoma often blends into the normal skin and does not sharply delineate the mole from normal skin  Any mole that starts to demonstrate "uneven borders" should be examined promptly by a board certified dermatologist      Color: Healthy moles tend to be one color throughout    Melanomas tend to be made up of different colors ranging from dark black, blue, white, or red  Any mole that demonstrates a color change should be examined promptly by a board certified dermatologist      Diameter: Healthy moles tend to be smaller than 0 6 cm in size; an exception are "congenital nevi" that can be larger  Melanomas tend to grow and can often be greater than 0 6 cm (1/4 of an inch, or the size of a pencil eraser)  This is only a guideline, and many normal moles may be larger than 0 6 cm without being unhealthy  Any mole that starts to change in size (small to bigger or bigger to smaller) should be examined promptly by a board certified dermatologist      Evolving: Healthy moles tend to "stay the same "  Melanomas may often show signs of change or evolution such as a change in size, shape, color, or elevation  Any mole that starts to itch, bleed, crust, burn, hurt, or ulcerate or demonstrate a change or evolution should be examined promptly by a board certified dermatologist       Dysplastic Nevi  Dysplastic moles are moles that fit the ABCDE rules of melanoma but are not identified as melanomas when examined under the microscope  They may indicate an increased risk of melanoma in that person  If there is a family history of melanoma, most experts agree that the person may be at an increased risk for developing a melanoma  Experts still do not agree on what dysplastic moles mean in patients without a personal or family history of melanoma  Dysplastic moles are usually larger than common moles and have different colors within it with irregular borders  The appearance can be very similar to a melanoma  Biopsies of dysplastic moles may show abnormalities which are different from a regular mole  Melanoma  Malignant melanoma is a type of skin cancer that can be deadly if it spreads throughout the body  The incidence of melanoma in the [place] is growing faster than any other cancer   Melanoma usually grows near the surface of the skin for a period of time, and then begins to grow deeper into the skin  Once it grows deeper into the skin, the risk of spread to other organs greatly increases  Therefore, early detection and removal of a malignant melanoma may result in a better chance at a complete cure; removal after the tumor has spread may not be as effective, leading to worse clinical outcomes such as death  The true rate of nevus transformation into a melanoma is unknown  It has been estimated that the lifetime risk for any acquired melanocytic nevus on any 21year-old individual transforming into melanoma by age [de-identified] is 0 03% (1 in 3,164) for men and 0 009% (1 in 10,800) for women  The appearance of a "new mole" remains one of the most reliable methods for identifying a malignant melanoma  Occasionally, melanomas appear as rapidly growing, blue-black, dome-shaped bumps within a previous mole or previous area of normal skin  Other times, melanomas are suspected when a mole suddenly appears or changes  Itching, burning, or pain in a pigmented lesion should increase suspicion, but most patients with early melanoma have no skin discomfort whatsoever  Melanoma can occur anywhere on the skin, including areas that are difficult for self-examination  Many melanomas are first noticed by other family members  Suspicious-looking moles may be removed for microscopic examination  You may be able to prevent death from melanoma by doing two simple things:    1  Try to avoid unnecessary sun exposure and protect your skin when it is exposed to the sun  People who live near the equator, people who have intermittent exposures to large amounts of sun, and people who have had sunburns in childhood or adolescence have an increased risk for melanoma  Sun sense and vigilant sun protection may be keys to helping to prevent melanoma    We recommend wearing UPF-rated sun protective clothing and sunglasses whenever possible and applying a moisturizer-sunscreen combination product (SPF 50+) such as Neutrogena Daily Defense to sun exposed areas of skin at least three times a day  2  Have your moles regularly examined by a board certified dermatologist AND by yourself or a family member/friend at home  We recommend that you have your moles examined at least once a year by a board certified dermatologist   Use your birthday as an annual reminder to have your "Birthday Suit" (I e , your skin) examined; it is a nice birthday gift to yourself to know that your skin is healthy appearing! Additionally, at-home self examinations may be helpful for detecting a possible melanoma  Use the ABCDEs we discussed and check your moles once a month at home  SEBACEOUS HYPERPLASIA    Physical Exam:   Anatomic Location Affected: Face   Morphological Description:  Scattered pink yellow papules   Pertinent Positives:   Pertinent Negatives: Additional History of Present Condition:  Patient noticed area a few months ago     Assessment and Plan:  Based on a thorough discussion of this condition and the management approach to it (including a comprehensive discussion of the known risks, side effects and potential benefits of treatment), the patient (family) agrees to implement the following specific plan:   Monitor for changes  If growth or change, let us know    Sebaceous Hyperplasia  Sebaceous hyperplasia is a common, benign condition of enlarged oil secreting (sebaceous) glands commonly found on the forehead and cheeks of middle-aged and elderly patients  They normally appear as small yellow bumps up to 3mm in diameter that can be single or multiple  The bumps on the face often display a centrall dell  Occasionally, these bumps can occur on the chest, areola, mouth, and genitals  Rarely, they can grow to take a giant form, or be arranged linearly       Causes of sebaceous hyperplasia  Sebaceous hyperplasic is a form of benign hair follicle tumor and can often be confused with basal cell carcinoma  It can be more prevalent in immunosuppressed patients such as those undergoing organ transplantation  In the rare T[place] syndrome, sebaceous hyperplasia occurs in association with internal cancers  Lesions of sebaceous hyperplasia are benign, with no known potential for malignant transformation, but they may be associated with nonmelanoma skin cancer in transplantation patients  How we do diagnose sebaceous hyperplasia? Your dermatologist may take a closer look at the bumps with a device called a dermatoscope  Common features include a central hair follicle surrounded by yellowish lobules with prominent blood vessels  What is the treatment of sebaceous hyperplasia? Since sebaceous hyperplasia is benign with no known potential for transformation into cancer, treatment is mostly for cosmetic reasons or if the lesions become irritated  Options include   - Light electrocautery or laser vaporization  - Oral isotrentinoin is effective for extensive or disfiguring spots, but do not prevent recurrence   - Antiandrogens may be used in females to decrease the size and improve overall appearance of bumps     TEMPLE ANGIOMAS    Physical Exam:   Anatomic Location Affected:  Trunk    Morphological Description:  Scattered cherry red, 1-4 mm papules   Pertinent Positives:   Pertinent Negatives: Additional History of Present Condition:  Present on exam     Assessment and Plan:  Based on a thorough discussion of this condition and the management approach to it (including a comprehensive discussion of the known risks, side effects and potential benefits of treatment), the patient (family) agrees to implement the following specific plan:   Reassured benign    Assessment and Plan:    Cherry angioma, also known as Starla Romberg spots, are benign vascular skin lesions  A "cherry angioma" is a firm red, blue or purple papule, 0 1-1 cm in diameter   When thrombosed, they can appear black in colour until evaluated with a dermatoscope when the red or purple colour is more easily seen  Cherry angioma may develop on any part of the body but most often appear on the scalp, face, lips and trunk  An angioma is due to proliferating endothelial cells; these are the cells that line the inside of a blood vessel  Angiomas can arise in early life or later in life; the most common type of angioma is a cherry angioma  Cherry angiomas are very common in males and females of any age or race  They are more noticeable in white skin than in skin of colour  They markedly increase in number from about the age of 36  There may be a family history of similar lesions  Eruptive cherry angiomas have been rarely reported to be associated with internal malignancy  The cause of angiomas is unknown  Genetic analysis of cherry angiomas has shown that they frequently carry specific somatic missense mutations in the GNAQ and GNA11 (Q209H) genes, which are involved in other vascular and melanocytic proliferations  Cherry angioma is usually diagnosed clinically and no investigations are necessary for the majority of lesions  It has a characteristic red-clod or lobular pattern on dermatoscopy (called lacunar pattern using conventional pattern analysis)  When there is uncertainty about the diagnosis, a biopsy may be performed  The angioma is composed of venules in a thickened papillary dermis  Collagen bundles may be prominent between the lobules  Cherry angiomas are harmless, so they do not usually have to be treated  Occasionally, they are removed to exclude a malignant skin lesion such as a nodular melanoma or because they are irritated or bleeding (and a subsequent risk for infection)  To decrease friction over the lesions, we recommend Neutrogena Daily Defense SPF 50+ at least 3 times a day      XEROSIS ("DRY SKIN")    Physical Exam:   Anatomic Location Affected:  Trunk and extremities  Morphological Description:  Mild xerosis   Pertinent Positives:   Pertinent Negatives: Additional History of Present Condition:  Patient expresses lotions used are not improving the dryness  Assessment and Plan:  Based on a thorough discussion of this condition and the management approach to it (including a comprehensive discussion of the known risks, side effects and potential benefits of treatment), the patient (family) agrees to implement the following specific plan:  Use moisturizer like Eucerin,Cerave, Vanicream or Aveeno Cream 3 times a day for the dry skin             Dry skin refers to skin that feels dry to touch  Dry skin has a dull surface with a rough, scaly quality  The skin is less pliable and cracked  When dryness is severe, the skin may become inflamed and fissured  Although any body site can be dry, dry skin tends to affect the shins more than any other site  Dry skin is lacking moisture in the outer horny cell layer (stratum corneum) and this results in cracks in the skin surface  Dry skin is also called xerosis, xeroderma or asteatosis (lack of fat)  It can affect males and females of all ages  There is some racial variability in water and lipid content of the skin   Dry skin that starts in early childhood may be one of about 20 types of ichthyosis (fish-scale skin)  There is often a family history of dry skin   Dry skin is commonly seen in people with atopic dermatitis   Nearly everyone > 60 years has dry skin  Dry skin that begins later may be seen in people with certain diseases and conditions   Postmenopausal women   Hypothyroidism   Chronic renal disease    Malnutrition and weight loss    Subclinical dermatitis    Treatment with certain drugs such as oral retinoids, diuretics and epidermal growth factor receptor inhibitors    People exposed to a dry environment may experience dry skin     Low humidity: in desert climates or cool, windy conditions  Excessive air conditioning    Direct heat from a fire or fan heater    Excessive bathing    Contact with soap, detergents and solvents    Inappropriate topical agents such as alcohol    Frictional irritation from rough clothing or abrasives    Dry skin is due to abnormalities in the integrity of the barrier function of the stratum corneum, which is made up of corneocytes   There is an overall reduction in the lipids in the stratum corneum   Ratio of ceramides, cholesterol and free fatty acids may be normal or altered   There may be a reduction in the proliferation of keratinocytes   Keratinocyte subtypes change in dry skin with a decrease in keratins K1, K10 and increase in K5, K14     Involucrin (a protein) may be expressed early, increasing cell stiffness   The result is retention of corneocytes and reduced water-holding capacity  The inherited forms of ichthyosis are due to loss of function mutations in various genes (listed in parentheses below)  The clinical features of ichthyosis depend on the specific type of ichthyosis:   Ichthyosis vulgaris (FLG)   Recessive X-linked ichthyosis (STS)    Autosomal recessive congenital ichthyosis (ABCA12, TGM1, ALOXE3)    Keratinopathic ichthyoses (KRT1, KRT10, KRT2)    Acquired ichthyosis may be due to:   Metabolic factors: thyroid deficiency    Illness: lymphoma, internal malignancy, sarcoidosis, HIV infection    Drugs: nicotinic acid, kava, protein kinase inhibitors (eg EGFR inhibitors), hydroxyurea  Complications of dry skin:  Dry areas of skin may become itchy, indicating a form of eczema/dermatitis has developed   Atopic eczema -- especially in people with ichthyosis vulgaris    Eczema craquelé -- especially in elderly people  Also called asteatotic eczema    A dry form of nummular dermatitis/discoid eczema -- especially in people that wash their skin excessively    When the dry skin of an elderly person is itchy without a visible rash, it is sometimes called winter itch, 7th age itch, senile pruritus or chronic pruritus of the elderly  Other complications of dry skin may include:   Skin infection when bacteria or viruses penetrate a break in the skin surface    Overheating, especially in some forms of ichthyosis    Food allergy, eg, to peanuts, has been associated with filaggrin mutations    Contact allergy, eg, to nickel, has also been correlated with barrier function defects  How is the type of dry skin diagnosed? The type of dry skin is diagnosed by careful history and examination  In children:   Family history    Age of onset    Appearance at birth, if known    Distribution of dry skin    Other features, eg eczema, abnormal nails, hair, dentition, sight, hearing  In adults:   Medical history    Medications and topical preparations    Bathing frequency and use of soap    Evaluation of environmental factors that may contribute to dry skin  What is the treatment for dry skin? The mainstay of treatment of dry skin and ichthyosis is moisturisers/emollients  They should be applied liberally and often enough to:   Reduce itch    Improve the barrier function    Prevent entry of irritants, bacteria    Reduce transepidermal water loss  When considering which emollient is most suitable, consider:   Severity of the dryness    Tolerance    Personal preference    Cost and availability  Emollients generally work best if applied to damp skin, if pH is below 7 (acidic), and if containing humectants such as urea or propylene glycol  Additional treatments include:   Topical steroid if itchy or there is dermatitis -- choose an emollient base    Topical calcineurin inhibitors if topical steroids are unsuitable  How can dry skin be prevented? Eliminate aggravating factors:   Reduce the frequency of bathing      A humidifier in winter and air conditioner in summer    Compare having a short shower with a prolonged soak in a bath   Use lukewarm, not hot, water   Replace standard soap with a substitute such as a synthetic detergent cleanser, water-miscible emollient, bath oil, anti-pruritic tar oil, colloidal oatmeal etc     Apply an emollient liberally and often, particularly shortly after bathing, and when itchy  The drier the skin, the thicker this should be, especially on the hands  What is the outlook for dry skin? A tendency to dry skin may persist life-long, or it may improve once contributing factors are controlled         Scribe Attestation    I,:  Marifer Teixeira am acting as a scribe while in the presence of the attending physician :       I,:  Hector Andrade MD personally performed the services described in this documentation    as scribed in my presence :

## 2022-07-27 NOTE — PATIENT INSTRUCTIONS
NEOPLASM OF UNCERTAIN BEHAVIOR OF SKIN    Assessment and Plan:  I have discussed with the patient that a sample of skin via a "skin biopsy would be potentially helpful to further make a specific diagnosis under the microscope  Based on a thorough discussion of this condition and the management approach to it (including a comprehensive discussion of the known risks, side effects and potential benefits of treatment), the patient (family) agrees to implement the following specific plan:    Procedure:  Skin Biopsy  After a thorough discussion of treatment options and risk/benefits/alternatives (including but not limited to local pain, scarring, dyspigmentation, blistering, possible superinfection, and inability to confirm a diagnosis via histopathology), verbal and written consent were obtained and portion of the rash was biopsied for tissue sample  See below for consent that was obtained from patient and subsequent Procedure Note  INFORMED CONSENT DISCUSSION AND POST-OPERATIVE INSTRUCTIONS FOR PATIENT    I   RATIONALE FOR PROCEDURE  I understand that a skin biopsy allows the Dermatologist to test a lesion or rash under the microscope to obtain a diagnosis  It usually involves numbing the area with numbing medication and removing a small piece of skin; sometimes the area will be closed with sutures  In this specific procedure, sutures are not usually needed  If any sutures are placed, then they are usually need to be removed in 2 weeks or less  I understand that my Dermatologist recommends that a skin "shave" biopsy be performed today  A local anesthetic, similar to the kind that a dentist uses when filling a cavity, will be injected with a very small needle into the skin area to be sampled  The injected skin and tissue underneath "will go to sleep and become numb so no pain should be felt afterwards    An instrument shaped like a tiny "razor blade" (shave biopsy instrument) will be used to cut a small piece of tissue and skin from the area so that a sample of tissue can be taken and examined more closely under the microscope  A slight amount of bleeding will occur, but it will be stopped with direct pressure and a pressure bandage and any other appropriate methods  I understands that a scar will form where the wound was created  Surgical ointment will be applied to help protect the wound  Sutures are not usually needed  II   RISKS AND POTENTIAL COMPLICATIONS   I understand the risks and potential complications of a skin biopsy include but are not limited to the following:  Bleeding  Infection  Pain  Scar/keloid  Skin discoloration  Incomplete Removal  Recurrence  Nerve Damage/Numbness/Loss of Function  Allergic Reaction to Anesthesia  Biopsies are diagnostic procedures and based on findings additional treatment or evaluation may be required  Loss or destruction of specimen resulting in no additional findings    My Dermatologist has explained to me the nature of the condition, the nature of the procedure, and the benefits to be reasonably expected compared with alternative approaches  My Dermatologist has discussed the likelihood of major risks or complications of this procedure including the specific risks listed above, such as bleeding, infection, and scarring/keloid  I understand that a scar is expected after this procedure  I understand that my physician cannot predict if the scar will form a "keloid," which extends beyond the borders of the wound that is created  A keloid is a thick, painful, and bumpy scar  A keloid can be difficult to treat, as it does not always respond well to therapy, which includes injecting cortisone directly into the keloid every few weeks  While this usually reduces the pain and size of the scar, it does not eliminate it  I understand that photographs may be taken before and after the procedure    These will be maintained as part of the medical providers confidential records and may not be made available to me  I further authorize the medical provider to use the photographs for teaching purposes or to illustrate scientific papers, books, or lectures if in his/her judgment, medical research, education, or science may benefit from its use  I have had an opportunity to fully inquire about the risks and benefits of this procedure and its alternatives  I have been given ample time and opportunity to ask questions and to seek a second opinion if I wished to do so  I acknowledge that there have specifically been no guarantees as to the cosmetic results from the procedure  I am aware that with any procedure there is always the possibility of an unexpected complication  III  POST-PROCEDURAL CARE (WHAT YOU WILL NEED TO DO "AFTER THE BIOPSY" TO OPTIMIZE HEALING)    Keep the area clean and dry  Try NOT to remove the bandage or get it wet for the first 24 hours  Gently clean the area and apply surgical ointment (such as Vaseline petrolatum ointment, which is available "over the counter" and not a prescription) to the biopsy site for up to 2 weeks straight  This acts to protect the wound from the outside world  Sutures are not usually placed in this procedure  If any sutures were placed, return for suture removal as instructed (generally 1 week for the face, 2 weeks for the body)  Take Acetaminophen (Tylenol) for discomfort, if no contraindications  Ibuprofen or aspirin could make bleeding worse  Call our office immediately for signs of infection: fever, chills, increased redness, warmth, tenderness, discomfort/pain, or pus or foul smell coming from the wound  WHAT TO DO IF THERE IS ANY BLEEDING? If a small amount of bleeding is noticed, place a clean cloth over the area and apply firm pressure for ten minutes  Check the wound after 10 minutes of direct pressure    If bleeding persists, try one more time for an additional 10 minutes of direct pressure on the area   If the bleeding becomes heavier or does not stop after the second attempt, or if you have any other questions about this procedure, then please call your [facility] Dermatologist by calling [phone] (SKIN)  I hereby acknowledge that I have reviewed and verified the site with my Dermatologist and have requested and authorized my Dermatologist to proceed with the procedure  MELANOCYTIC NEVI ("Moles")    Assessment and Plan:  Based on a thorough discussion of this condition and the management approach to it (including a comprehensive discussion of the known risks, side effects and potential benefits of treatment), the patient (family) agrees to implement the following specific plan:  Reassured benign      Melanocytic Nevi  Melanocytic nevi ("moles") are tan or brown, raised or flat areas of the skin which have an increased number of melanocytes  Melanocytes are the cells in our body which make pigment and account for skin color  Some moles are present at birth (I e , "congenital nevi"), while others come up later in life (i e , "acquired nevi")  The sun can stimulate the body to make more moles  Sunburns are not the only thing that triggers more moles  Chronic sun exposure can do it too  Clinically distinguishing a healthy mole from melanoma may be difficult, even for experienced dermatologists  The "ABCDE's" of moles have been suggested as a means of helping to alert a person to a suspicious mole and the possible increased risk of melanoma  The suggestions for raising alert are as follows:    Asymmetry: Healthy moles tend to be symmetric, while melanomas are often asymmetric  Asymmetry means if you draw a line through the mole, the two halves do not match in color, size, shape, or surface texture  Asymmetry can be a result of rapid enlargement of a mole, the development of a raised area on a previously flat lesion, scaling, ulceration, bleeding or scabbing within the mole    Any mole that starts to demonstrate "asymmetry" should be examined promptly by a board certified dermatologist      Border: Healthy moles tend to have discrete, even borders  The border of a melanoma often blends into the normal skin and does not sharply delineate the mole from normal skin  Any mole that starts to demonstrate "uneven borders" should be examined promptly by a board certified dermatologist      Color: Healthy moles tend to be one color throughout  Melanomas tend to be made up of different colors ranging from dark black, blue, white, or red  Any mole that demonstrates a color change should be examined promptly by a board certified dermatologist      Diameter: Healthy moles tend to be smaller than 0 6 cm in size; an exception are "congenital nevi" that can be larger  Melanomas tend to grow and can often be greater than 0 6 cm (1/4 of an inch, or the size of a pencil eraser)  This is only a guideline, and many normal moles may be larger than 0 6 cm without being unhealthy  Any mole that starts to change in size (small to bigger or bigger to smaller) should be examined promptly by a board certified dermatologist      Evolving: Healthy moles tend to "stay the same "  Melanomas may often show signs of change or evolution such as a change in size, shape, color, or elevation  Any mole that starts to itch, bleed, crust, burn, hurt, or ulcerate or demonstrate a change or evolution should be examined promptly by a board certified dermatologist       Dysplastic Nevi  Dysplastic moles are moles that fit the ABCDE rules of melanoma but are not identified as melanomas when examined under the microscope  They may indicate an increased risk of melanoma in that person  If there is a family history of melanoma, most experts agree that the person may be at an increased risk for developing a melanoma  Experts still do not agree on what dysplastic moles mean in patients without a personal or family history of melanoma  Dysplastic moles are usually larger than common moles and have different colors within it with irregular borders  The appearance can be very similar to a melanoma  Biopsies of dysplastic moles may show abnormalities which are different from a regular mole  Melanoma  Malignant melanoma is a type of skin cancer that can be deadly if it spreads throughout the body  The incidence of melanoma in the [place] is growing faster than any other cancer  Melanoma usually grows near the surface of the skin for a period of time, and then begins to grow deeper into the skin  Once it grows deeper into the skin, the risk of spread to other organs greatly increases  Therefore, early detection and removal of a malignant melanoma may result in a better chance at a complete cure; removal after the tumor has spread may not be as effective, leading to worse clinical outcomes such as death  The true rate of nevus transformation into a melanoma is unknown  It has been estimated that the lifetime risk for any acquired melanocytic nevus on any 21year-old individual transforming into melanoma by age [de-identified] is 0 03% (1 in 3,164) for men and 0 009% (1 in 10,800) for women  The appearance of a "new mole" remains one of the most reliable methods for identifying a malignant melanoma  Occasionally, melanomas appear as rapidly growing, blue-black, dome-shaped bumps within a previous mole or previous area of normal skin  Other times, melanomas are suspected when a mole suddenly appears or changes  Itching, burning, or pain in a pigmented lesion should increase suspicion, but most patients with early melanoma have no skin discomfort whatsoever  Melanoma can occur anywhere on the skin, including areas that are difficult for self-examination  Many melanomas are first noticed by other family members  Suspicious-looking moles may be removed for microscopic examination         You may be able to prevent death from melanoma by doing two simple things:    Try to avoid unnecessary sun exposure and protect your skin when it is exposed to the sun  People who live near the equator, people who have intermittent exposures to large amounts of sun, and people who have had sunburns in childhood or adolescence have an increased risk for melanoma  Sun sense and vigilant sun protection may be keys to helping to prevent melanoma  We recommend wearing UPF-rated sun protective clothing and sunglasses whenever possible and applying a moisturizer-sunscreen combination product (SPF 50+) such as Neutrogena Daily Defense to sun exposed areas of skin at least three times a day  Have your moles regularly examined by a board certified dermatologist AND by yourself or a family member/friend at home  We recommend that you have your moles examined at least once a year by a board certified dermatologist   Use your birthday as an annual reminder to have your "Birthday Suit" (I e , your skin) examined; it is a nice birthday gift to yourself to know that your skin is healthy appearing! Additionally, at-home self examinations may be helpful for detecting a possible melanoma  Use the ABCDEs we discussed and check your moles once a month at home  SEBACEOUS HYPERPLASIA    Physical Exam:  Anatomic Location Affected: Face  Morphological Description:  Scattered pink yellow papules  Pertinent Positives:  Pertinent Negatives:     Additional History of Present Condition:  Patient noticed area a few months ago     Assessment and Plan:  Based on a thorough discussion of this condition and the management approach to it (including a comprehensive discussion of the known risks, side effects and potential benefits of treatment), the patient (family) agrees to implement the following specific plan:  Reassured benign     Sebaceous Hyperplasia  Sebaceous hyperplasia is a common, benign condition of enlarged oil secreting (sebaceous) glands commonly found on the forehead and cheeks of middle-aged and elderly patients  They normally appear as small yellow bumps up to 3mm in diameter that can be single or multiple  The bumps on the face often display a centrall dell  Occasionally, these bumps can occur on the chest, areola, mouth, and genitals  Rarely, they can grow to take a giant form, or be arranged linearly  Causes of sebaceous hyperplasia  Sebaceous hyperplasic is a form of benign hair follicle tumor and can often be confused with basal cell carcinoma  It can be more prevalent in immunosuppressed patients such as those undergoing organ transplantation  In the rare T[place] syndrome, sebaceous hyperplasia occurs in association with internal cancers  Lesions of sebaceous hyperplasia are benign, with no known potential for malignant transformation, but they may be associated with nonmelanoma skin cancer in transplantation patients  How we do diagnose sebaceous hyperplasia? Your dermatologist may take a closer look at the bumps with a device called a dermatoscope  Common features include a central hair follicle surrounded by yellowish lobules with prominent blood vessels  What is the treatment of sebaceous hyperplasia? Since sebaceous hyperplasia is benign with no known potential for transformation into cancer, treatment is mostly for cosmetic reasons or if the lesions become irritated  Options include   Light electrocautery or laser vaporization  Oral isotrentinoin is effective for extensive or disfiguring spots, but do not prevent recurrence   Antiandrogens may be used in females to decrease the size and improve overall appearance of bumps     TEMPLE ANGIOMAS    Physical Exam:  Anatomic Location Affected:  Trunk   Morphological Description:  Scattered cherry red, 1-4 mm papules  Pertinent Positives:  Pertinent Negatives:     Additional History of Present Condition:  Present on exam     Assessment and Plan:  Based on a thorough discussion of this condition and the management approach to it (including a comprehensive discussion of the known risks, side effects and potential benefits of treatment), the patient (family) agrees to implement the following specific plan:  Reassured benign    Assessment and Plan:    Cherry angioma, also known as Leos Puff spots, are benign vascular skin lesions  A "cherry angioma" is a firm red, blue or purple papule, 0 1-1 cm in diameter  When thrombosed, they can appear black in colour until evaluated with a dermatoscope when the red or purple colour is more easily seen  Cherry angioma may develop on any part of the body but most often appear on the scalp, face, lips and trunk  An angioma is due to proliferating endothelial cells; these are the cells that line the inside of a blood vessel  Angiomas can arise in early life or later in life; the most common type of angioma is a cherry angioma  Cherry angiomas are very common in males and females of any age or race  They are more noticeable in white skin than in skin of colour  They markedly increase in number from about the age of 36  There may be a family history of similar lesions  Eruptive cherry angiomas have been rarely reported to be associated with internal malignancy  The cause of angiomas is unknown  Genetic analysis of cherry angiomas has shown that they frequently carry specific somatic missense mutations in the GNAQ and GNA11 (Q209H) genes, which are involved in other vascular and melanocytic proliferations  Cherry angioma is usually diagnosed clinically and no investigations are necessary for the majority of lesions  It has a characteristic red-clod or lobular pattern on dermatoscopy (called lacunar pattern using conventional pattern analysis)  When there is uncertainty about the diagnosis, a biopsy may be performed  The angioma is composed of venules in a thickened papillary dermis  Collagen bundles may be prominent between the lobules      Cherry angiomas are harmless, so they do not usually have to be treated  Occasionally, they are removed to exclude a malignant skin lesion such as a nodular melanoma or because they are irritated or bleeding (and a subsequent risk for infection)  To decrease friction over the lesions, we recommend Neutrogena Daily Defense SPF 50+ at least 3 times a day  XEROSIS ("DRY SKIN")      Additional History of Present Condition:  Patient expres    Assessment and Plan:  Based on a thorough discussion of this condition and the management approach to it (including a comprehensive discussion of the known risks, side effects and potential benefits of treatment), the patient (family) agrees to implement the following specific plan:  Use moisturizer like Eucerin,Cerave, Vanicream or Aveeno Cream 3 times a day for the dry skin            Dry skin refers to skin that feels dry to touch  Dry skin has a dull surface with a rough, scaly quality  The skin is less pliable and cracked  When dryness is severe, the skin may become inflamed and fissured  Although any body site can be dry, dry skin tends to affect the shins more than any other site  Dry skin is lacking moisture in the outer horny cell layer (stratum corneum) and this results in cracks in the skin surface  Dry skin is also called xerosis, xeroderma or asteatosis (lack of fat)  It can affect males and females of all ages  There is some racial variability in water and lipid content of the skin  Dry skin that starts in early childhood may be one of about 20 types of ichthyosis (fish-scale skin)  There is often a family history of dry skin  Dry skin is commonly seen in people with atopic dermatitis  Nearly everyone > 60 years has dry skin  Dry skin that begins later may be seen in people with certain diseases and conditions    Postmenopausal women  Hypothyroidism  Chronic renal disease   Malnutrition and weight loss   Subclinical dermatitis   Treatment with certain drugs such as oral retinoids, diuretics and epidermal growth factor receptor inhibitors    People exposed to a dry environment may experience dry skin  Low humidity: in desert climates or cool, windy conditions   Excessive air conditioning   Direct heat from a fire or fan heater   Excessive bathing   Contact with soap, detergents and solvents   Inappropriate topical agents such as alcohol   Frictional irritation from rough clothing or abrasives    Dry skin is due to abnormalities in the integrity of the barrier function of the stratum corneum, which is made up of corneocytes  There is an overall reduction in the lipids in the stratum corneum  Ratio of ceramides, cholesterol and free fatty acids may be normal or altered  There may be a reduction in the proliferation of keratinocytes  Keratinocyte subtypes change in dry skin with a decrease in keratins K1, K10 and increase in K5, K14  Involucrin (a protein) may be expressed early, increasing cell stiffness  The result is retention of corneocytes and reduced water-holding capacity  The inherited forms of ichthyosis are due to loss of function mutations in various genes (listed in parentheses below)  The clinical features of ichthyosis depend on the specific type of ichthyosis:  Ichthyosis vulgaris (FLG)  Recessive X-linked ichthyosis (STS)   Autosomal recessive congenital ichthyosis (ABCA12, TGM1, ALOXE3)   Keratinopathic ichthyoses (KRT1, KRT10, KRT2)    Acquired ichthyosis may be due to:  Metabolic factors: thyroid deficiency   Illness: lymphoma, internal malignancy, sarcoidosis, HIV infection   Drugs: nicotinic acid, kava, protein kinase inhibitors (eg EGFR inhibitors), hydroxyurea  Complications of dry skin:  Dry areas of skin may become itchy, indicating a form of eczema/dermatitis has developed  Atopic eczema -- especially in people with ichthyosis vulgaris   Eczema craquelé -- especially in elderly people   Also called asteatotic eczema   A dry form of nummular dermatitis/discoid eczema -- especially in people that wash their skin excessively  When the dry skin of an elderly person is itchy without a visible rash, it is sometimes called winter itch, 7th age itch, senile pruritus or chronic pruritus of the elderly  Other complications of dry skin may include:  Skin infection when bacteria or viruses penetrate a break in the skin surface   Overheating, especially in some forms of ichthyosis   Food allergy, eg, to peanuts, has been associated with filaggrin mutations   Contact allergy, eg, to nickel, has also been correlated with barrier function defects  How is the type of dry skin diagnosed? The type of dry skin is diagnosed by careful history and examination  In children:  Family history   Age of onset   Appearance at birth, if known   Distribution of dry skin   Other features, eg eczema, abnormal nails, hair, dentition, sight, hearing  In adults:  Medical history   Medications and topical preparations   Bathing frequency and use of soap   Evaluation of environmental factors that may contribute to dry skin  What is the treatment for dry skin? The mainstay of treatment of dry skin and ichthyosis is moisturisers/emollients  They should be applied liberally and often enough to:  Reduce itch   Improve the barrier function   Prevent entry of irritants, bacteria   Reduce transepidermal water loss  When considering which emollient is most suitable, consider:  Severity of the dryness   Tolerance   Personal preference   Cost and availability  Emollients generally work best if applied to damp skin, if pH is below 7 (acidic), and if containing humectants such as urea or propylene glycol  Additional treatments include:  Topical steroid if itchy or there is dermatitis -- choose an emollient base   Topical calcineurin inhibitors if topical steroids are unsuitable  How can dry skin be prevented? Eliminate aggravating factors:  Reduce the frequency of bathing     A humidifier in winter and air conditioner in summer   Compare having a short shower with a prolonged soak in a bath  Use lukewarm, not hot, water  Replace standard soap with a substitute such as a synthetic detergent cleanser, water-miscible emollient, bath oil, anti-pruritic tar oil, colloidal oatmeal etc    Apply an emollient liberally and often, particularly shortly after bathing, and when itchy  The drier the skin, the thicker this should be, especially on the hands  What is the outlook for dry skin? A tendency to dry skin may persist life-long, or it may improve once contributing factors are controlled

## 2022-08-02 ENCOUNTER — OFFICE VISIT (OUTPATIENT)
Dept: PHYSICAL THERAPY | Facility: CLINIC | Age: 57
End: 2022-08-02
Payer: COMMERCIAL

## 2022-08-02 DIAGNOSIS — M54.16 LUMBAR RADICULOPATHY: ICD-10-CM

## 2022-08-02 DIAGNOSIS — M54.16 LUMBAR BACK PAIN WITH RADICULOPATHY AFFECTING LEFT LOWER EXTREMITY: Primary | ICD-10-CM

## 2022-08-02 PROCEDURE — 97112 NEUROMUSCULAR REEDUCATION: CPT | Performed by: PHYSICAL THERAPIST

## 2022-08-02 PROCEDURE — 97110 THERAPEUTIC EXERCISES: CPT | Performed by: PHYSICAL THERAPIST

## 2022-08-02 PROCEDURE — 97140 MANUAL THERAPY 1/> REGIONS: CPT | Performed by: PHYSICAL THERAPIST

## 2022-08-02 NOTE — PROGRESS NOTES
Daily Note     Today's date: 2022  Patient name: Mc Martinez  : 1965  MRN: [id number]  Referring provider: Kelly Crandall MD  Dx:   Encounter Diagnosis     ICD-10-CM    1  Lumbar back pain with radiculopathy affecting left lower extremity  M54 16    2  Lumbar radiculopathy  M54 16                   Subjective: Pt reports having anterior knee pain today and last evening  She states that she did a lot of sitting at work today but attempted to get up and walk around several times  Objective: See treatment diary below      Assessment:  Initiated POC with good tolerance  No radicular symptoms present during program initiation  Mild soreness in lumbar spine with CPA mobilizations at L4/5  Primarily focused on L/S stabilization  Tolerated treatment well  Patient would benefit from continued PT  Plan: Continue per plan of care        Precautions: Meningioma, H/O Depression      Manuals             CPA of L/S 5' gr II            L hip LAD 3'            Caudal glides Gr III 3'                         Neuro Re-Ed             TrA bridge 2x10            TrA hold c/ PB 2x10            TrA clam shell 2x10            TrA SLR             Standing hip abduction             Standing hip extension                          Ther Ex             TM walking 8'            LTR             Piriformis stretch             Hamstring stretch                                                                 Ther Activity             Lateral walks             Leg press             Gait Training                                       Modalities                                       1:1 with PT from [phone]

## 2022-08-04 ENCOUNTER — OFFICE VISIT (OUTPATIENT)
Dept: PHYSICAL THERAPY | Facility: CLINIC | Age: 57
End: 2022-08-04
Payer: COMMERCIAL

## 2022-08-04 DIAGNOSIS — M54.16 LUMBAR RADICULOPATHY: ICD-10-CM

## 2022-08-04 DIAGNOSIS — M54.16 LUMBAR BACK PAIN WITH RADICULOPATHY AFFECTING LEFT LOWER EXTREMITY: Primary | ICD-10-CM

## 2022-08-04 PROCEDURE — 97110 THERAPEUTIC EXERCISES: CPT

## 2022-08-04 PROCEDURE — 97112 NEUROMUSCULAR REEDUCATION: CPT

## 2022-08-04 PROCEDURE — 97140 MANUAL THERAPY 1/> REGIONS: CPT

## 2022-08-04 NOTE — PROGRESS NOTES
Daily Note     Today's date: 2022  Patient name: Charles Cuevas  : 1965  MRN: [id number]  Referring provider: Katherine Stack MD  Dx:   Encounter Diagnosis     ICD-10-CM    1  Lumbar back pain with radiculopathy affecting left lower extremity  M54 16    2  Lumbar radiculopathy  M54 16                   Subjective: Patient reports feeling sore in the low back after last session but it resided by the following day  Hasn't had any significant radicular symptoms  Objective: See treatment diary below      Assessment: Patient was challenged by bridges and standing hip exercises today and required cueing for TrA activation throughout exercises  Moderate tenderness noted during PA mobilizations today but had positive response to hip mobilizations  Maintained or progressed primary PT's POC  Plan: Continue per plan of care        Precautions: Meningioma, H/O Depression      Manuals            CPA of L/S 5' gr II 5' gr II           L hip LAD 3' 3'           Caudal glides Gr III 3' Gr III 3'                        Neuro Re-Ed             TrA bridge 2x10 2x10           TrA hold c/ PB 2x10 2x10            TrA clam shell 2x10 2x10           TrA SLR  2x10           Standing hip abduction  2x10           Standing hip extension  2x10                        Ther Ex             TM walking 8' 8'           LTR  x20           Piriformis stretch  2x20"           Hamstring stretch                                                                 Ther Activity             Lateral walks             Leg press                                       Gait Training                                       Modalities                                       1:1 with PT from [phone]

## 2022-08-09 ENCOUNTER — APPOINTMENT (OUTPATIENT)
Dept: PHYSICAL THERAPY | Facility: CLINIC | Age: 57
End: 2022-08-09
Payer: COMMERCIAL

## 2022-08-11 ENCOUNTER — OFFICE VISIT (OUTPATIENT)
Dept: PHYSICAL THERAPY | Facility: CLINIC | Age: 57
End: 2022-08-11
Payer: COMMERCIAL

## 2022-08-11 DIAGNOSIS — M54.16 LUMBAR BACK PAIN WITH RADICULOPATHY AFFECTING LEFT LOWER EXTREMITY: Primary | ICD-10-CM

## 2022-08-11 DIAGNOSIS — M54.16 LUMBAR RADICULOPATHY: ICD-10-CM

## 2022-08-11 PROCEDURE — 97140 MANUAL THERAPY 1/> REGIONS: CPT | Performed by: PHYSICAL THERAPIST

## 2022-08-11 PROCEDURE — 97112 NEUROMUSCULAR REEDUCATION: CPT | Performed by: PHYSICAL THERAPIST

## 2022-08-11 PROCEDURE — 97110 THERAPEUTIC EXERCISES: CPT | Performed by: PHYSICAL THERAPIST

## 2022-08-11 NOTE — PROGRESS NOTES
Daily Note     Today's date: 2022  Patient name: Clara Hall  : 1965  MRN: [id number]  Referring provider: David Mcgarry MD  Dx:   Encounter Diagnosis     ICD-10-CM    1  Lumbar back pain with radiculopathy affecting left lower extremity  M54 16    2  Lumbar radiculopathy  M54 16        Start Time:   Stop Time:   Total time in clinic (min): 40 minutes    Subjective: Pt reports gardening over the weekend which increased her Left hip, left lower back, and LLE pain  She states that her pain was shooting to the left leg (posteriorly to the knee)   Objective: See treatment diary below      Assessment:  Repeated extension did not centralize symptoms today  Left hip pain still reproduced with hip ROM  Initiated sciatic nerve glide to reduce neural sensitivity  Also performed hip rocking in quadruped to help with hip flexion ROM  Tolerated treatment fair  Patient would benefit from continued PT  Plan: Continue per plan of care        Precautions: Meningioma, H/O Depression      Manuals           CPA of L/S 5' gr II 5' gr II 5' gr II          L hip LAD 3' 3'           Caudal glides Gr III 3' Gr III 3' Gr III 3'                       Neuro Re-Ed             TrA bridge 2x10 2x10 2x10          TrA hold c/ PB 2x10 2x10  2x10          TrA clam shell 2x10 2x10 SL 3#          TrA SLR  2x10           Standing hip abduction  2x10           Standing hip extension  2x10           TrA March   2x10          Sciatic nerve glide   2x10          Ther Ex             TM walking 8' 8' 8'          LTR  x20 20x          Piriformis stretch  2x20" 3x20"          Hamstring stretch             Hip rocking in quad    20x5"                                                 Ther Activity             Lateral walks             Leg press                                       Gait Training                                       Modalities                                       1:1 with PT from [phone]

## 2022-08-16 ENCOUNTER — OFFICE VISIT (OUTPATIENT)
Dept: PHYSICAL THERAPY | Facility: CLINIC | Age: 57
End: 2022-08-16
Payer: COMMERCIAL

## 2022-08-16 DIAGNOSIS — M54.16 LUMBAR BACK PAIN WITH RADICULOPATHY AFFECTING LEFT LOWER EXTREMITY: Primary | ICD-10-CM

## 2022-08-16 DIAGNOSIS — M54.16 LUMBAR RADICULOPATHY: ICD-10-CM

## 2022-08-16 PROCEDURE — 97110 THERAPEUTIC EXERCISES: CPT | Performed by: PHYSICAL THERAPIST

## 2022-08-16 PROCEDURE — 97112 NEUROMUSCULAR REEDUCATION: CPT | Performed by: PHYSICAL THERAPIST

## 2022-08-16 PROCEDURE — 97140 MANUAL THERAPY 1/> REGIONS: CPT | Performed by: PHYSICAL THERAPIST

## 2022-08-16 NOTE — PROGRESS NOTES
Daily Note     Today's date: 2022  Patient name: Tolu Warner  : 1965  MRN: [id number]  Referring provider: Nelda Acuña MD  Dx:   Encounter Diagnosis     ICD-10-CM    1  Lumbar back pain with radiculopathy affecting left lower extremity  M54 16    2  Lumbar radiculopathy  M54 16                   Subjective: Pt offers no new complaints today  Symptoms are less into the LE and less intense  Objective: See treatment diary below      Assessment: Tolerated treatment well  Continued with core stabilization program  No radicular symptoms with lumbar spine mobilizations  Patient exhibited good technique with therapeutic exercises and would benefit from continued PT  Plan: Continue per plan of care        Precautions: Meningioma, H/O Depression      Manuals          CPA of L/S 5' gr II 5' gr II 5' gr II 8' gr II         L hip LAD 3' 3'           Caudal glides Gr III 3' Gr III 3' Gr III 3' Hold                      Neuro Re-Ed             TrA bridge 2x10 2x10 2x10 2x10         TrA hold c/ PB 2x10 2x10  2x10 2x10         TrA clam shell 2x10 2x10 SL 3# SL 3# 2x10          TrA SLR  2x10           Standing hip abduction  2x10           Standing hip extension  2x10           TrA March   2x10 2x10         Sciatic nerve glide   2x10          Ther Ex             TM walking 8' 8' 8' 9'          LTR  x20 20x 20x         Piriformis stretch  2x20" 3x20"          Hamstring stretch             Hip rocking in quad    20x5" 20x5"                                                Ther Activity             Lateral walks             Leg press                                       Gait Training                                       Modalities                                       1:1 with PT from [phone]

## 2022-08-18 ENCOUNTER — OFFICE VISIT (OUTPATIENT)
Dept: PHYSICAL THERAPY | Facility: CLINIC | Age: 57
End: 2022-08-18
Payer: COMMERCIAL

## 2022-08-18 DIAGNOSIS — M54.16 LUMBAR BACK PAIN WITH RADICULOPATHY AFFECTING LEFT LOWER EXTREMITY: Primary | ICD-10-CM

## 2022-08-18 DIAGNOSIS — M54.16 LUMBAR RADICULOPATHY: ICD-10-CM

## 2022-08-18 PROCEDURE — 97140 MANUAL THERAPY 1/> REGIONS: CPT | Performed by: PHYSICAL THERAPIST

## 2022-08-18 PROCEDURE — 97112 NEUROMUSCULAR REEDUCATION: CPT | Performed by: PHYSICAL THERAPIST

## 2022-08-18 PROCEDURE — 97110 THERAPEUTIC EXERCISES: CPT | Performed by: PHYSICAL THERAPIST

## 2022-08-18 NOTE — PROGRESS NOTES
Daily Note     Today's date: 2022  Patient name: Celia Corado  : 1965  MRN: [id number]  Referring provider: Ivana Torres MD  Dx:   Encounter Diagnosis     ICD-10-CM    1  Lumbar back pain with radiculopathy affecting left lower extremity  M54 16    2  Lumbar radiculopathy  M54 16        Start Time: 1630  Stop Time: 1705  Total time in clinic (min): 35 minutes    Subjective: Pt arrived late and was accommodated  Pt states her ankle wound is infected and she is now on anti-biotics  Objective: See treatment diary below      Assessment: Pt moderately tender to gluteal insertion at iliac crest on the left side  Performed STM to this region to help desensitize this  Tolerated treatment fair  Patient would benefit from continued PT  Plan: Continue per plan of care        Precautions: Meningioma, H/O Depression      Manuals         CPA of L/S 5' gr II 5' gr II 5' gr II 8' gr II 6' gr II        L hip LAD 3' 3'           Caudal glides Gr III 3' Gr III 3' Gr III 3' Hold         STM to left gluteal insertion at iliac crest     4'        Neuro Re-Ed             TrA bridge 2x10 2x10 2x10 2x10 2x10        TrA hold c/ PB 2x10 2x10  2x10 2x10 2x10        TrA clam shell 2x10 2x10 SL 3# SL 3# 2x10  SL 3# 2x10         TrA SLR  2x10           Standing hip abduction  2x10           Standing hip extension  2x10           TrA March   2x10 2x10         Sciatic nerve glide   2x10          Ther Ex             TM walking 8' 8' 8' 9'  NP        LTR  x20 20x 20x 20x        Piriformis stretch  2x20" 3x20"  10x10"        Hamstring stretch             Hip rocking in quad    20x5" 20x5" 20x5"                                               Ther Activity             Lateral walks             Leg press                                       Gait Training                                       Modalities                                       1:1 with PT from [phone]

## 2022-08-23 ENCOUNTER — OFFICE VISIT (OUTPATIENT)
Dept: PHYSICAL THERAPY | Facility: CLINIC | Age: 57
End: 2022-08-23
Payer: COMMERCIAL

## 2022-08-23 DIAGNOSIS — M54.16 LUMBAR RADICULOPATHY: ICD-10-CM

## 2022-08-23 DIAGNOSIS — M54.16 LUMBAR BACK PAIN WITH RADICULOPATHY AFFECTING LEFT LOWER EXTREMITY: Primary | ICD-10-CM

## 2022-08-23 PROCEDURE — 97140 MANUAL THERAPY 1/> REGIONS: CPT | Performed by: PHYSICAL THERAPIST

## 2022-08-23 PROCEDURE — 97112 NEUROMUSCULAR REEDUCATION: CPT | Performed by: PHYSICAL THERAPIST

## 2022-08-23 PROCEDURE — 97110 THERAPEUTIC EXERCISES: CPT | Performed by: PHYSICAL THERAPIST

## 2022-08-23 NOTE — PROGRESS NOTES
Daily Note     Today's date: 2022  Patient name: Aurora Raza  : 1965  MRN: [id number]  Referring provider: Reed Cotter MD  Dx:   Encounter Diagnosis     ICD-10-CM    1  Lumbar back pain with radiculopathy affecting left lower extremity  M54 16    2  Lumbar radiculopathy  M54 16        Start Time: 1708  Stop Time: 1745  Total time in clinic (min): 37 minutes    Subjective: Pt notes back pain improving  Objective: See treatment diary below      Assessment: Left gluteal region still moderately tender but this is just transient  Tolerated treatment well  Continued strength and stabilization exercises with good tolerance  Patient would benefit from continued PT  Plan: Continue per plan of care        Precautions: Meningioma, H/O Depression      Manuals        CPA of L/S 5' gr II 5' gr II 5' gr II 8' gr II 6' gr II 6' gr II+III       L hip LAD 3' 3'           Caudal glides Gr III 3' Gr III 3' Gr III 3' Hold         STM to left gluteal insertion at iliac crest     4' 4'       Neuro Re-Ed             TrA bridge 2x10 2x10 2x10 2x10 2x10 2x10       TrA hold c/ PB 2x10 2x10  2x10 2x10 2x10 2x10       TrA clam shell 2x10 2x10 SL 3# SL 3# 2x10  SL 3# 2x10         TrA SLR  2x10           Standing hip abduction  2x10           Standing hip extension  2x10           TrA March   2x10 2x10         Sciatic nerve glide   2x10          Ther Ex             TM walking 8' 8' 8' 9'  NP 7'       LTR  x20 20x 20x 20x 20x       Piriformis stretch  2x20" 3x20"  10x10" 10x10"       Hamstring stretch             Hip rocking in quad    20x5" 20x5" 20x5"        Prone press up                                        Ther Activity             Lateral walks             Leg press                                       Gait Training                                       Modalities                                       1:1 with PT from [phone]

## 2022-08-25 ENCOUNTER — APPOINTMENT (OUTPATIENT)
Dept: PHYSICAL THERAPY | Facility: CLINIC | Age: 57
End: 2022-08-25
Payer: COMMERCIAL

## 2022-08-30 ENCOUNTER — EVALUATION (OUTPATIENT)
Dept: PHYSICAL THERAPY | Facility: CLINIC | Age: 57
End: 2022-08-30
Payer: COMMERCIAL

## 2022-08-30 DIAGNOSIS — M54.16 LUMBAR BACK PAIN WITH RADICULOPATHY AFFECTING LEFT LOWER EXTREMITY: Primary | ICD-10-CM

## 2022-08-30 DIAGNOSIS — M54.16 LUMBAR RADICULOPATHY: ICD-10-CM

## 2022-08-30 PROCEDURE — 97140 MANUAL THERAPY 1/> REGIONS: CPT | Performed by: PHYSICAL THERAPIST

## 2022-08-30 PROCEDURE — 97112 NEUROMUSCULAR REEDUCATION: CPT | Performed by: PHYSICAL THERAPIST

## 2022-08-30 PROCEDURE — 97110 THERAPEUTIC EXERCISES: CPT | Performed by: PHYSICAL THERAPIST

## 2022-08-30 PROCEDURE — 97530 THERAPEUTIC ACTIVITIES: CPT | Performed by: PHYSICAL THERAPIST

## 2022-08-30 NOTE — PROGRESS NOTES
[id number]    Today's date: 2022  Patient name: Valentin Washington  : 1965  MRN: [id number]  Referring provider: Anjelica Mosqueda MD  Dx:   Encounter Diagnosis     ICD-10-CM    1  Lumbar back pain with radiculopathy affecting left lower extremity  M54 16    2  Lumbar radiculopathy  M54 16                   Assessment  Assessment details: Anil Guerrero reports good improvement in radicular pain in LLE  Functional status measure has improved to 72  Patient notes pain has reduced to 0-4/10  She no longer is experiencing constant discomfort in LLE  She has demonstrated improvement in lumbar spine ROM and hip ROM  Her left hip strength has improved, although mild weakness continues with abduction, ER, and extension  Overall, patient has achieved most goals and would like to continue her HEP  Pt will be discharged from PT at this time  Thank you for this referral      Primary movement impairments:  1)Decreased lumbar spine/left hip ROM_ Improving  2)Poor motor control of gluteals on the L (likely due to pain)_Improving   3)L/S joint hypomobility  And decreased flexibility of L hip _ Improving     Impairments: abnormal muscle firing, abnormal or restricted ROM, activity intolerance, impaired physical strength, lacks appropriate home exercise program, pain with function and weight-bearing intolerance  Understanding of Dx/Px/POC: good   Prognosis: good    Goals  Short Term Goals: to be achieved by 4 weeks  1) Patient to be independent with basic HEP -MET  2) Decrease pain to 2/10 at its worst -Partially met  3) Increase lumbar spine and left hip ROM by 5-10 degrees -MET  4) Increase LLE strength (left hip) by 1/2 MMT grade in all deficient planes  -MET    Long Term Goals: to be achieved by discharge  1) FOTO equal to or greater than expected  -MET  2) Ambulation to improve to maximal level of function-MET  2) Sit to stand transfers will improve to maximal level of function -MET      Plan  D/C to HEP           Subjective Evaluation    History of Present Illness  Mechanism of injury: History of Current Injury: Pt referred to PT due to acute on chronic LBP with LLE symptoms after slipping and falling on a cruise at end of   She was prescribed a muscle relaxant on 22  Pt was previously evaluated for this condition in 2021 with successful treatment  Her symptoms at that time were waxing/waning and occurring in bilateral LE, worse on the left  Pt recently had XR which suggest age related changes  Initially, both legs were painful  Pain location/Descriptors: P1: Sharp posterior buttock and lateral hip which radiates to mid femur region  Denies N&T  Aggravating factors: transferring into and out of car, sitting for too long, prolonged standing, lifting  Easing factors: Tylenol,   24 HR pattern: Depending on movement  Imaging: XR   Special Questions: Denies weakness or N&T  Denies any bowel/bladder changes  Patient goals:  Walking, live with less pain, lifting pain free  Hobbies/Interest: Walking but hasn't been able to recently     Occupation: Work at [employer]    Pain  Current pain ratin  At worst pain ratin      Diagnostic Tests  X-ray: normal  Treatments  No previous or current treatments  Patient Goals  Patient goals for therapy: increased strength, decreased pain, increased motion and independence with ADLs/IADLs          Objective     Neurological Testing     Sensation     Lumbar   Left   Intact: light touch    Right   Intact: light touch    Reflexes   Left   Patellar (L4): normal (2+)  Achilles (S1): normal (2+)  Clonus sign: negative    Right   Patellar (L4): normal (2+)  Achilles (S1): normal (2+)  Clonus sign: negative    Active Range of Motion     Lumbar   Flexion: 102 degrees    Extension: 45 degrees   Left lateral flexion: 40 degrees with stretch     Right lateral flexion: 45 degrees  With stretch    Additional Active Range of Motion Details    Strength/Myotome Testing     Lumbar   Left   Heel walk: normal  Toe walk: normal    Right   Heel walk: normal  Toe walk: normal    Left Hip   Planes of Motion   Flexion: 4+  Extension: 4+  Abduction: 4  External rotation: 4+    Right Hip   Planes of Motion   Flexion: 5  Extension: 5  Abduction: 5  External rotation: 5    Left Knee   Flexion: 5  Extension: 5    Right Knee   Flexion: 5  Extension: 5    Left Ankle/Foot   Dorsiflexion: 5  Plantar flexion: 5  Great toe extension: 5    Right Ankle/Foot   Dorsiflexion: 5  Plantar flexion: 5  Great toe extension: 5    Tests     Lumbar     Left   Positive passive SLR and quadrant  Right   Positive quadrant  Negative passive SLR  Left Hip   Neg LAURENCE   Pos FADIR       Additional Tests Details  +neural tension with Left SLR at 80 deg  Neg LAURENCE, +FADIR, and quadrant test of left hip  *Pain with OP into Left hip flexion (past 90 deg) , end range ER (60 deg) , and mid range IR (10 deg) - (8/30: Hip ROM: 128 flexion, 75 ER, 38 IR P!)  *Mild tenderness to gluteals and GT bursa on the L    *L1-5 joint hypomobility upon CPA - This improving     Precautions: Meningioma, H/O Depression      Manuals 8/2 8/4 8/11 8/16 8/18 8/23 8/30      CPA of L/S 5' gr II 5' gr II 5' gr II 8' gr II 6' gr II 6' gr II+III 6' gr II+III      L hip LAD 3' 3'           Caudal glides Gr III 3' Gr III 3' Gr III 3' Hold         STM to left gluteal insertion at iliac crest     4' 4' 4'      Neuro Re-Ed             TrA bridge 2x10 2x10 2x10 2x10 2x10 2x10       TrA hold c/ PB 2x10 2x10  2x10 2x10 2x10 2x10       TrA clam shell 2x10 2x10 SL 3# SL 3# 2x10  SL 3# 2x10         TrA SLR  2x10           Standing hip abduction  2x10           Standing hip extension  2x10           TrA March   2x10 2x10         Sciatic nerve glide   2x10          Ther Ex             TM walking 8' 8' 8' 9'  NP 7' 10'      LTR  x20 20x 20x 20x 20x       Piriformis stretch  2x20" 3x20"  10x10" 10x10"       Hamstring stretch             Hip rocking in quad    20x5" 20x5" 20x5" Prone press up                                        Ther Activity             Lateral walks             Leg press                                       Gait Training                                       Modalities                                       1:1 with PT from [phone]  Pt was re-evaluated today x 15 minutes    Paradine  Access Code: [email or web address]

## 2022-12-08 ENCOUNTER — HOSPITAL ENCOUNTER (OUTPATIENT)
Dept: MRI IMAGING | Facility: HOSPITAL | Age: 57
Discharge: HOME | End: 2022-12-08

## 2022-12-08 DIAGNOSIS — D32.0 MENINGIOMA, CEREBRAL (HCC): ICD-10-CM

## 2022-12-08 RX ADMIN — GADOBUTROL 9 ML: 604.72 INJECTION INTRAVENOUS at 19:25

## 2022-12-10 DIAGNOSIS — E78.00 HYPERCHOLESTEROLEMIA: ICD-10-CM

## 2022-12-12 RX ORDER — PRAVASTATIN SODIUM 20 MG
TABLET ORAL
Qty: 90 TABLET | Refills: 1 | Status: SHIPPED | OUTPATIENT
Start: 2022-12-12

## 2022-12-13 ENCOUNTER — HOSPITAL ENCOUNTER (OUTPATIENT)
Dept: MAMMOGRAPHY | Facility: CLINIC | Age: 57
Discharge: HOME | End: 2022-12-13

## 2022-12-13 DIAGNOSIS — Z12.31 ENCOUNTER FOR SCREENING MAMMOGRAM FOR MALIGNANT NEOPLASM OF BREAST: ICD-10-CM

## 2022-12-21 ENCOUNTER — TELEMEDICINE (OUTPATIENT)
Dept: NEUROSURGERY | Facility: CLINIC | Age: 57
End: 2022-12-21

## 2022-12-21 DIAGNOSIS — D32.0 MENINGIOMA, CEREBRAL (HCC): Primary | ICD-10-CM

## 2022-12-21 NOTE — PROGRESS NOTES
Virtual Regular Visit    Verification of patient location:    Patient is located in the following state in which I hold an active license [place]      Assessment/Plan:    Problem List Items Addressed This Visit        Nervous and Auditory    Meningioma, cerebral ([address] ) - Primary     Patient presents to outpatient office today for surveillance of left frontal meningioma  · This was an incidental finding when patient was being worked up for dizziness by Neurology in May 2019  Imaging:    MRI brain w/wo 12/8/2022:Left frontal meningioma is unchanged from 2019 with stable mild local mass effect  No associated vasogenic edema  No acute infarction, intracranial hemorrhage  Plan:  · Reviewed imaging with patient  MRI brain demonstrates stable left frontal meningioma with no surrounding vasogenic edema or mass effect     · At this time patient would like to continue with surveillance and regular MRIs  Per NCCN guidelines patient will follow-up in 1 year with MRI brain  · Patient advised if she develops worsening visual changes, worsening headaches, seizures, weakness in arms or legs and or memory loss to follow-up sooner  · To assess her symptoms of persistent dizziness and fogginess, recommend that the patient follow up with her Neurologist; she has not seen since 2019  · Reviewed recommendations with patient  Patient showed understanding and is agreeable to recommendations  · Patient or to contact Neurosurgery any further questions or concerns               Relevant Orders    MRI brain with and without contrast            Reason for visit is   Chief Complaint   Patient presents with   • Virtual Regular Visit     (AMWELL: [phone]) 1 YR F/U WITH MRI BRAIN [facility] FOR COGNITIVE DISORDERS 12/8/22 SL; CONTINUED SURVEILLANCE OF INCIDENTAL FINDING OF MENINGIOMA        Encounter provider DAX Dumont    Provider located at [address] [place]  [address] [place] [address]  [phone]      Recent Visits  No visits were found meeting these conditions  Showing recent visits within past 7 days and meeting all other requirements  Today's Visits  Date Type Provider Dept   12/21/22 Ericka [facility] today's visits and meeting all other requirements  Future Appointments  No visits were found meeting these conditions  Showing future appointments within next 150 days and meeting all other requirements       The patient was identified by name and date of birth  Marilynn Ken was informed that this is a telemedicine visit and that the visit is being conducted through the [address] [employer] platform  She agrees to proceed     My office door was closed  No one else was in the room  She acknowledged consent and understanding of privacy and security of the video platform  The patient has agreed to participate and understands they can discontinue the visit at any time  Patient is aware this is a billable service  Subjective  Marilynn Ken is a 61 y o  female with past medical history significant for esophageal reflux, hypercholesterolemia, depression, vertigo, episodic lightheadedness, obesity, gastric bypass, and hemorrhoids  Patient seen in outpatient office today for surveillance of left frontal meningioma and to review MRI brain  This was an incidental finding on MRI brain when patient was being worked up for dizziness by neurology in May 2019  Patient reports that things began when she went to see her GYN because she was having dizziness for a few months and was assuming she was having hot flashes  Her GYN recommended against treating her for hot flashes at that time and referred her to her PCP  Per chart review she then called her PCP office and they referred her to a neurologist   Neurology ordered an MRI and ultrasound    Patient was initially seen in consult by our service in May 2019 for a small 15 mm convexity left frontal meningioma  Patient at that time described episodes of rapid onset of dizziness with diaphoresis similar to hot flashes although patient described profound tiredness after episodes  Patient states she continues to have these episodes, she reports having about two a month  She states she was told by endocrine that it could be a drop in her blood sugar causing these episodes  When she has these episodes she endorses lightheadedness and brain fogginess with sweating after episodes  Patient also endorses right eye floater which was there before and she is going to see her ophthalmologist   She also endorses some abdominal pain as well as reflux  She denies any recent falls or traumas or difficulty with her balance  She also reports some clicking noise in her neck when turning it to the right which has improved  She denies any neck pain or radiating arm pain or numbness/tingling  She denies any headaches, blurry vision, chest pain, shortness of breath, abdominal pain, nausea, vomiting, diarrhea, no problems bowel or bladder, no new weakness or numbness/tingling  HPI     Past Medical History:   Diagnosis Date   • Abdominal pain    • Chronic pain disorder     abdominal   • Depression    • Diverticula of colon    • Former smoker     Resolved 2012    • GERD (gastroesophageal reflux disease)    • Hemorrhoids    • Hiatal hernia    • Hyperlipidemia    • Intestinal malabsorption following gastrectomy    • Morbid obesity ([facility] [address] )     Resolved 10/2/2015    • Obesity    • Postgastrectomy malabsorption    • Vitamin D insufficiency     Resolved 2016        Past Surgical History:   Procedure Laterality Date   • ABDOMINAL SURGERY      gastric bipass   • ANKLE SURGERY     •  SECTION      (2) ,     • COLONOSCOPY     • ESOPHAGOGASTRODUODENOSCOPY      x2   • GASTRIC BYPASS  2015   • HERNIA REPAIR  2015    Paraesophageal hiatus hernia   Managed by [facility])   • NM EGD TRANSORAL BIOPSY SINGLE/MULTIPLE N/A 6/7/2017    Procedure: ESOPHAGOGASTRODUODENOSCOPY (EGD); Surgeon: Meño Perez MD;  Location: [facility]; Service: Bariatrics   • TONSILLECTOMY  1984   • UTERINE FIBROID SURGERY         Current Outpatient Medications   Medication Sig Dispense Refill   • calcium carbonate (TUMS) 500 mg chewable tablet Chew 2 tablets daily     • Calcium Citrate-Vitamin D 250-200 MG-UNIT TABS Take 1 tablet by mouth 3 (three) times a day     • estradiol (VIVELLE-DOT) 0 025 MG/24HR PLACE 1 PATCH ON THE SKIN 2 TIMES A WEEK     • FLUoxetine (PROzac) 20 mg capsule TAKE 2 CAPSULES BY MOUTH EVERY DAY (Patient taking differently: Take 20 mg by mouth daily) 180 capsule 1   • Melatonin 5 MG CAPS Take 5 mg by mouth daily at bedtime      • MULTIPLE VITAMIN PO Take 1 tablet by mouth daily     • Omega-3 Fatty Acids (Fish Oil) 600 MG CAPS Take by mouth     • pravastatin (PRAVACHOL) 20 mg tablet TAKE 1 TABLET BY MOUTH EVERY DAY 90 tablet 1   • progesterone (PROMETRIUM) 100 MG capsule Take 100 mg by mouth daily     • Vitamin E 100 UNIT/GM CREA Take by mouth       No current facility-administered medications for this visit  No Known Allergies    Review of Systems   Constitutional: Negative  HENT: Negative  Eyes: Negative  Respiratory: Negative  Cardiovascular: Negative  Gastrointestinal: Negative  Endocrine: Negative  Following with Endocrinologist   Genitourinary: Negative  Musculoskeletal: Negative  Skin: Negative  Allergic/Immunologic: Negative  Neurological: Positive for dizziness, light-headedness and numbness (hands)  Hematological: Negative  Psychiatric/Behavioral: Negative  All other systems reviewed and are negative      ROS reviewed with patient and agree and changes are made as needed    Video Exam    Vitals:     Exam:    Patient is alert and oriented x3, speech is clear, following commands, smile symmetrical, tongue midline,EOMI, vision and hearing grossly intact, patient able to shrug bilateral shoulders up  Patient able to lift bilateral arms above her head without difficulty or discomfort  Patient able to lift legs off a chair without difficulty or discomfort  Per patient sensation light touch is grossly intact in all extremities  Patient is able to perform simple math and identify an object as well as repeat a sentence without difficulty      I spent 20 minutes directly with the patient during this visit

## 2022-12-21 NOTE — ASSESSMENT & PLAN NOTE
Patient presents to outpatient office today for surveillance of left frontal meningioma  · This was an incidental finding when patient was being worked up for dizziness by Neurology in May 2019  Imaging:    MRI brain w/wo 12/8/2022:Left frontal meningioma is unchanged from 2019 with stable mild local mass effect  No associated vasogenic edema  No acute infarction, intracranial hemorrhage  Plan:  · Reviewed imaging with patient  MRI brain demonstrates stable left frontal meningioma with no surrounding vasogenic edema or mass effect     · At this time patient would like to continue with surveillance and regular MRIs  Per NCCN guidelines patient will follow-up in 1 year with MRI brain  · Patient advised if she develops worsening visual changes, worsening headaches, seizures, weakness in arms or legs and or memory loss to follow-up sooner  · To assess her symptoms of persistent dizziness and fogginess, recommend that the patient follow up with her Neurologist; she has not seen since 2019  · Reviewed recommendations with patient  Patient showed understanding and is agreeable to recommendations  · Patient or to contact Neurosurgery any further questions or concerns

## 2023-01-13 ENCOUNTER — APPOINTMENT (OUTPATIENT)
Dept: LAB | Facility: CLINIC | Age: 58
End: 2023-01-13

## 2023-01-13 DIAGNOSIS — E55.9 VITAMIN D DEFICIENCY: ICD-10-CM

## 2023-01-13 DIAGNOSIS — E21.3 HYPERPARATHYROIDISM (HCC): ICD-10-CM

## 2023-01-13 DIAGNOSIS — E78.00 HYPERCHOLESTEROLEMIA: ICD-10-CM

## 2023-01-13 LAB
25(OH)D3 SERPL-MCNC: 63.5 NG/ML (ref 30–100)
ALBUMIN SERPL BCP-MCNC: 3.8 G/DL (ref 3.5–5)
ALP SERPL-CCNC: 69 U/L (ref 46–116)
ALT SERPL W P-5'-P-CCNC: 27 U/L (ref 12–78)
ANION GAP SERPL CALCULATED.3IONS-SCNC: 4 MMOL/L (ref 4–13)
AST SERPL W P-5'-P-CCNC: 23 U/L (ref 5–45)
BILIRUB SERPL-MCNC: 0.61 MG/DL (ref 0.2–1)
BUN SERPL-MCNC: 20 MG/DL (ref 5–25)
CA-I BLD-SCNC: 1.27 MMOL/L (ref 1.12–1.32)
CALCIUM SERPL-MCNC: 9.9 MG/DL (ref 8.3–10.1)
CHLORIDE SERPL-SCNC: 107 MMOL/L (ref 96–108)
CHOLEST SERPL-MCNC: 177 MG/DL
CO2 SERPL-SCNC: 29 MMOL/L (ref 21–32)
CREAT SERPL-MCNC: 0.94 MG/DL (ref 0.6–1.3)
GFR SERPL CREATININE-BSD FRML MDRD: 67 ML/MIN/1.73SQ M
GLUCOSE P FAST SERPL-MCNC: 92 MG/DL (ref 65–99)
HDLC SERPL-MCNC: 78 MG/DL
LDLC SERPL CALC-MCNC: 89 MG/DL (ref 0–100)
PHOSPHATE SERPL-MCNC: 3.8 MG/DL (ref 2.7–4.5)
POTASSIUM SERPL-SCNC: 4.3 MMOL/L (ref 3.5–5.3)
PROT SERPL-MCNC: 6.6 G/DL (ref 6.4–8.4)
PTH-INTACT SERPL-MCNC: 48 PG/ML (ref 18.4–80.1)
SODIUM SERPL-SCNC: 140 MMOL/L (ref 135–147)
TRIGL SERPL-MCNC: 48 MG/DL

## 2023-01-17 ENCOUNTER — APPOINTMENT (OUTPATIENT)
Dept: LAB | Facility: CLINIC | Age: 58
End: 2023-01-17

## 2023-01-17 LAB
CALCIUM 24H UR-MCNC: 294 MG/24 HRS (ref 42–353)
CREAT 24H UR-MRATE: 1.5 G/24HR (ref 0.6–1.8)
SPECIMEN VOL UR: 2000 ML
SPECIMEN VOL UR: 2000 ML

## 2023-01-18 ENCOUNTER — OFFICE VISIT (OUTPATIENT)
Dept: ENDOCRINOLOGY | Facility: CLINIC | Age: 58
End: 2023-01-18

## 2023-01-18 VITALS
SYSTOLIC BLOOD PRESSURE: 124 MMHG | DIASTOLIC BLOOD PRESSURE: 80 MMHG | WEIGHT: 210 LBS | BODY MASS INDEX: 37.21 KG/M2 | HEART RATE: 57 BPM | HEIGHT: 63 IN | OXYGEN SATURATION: 97 % | TEMPERATURE: 97 F

## 2023-01-18 DIAGNOSIS — E78.00 HYPERCHOLESTEROLEMIA: ICD-10-CM

## 2023-01-18 DIAGNOSIS — Z90.3 POSTGASTRECTOMY MALABSORPTION: Primary | ICD-10-CM

## 2023-01-18 DIAGNOSIS — E21.3 HYPERPARATHYROIDISM (HCC): ICD-10-CM

## 2023-01-18 DIAGNOSIS — E66.9 CLASS 2 OBESITY WITHOUT SERIOUS COMORBIDITY WITH BODY MASS INDEX (BMI) OF 36.0 TO 36.9 IN ADULT, UNSPECIFIED OBESITY TYPE: ICD-10-CM

## 2023-01-18 DIAGNOSIS — K91.2 POSTGASTRECTOMY MALABSORPTION: Primary | ICD-10-CM

## 2023-01-18 RX ORDER — MULTIVIT WITH MIN/MFOLATE/K2 340-15/3 G
POWDER (GRAM) ORAL
COMMUNITY
Start: 2022-10-01

## 2023-01-18 NOTE — PROGRESS NOTES
Follow-up Patient Progress Note      CC:hyperparathyroidism     History of Present Illness:   61 yr female with obesity s/p RYGB 2016, post bypass malabsorption, postprandial hypoglycemia, HLD, cervical spondylosis, lt frontal meningioma and recently found elevated PTH  Last visit was 7/11/22      She is taking 2 5gm calcium daily and vitamin D since last visit  She also did 24 hr urine collection last week but results from quest showed normal calcium corrected to 9 1mg/dL, nml ionized calcium, notmal bit D 61ng/dL, nml phosphorus 3 5mg/dL and elevated urine calcium 430mg/dL on 1600cc with urine cr 1 2mg(significantly >serum cr 0 8mg/dl)  She lost 110 lbs post bypass   since last visit, She gained 7 lbs      She is perimenopausal     Patient Active Problem List   Diagnosis   • Hypercholesterolemia   • Esophageal reflux   • Gastroenteritis   • Endometrial polyp   • Major depressive disorder with single episode, in partial remission (HCC)   • Postgastrectomy malabsorption   • Sea sickness   • Vertigo   • Postural dizziness with near syncope   • Meningioma, cerebral (HCC)   • Episodic lightheadedness   • Encounter for surgical aftercare following surgery of digestive system   • Weight gain following gastric bypass surgery   • Class 2 obesity without serious comorbidity with body mass index (BMI) of 36 0 to 36 9 in adult   • Tinea pedis     Past Medical History:   Diagnosis Date   • Abdominal pain    • Chronic pain disorder     abdominal   • Depression    • Diverticula of colon    • Former smoker     Resolved 7/2012    • GERD (gastroesophageal reflux disease)    • Hemorrhoids    • Hiatal hernia    • Hyperlipidemia    • Intestinal malabsorption following gastrectomy    • Morbid obesity ([address] )     Resolved 10/2/2015    • Obesity    • Postgastrectomy malabsorption    • Vitamin D insufficiency     Resolved 1/14/2016       Past Surgical History:   Procedure Laterality Date   • ABDOMINAL SURGERY      gastric bipass   • ANKLE SURGERY     •  SECTION      (2) ,     • COLONOSCOPY     • ESOPHAGOGASTRODUODENOSCOPY      x2   • GASTRIC BYPASS  2015   • HERNIA REPAIR  2015    Paraesophageal hiatus hernia  Managed by Robert Temple (General Surgery)   • RI EGD TRANSORAL BIOPSY SINGLE/MULTIPLE N/A 2017    Procedure: ESOPHAGOGASTRODUODENOSCOPY (EGD); Surgeon: Marcelino Noriega MD;  Location: [facility];   Service: Bariatrics   • TONSILLECTOMY     • UTERINE FIBROID SURGERY        Family History   Problem Relation Age of Onset   • Breast cancer Mother 32   • Prostate cancer Father 48   • Hypertension Father    • Heart disease Father         Coronary arteriosclerosis    • Hyperlipidemia Father    • Heart disease Maternal Grandmother         Coronary arteriosclerosis    • Heart disease Maternal Grandfather         Coronary arteriosclerosis    • Diabetes Paternal Grandmother    • No Known Problems Brother    • No Known Problems Daughter    • No Known Problems Daughter    • No Known Problems Paternal Aunt    • Stroke Neg Hx    • Thyroid disease Neg Hx      Social History     Tobacco Use   • Smoking status: Former     Types: Cigarettes     Quit date:      Years since quittin 0   • Smokeless tobacco: Never   • Tobacco comments:     20+ pack year hx - As per [employer]    Substance Use Topics   • Alcohol use: Yes     No Known Allergies      Current Outpatient Medications:   •  Cholecalciferol (D3 5000) 125 MCG (5000 UT) capsule, , Disp: , Rfl:   •  Calcium Citrate-Vitamin D 250-200 MG-UNIT TABS, Take 1 tablet by mouth 3 (three) times a day, Disp: , Rfl:   •  estradiol (VIVELLE-DOT) 0 025 MG/24HR, PLACE 1 PATCH ON THE SKIN 2 TIMES A WEEK, Disp: , Rfl:   •  FLUoxetine (PROzac) 20 mg capsule, TAKE 2 CAPSULES BY MOUTH EVERY DAY (Patient taking differently: Take 20 mg by mouth daily), Disp: 180 capsule, Rfl: 1  •  Melatonin 5 MG CAPS, Take 5 mg by mouth daily at bedtime , Disp: , Rfl:   •  MULTIPLE VITAMIN PO, Take 1 tablet by mouth daily, Disp: , Rfl:   •  pravastatin (PRAVACHOL) 20 mg tablet, TAKE 1 TABLET BY MOUTH EVERY DAY, Disp: 90 tablet, Rfl: 1  •  progesterone (PROMETRIUM) 100 MG capsule, Take 100 mg by mouth daily, Disp: , Rfl:     Review of Systems   Constitutional: Positive for fatigue  HENT: Negative  Eyes: Negative  Respiratory: Negative  Cardiovascular: Negative  Gastrointestinal: Negative  Endocrine: Negative  Musculoskeletal: Negative  Skin: Negative  Allergic/Immunologic: Negative  Neurological: Negative  Hematological: Negative  Psychiatric/Behavioral: Negative  Physical Exam:  Body mass index is 37 2 kg/m²  /80   Pulse 57   Temp (!) 97 °F (36 1 °C)   Ht 5' 3" (1 6 m)   Wt 95 3 kg (210 lb)   SpO2 97%   BMI 37 20 kg/m²    Vitals:    01/18/23 0821   Weight: 95 3 kg (210 lb)        Physical Exam  Constitutional:       Appearance: She is well-developed  HENT:      Head: Normocephalic  Eyes:      Pupils: Pupils are equal, round, and reactive to light  Neck:      Thyroid: No thyromegaly  Cardiovascular:      Rate and Rhythm: Normal rate  Heart sounds: Normal heart sounds  Pulmonary:      Effort: Pulmonary effort is normal       Breath sounds: Normal breath sounds  Abdominal:      General: Bowel sounds are normal       Palpations: Abdomen is soft  Musculoskeletal:         General: No deformity  Cervical back: Normal range of motion  Skin:     Capillary Refill: Capillary refill takes less than 2 seconds  Coloration: Skin is not pale  Findings: No rash  Neurological:      Mental Status: She is alert and oriented to person, place, and time           Labs:   No results found for: HGBA1C    Lab Results   Component Value Date    WCG5ATSMHRDN 2 240 02/23/2022       Lab Results   Component Value Date    CREATININE 0 94 01/13/2023    CREATININE 0 81 07/11/2022    CREATININE 0 81 02/23/2022    BUN 20 01/13/2023     06/17/2015    K 4 3 01/13/2023     01/13/2023    CO2 29 01/13/2023     eGFR   Date Value Ref Range Status   01/13/2023 67 ml/min/1 73sq m Final       Lab Results   Component Value Date    ALT 27 01/13/2023    AST 23 01/13/2023    ALKPHOS 69 01/13/2023       Lab Results   Component Value Date    CHOLESTEROL 177 01/13/2023    CHOLESTEROL 170 02/11/2020     Lab Results   Component Value Date    HDL 78 01/13/2023    HDL 62 02/11/2020     Lab Results   Component Value Date    TRIG 48 01/13/2023    TRIG 54 02/11/2020     No results found for: NONHDLC      Impression:  1  Postgastrectomy malabsorption    2  Hypercholesterolemia    3  Class 2 obesity without serious comorbidity with body mass index (BMI) of 36 0 to 36 9 in adult, unspecified obesity type         Plan:    Donny Batres was seen today for follow-up  Diagnoses and all orders for this visit:    Postgastrectomy malabsorption  She is on replacement with a multivitamin and other vitamins/minerals  Overall feels well  Will need yearly labs to include vitamins and minerals  Hyperparathyroidism  She is now on replacement with calcium and vitamin D and has improved her hyperparathyroidism with it  Last PTH was normal   Her 24-hour urine was normal at 294 mg  Advised to reduce calcium intake to twice daily  May repeat labs in 1 year  Tentative follow-up in 1 year but she can also follow with her primary care physician  Hypercholesterolemia    Class 2 obesity without serious comorbidity with body mass index (BMI) of 36 0 to 36 9 in adult, unspecified obesity type        I have spent 35 minutes with patient today in which greater than 50% of this time was spent in counseling/coordination of care  Discussed with the patient and all questioned fully answered  She will call me if any problems arise      Educated/ Counseled patient on diagnostic test results, prognosis, risk vs benefit of treatment options, importance of treatment compliance, healthy life and lifestyle choices        [address]

## 2023-01-24 ENCOUNTER — OFFICE VISIT (OUTPATIENT)
Dept: FAMILY MEDICINE CLINIC | Facility: CLINIC | Age: 58
End: 2023-01-24

## 2023-01-24 VITALS
BODY MASS INDEX: 36.02 KG/M2 | SYSTOLIC BLOOD PRESSURE: 112 MMHG | OXYGEN SATURATION: 93 % | WEIGHT: 203.3 LBS | DIASTOLIC BLOOD PRESSURE: 70 MMHG | TEMPERATURE: 98.2 F | HEART RATE: 60 BPM | HEIGHT: 63 IN

## 2023-01-24 DIAGNOSIS — D32.0 MENINGIOMA, CEREBRAL (HCC): ICD-10-CM

## 2023-01-24 DIAGNOSIS — G56.03 BILATERAL CARPAL TUNNEL SYNDROME: ICD-10-CM

## 2023-01-24 DIAGNOSIS — M54.50 CHRONIC BILATERAL LOW BACK PAIN WITHOUT SCIATICA: ICD-10-CM

## 2023-01-24 DIAGNOSIS — G89.29 CHRONIC BILATERAL LOW BACK PAIN WITHOUT SCIATICA: ICD-10-CM

## 2023-01-24 DIAGNOSIS — Z00.00 ANNUAL PHYSICAL EXAM: Primary | ICD-10-CM

## 2023-01-24 DIAGNOSIS — E21.3 HYPERPARATHYROIDISM (HCC): ICD-10-CM

## 2023-01-24 PROBLEM — R42 EPISODIC LIGHTHEADEDNESS: Status: RESOLVED | Noted: 2019-05-16 | Resolved: 2023-01-24

## 2023-01-24 NOTE — PATIENT INSTRUCTIONS

## 2023-01-24 NOTE — ASSESSMENT & PLAN NOTE
Worsening  Patient states that physical therapy worked well in the past for this-will refer  If not improving in 3 to 4 weeks, will refer for EMG and then refer to hand surgery  Continue nighttime carpal tunnel wrist braces    Recheck 4 weeks if not improved

## 2023-01-24 NOTE — PROGRESS NOTES
Name: Agueda Cabrera      : 1965      MRN: [id number]  Encounter Provider: Jessa Maynard MD  Encounter Date: 2023   Encounter department: [address] [place] [address] Road     1  Annual physical exam    2  Bilateral carpal tunnel syndrome  Assessment & Plan:  Worsening  Patient states that physical therapy worked well in the past for this-will refer  If not improving in 3 to 4 weeks, will refer for EMG and then refer to hand surgery  Continue nighttime carpal tunnel wrist braces  Recheck 4 weeks if not improved    Orders:  -     Ambulatory Referral to Physical Therapy; Future    3  Chronic bilateral low back pain without sciatica  Assessment & Plan: Without sciatica  I reviewed with patient  Patient primarily tender over the SI joints bilaterally  Discussed treatment options  We will retry physical therapy  Consider pain management eval for possible injections if not improving  Recheck 4 weeks if not improved    Orders:  -     Ambulatory Referral to Physical Therapy; Future    4  Hyperparathyroidism ([address] )  Assessment & Plan:  Improved with adjustment of calcium and vitamin D  Follow-up with endocrinology  Recheck 1 year      5  Meningioma, cerebral [facility])  Assessment & Plan:  Incidental finding on work-up for "brain fog"  Reviewed recent study, unchanged size since 2019  Continue to follow-up  Follow-up with neurology  Subjective     Pt here for physical but also has several complaints  - pt still having low back pain  Worse since her fall last   Went through PT and is compliant with home exercises  No radiation to legs  Worse after rising from seated position  No change in bowel or bladder   - pt also notes that she recently developed a recurrence of her carpal tunnel, R>L  She denies over use or trauma  She just restarted using her night time CTS braces  Pt states that she improved in the past with PT   She denies hand weakness  - pt seen by Endo  PTH improved with adjustment in Vit D and Ca++    - pt recently found to have a meningioma  Followed by Neuro  - "brain fog" episodes were found to be related to intermittent hypoglycemia  (BGs 70 or less with episodes)  Review of Systems   Constitutional: Negative  HENT: Negative  Eyes: Negative  Respiratory: Negative  Cardiovascular: Negative  Gastrointestinal: Negative  Endocrine: Negative  Genitourinary: Negative  Musculoskeletal: Positive for arthralgias, back pain, gait problem and myalgias  Negative for joint swelling  Skin: Negative  Allergic/Immunologic: Negative  Neurological: Negative for dizziness, weakness, light-headedness, numbness and headaches  Hematological: Negative  Psychiatric/Behavioral: Negative  Past Medical History:   Diagnosis Date   • Abdominal pain    • Chronic pain disorder     abdominal   • Depression    • Diverticula of colon    • Former smoker     Resolved 2012    • GERD (gastroesophageal reflux disease)    • Hemorrhoids    • Hiatal hernia    • Hyperlipidemia    • Intestinal malabsorption following gastrectomy    • Morbid obesity ([address] )     Resolved 10/2/2015    • Obesity    • Postgastrectomy malabsorption    • Vitamin D insufficiency     Resolved 2016      Past Surgical History:   Procedure Laterality Date   • ABDOMINAL SURGERY      gastric bipass   • ANKLE SURGERY     •  SECTION      (2) ,     • COLONOSCOPY     • ESOPHAGOGASTRODUODENOSCOPY      x2   • GASTRIC BYPASS  2015   • HERNIA REPAIR  2015    Paraesophageal hiatus hernia  Managed by Adolfo Armijo (General Surgery)   • [place] EGD TRANSORAL BIOPSY SINGLE/MULTIPLE N/A 2017    Procedure: ESOPHAGOGASTRODUODENOSCOPY (EGD); Surgeon: Danie Boykin MD;  Location: [facility];   Service: Bariatrics   • TONSILLECTOMY     • UTERINE FIBROID SURGERY       Family History   Problem Relation Age of Onset   • Breast cancer Mother 32   • Prostate cancer Father 48   • Hypertension Father    • Heart disease Father         Coronary arteriosclerosis    • Hyperlipidemia Father    • Heart disease Maternal Grandmother         Coronary arteriosclerosis    • Heart disease Maternal Grandfather         Coronary arteriosclerosis    • Diabetes Paternal Grandmother    • No Known Problems Brother    • No Known Problems Daughter    • No Known Problems Daughter    • No Known Problems Paternal Aunt    • Stroke Neg Hx    • Thyroid disease Neg Hx      Social History     Socioeconomic History   • Marital status: /Civil Union     Spouse name: None   • Number of children: None   • Years of education: None   • Highest education level: None   Occupational History   • None   Tobacco Use   • Smoking status: Former     Types: Cigarettes     Quit date:      Years since quittin 0   • Smokeless tobacco: Never   • Tobacco comments:     20+ pack year hx - As per [employer]    Vaping Use   • Vaping Use: Never used   Substance and Sexual Activity   • Alcohol use:  Yes   • Drug use: No   • Sexual activity: None   Other Topics Concern   • None   Social History Narrative    Daily coffee consumption (2 cups/day)    Denied: History of daily cola consumption (__cans/day)    Daily tea consumption (__cups/day)    Former smoker: quit  - As per [employer] or homemaker - As per [employer]    Uses safety equipment - Seatbelts      Social Determinants of Health     Financial Resource Strain: Not on file   Food Insecurity: Not on file   Transportation Needs: Not on file   Physical Activity: Not on file   Stress: Not on file   Social Connections: Not on file   Intimate Partner Violence: Not on file   Housing Stability: Not on file     Current Outpatient Medications on File Prior to Visit   Medication Sig   • Calcium Citrate-Vitamin D 250-200 MG-UNIT TABS Take 1 tablet by mouth 3 (three) times a day   • Cholecalciferol (D3 5000) 125 MCG (5000 UT) capsule    • estradiol (VIVELLE-DOT) 0 025 MG/24HR PLACE 1 PATCH ON THE SKIN 2 TIMES A WEEK   • FLUoxetine (PROzac) 20 mg capsule TAKE 2 CAPSULES BY MOUTH EVERY DAY (Patient taking differently: Take 20 mg by mouth daily)   • Melatonin 5 MG CAPS Take 5 mg by mouth daily at bedtime    • MULTIPLE VITAMIN PO Take 1 tablet by mouth daily   • pravastatin (PRAVACHOL) 20 mg tablet TAKE 1 TABLET BY MOUTH EVERY DAY   • progesterone (PROMETRIUM) 100 MG capsule Take 100 mg by mouth daily     No Known Allergies  Immunization History   Administered Date(s) Administered   • COVID-19 PFIZER VACCINE 0 3 ML IM 04/09/2021, 05/04/2021   • Fluzone Split Quad 0 25 mL 11/07/2014   • Fluzone Split Quad 0 5 mL 11/19/2019   • INFLUENZA 02/19/2016, 11/07/2016, 09/18/2017, 10/05/2018, 10/17/2020, 10/01/2022   • Influenza Quadrivalent 3 years and older 10/05/2018   • Influenza Quadrivalent Preservative Free 3 years and older IM 10/09/2020, 10/17/2020   • Influenza Quadrivalent, 6-35 Months IM 11/07/2014   • Influenza, Quadrivalent (nasal) 02/19/2016, 11/07/2016   • Influenza, injectable, quadrivalent, preservative free 0 5 mL 10/05/2018, 11/19/2019, 10/09/2020, 10/17/2020   • Influenza, seasonal, injectable 10/05/2011, 10/09/2012   • Tdap 11/07/2014   • Tuberculin Skin Test-PPD Intradermal 10/05/2011       Objective     /70   Pulse 60   Temp 98 2 °F (36 8 °C)   Ht 5' 3" (1 6 m)   Wt 92 2 kg (203 lb 4 8 oz)   SpO2 93%   BMI 36 01 kg/m²     Physical Exam  Vitals reviewed  Constitutional:       Appearance: She is well-developed  She is not diaphoretic  HENT:      Head: Normocephalic and atraumatic  Right Ear: Tympanic membrane, ear canal and external ear normal       Left Ear: Tympanic membrane, ear canal and external ear normal       Mouth/Throat:      Mouth: Mucous membranes are moist    Eyes:      Extraocular Movements: Extraocular movements intact        Conjunctiva/sclera: Conjunctivae normal       Pupils: Pupils are equal, round, and reactive to light  Neck:      Thyroid: No thyromegaly  Vascular: No JVD  Cardiovascular:      Rate and Rhythm: Normal rate and regular rhythm  Heart sounds: No murmur heard  Pulmonary:      Effort: Pulmonary effort is normal       Breath sounds: Normal breath sounds  Abdominal:      General: There is no distension  Palpations: Abdomen is soft  There is no mass  Tenderness: There is no abdominal tenderness  Musculoskeletal:         General: No swelling  Normal range of motion  Cervical back: Normal range of motion and neck supple  No tenderness  No muscular tenderness  Right lower leg: No edema  Left lower leg: No edema  Comments: (+) Tinnels and Phalens at the wrists bilat  No thenar or hypothenar wasting  Also TTP over the SI joints bilat  SLR (-) bilat  Lymphadenopathy:      Cervical: No cervical adenopathy  Skin:     General: Skin is warm and dry  Capillary Refill: Capillary refill takes less than 2 seconds  Neurological:      Mental Status: She is alert and oriented to person, place, and time  Cranial Nerves: No cranial nerve deficit  Sensory: No sensory deficit  Motor: No weakness or abnormal muscle tone  Coordination: Coordination normal       Gait: Gait normal       Deep Tendon Reflexes: Reflexes are normal and symmetric  Reflexes normal    Psychiatric:         Mood and Affect: Mood normal          Behavior: Behavior normal          Thought Content:  Thought content normal          Judgment: Judgment normal        Olimpia Cho MD

## 2023-01-24 NOTE — ASSESSMENT & PLAN NOTE
Without sciatica  I reviewed with patient  Patient primarily tender over the SI joints bilaterally  Discussed treatment options  We will retry physical therapy  Consider pain management eval for possible injections if not improving    Recheck 4 weeks if not improved

## 2023-01-24 NOTE — PROGRESS NOTES
[address]    NAME: Gary Paysandra  AGE: 61 y o  SEX: female  : 1965     DATE: 2023     Assessment and Plan:     Problem List Items Addressed This Visit        Endocrine    Hyperparathyroidism ([address] )     Improved with adjustment of calcium and vitamin D  Follow-up with endocrinology  Recheck 1 year            Nervous and Auditory    Bilateral carpal tunnel syndrome     Worsening  Patient states that physical therapy worked well in the past for this-will refer  If not improving in 3 to 4 weeks, will refer for EMG and then refer to hand surgery  Continue nighttime carpal tunnel wrist braces  Recheck 4 weeks if not improved         Relevant Orders    Ambulatory Referral to Physical Therapy    Meningioma, cerebral ([facility] [address] )     Incidental finding on work-up for "brain fog"  Reviewed recent study, unchanged size since 2019  Continue to follow-up  Follow-up with neurology  Other    Chronic bilateral low back pain without sciatica     Without sciatica  I reviewed with patient  Patient primarily tender over the SI joints bilaterally  Discussed treatment options  We will retry physical therapy  Consider pain management eval for possible injections if not improving  Recheck 4 weeks if not improved         Relevant Orders    Ambulatory Referral to Physical Therapy   Other Visit Diagnoses     Annual physical exam    -  Primary          Immunizations and preventive care screenings were discussed with patient today  Appropriate education was printed on patient's after visit summary  Counseling:  Exercise: the importance of regular exercise/physical activity was discussed  Recommend exercise 3-5 times per week for at least 30 minutes  Return in about 6 months (around 2023)       Chief Complaint:     Chief Complaint   Patient presents with   • Physical Exam   • Back Pain   • blood work results History of Present Illness:     Adult Annual Physical   Patient here for a comprehensive physical exam  The patient reports problems - as below  - pt seen by Endo  PTH improved with adjustment in Vit D and Ca++    - pt recently found to have a meningioma  Followed by Neuro  - "brain fog" episodes were found to be related to intermittent hypoglycemia  (BGs 70 or less with episodes)  - pt still having low back pain  Worse since her fall last June  Went through PT and is compliant with home exercises  - carpal tunnel is worse, R>L     Diet and Physical Activity  Diet/Nutrition: working on diet  Exercise: moderate cardiovascular exercise, 1-2 times a week on average and less than 30 minutes on average  Depression Screening  PHQ-2/9 Depression Screening    Little interest or pleasure in doing things: 0 - not at all  Feeling down, depressed, or hopeless: 0 - not at all  Trouble falling or staying asleep, or sleeping too much: 2 - more than half the days  Feeling tired or having little energy: 1 - several days  Poor appetite or overeating: 3 - nearly every day  Feeling bad about yourself - or that you are a failure or have let yourself or your family down: 0 - not at all  Trouble concentrating on things, such as reading the newspaper or watching television: 0 - not at all  Moving or speaking so slowly that other people could have noticed  Or the opposite - being so fidgety or restless that you have been moving around a lot more than usual: 0 - not at all  Thoughts that you would be better off dead, or of hurting yourself in some way: 0 - not at all  PHQ-9 Score: 6   PHQ-9 Interpretation: Mild depression        General Health  Sleep: labile  Hearing: normal - bilateral   Vision: no vision problems, most recent eye exam <1 year ago and wears glasses  Dental: regular dental visits and brushes teeth twice daily         /GYN Health  Patient is: postmenopausal  Last menstrual period: >5y ago  Contraceptive method: n/a  Review of Systems:     Review of Systems   Constitutional: Negative  HENT: Negative  Eyes: Negative  Respiratory: Negative  Cardiovascular: Negative  Gastrointestinal: Negative  Endocrine: Negative  Genitourinary: Negative  Musculoskeletal: Positive for arthralgias, back pain and myalgias  Skin: Negative  Allergic/Immunologic: Negative  Neurological: Positive for numbness  Negative for dizziness, weakness, light-headedness and headaches  Hematological: Negative  Psychiatric/Behavioral: Negative  Past Medical History:     Past Medical History:   Diagnosis Date   • Abdominal pain    • Chronic pain disorder     abdominal   • Depression    • Diverticula of colon    • Former smoker     Resolved 2012    • GERD (gastroesophageal reflux disease)    • Hemorrhoids    • Hiatal hernia    • Hyperlipidemia    • Intestinal malabsorption following gastrectomy    • Morbid obesity ([facility] [address] )     Resolved 10/2/2015    • Obesity    • Postgastrectomy malabsorption    • Vitamin D insufficiency     Resolved 2016       Past Surgical History:     Past Surgical History:   Procedure Laterality Date   • ABDOMINAL SURGERY      gastric bipass   • ANKLE SURGERY     •  SECTION      (2) ,     • COLONOSCOPY     • ESOPHAGOGASTRODUODENOSCOPY      x2   • GASTRIC BYPASS  2015   • HERNIA REPAIR  2015    Paraesophageal hiatus hernia  Managed by Leonela Stern (General Surgery)   • [place] EGD TRANSORAL BIOPSY SINGLE/MULTIPLE N/A 2017    Procedure: ESOPHAGOGASTRODUODENOSCOPY (EGD); Surgeon: Dilcia Friend MD;  Location: [facility];   Service: Bariatrics   • TONSILLECTOMY     • UTERINE FIBROID SURGERY        Social History:     Social History     Socioeconomic History   • Marital status: /Civil Union     Spouse name: None   • Number of children: None   • Years of education: None   • Highest education level: None   Occupational History   • None   Tobacco Use • Smoking status: Former     Types: Cigarettes     Quit date:      Years since quittin 0   • Smokeless tobacco: Never   • Tobacco comments:     20+ pack year hx - As per [employer]    Vaping Use   • Vaping Use: Never used   Substance and Sexual Activity   • Alcohol use:  Yes   • Drug use: No   • Sexual activity: None   Other Topics Concern   • None   Social History Narrative    Daily coffee consumption (2 cups/day)    Denied: History of daily cola consumption (__cans/day)    Daily tea consumption (__cups/day)    Former smoker: quit  - As per [employer] or homemaker - As per [employer]    Uses safety equipment - Seatbelts      Social Determinants of Health     Financial Resource Strain: Not on file   Food Insecurity: Not on file   Transportation Needs: Not on file   Physical Activity: Not on file   Stress: Not on file   Social Connections: Not on file   Intimate Partner Violence: Not on file   Housing Stability: Not on file      Family History:     Family History   Problem Relation Age of Onset   • Breast cancer Mother 32   • Prostate cancer Father 48   • Hypertension Father    • Heart disease Father         Coronary arteriosclerosis    • Hyperlipidemia Father    • Heart disease Maternal Grandmother         Coronary arteriosclerosis    • Heart disease Maternal Grandfather         Coronary arteriosclerosis    • Diabetes Paternal Grandmother    • No Known Problems Brother    • No Known Problems Daughter    • No Known Problems Daughter    • No Known Problems Paternal Aunt    • Stroke Neg Hx    • Thyroid disease Neg Hx       Current Medications:     Current Outpatient Medications   Medication Sig Dispense Refill   • Calcium Citrate-Vitamin D 250-200 MG-UNIT TABS Take 1 tablet by mouth 3 (three) times a day     • Cholecalciferol (D3 5000) 125 MCG (5000 UT) capsule      • estradiol (VIVELLE-DOT) 0 025 MG/24HR PLACE 1 PATCH ON THE SKIN 2 TIMES A WEEK     • FLUoxetine (PROzac) 20 mg capsule TAKE 2 CAPSULES BY MOUTH EVERY DAY (Patient taking differently: Take 20 mg by mouth daily) 180 capsule 1   • Melatonin 5 MG CAPS Take 5 mg by mouth daily at bedtime      • MULTIPLE VITAMIN PO Take 1 tablet by mouth daily     • pravastatin (PRAVACHOL) 20 mg tablet TAKE 1 TABLET BY MOUTH EVERY DAY 90 tablet 1   • progesterone (PROMETRIUM) 100 MG capsule Take 100 mg by mouth daily       No current facility-administered medications for this visit  Allergies:     No Known Allergies   Physical Exam:     /70   Pulse 60   Temp 98 2 °F (36 8 °C)   Ht 5' 3" (1 6 m)   Wt 92 2 kg (203 lb 4 8 oz)   SpO2 93%   BMI 36 01 kg/m²     Physical Exam  Vitals reviewed  Constitutional:       Appearance: She is well-developed  HENT:      Head: Normocephalic and atraumatic  Right Ear: Tympanic membrane, ear canal and external ear normal       Left Ear: Tympanic membrane, ear canal and external ear normal       Mouth/Throat:      Mouth: Mucous membranes are moist    Eyes:      Extraocular Movements: Extraocular movements intact  Conjunctiva/sclera: Conjunctivae normal       Pupils: Pupils are equal, round, and reactive to light  Neck:      Thyroid: No thyromegaly  Vascular: No JVD  Cardiovascular:      Rate and Rhythm: Normal rate and regular rhythm  Pulses: Normal pulses  Heart sounds: Normal heart sounds  No murmur heard  Pulmonary:      Effort: Pulmonary effort is normal       Breath sounds: Normal breath sounds  No wheezing  Abdominal:      General: Bowel sounds are normal  There is no distension  Palpations: Abdomen is soft  There is no mass  Tenderness: There is no abdominal tenderness  Musculoskeletal:         General: Tenderness present  No swelling or deformity  Normal range of motion  Cervical back: Normal range of motion and neck supple  No muscular tenderness  Right lower leg: No edema  Left lower leg: No edema        Comments: (+) bilat tinnels and phalens tests at wrists  Also TTP over SI joints bilat  SLR (-) bilat    Lymphadenopathy:      Cervical: No cervical adenopathy  Skin:     General: Skin is warm  Capillary Refill: Capillary refill takes less than 2 seconds  Neurological:      General: No focal deficit present  Mental Status: She is alert and oriented to person, place, and time  Cranial Nerves: No cranial nerve deficit  Sensory: No sensory deficit  Motor: No weakness or abnormal muscle tone  Coordination: Coordination normal       Gait: Gait normal       Deep Tendon Reflexes: Reflexes normal    Psychiatric:         Mood and Affect: Mood normal          Behavior: Behavior normal          Thought Content: Thought content normal          Judgment: Judgment normal       Comments: PHQ-2/9 Depression Screening    Little interest or pleasure in doing things: 0 - not at all  Feeling down, depressed, or hopeless: 0 - not at all  Trouble falling or staying asleep, or sleeping too much: 2 - more than   half the days  Feeling tired or having little energy: 1 - several days  Poor appetite or overeating: 3 - nearly every day  Feeling bad about yourself - or that you are a failure or have let   yourself or your family down: 0 - not at all  Trouble concentrating on things, such as reading the newspaper or watching   television: 0 - not at all  Moving or speaking so slowly that other people could have noticed   Or the   opposite - being so fidgety or restless that you have been moving around a   lot more than usual: 0 - not at all  Thoughts that you would be better off dead, or of hurting yourself in some   way: 0 - not at all  PHQ-9 Score: 6   PHQ-9 Interpretation: Mild depression                Milderd MD David Chavira

## 2023-01-24 NOTE — ASSESSMENT & PLAN NOTE
Incidental finding on work-up for "brain fog"  Reviewed recent study, unchanged size since 2019  Continue to follow-up  Follow-up with neurology

## 2023-02-27 DIAGNOSIS — F32.A DEPRESSION, UNSPECIFIED DEPRESSION TYPE: ICD-10-CM

## 2023-02-27 RX ORDER — FLUOXETINE HYDROCHLORIDE 20 MG/1
CAPSULE ORAL
Qty: 180 CAPSULE | Refills: 1 | Status: SHIPPED | OUTPATIENT
Start: 2023-02-27

## 2023-03-06 ENCOUNTER — EVALUATION (OUTPATIENT)
Dept: PHYSICAL THERAPY | Facility: CLINIC | Age: 58
End: 2023-03-06

## 2023-03-06 DIAGNOSIS — G56.03 BILATERAL CARPAL TUNNEL SYNDROME: Primary | ICD-10-CM

## 2023-03-06 DIAGNOSIS — G89.29 CHRONIC BILATERAL LOW BACK PAIN WITHOUT SCIATICA: ICD-10-CM

## 2023-03-06 DIAGNOSIS — M54.50 CHRONIC BILATERAL LOW BACK PAIN WITHOUT SCIATICA: ICD-10-CM

## 2023-03-06 NOTE — PROGRESS NOTES
PT Evaluation     Today's date: 3/6/2023  Patient name: Ailyn Miles  : 1965  MRN: [id number]  Referring provider: David Burroughs*  Dx:   Encounter Diagnosis     ICD-10-CM    1  Bilateral carpal tunnel syndrome  G56 03 Ambulatory Referral to Physical Therapy      2  Chronic bilateral low back pain without sciatica  M54 50 Ambulatory Referral to Physical Therapy    G89 29                      Assessment  Assessment details: Ailyn Miles is a 60 y o  female who presents with signs and symptoms consistent of bilateral carpal tunnel syndrome  Pt has been having worsening medel hand pain, particularly at night time over the last couple of months  Pt does admit to paresthesia's in this region  Patient presents with tinels, phalens, reverse phalens, and ULTT-A  Due to these impairments, Patient has difficulty performing gripping activities, IADLs, and sleeping  Patient would benefit from skilled physical therapy to address the impairments, improve their level of function, and to improve their overall quality of life  Patient also has a script for persistent and non-radiating LBP  Pt would like to be treated for her CTS first as this is the most limited factor and we will address the lumbar spine as wrist/hand improves  Primary movement impairments:  1) Adverse neural dyanmics median  2) Wrist ROM deficits  3) Wrist flexibility restrictions   Impairments: abnormal or restricted ROM, activity intolerance, impaired physical strength, lacks appropriate home exercise program and pain with function    Goals  STG: To be achieved in 4 -6 weeks  1)Improve Right wrist ROM by 5- 10 degrees  2)Reduce pain by 50%  3)Improve flexibility to mild restrictions  4)Improve  strength by 5-10 degrees    LTG: To be achieved at Discharge  1)Patient is independent with HEP  2)Improve FOTO to greater than or equal to expected outcome  3)Improve tolerance sleeping without being woken by pain or paresthesia  Plan  Patient would benefit from: PT eval and skilled physical therapy  Planned modality interventions: low level laser therapy  Planned therapy interventions: manual therapy, joint mobilization, patient education, neuromuscular re-education, therapeutic activities, therapeutic exercise and home exercise program  Frequency: 1-2x per week for 6 weeks  Treatment plan discussed with: patient        Subjective Evaluation    History of Present Illness  Mechanism of injury: History of Current Injury: Pt referred to PT by PCP for 2 different diagnoses; bilateral CTS and chronic LBP  Pt has been previously treated in PT for both conditions which had helped  Pt reports worsening of symptoms in her hands right worse than left over the last couple of months  She is wear a neutral cock up splint on the right side but continues to be woken up due to pain  Pt does report weakness with  but denies any dropping of items  Denies issues with driving  Pain location/Descriptors: Pain bilateral palmar side of hands from thumb to ring finger  Pt also reports diminished sensation in this region  Aggravating factors: Sleeping  Easing factors: Stretching  24 HR pattern: Worse in evening  Imaging: None  Special Questions: Better in AM and worse at night time  Patient goals:  Learn exercises to manage symptoms  Hobbies/Interest: dominique   Occupation: Works for [employer]    Pain  Current pain ratin  At worst pain ratin          Objective     Palpation     Additional Palpation Details  Carpal tunnel     Active Range of Motion     Left Wrist   Wrist flexion: 95 degrees   Wrist extension: 64 degrees     Right Wrist   Wrist flexion: 85 degrees   Wrist extension: 60 degrees     Strength/Myotome Testing     Left Wrist/Hand   Wrist extension: 5  Wrist flexion: 5    Right Wrist/Hand   Wrist extension: 5  Wrist flexion: 5    Additional Strength Details  : 60# L/65#R     Tests     Left Shoulder   Positive ULTT1       Right Shoulder   Positive ULTT1  Left Elbow   Negative Tinel's sign (cubital tunnel)  Right Elbow   Negative Tinel's sign (cubital tunnel)  Left Wrist/Hand   Positive Phalen's sign and Tinel's sign (medial nerve)  Negative AIN OK sign and Froment's sign  Right Wrist/Hand   Positive Phalen's sign and Tinel's sign (medial nerve)  Negative AIN OK sign and Froment's sign  Additional Tests Details  + Reverse phalans             Precautions: Meningioma, H/O Depression      Manuals 3/6            IASTM using LLL 10' (5/5)            Wrist mobs                                       Neuro Re-Ed              c/ putty             Median Nerve glide -wrist             Median nerve glide- entire UE             Sup/Pro using flex bar                                                     Ther Ex             UBE             Wrist extensors stretch              Wrist extension- ecc             Wrist flexion -ecc                                                                 Ther Activity                                       Gait Training                                       Modalities                                         [facility]  Access Code: [email or web address]

## 2023-03-15 ENCOUNTER — OFFICE VISIT (OUTPATIENT)
Dept: PHYSICAL THERAPY | Facility: CLINIC | Age: 58
End: 2023-03-15

## 2023-03-15 DIAGNOSIS — G56.03 BILATERAL CARPAL TUNNEL SYNDROME: Primary | ICD-10-CM

## 2023-03-15 NOTE — PROGRESS NOTES
Daily Note     Today's date: 3/15/2023  Patient name: Lee Palumbo  : 1965  MRN: [id number]  Referring provider: Latosha Ortega*  Dx:   Encounter Diagnosis     ICD-10-CM    1  Bilateral carpal tunnel syndrome  G56 03           Start Time: 730  Stop Time: 0800  Total time in clinic (min): 30 minutes    Subjective: Pt reports compliance with HEP  She would like to focus on her wrists today as this is the area of most concern  She brings her cock-up wrist splints as well  Objective: See treatment diary below      Assessment: Treatment limited today secondary to time restraints (pt had to go to work)  Focused on MT at the wrist using LLL and STM  Tolerated treatment well  Recommend patient get better wrist splint  Reviewed appropriate splints that patient can purchase  Patient would benefit from continued PT  Plan: Continue per plan of care        Precautions: Meningioma, H/O Depression      Manuals 3/6 3/15           IASTM using LLL 10' (5/5) 10' (5/)           Wrist mobs             Carpal tunnel stretching c/ STM  15' B                        Neuro Re-Ed              c/ putty             Median Nerve glide -wrist             Median nerve glide- entire UE             Sup/Pro using flex bar                                                     Ther Ex             UBE             Wrist extensors stretch              Wrist extension- ecc             Wrist flexion -ecc                                                                 Ther Activity                                       Gait Training                                       Modalities

## 2023-03-16 NOTE — ASSESSMENT & PLAN NOTE
-Avoid fried foods and trans fat, limit saturated fats and refined carbohydrates  -Increase fish/omega 3 FA consumption  -Increase physical activity  -obtain lipid panel

## 2023-03-16 NOTE — ASSESSMENT & PLAN NOTE
-s/p Peyton-En-Y Gastric Bypass with Dr Lewis Burns on 06/16/15  She is struggling with weight regain  Initial: 271 5lbs  Current: 207 8lbs  EWL: 49%   Joaquin: 168 5lbs in July 2017  Current BMI is Body mass index is 38 01 kg/m²  · Tolerating a regular diet-yes  · Eating at least 60 grams of protein per day-yes  · Following 30/60 minute rule with liquids-yes  · Drinking at least 64 ounces of fluid per day-no; advised her to increase and decrease coffee  · Drinking carbonated beverages-no  · Sufficient exercise-some walking; starting PT soon and agrees to start walking  · Using NSAIDs regularly-no  · Using nicotine-no  · Using alcohol-very rarely   Advised about the risks of alcohol s/p bariatric surgery and recommend avoiding all alcohol  · She is up to date with colonoscopy and will f/u per GI

## 2023-03-16 NOTE — ASSESSMENT & PLAN NOTE
-At risk for malabsorption of vitamins/minerals secondary to malabsorption and restriction of intake from bariatric surgery  -Currently taking adequate postop bariatric surgery vitamin supplementation: Bariatric Choice MVI w/ 45mg iron, Calcium citrate 500mg BID, Vitamin D 5000IU    -Obtain CBC/Metabolic panel    -Patient received education about the importance of adhering to a lifelong supplementation regimen to avoid vitamin/mineral deficiencies

## 2023-03-17 ENCOUNTER — CONSULT (OUTPATIENT)
Dept: BARIATRICS | Facility: CLINIC | Age: 58
End: 2023-03-17

## 2023-03-17 VITALS
HEIGHT: 62 IN | RESPIRATION RATE: 16 BRPM | BODY MASS INDEX: 38.24 KG/M2 | SYSTOLIC BLOOD PRESSURE: 122 MMHG | WEIGHT: 207.8 LBS | DIASTOLIC BLOOD PRESSURE: 78 MMHG | HEART RATE: 73 BPM

## 2023-03-17 DIAGNOSIS — E78.00 HYPERCHOLESTEROLEMIA: ICD-10-CM

## 2023-03-17 DIAGNOSIS — Z48.815 ENCOUNTER FOR SURGICAL AFTERCARE FOLLOWING SURGERY OF DIGESTIVE SYSTEM: Primary | ICD-10-CM

## 2023-03-17 DIAGNOSIS — E66.9 OBESITY, CLASS II, BMI 35-39.9: ICD-10-CM

## 2023-03-17 DIAGNOSIS — Z90.3 POSTGASTRECTOMY MALABSORPTION: ICD-10-CM

## 2023-03-17 DIAGNOSIS — K91.2 POSTGASTRECTOMY MALABSORPTION: ICD-10-CM

## 2023-03-17 RX ORDER — PROGESTERONE 100 MG/1
CAPSULE ORAL
COMMUNITY
Start: 2023-03-15

## 2023-03-17 NOTE — PROGRESS NOTES
Assessment/Plan:    Encounter for surgical aftercare following surgery of digestive system  -s/p Peyton-En-Y Gastric Bypass with Dr Gordon Martinez on 06/16/15  She is struggling with weight regain  Initial: 271 5lbs  Current: 207 8lbs  EWL: 49%   Joaquin: 168 5lbs in July 2017  Current BMI is Body mass index is 38 01 kg/m²  · Tolerating a regular diet-yes  · Eating at least 60 grams of protein per day-yes  · Following 30/60 minute rule with liquids-yes  · Drinking at least 64 ounces of fluid per day-no; advised her to increase and decrease coffee  · Drinking carbonated beverages-no  · Sufficient exercise-some walking; starting PT soon and agrees to start walking  · Using NSAIDs regularly-no  · Using nicotine-no  · Using alcohol-very rarely  Advised about the risks of alcohol s/p bariatric surgery and recommend avoiding all alcohol  · She is up to date with colonoscopy and will f/u per GI     Postgastrectomy malabsorption  -At risk for malabsorption of vitamins/minerals secondary to malabsorption and restriction of intake from bariatric surgery  -Currently taking adequate postop bariatric surgery vitamin supplementation: Bariatric Choice MVI w/ 45mg iron, Calcium citrate 500mg BID, Vitamin D 5000IU    -Obtain CBC/Metabolic panel    -Patient received education about the importance of adhering to a lifelong supplementation regimen to avoid vitamin/mineral deficiencies     Hypercholesterolemia  -Avoid fried foods and trans fat, limit saturated fats and refined carbohydrates  -Increase fish/omega 3 FA consumption  -Increase physical activity  -obtain lipid panel       Diagnoses and all orders for this visit:    Encounter for surgical aftercare following surgery of digestive system    Postgastrectomy malabsorption  -     Iron Panel (Includes Ferritin, Iron Sat%, Iron, and TIBC); Future  -     Zinc; Future  -     Vitamin B12; Future  -     Vitamin B1, whole blood;  Future  -     Vitamin A; Future  -     CBC and differential; Future  -     Folate; Future  -     Hemoglobin A1C; Future    Hypercholesterolemia    Obesity, Class II, BMI 35-39 9  -     Hemoglobin A1C; Future    Other orders  -     Progesterone 100 MG CAPS          Subjective:      Patient ID: Marilynn Ken is a 60 y o  female  -s/p Peyton-En-Y Gastric Bypass with Dr Ronald Sotomayor on 06/16/15  Presents to the office today for routine follow up  She is lost to f/u - last seen in January 2021  Tolerating diet without issues; denies N/V, dysphagia, reflux  Struggling with weight regain - up 14 lbs in last 2 years  Intermittent dizziness/lightheadedness - saw her Endo regarding this  Notices it happens around snacking/sweets - admits to dumping  Diet Recall:   B - 2 scrambled eggs and cheese or grits  Snack - pumpkin seeds or almonds  L - green salad with olives and HB eggs with lemon juice and Olive oil  D - corned beef and hash or lean protein and veggies  HS - popcorn or chips or peanuts    Fluids - decaf coffee/tea 6-8 cups, rare ETOH    The following portions of the patient's history were reviewed and updated as appropriate: allergies, current medications, past family history, past medical history, past social history, past surgical history and problem list     Review of Systems   Constitutional: Positive for unexpected weight change (weight regain)  Negative for chills and fever  HENT: Negative for trouble swallowing  Respiratory: Negative for cough and shortness of breath  Cardiovascular: Negative for chest pain and palpitations  Gastrointestinal: Negative for abdominal pain, constipation, diarrhea, nausea and vomiting  Neurological: Negative for dizziness     Psychiatric/Behavioral:        Denies anxiety and depression         Objective:      /78 (BP Location: Left arm, Patient Position: Sitting, Cuff Size: Large)   Pulse 73   Resp 16   Ht 5' 2" (1 575 m)   Wt 94 3 kg (207 lb 12 8 oz)   BMI 38 01 kg/m²     Colonoscopy-Completed       Physical Exam  Vitals reviewed  Constitutional:       General: She is not in acute distress  Appearance: She is well-developed  HENT:      Head: Normocephalic and atraumatic  Eyes:      General: No scleral icterus  Cardiovascular:      Rate and Rhythm: Normal rate and regular rhythm  Pulmonary:      Effort: Pulmonary effort is normal  No respiratory distress  Abdominal:      General: There is no distension  Palpations: Abdomen is soft  Tenderness: There is no abdominal tenderness  Skin:     General: Skin is warm and dry  Neurological:      Mental Status: She is alert and oriented to person, place, and time  Psychiatric:         Mood and Affect: Mood normal          Behavior: Behavior normal            BARRIERS: none identified    GOALS:   · Continued/Maintain healthy weight loss with good nutrition intakes  · Adequate hydration with at least 64oz  fluid intake  · Normal vitamin and mineral levels  · Exercise as tolerated  · Keep logs and track portions - limit to 1 cup or 1 25 cups  · Practice 30/60 minute rule  · Increasing     · Follow-up in 1 year  We kindly ask that your arrive 15 minutes before your scheduled appointment time with your provider to allow our staff to room you, get your vital signs and update your chart  · Follow diet as discussed  · Get lab work done in the next 2 weeks  You have been given a lab slip today  Please call the office if you need a replacement  It is recommended to check with your insurance BEFORE getting labs done to make sure they are covered by your policy  Also, please check with your PCP and other providers before getting labs to avoid duplicate labs  Make sure to HOLD any multivitamins that may contain biotin and any biotin supplements FOR 5 DAYS before any labs since it can affect the results  · Follow vitamin and mineral recommendations as reviewed with you      · Call our office if you have any problems with abdominal pain especially associated with fever, chills, nausea, vomiting or any other concerns  · All  Post-bariatric surgery patients should be aware that very small quantities of any alcohol can cause impairment and it is very possible not to feel the effect  The effect can be in the system for several hours  It is also a stomach irritant  · It is advised to AVOID alcohol, Nonsteroidal antiinflammatory drugs (NSAIDS) and nicotine of all forms   Any of these can cause stomach irritation/pain

## 2023-03-17 NOTE — PATIENT INSTRUCTIONS
GOALS:   Continued/Maintain healthy weight loss with good nutrition intakes  Adequate hydration with at least 64oz  fluid intake  Normal vitamin and mineral levels  Exercise as tolerated  Keep logs and track portions - limit to 1 cup or 1 25 cups  Practice 30/60 minute rule  Increasing     Follow-up in 1 year  We kindly ask that your arrive 15 minutes before your scheduled appointment time with your provider to allow our staff to room you, get your vital signs and update your chart  Follow diet as discussed  Get lab work done in the next 2 weeks  You have been given a lab slip today  Please call the office if you need a replacement  It is recommended to check with your insurance BEFORE getting labs done to make sure they are covered by your policy  Also, please check with your PCP and other providers before getting labs to avoid duplicate labs  Make sure to HOLD any multivitamins that may contain biotin and any biotin supplements FOR 5 DAYS before any labs since it can affect the results  Follow vitamin and mineral recommendations as reviewed with you  Call our office if you have any problems with abdominal pain especially associated with fever, chills, nausea, vomiting or any other concerns  All  Post-bariatric surgery patients should be aware that very small quantities of any alcohol can cause impairment and it is very possible not to feel the effect  The effect can be in the system for several hours  It is also a stomach irritant  It is advised to AVOID alcohol, Nonsteroidal antiinflammatory drugs (NSAIDS) and nicotine of all forms   Any of these can cause stomach irritation/pain

## 2023-03-21 ENCOUNTER — OFFICE VISIT (OUTPATIENT)
Dept: PHYSICAL THERAPY | Facility: CLINIC | Age: 58
End: 2023-03-21

## 2023-03-21 DIAGNOSIS — G56.03 BILATERAL CARPAL TUNNEL SYNDROME: Primary | ICD-10-CM

## 2023-03-21 NOTE — PROGRESS NOTES
Daily Note     Today's date: 3/21/2023  Patient name: Bernice Carmona  : 1965  MRN: [id number]  Referring provider: Warren Joseph*  Dx:   Encounter Diagnosis     ICD-10-CM    1  Bilateral carpal tunnel syndrome  G56 03                      Subjective: Pt reports gradual improvement each session  Objective: See treatment diary below      Assessment: Tolerated treatment well  Pain and parethesia's improving at night time  Implemented additional forearm and hand exercises with good tolerance  Updated HEP to include  and finger/pinch strengthening  Patient would benefit from continued PT  I will evaluate patient's lumbar spine next session  Plan: Continue per plan of care        Precautions: Meningioma, H/O Depression      Manuals 3/6 3/15 3/21          IASTM using LLL 10' (5/5) 10' (5/5) 10' (5/5)          Wrist mobs             Carpal tunnel stretching c/ STM  15' B 15' B                       Neuro Re-Ed             Pinch c/ putty   10 sets of 5 pinches -yellow putty           Median Nerve glide -wrist   flossing in prayer stretch x 20          Median nerve glide- entire UE             Sup/Pro using flex bar    2x10 red flex bar                                                 Ther Ex             UBE   2'/2' fb          Wrist extensors stretch    5x10" B          Wrist extension- ecc             Wrist flexion -ecc                                                                 Ther Activity                                       Gait Training                                       Modalities                                       1:1 with PT from [id number]

## 2023-03-29 NOTE — PROGRESS NOTES
PT Initial evaluation: Lumbar spine    Today's date: 3/29/2023  Patient name: Glenda Landrum  : 1965  MRN: [id number]  Referring provider: Eleuterio Wallace*  Dx: No diagnosis found  Subjective: Pt would be like to be assessed for her lumbar spine pain  Pt reports waxing and waning lower back pain for years  She denies any DAYRON and has been treated successfully with PT in the past  She is currently being treated for b/l carpal tunnel syndrome which is improving  Pain location/Descriptors: ***  Aggravating factors: ***  Easing factors: ***  24 HR pattern: ***  Imaging: XR: mild degenerative changes  Special Questions: ***  Patient goals:  ***            Objective: See treatment diary below    Lumbar spine ROM:   Flexion: *** degrees  Extension: *** degrees  Rotation: ***R/***L  Lateral flexion: ***R/***L    MSR:  Patella: ***  Achilles: ***  Clonus: ***    Myotomes:  L1-S2: Symmetrical and WNL  Heel/Toe walk: Yes  Functional squat: Yes  Areas of weakness: ***    Dermatomes:   L1-S2: Symmetrical and WNL    Special tests:   Slump: ***  Passive SLR: ***  LAURENCE:***  FADIR: ***  Lumbar spine Joint mobility: ***  Tenderness: ***        Assessment: Glenda Landrum is a 60 y o  female who presents with signs and symptoms consistent of ***  Patient presents with {Lumbar spine impairments:88137}  Due to these impairments, Patient has difficulty performing {Functional limitations:37418}  Patient would benefit from skilled physical therapy to address the impairments, improve their level of function, and to improve their overall quality of life  Plan: Continue PT 1-2x per week for carpal tunnel syndrome and persistent LBP        Precautions: Meningioma, H/O Depression      Manuals 3/6 3/15 3/21 3/29         IASTM using LLL 10' () 10' () 10' ()          Wrist mobs             Carpal tunnel stretching c/ STM  15' B 15' B                       Neuro Re-Ed             Pinch c/ putty   10 sets of "5 pinches -yellow putty           Median Nerve glide -wrist   flossing in prayer stretch x 20          Median nerve glide- entire UE             Sup/Pro using flex bar    2x10 red flex bar                                                 Ther Ex             UBE   2'/2' fb          Wrist extensors stretch    5x10\" B          Wrist extension- ecc             Wrist flexion -ecc                                                                 Ther Activity                                       Gait Training                                       Modalities                                       1:1 with PT from [phone]       "

## 2023-03-30 ENCOUNTER — APPOINTMENT (OUTPATIENT)
Dept: PHYSICAL THERAPY | Facility: CLINIC | Age: 58
End: 2023-03-30

## 2023-04-04 NOTE — PROGRESS NOTES
PT Initial evaluation: Lumbar spine    Today's date: 2023  Patient name: Jaelyn Pa  : 1965  MRN: [id number]  Referring provider: Jeremy Gabriel*  Dx:   Encounter Diagnosis     ICD-10-CM    1  Chronic midline low back pain without sciatica  M54 50     G89 29                      Subjective: Pt would be like to be assessed for her lumbar spine pain  Pt reports waxing and waning lower back pain for years  She denies any DAYRON and has been treated successfully with PT in the past  She is currently being treated for b/l carpal tunnel syndrome which is improving  Pt admits to recently falling at her [employer] game which has flared her symptoms up  Pt denies any radicular symptoms or paresthesias  Pt does admit to weakness in her left hip  Pain location/Descriptors: Centralized lower back pain which radiates laterally  Pt does admit to have some pain into left anterior hip  Pain is dull in nature  Aggravating factors: Transfers, post static dyskinesia, driving, prolonged  Easing factors: sitting in comfortable position, sitting at desk chair with lumbar support  24 HR pattern: depending on movement     Imaging: XR: mild degenerative changes  Special Questions: Denies any numbness/tingling, saddle anesthesia, or bowel/bladder issues  Patient goals:  Reduce discomfort, manage symptoms, improve transfers            Objective: See treatment diary below    Lumbar spine ROM:   Flexion: 95 degrees  Extension: 35 degrees  Rotation: 25% restricted R/25% restricted L  Lateral flexion: 30R/30L - Pain bilaterally   Quadrant: Pain bilaterally (on opposite side)     MSR:  Patella: 2+  Achilles: 2+  Clonus: absent     Myotomes:  L1-S2: Symmetrical and WNL  Heel/Toe walk: Yes  Functional squat: Yes  Areas of weakness:  Left hip flexion: 4-    Right hip flexion: 4-  Left hip abd: 3         Right hip abd: 3  Left hip ER: 4-         Right hip ER: 4-  Left hip ext: 3+       Right hip ext: 4-    Dermatomes:   L1-S2: Symmetrical and WNL    Special tests:     Passive SLR: negative  LAURENCE:negative  FADIR: negative  Lumbar spine Joint mobility: L4-5 hypomobility and painful  L UPA L3-5: Moderate discomfort   Tenderness: bilateral hip discomfort/GT bursa region  Hip scour: + bilaterally          Assessment: Ashutosh Vila is a 60 y o  female who presents with signs and symptoms consistent of persistent centralized LBP with greater trochanteric syndrome B/L  Patient presents with centralized lower back pain, decreased lumbar spine ROM, aberrant movements, joint mobility restrictions, weakness and poor motor control  Due to these impairments, Patient has difficulty performing transferring, prolonged sitting, and prolonged driving  Patient would benefit from skilled physical therapy to address the impairments, improve their level of function, and to improve their overall quality of life  Primary movement impairments of L/S:  1)Decreased joint mobility at L4/5  2)Weakness in hips (L worse than R) : hip abd, ER, flexion, and extension  3)Decrease glute firing pattern  4)Post static dyskinesia and pain with transfers (aberrant movement)    Plan: Continue PT 1x every other week for 8 weeks for carpal tunnel syndrome and persistent LBP  Pt has been very compliant with HEP  I updated her HEP today        Precautions: Meningioma, H/O Depression      Manuals 3/6 3/15 3/21 4/6         IASTM using LLL 10' (5/5) 10' (5/5) 10' (5/5)          Wrist mobs             Carpal tunnel stretching c/ STM  15' B 15' B          Lumbar spine CPA    L4-5 Gr III 8'          Neuro Re-Ed             Pinch c/ putty   10 sets of 5 pinches -yellow putty           Median Nerve glide -wrist   flossing in prayer stretch x 20          Median nerve glide- entire UE             Sup/Pro using flex bar    2x10 red flex bar          Bridge c/ hip abd iso             TrA March             TrA Clam shell              Standing hip abduction c/ TB "and TrA              Ther Ex             UBE   2'/2' fb          Wrist extensors stretch    5x10\" B          Wrist extension- ecc             Wrist flexion -ecc             Prone press ups             LTR             Piriformis stretch                           Ther Activity             Modified bridge                           Gait Training                                       Modalities                                       1:1 with PT from [phone]       "

## 2023-04-06 ENCOUNTER — EVALUATION (OUTPATIENT)
Dept: PHYSICAL THERAPY | Facility: CLINIC | Age: 58
End: 2023-04-06

## 2023-04-06 DIAGNOSIS — G89.29 CHRONIC MIDLINE LOW BACK PAIN WITHOUT SCIATICA: Primary | ICD-10-CM

## 2023-04-06 DIAGNOSIS — M54.50 CHRONIC MIDLINE LOW BACK PAIN WITHOUT SCIATICA: Primary | ICD-10-CM

## 2023-04-11 DIAGNOSIS — T75.3XXA SEA SICKNESS, INITIAL ENCOUNTER: Primary | ICD-10-CM

## 2023-04-11 RX ORDER — SCOLOPAMINE TRANSDERMAL SYSTEM 1 MG/1
1 PATCH, EXTENDED RELEASE TRANSDERMAL
Qty: 10 PATCH | Refills: 1 | Status: SHIPPED | OUTPATIENT
Start: 2023-04-11 | End: 2023-08-07

## 2023-05-12 ENCOUNTER — TELEPHONE (OUTPATIENT)
Dept: BARIATRICS | Facility: CLINIC | Age: 58
End: 2023-05-12

## 2023-05-12 NOTE — TELEPHONE ENCOUNTER
----- Message from David Duarte PA-C sent at 5/12/2023  9:00 AM EDT -----  Please ensure that she receives the message below, thank you

## 2023-05-19 LAB
BASOPHILS # BLD AUTO: 21 CELLS/UL (ref 0–200)
BASOPHILS NFR BLD AUTO: 0.5 %
EOSINOPHIL # BLD AUTO: 92 CELLS/UL (ref 15–500)
EOSINOPHIL NFR BLD AUTO: 2.2 %
ERYTHROCYTE [DISTWIDTH] IN BLOOD BY AUTOMATED COUNT: 11.8 % (ref 11–15)
FERRITIN SERPL-MCNC: 18 NG/ML (ref 16–232)
FOLATE SERPL-MCNC: >24 NG/ML
HBA1C MFR BLD: 5 % OF TOTAL HGB
HCT VFR BLD AUTO: 43.9 % (ref 35–45)
HGB BLD-MCNC: 14.7 G/DL (ref 11.7–15.5)
IRON SATN MFR SERPL: 29 % (CALC) (ref 16–45)
IRON SERPL-MCNC: 111 MCG/DL (ref 45–160)
LYMPHOCYTES # BLD AUTO: 1579 CELLS/UL (ref 850–3900)
LYMPHOCYTES NFR BLD AUTO: 37.6 %
MCH RBC QN AUTO: 31 PG (ref 27–33)
MCHC RBC AUTO-ENTMCNC: 33.5 G/DL (ref 32–36)
MCV RBC AUTO: 92.6 FL (ref 80–100)
MONOCYTES # BLD AUTO: 294 CELLS/UL (ref 200–950)
MONOCYTES NFR BLD AUTO: 7 %
NEUTROPHILS # BLD AUTO: 2213 CELLS/UL (ref 1500–7800)
NEUTROPHILS NFR BLD AUTO: 52.7 %
PLATELET # BLD AUTO: 304 THOUSAND/UL (ref 140–400)
PMV BLD REES-ECKER: 10.7 FL (ref 7.5–12.5)
RBC # BLD AUTO: 4.74 MILLION/UL (ref 3.8–5.1)
TIBC SERPL-MCNC: 377 MCG/DL (CALC) (ref 250–450)
VIT A SERPL-MCNC: 56 MCG/DL (ref 38–98)
VIT B1 BLD-SCNC: 210 NMOL/L (ref 78–185)
VIT B12 SERPL-MCNC: 831 PG/ML (ref 200–1100)
WBC # BLD AUTO: 4.2 THOUSAND/UL (ref 3.8–10.8)
ZINC SERPL-MCNC: 74 MCG/DL (ref 60–130)

## 2023-06-05 ENCOUNTER — APPOINTMENT (EMERGENCY)
Dept: RADIOLOGY | Facility: HOSPITAL | Age: 58
End: 2023-06-05
Payer: COMMERCIAL

## 2023-06-05 ENCOUNTER — HOSPITAL ENCOUNTER (EMERGENCY)
Facility: HOSPITAL | Age: 58
Discharge: HOME | End: 2023-06-05
Attending: EMERGENCY MEDICINE
Payer: COMMERCIAL

## 2023-06-05 VITALS
HEART RATE: 89 BPM | OXYGEN SATURATION: 95 % | RESPIRATION RATE: 16 BRPM | TEMPERATURE: 101.4 F | SYSTOLIC BLOOD PRESSURE: 126 MMHG | DIASTOLIC BLOOD PRESSURE: 65 MMHG

## 2023-06-05 DIAGNOSIS — R50.9 FEVER: ICD-10-CM

## 2023-06-05 DIAGNOSIS — U07.1 COVID-19: Primary | ICD-10-CM

## 2023-06-05 LAB
ALBUMIN SERPL BCP-MCNC: 4.2 G/DL (ref 3.5–5)
ALP SERPL-CCNC: 68 U/L (ref 34–104)
ALT SERPL W P-5'-P-CCNC: 15 U/L (ref 7–52)
ANION GAP SERPL CALCULATED.3IONS-SCNC: 10 MMOL/L (ref 4–13)
AST SERPL W P-5'-P-CCNC: 20 U/L (ref 13–39)
BACTERIA UR QL AUTO: ABNORMAL /HPF
BASOPHILS # BLD MANUAL: 0 THOUSAND/UL (ref 0–0.1)
BASOPHILS NFR MAR MANUAL: 0 % (ref 0–1)
BILIRUB SERPL-MCNC: 0.49 MG/DL (ref 0.2–1)
BILIRUB UR QL STRIP: NEGATIVE
BUN SERPL-MCNC: 11 MG/DL (ref 5–25)
CALCIUM SERPL-MCNC: 8.8 MG/DL (ref 8.4–10.2)
CHLORIDE SERPL-SCNC: 103 MMOL/L (ref 96–108)
CLARITY UR: CLEAR
CO2 SERPL-SCNC: 23 MMOL/L (ref 21–32)
COLOR UR: YELLOW
CREAT SERPL-MCNC: 0.78 MG/DL (ref 0.6–1.3)
EOSINOPHIL # BLD MANUAL: 0 THOUSAND/UL (ref 0–0.4)
EOSINOPHIL NFR BLD MANUAL: 0 % (ref 0–6)
ERYTHROCYTE [DISTWIDTH] IN BLOOD BY AUTOMATED COUNT: 13 % (ref 11.6–15.1)
FLUAV RNA RESP QL NAA+PROBE: NEGATIVE
FLUBV RNA RESP QL NAA+PROBE: NEGATIVE
GFR SERPL CREATININE-BSD FRML MDRD: 83 ML/MIN/1.73SQ M
GLUCOSE SERPL-MCNC: 117 MG/DL (ref 65–140)
GLUCOSE UR STRIP-MCNC: NEGATIVE MG/DL
HCT VFR BLD AUTO: 41.3 % (ref 34.8–46.1)
HGB BLD-MCNC: 13.6 G/DL (ref 11.5–15.4)
HGB UR QL STRIP.AUTO: NEGATIVE
HOLD SPECIMEN: NORMAL
KETONES UR STRIP-MCNC: ABNORMAL MG/DL
LACTATE SERPL-SCNC: 0.7 MMOL/L (ref 0.5–2)
LEUKOCYTE ESTERASE UR QL STRIP: NEGATIVE
LYMPHOCYTES # BLD AUTO: 0.22 THOUSAND/UL (ref 0.6–4.47)
LYMPHOCYTES # BLD AUTO: 3 % (ref 14–44)
MCH RBC QN AUTO: 31.1 PG (ref 26.8–34.3)
MCHC RBC AUTO-ENTMCNC: 32.9 G/DL (ref 31.4–37.4)
MCV RBC AUTO: 94 FL (ref 82–98)
MONOCYTES # BLD AUTO: 0.22 THOUSAND/UL (ref 0–1.22)
MONOCYTES NFR BLD: 3 % (ref 4–12)
MUCOUS THREADS UR QL AUTO: ABNORMAL
NEUTROPHILS # BLD MANUAL: 6.75 THOUSAND/UL (ref 1.85–7.62)
NEUTS BAND NFR BLD MANUAL: 3 % (ref 0–8)
NEUTS SEG NFR BLD AUTO: 91 % (ref 43–75)
NITRITE UR QL STRIP: NEGATIVE
NON-SQ EPI CELLS URNS QL MICRO: ABNORMAL /HPF
PH UR STRIP.AUTO: 6 [PH]
PLATELET # BLD AUTO: 213 THOUSANDS/UL (ref 149–390)
PLATELET BLD QL SMEAR: ADEQUATE
PMV BLD AUTO: 10.3 FL (ref 8.9–12.7)
POTASSIUM SERPL-SCNC: 3.5 MMOL/L (ref 3.5–5.3)
PROT SERPL-MCNC: 6.5 G/DL (ref 6.4–8.4)
PROT UR STRIP-MCNC: ABNORMAL MG/DL
RBC # BLD AUTO: 4.38 MILLION/UL (ref 3.81–5.12)
RBC #/AREA URNS AUTO: ABNORMAL /HPF
RBC MORPH BLD: PRESENT
RSV RNA RESP QL NAA+PROBE: NEGATIVE
SARS-COV-2 RNA RESP QL NAA+PROBE: POSITIVE
SODIUM SERPL-SCNC: 136 MMOL/L (ref 135–147)
SP GR UR STRIP.AUTO: 1.03 (ref 1–1.03)
STOMATOCYTES BLD QL SMEAR: PRESENT
UROBILINOGEN UR STRIP-ACNC: <2 MG/DL
WBC # BLD AUTO: 7.18 THOUSAND/UL (ref 4.31–10.16)
WBC #/AREA URNS AUTO: ABNORMAL /HPF

## 2023-06-05 PROCEDURE — 85007 BL SMEAR W/DIFF WBC COUNT: CPT | Performed by: EMERGENCY MEDICINE

## 2023-06-05 PROCEDURE — 81001 URINALYSIS AUTO W/SCOPE: CPT | Performed by: EMERGENCY MEDICINE

## 2023-06-05 PROCEDURE — 85027 COMPLETE CBC AUTOMATED: CPT | Performed by: EMERGENCY MEDICINE

## 2023-06-05 PROCEDURE — 80053 COMPREHEN METABOLIC PANEL: CPT | Performed by: EMERGENCY MEDICINE

## 2023-06-05 PROCEDURE — 96365 THER/PROPH/DIAG IV INF INIT: CPT

## 2023-06-05 PROCEDURE — 96375 TX/PRO/DX INJ NEW DRUG ADDON: CPT

## 2023-06-05 PROCEDURE — 0241U HB NFCT DS VIR RESP RNA 4 TRGT: CPT

## 2023-06-05 PROCEDURE — 71045 X-RAY EXAM CHEST 1 VIEW: CPT

## 2023-06-05 PROCEDURE — 99284 EMERGENCY DEPT VISIT MOD MDM: CPT

## 2023-06-05 PROCEDURE — 96366 THER/PROPH/DIAG IV INF ADDON: CPT

## 2023-06-05 PROCEDURE — 36415 COLL VENOUS BLD VENIPUNCTURE: CPT

## 2023-06-05 PROCEDURE — 83605 ASSAY OF LACTIC ACID: CPT | Performed by: EMERGENCY MEDICINE

## 2023-06-05 PROCEDURE — 87040 BLOOD CULTURE FOR BACTERIA: CPT | Performed by: EMERGENCY MEDICINE

## 2023-06-05 RX ORDER — ONDANSETRON 2 MG/ML
4 INJECTION INTRAMUSCULAR; INTRAVENOUS ONCE
Status: COMPLETED | OUTPATIENT
Start: 2023-06-05 | End: 2023-06-05

## 2023-06-05 RX ORDER — ACETAMINOPHEN 325 MG/1
650 TABLET ORAL ONCE
Status: COMPLETED | OUTPATIENT
Start: 2023-06-05 | End: 2023-06-05

## 2023-06-05 RX ORDER — NIRMATRELVIR AND RITONAVIR 300-100 MG
3 KIT ORAL 2 TIMES DAILY
Qty: 30 TABLET | Refills: 0 | Status: SHIPPED | OUTPATIENT
Start: 2023-06-05 | End: 2023-06-10

## 2023-06-05 RX ORDER — ACETAMINOPHEN 325 MG/1
325 TABLET ORAL ONCE
Status: COMPLETED | OUTPATIENT
Start: 2023-06-05 | End: 2023-06-05

## 2023-06-05 RX ADMIN — ACETAMINOPHEN 325 MG: 325 TABLET ORAL at 18:48

## 2023-06-05 RX ADMIN — ACETAMINOPHEN 650 MG: 325 TABLET ORAL at 18:49

## 2023-06-05 RX ADMIN — SODIUM CHLORIDE, SODIUM LACTATE, POTASSIUM CHLORIDE, AND CALCIUM CHLORIDE 1000 ML: .6; .31; .03; .02 INJECTION, SOLUTION INTRAVENOUS at 18:49

## 2023-06-05 RX ADMIN — ONDANSETRON 4 MG: 2 INJECTION INTRAMUSCULAR; INTRAVENOUS at 19:57

## 2023-06-05 NOTE — Clinical Note
Brooks Ugalde was seen and treated in our emergency department on 6/5/2023  Diagnosis:     Mark Benjamin  may return to work on return date  She may return on this date: 06/09/2023         If you have any questions or concerns, please don't hesitate to call        Ximena Best,     ______________________________           _______________          _______________  Hospital Representative                              Date                                Time

## 2023-06-05 NOTE — ED PROVIDER NOTES
History  Chief Complaint   Patient presents with   • Fever     Pt returned from a cruise to  [facility] yesterday, started with 104 fever yesterday, diarrhea 2 days ago  Reports body aches and LLQ pain  Took tylenol at 0     62-year-old female presents to the emergency department with 1 day history of fever  Patient states she returned home 1 day ago from a cruise to [place]  Patient states she had 2 episodes of diarrhea 2 days ago however states that has resolved  Patient notes associated body aches, fatigue, headache  Patient denies any nausea, vomiting, bowel/bladder changes, abdominal pain, neck pain, focal weakness, cough, congestion, sore throat  Prior to Admission Medications   Prescriptions Last Dose Informant Patient Reported? Taking?    Calcium Citrate-Vitamin D 250-200 MG-UNIT TABS  Self Yes No   Sig: Take 1 tablet by mouth 3 (three) times a day   Cholecalciferol (D3 5000) 125 MCG (5000 UT) capsule  Self Yes No   FLUoxetine (PROzac) 20 mg capsule   No No   Sig: TAKE 2 CAPSULES BY MOUTH EVERY DAY   MULTIPLE VITAMIN PO  Self Yes No   Sig: Take 1 tablet by mouth daily   Melatonin 5 MG CAPS  Self Yes No   Sig: Take 5 mg by mouth daily at bedtime    Progesterone 100 MG CAPS   Yes No   estradiol (VIVELLE-DOT) 0 025 MG/24HR  Self Yes No   Sig: PLACE 1 PATCH ON THE SKIN 2 TIMES A WEEK   pravastatin (PRAVACHOL) 20 mg tablet  Self No No   Sig: TAKE 1 TABLET BY MOUTH EVERY DAY   progesterone (PROMETRIUM) 100 MG capsule  Self Yes No   Sig: Take 100 mg by mouth daily   Patient not taking: Reported on 3/17/2023   scopolamine (TRANSDERM-SCOP) 1 mg/3 days TD 72 hr patch   No No   Sig: Place 1 patch on the skin over 72 hours every third day      Facility-Administered Medications: None       Past Medical History:   Diagnosis Date   • Abdominal pain    • Chronic pain disorder     abdominal   • Depression    • Diverticula of colon    • Former smoker     Resolved 7/2012    • GERD (gastroesophageal reflux disease) • Hemorrhoids    • Hiatal hernia    • Hyperlipidemia    • Intestinal malabsorption following gastrectomy    • Morbid obesity ([address] )     Resolved 10/2/2015    • Obesity    • Postgastrectomy malabsorption    • Vitamin D insufficiency     Resolved 2016        Past Surgical History:   Procedure Laterality Date   • ABDOMINAL SURGERY      gastric bipass   • ANKLE SURGERY     •  SECTION      (2) ,     • COLONOSCOPY     • ESOPHAGOGASTRODUODENOSCOPY      x2   • GASTRIC BYPASS  2015   • HERNIA REPAIR  2015    Paraesophageal hiatus hernia  Managed by Rene Mcgowan (General Surgery)   • RI EGD TRANSORAL BIOPSY SINGLE/MULTIPLE N/A 2017    Procedure: ESOPHAGOGASTRODUODENOSCOPY (EGD); Surgeon: Hattie Warner MD;  Location: [facility]; Service: Bariatrics   • TONSILLECTOMY     • UTERINE FIBROID SURGERY         Family History   Problem Relation Age of Onset   • Breast cancer Mother 32   • Prostate cancer Father 48   • Hypertension Father    • Heart disease Father         Coronary arteriosclerosis    • Hyperlipidemia Father    • Heart disease Maternal Grandmother         Coronary arteriosclerosis    • Heart disease Maternal Grandfather         Coronary arteriosclerosis    • Diabetes Paternal Grandmother    • No Known Problems Brother    • No Known Problems Daughter    • No Known Problems Daughter    • No Known Problems Paternal Aunt    • Stroke Neg Hx    • Thyroid disease Neg Hx      I have reviewed and agree with the history as documented  E-Cigarette/Vaping   • E-Cigarette Use Never User      E-Cigarette/Vaping Substances     Social History     Tobacco Use   • Smoking status: Former     Types: Cigarettes     Quit date:      Years since quittin 4   • Smokeless tobacco: Never   • Tobacco comments:     20+ pack year hx - As per [employer]    Vaping Use   • Vaping Use: Never used   Substance Use Topics   • Alcohol use:  Yes   • Drug use: No        Review of Systems Constitutional: Positive for fatigue and fever  Negative for chills  HENT: Negative for ear pain and sore throat  Eyes: Negative for pain and visual disturbance  Respiratory: Negative for cough and shortness of breath  Cardiovascular: Negative for chest pain and palpitations  Gastrointestinal: Positive for diarrhea (resolved)  Negative for abdominal pain, blood in stool, constipation, nausea and vomiting  Genitourinary: Negative for dysuria and hematuria  Musculoskeletal: Positive for myalgias  Negative for arthralgias and back pain  Skin: Negative for color change and rash  Neurological: Positive for headaches  Negative for seizures and syncope  All other systems reviewed and are negative  Physical Exam  ED Triage Vitals   Temperature Pulse Respirations Blood Pressure SpO2   06/05/23 1641 06/05/23 1641 06/05/23 1641 06/05/23 1641 06/05/23 1641   (!) 101 4 °F (38 6 °C) 96 17 146/86 96 %      Temp Source Heart Rate Source Patient Position - Orthostatic VS BP Location FiO2 (%)   06/05/23 1641 06/05/23 1641 06/05/23 1641 06/05/23 1641 --   Oral Monitor Sitting Right arm       Pain Score       06/05/23 1848       8             Orthostatic Vital Signs  Vitals:    06/05/23 1641 06/05/23 1900   BP: 146/86 126/65   Pulse: 96 89   Patient Position - Orthostatic VS: Sitting Lying       Physical Exam  Vitals and nursing note reviewed  Constitutional:       General: She is not in acute distress  Appearance: She is well-developed  HENT:      Head: Normocephalic and atraumatic  Mouth/Throat:      Mouth: Mucous membranes are moist    Eyes:      Conjunctiva/sclera: Conjunctivae normal    Cardiovascular:      Rate and Rhythm: Normal rate and regular rhythm  Heart sounds: No murmur heard  Pulmonary:      Effort: Pulmonary effort is normal  No respiratory distress  Breath sounds: Normal breath sounds  Abdominal:      Palpations: Abdomen is soft  Tenderness:  There is abdominal tenderness in the suprapubic area  Musculoskeletal:         General: No swelling  Cervical back: Normal range of motion and neck supple  No tenderness  Skin:     General: Skin is warm and dry  Capillary Refill: Capillary refill takes less than 2 seconds  Neurological:      General: No focal deficit present  Mental Status: She is alert and oriented to person, place, and time  Psychiatric:         Mood and Affect: Mood normal          ED Medications  Medications   acetaminophen (TYLENOL) tablet 325 mg (325 mg Oral Given 6/5/23 1848)   lactated ringers bolus 1,000 mL (0 mL Intravenous Stopped 6/5/23 2059)   acetaminophen (TYLENOL) tablet 650 mg (650 mg Oral Given 6/5/23 1849)   ondansetron (ZOFRAN) injection 4 mg (4 mg Intravenous Given 6/5/23 1957)       Diagnostic Studies  Results Reviewed     Procedure Component Value Units Date/Time    Blood culture #1 [[id number]] Collected: 06/05/23 1740    Lab Status: Preliminary result Specimen: Blood from Arm, Left Updated: 06/07/23 0001     Blood Culture No Growth at 24 hrs  Blood culture #2 [[id number]] Collected: 06/05/23 1653    Lab Status: Preliminary result Specimen: Blood from Hand, Right Updated: 06/06/23 2201     Blood Culture No Growth at 24 hrs      Urine Microscopic [[id number]]  (Abnormal) Collected: 06/05/23 1950    Lab Status: Final result Specimen: Urine, Clean Catch Updated: 06/05/23 2001     RBC, UA 1-2 /hpf      WBC, UA 1-2 /hpf      Epithelial Cells Occasional /hpf      Bacteria, UA Occasional /hpf      MUCUS THREADS Occasional    UA w Reflex to Microscopic w Reflex to Culture [[id number]]  (Abnormal) Collected: 06/05/23 1950    Lab Status: Final result Specimen: Urine, Clean Catch Updated: 06/05/23 2000     Color, UA Yellow     Clarity, UA Clear     Specific [place], UA 1 030     pH, UA 6 0     Leukocytes, UA Negative     Nitrite, UA Negative     Protein, UA Trace mg/dl      Glucose, UA Negative mg/dl      Ketones, UA 40 (2+) mg/dl Urobilinogen, UA <2 0 mg/dl      Bilirubin, UA Negative     Occult Blood, UA Negative    FLU/RSV/COVID - if FLU/RSV clinically relevant [[id number]]  (Abnormal) Collected: 06/05/23 1844    Lab Status: Final result Specimen: Nares from Nose Updated: 06/05/23 1945     SARS-CoV-2 Positive     INFLUENZA A PCR Negative     INFLUENZA B PCR Negative     RSV PCR Negative    Narrative:      FOR PEDIATRIC PATIENTS - copy/paste COVID Guidelines URL to browser: [email or web address]  [email or web address]    SARS-CoV-2 assay is a Nucleic Acid Amplification assay intended for the  qualitative detection of nucleic acid from SARS-CoV-2 in nasopharyngeal  swabs  Results are for the presumptive identification of SARS-CoV-2 RNA  Positive results are indicative of infection with SARS-CoV-2, the virus  causing COVID-19, but do not rule out bacterial infection or co-infection  with other viruses  Laboratories within the [place] and its  territories are required to report all positive results to the appropriate  public health authorities  Negative results do not preclude SARS-CoV-2  infection and should not be used as the sole basis for treatment or other  patient management decisions  Negative results must be combined with  clinical observations, patient history, and epidemiological information  This test has not been FDA cleared or approved  This test has been authorized by FDA under an Emergency Use Authorization  (EUA)  This test is only authorized for the duration of time the  declaration that circumstances exist justifying the authorization of the  emergency use of an in vitro diagnostic tests for detection of SARS-CoV-2  virus and/or diagnosis of COVID-19 infection under section 564(b)(1) of  the Act, 21 U  S C  230CER-0(Q)(5), unless the authorization is terminated  or revoked sooner  The test has been validated but independent review by FDA  and CLIA is pending      Test performed using [employer] GeneXpert: This RT-PCR assay targets N2,  a region unique to SARS-CoV-2  A conserved region in the E-gene was chosen  for pan-Sarbecovirus detection which includes SARS-CoV-2  According to CMS-2020-01-R, this platform meets the definition of high-throughput technology  [place] draw [[id number]] Collected: 06/05/23 1653    Lab Status: Final result Specimen: Blood from Arm, Right Updated: 06/05/23 1901    Narrative: The following orders were created for panel order [place] draw  Procedure                               Abnormality         Status                     ---------                               -----------         ------                     Clevester Natali top on MEMU[[id number]]                                 Final result               Green / Yellow tube on MGZV[[id number]]                      Final result                 Please view results for these tests on the individual orders  CBC and differential [[id number]]  (Normal) Collected: 06/05/23 1653    Lab Status: Final result Specimen: Blood from Hand, Right Updated: 06/05/23 1822     WBC 7 18 Thousand/uL      RBC 4 38 Million/uL      Hemoglobin 13 6 g/dL      Hematocrit 41 3 %      MCV 94 fL      MCH 31 1 pg      MCHC 32 9 g/dL      RDW 13 0 %      MPV 10 3 fL      Platelets 584 Thousands/uL     Narrative: This is an appended report  These results have been appended to a previously verified report      Manual Differential(PHLEBS Do Not Order) [[id number]]  (Abnormal) Collected: 06/05/23 1653    Lab Status: Final result Specimen: Blood from Hand, Right Updated: 06/05/23 1822     Segmented % 91 %      Bands % 3 %      Lymphocytes % 3 %      Monocytes % 3 %      Eosinophils, % 0 %      Basophils % 0 %      Absolute Neutrophils 6 75 Thousand/uL      Lymphocytes Absolute 0 22 Thousand/uL      Monocytes Absolute 0 22 Thousand/uL      Eosinophils Absolute 0 00 Thousand/uL      Basophils Absolute 0 00 Thousand/uL      Total Counted --     RBC Morphology Present     Stomatocytes Present     Platelet Estimate Adequate    Comprehensive metabolic panel [[id number]] Collected: 06/05/23 1653    Lab Status: Final result Specimen: Blood from Hand, Right Updated: 06/05/23 1736     Sodium 136 mmol/L      Potassium 3 5 mmol/L      Chloride 103 mmol/L      CO2 23 mmol/L      ANION GAP 10 mmol/L      BUN 11 mg/dL      Creatinine 0 78 mg/dL      Glucose 117 mg/dL      Calcium 8 8 mg/dL      AST 20 U/L      ALT 15 U/L      Alkaline Phosphatase 68 U/L      Total Protein 6 5 g/dL      Albumin 4 2 g/dL      Total Bilirubin 0 49 mg/dL      eGFR 83 ml/min/1 73sq m     Narrative:      [place] guidelines for Chronic Kidney Disease (CKD):   •  Stage 1 with normal or high GFR (GFR > 90 mL/min/1 73 square meters)  •  Stage 2 Mild CKD (GFR = 60-89 mL/min/1 73 square meters)  •  Stage 3A Moderate CKD (GFR = 45-59 mL/min/1 73 square meters)  •  Stage 3B Moderate CKD (GFR = 30-44 mL/min/1 73 square meters)  •  Stage 4 Severe CKD (GFR = 15-29 mL/min/1 73 square meters)  •  Stage 5 End Stage CKD (GFR <15 mL/min/1 73 square meters)  Note: GFR calculation is accurate only with a steady state creatinine    Lactic acid, plasma (w/reflex if result > 2 0) [[id number]]  (Normal) Collected: 06/05/23 1653    Lab Status: Final result Specimen: Blood from Arm, Right Updated: 06/05/23 1735     LACTIC ACID 0 7 mmol/L     Narrative:      Result may be elevated if tourniquet was used during collection  XR chest 1 view portable   ED Interpretation by Maura Perez MD (06/05 1857)   NAD      Final Result by Camille Koehler MD (06/06 1004)      No acute cardiopulmonary disease                    Workstation performed: [id number]               Procedures  Procedures      ED Course  ED Course as of 06/07/23 0233 Mon Jun 05, 2023   1734 Blood Pressure: 146/86   1734 Temperature(!): 101 4 °F (38 6 °C)  Oral temp   1734 Pulse: 96   1734 Respirations: 17 1734 SpO2: 96 %   164 76-year-old female presents to the emergency department with 1 day history of fever  Patient states she returned home 1 day ago from a cruise to [place]  Patient states she had 2 episodes of diarrhea 2 days ago however states that has resolved  Patient notes associated body aches, fatigue, headache  Patient denies any nausea, vomiting, bowel/bladder changes, abdominal pain, neck pain, focal weakness, cough, congestion, sore throat  PE: fatigued appearing female, heart RRR, lungs CTAB, mild suprapubic abdominal tenderness, abdomen soft, no rebound/guarding, no rashes    Concern for: viral gastroenteritis vs COVID/Flu/RSV vs PNA vs UTI   1752 CBC and differential  No significant leukocytosis, no banding, normal hemoglobin and hematocrit, normal platelets  1752 Comprehensive metabolic panel  wnl   2311 LACTIC ACID: 0 7   1827 Manual Differential(PHLEBS Do Not Order)(!)   1859 XR chest 1 view portable  No acute process visualized   1952 SARS-COV-2(!): Positive   1952 INFLU A PCR: Negative   1952 INFLU B PCR: Negative   1952 RSV PCR: Negative   2005 UA w Reflex to Microscopic w Reflex to Culture(!)  Ketones present, patient getting fluids, otherwise reassuring, will not culture urine  2005 Urine Microscopic(!)  Patient will be discharged  Otherwise unremarkable  2030 Results were discussed with patient  She expressed understanding  Discharged home with prescription for Paxlovid, instructed to hold statins while taking this medication  Patient expressed understanding  Instructed patient to follow-up with primary care doctor as needed  Instructed patient to the emergency department for any new or worsening symptoms  Patient and  expressed understanding were agreeable with plan  Patient and  were given the opportunity ask questions in the emergency department, all questions and concerns were addressed in the emergency department prior to discharge  SBIRT 20yo+    Flowsheet Row Most Recent Value   Initial Alcohol Screen: US AUDIT-C     1  How often do you have a drink containing alcohol? 0 Filed at: 06/05/2023 1644   2  How many drinks containing alcohol do you have on a typical day you are drinking? 0 Filed at: 06/05/2023 1644   3a  Male UNDER 65: How often do you have five or more drinks on one occasion? 0 Filed at: 06/05/2023 1644   3b  FEMALE Any Age, or MALE 65+: How often do you have 4 or more drinks on one occassion? 0 Filed at: 06/05/2023 1644   Audit-C Score 0 Filed at: 06/05/2023 1644   RICKY: How many times in the past year have you    Used an illegal drug or used a prescription medication for non-medical reasons? Never Filed at: 06/05/2023 1644                Medical Decision Making  Based on history and physical exam, concerned for: gastroenteritis vs COVID/Flu/RSV vs PNA vs UA  CBC, CMP, lactic, BCx, CXR, UA, Covid/flu/rsv  Pt received tylenol, zofran, and IVF while in ED  Plan: rx paxlovid, have pt hold statin while taking paxlovid, f/u with PCP as needed, instructed to RTED for any new or worsening symptoms    Plan discussed with patient and   They expressed understanding and are agreeable with plan  Patient and  were given the opportunity to ask questions, all questions and concerns were addressed by the end of ED visit  COVID-19: acute illness or injury  Fever: acute illness or injury  Amount and/or Complexity of Data Reviewed  Labs: ordered  Decision-making details documented in ED Course  Radiology: ordered and independent interpretation performed  Decision-making details documented in ED Course  Risk  OTC drugs  Prescription drug management              Disposition  Final diagnoses:   COVID-19   Fever     Time reflects when diagnosis was documented in both MDM as applicable and the Disposition within this note     Time User Action Codes Description Comment    6/5/2023  8:20 PM Julissa Perdomo Bismark Snyder Add [U07 1] COVID-19     6/5/2023  8:26 PM Clarice Chambers Add [R50 9] Fever       ED Disposition     ED Disposition   Discharge    Condition   Stable    Date/Time   Mon Jun 5, 2023  8:31 PM    Comment   Balaji Betsey discharge to home/self care  Follow-up Information     Follow up With Specialties Details Why Contact Info Additional Information    Alejandro Garcia MD Family Medicine Call in 1 day to schedule follow-up appointment [phone] [facility]    [address] Emergency Department Emergency Medicine Go to  As needed, If symptoms worsen [address] [facility] [address] [facility], [address], [place], [place], [address]          Discharge Medication List as of 6/5/2023  8:53 PM      START taking these medications    Details   nirmatrelvir & ritonavir (Paxlovid, 300/100,) tablet therapy pack Take 3 tablets by mouth 2 (two) times a day for 5 days Take 2 nirmatrelvir tablets + 1 ritonavir tablet together per dose, Starting Mon 6/5/2023, Until Sat 6/10/2023, Normal         CONTINUE these medications which have NOT CHANGED    Details   Calcium Citrate-Vitamin D 250-200 MG-UNIT TABS Take 1 tablet by mouth 3 (three) times a day, Historical Med      Cholecalciferol (D3 5000) 125 MCG (5000 UT) capsule Starting Sat 10/1/2022, Historical Med      estradiol (VIVELLE-DOT) 0 025 MG/24HR PLACE 1 PATCH ON THE SKIN 2 TIMES A WEEK, Historical Med      FLUoxetine (PROzac) 20 mg capsule TAKE 2 CAPSULES BY MOUTH EVERY DAY, Normal      Melatonin 5 MG CAPS Take 5 mg by mouth daily at bedtime , Historical Med      MULTIPLE VITAMIN PO Take 1 tablet by mouth daily, Historical Med      pravastatin (PRAVACHOL) 20 mg tablet TAKE 1 TABLET BY MOUTH EVERY DAY, Normal      progesterone (PROMETRIUM) 100 MG capsule Take 100 mg by mouth daily, Starting Thu 2/27/2020, Historical Med      Progesterone 100 MG CAPS Starting Wed 3/15/2023, Historical Med      scopolamine (TRANSDERM-SCOP) 1 mg/3 days TD 72 hr patch Place 1 patch on the skin over 72 hours every third day, Starting Tue 4/11/2023, Normal           No discharge procedures on file  PDMP Review     None           ED Provider  Attending physically available and evaluated Sundeep Bridges I managed the patient along with the ED Attending      Electronically Signed by         Harish Sky DO  06/07/23 92

## 2023-06-05 NOTE — ED ATTENDING ATTESTATION
6/5/2023  I, Mich Hebert MD, saw and evaluated the patient  I have discussed the patient with the resident/non-physician practitioner and agree with the resident's/non-physician practitioner's findings, Plan of Care, and MDM as documented in the resident's/non-physician practitioner's note, except where noted  All available labs and Radiology studies were reviewed  I was present for key portions of any procedure(s) performed by the resident/non-physician practitioner and I was immediately available to provide assistance  At this point I agree with the current assessment done in the Emergency Department  I have conducted an independent evaluation of this patient a history and physical is as follows:    59 yo female with remote h/o gastric bypass p/w fevers after return from trip to [facility]  Has loose stool x 2, no bloody/not tarry  Reports HA, general malaise, myalgia, sore throat  No congestion, reports mild cough  No cp, sob  No loose stool today  Vax UTD  Has not had COVID  On exam pt well hydrated appearing, benign oropharynx/HEENT exam   Non-meningitic  Benign heart/lungs  No murmur  Benign abdomen  No CVA TTP  Testing very reassuring  Labs/cxr/urine wnl  COVID test positive  Symptoms very much consistent with viral process  Safe for dispo to home with symptomatic treatment  Paxlovid offered/accepted  RTER precautions discussed and documented on discharge paperwork, pt and family endorsed good understanding of reasons to return  ED Course  ED Course as of 06/05/23 2243   Mon Jun 05, 2023   1740 Comprehensive metabolic panel  Reassuring, no end organ damage, no AG, normal bicarb  1740 LACTIC ACID: 0 7  wnl   1740 CBC and differential  No significant leukocytosis reassuring diff, normal H/H, normal platelets  1828 CBC and differential  No significant leukocytosis reassuring diff no banding, normal H/H, normal platelets        1948 FLU/RSV/COVID - if FLU/RSV clinically relevant(!)  COVID positive   2014 UA w Reflex to Microscopic w Reflex to Culture(!)  Ketones, pt getting fluids, otherwise reassuring not c/w urine         Critical Care Time  Procedures

## 2023-06-06 NOTE — DISCHARGE INSTRUCTIONS
You were seen and evaluated in the emergency department for fever, myalgias, headache  You had lab work and chest x-ray  Your lab work showed positive for COVID-19  Your chest x-ray not show any obvious signs of pneumonia  If prescription for Paxlovid was sent to your pharmacy  Please take as directed  Please hold your pravastatin while you are taking this medication  You may be started after completing treatment  Please contact your primary care doctor to schedule follow-up after being evaluated in the ER    Please return to the emergency department for any new or worsening symptoms, including but not limited to: Chest pain, shortness of breath, severe muscle pains, vomiting, confusion, or any new or worsening symptoms

## 2023-06-08 DIAGNOSIS — J01.90 ACUTE NON-RECURRENT SINUSITIS, UNSPECIFIED LOCATION: Primary | ICD-10-CM

## 2023-06-08 RX ORDER — AMOXICILLIN 875 MG/1
875 TABLET, COATED ORAL 2 TIMES DAILY
Qty: 20 TABLET | Refills: 0 | Status: SHIPPED | OUTPATIENT
Start: 2023-06-08 | End: 2023-06-18

## 2023-06-10 LAB — BACTERIA BLD CULT: NORMAL

## 2023-06-11 LAB — BACTERIA BLD CULT: NORMAL

## 2023-06-14 ENCOUNTER — CONSULT (OUTPATIENT)
Dept: BARIATRICS | Facility: CLINIC | Age: 58
End: 2023-06-14
Payer: COMMERCIAL

## 2023-06-14 VITALS
HEIGHT: 62 IN | WEIGHT: 201.2 LBS | BODY MASS INDEX: 37.02 KG/M2 | DIASTOLIC BLOOD PRESSURE: 82 MMHG | SYSTOLIC BLOOD PRESSURE: 126 MMHG | RESPIRATION RATE: 16 BRPM | HEART RATE: 80 BPM

## 2023-06-14 DIAGNOSIS — E66.09 CLASS 2 OBESITY DUE TO EXCESS CALORIES IN ADULT: Primary | ICD-10-CM

## 2023-06-14 DIAGNOSIS — Z90.3 POSTGASTRECTOMY MALABSORPTION: ICD-10-CM

## 2023-06-14 DIAGNOSIS — F32.4 MAJOR DEPRESSIVE DISORDER WITH SINGLE EPISODE, IN PARTIAL REMISSION (HCC): ICD-10-CM

## 2023-06-14 DIAGNOSIS — K91.2 POSTGASTRECTOMY MALABSORPTION: ICD-10-CM

## 2023-06-14 PROCEDURE — 99214 OFFICE O/P EST MOD 30 MIN: CPT | Performed by: FAMILY MEDICINE

## 2023-06-14 RX ORDER — DOXYCYCLINE HYCLATE 50 MG/1
324 CAPSULE, GELATIN COATED ORAL
COMMUNITY

## 2023-06-14 NOTE — PROGRESS NOTES
Assessment/Plan:  Ora Vuong was seen today for consult  Diagnoses and all orders for this visit:    Class 2 obesity due to excess calories in adult  -     liraglutide (SAXENDA) injection; INJECT SAXENDA DAILY SUBCUTANEOUSLY  -WEEK 1: 0 6mg daily -if tolerated WEEK 2: 1 2mg daily -if tolerated WEEK 3: 1 8mg daily -if tolerated WEEK 4: 2 4mg daily -if tolerated WEEK 5: 3mg daily -IF NAUSEA/VOMITING DEVELOP STOP MEDICATION FOR A FEW DAYS AND DECREASE TO PREVIOUSLY TOLERATED DOSE  STAY HYDRATED  -IF YOU DEVELOP SEVERE ABDOMINAL PAIN WHICH MAY RADIATE TO THE BACK, SOMETIMES ASSOCIATED WITH FEVER, AND VOMITING, STOP MEDICATION AND SEEK URGENT CARE AS THIS MAY BE A SIGN OF PANCREATITIS  - Patient denies personal history of pancreatitis  Patient also denies personal and family history of medullary thyroid cancer and multiple endocrine neoplasia type 2 (MEN 2 tumor)  Patient understands these major side effects and agrees to start the medication  Postgastrectomy malabsorption  Surgical notes reviewed  Labs reviewed  Encouraged vitamin supplementation and reach protein goal  Major depressive disorder with single episode, in partial remission ([facility] [address] )  could consider Topamax in the future  discussed side effects as potential med      Encourage mindful eating, portion control, motivational interview performed to help patient reach goals   Encouraged exercise start 10 min daily goal 150 min weekly        Follow up in approximately 2 mo      Subjective:   Chief Complaint   Patient presents with   • Consult     Initial visit with bariatician     Patient here to discuss weight associated problems and nutrition goals  HPI: Stevie Fam  is a 60 y o  female with excess weight/obesity here to pursue weight management  Most recent notes and records were reviewed      Wt Readings from Last 10 Encounters:   06/14/23 91 3 kg (201 lb 3 2 oz)   03/17/23 94 3 kg (207 lb 12 8 oz)   01/24/23 92 2 kg (203 lb 4 8 oz)   01/18/23 95 3 kg (210 "lb)   07/27/22 91 6 kg (201 lb 14 4 oz)   07/14/22 92 4 kg (203 lb 12 8 oz)   07/08/22 92 3 kg (203 lb 8 oz)   04/01/22 92 1 kg (203 lb)   01/19/22 92 5 kg (204 lb)   01/11/22 92 5 kg (204 lb)   s/p Peyton-En-Y Gastric Bypass with Dr Shikha Ball on 06/16/15  She is struggling with weight regain  Initial: 271 5lbs  Current: 201lbs  EWL: 49%   Joaquin: 168 5lbs in July 2017  ·  Tolerating a regular diet-yes  · Eating at least 60 grams of protein per day-yes  · Following 30/60 minute rule with liquids-yes  · Drinking at least 64 ounces of fluid per day-no; advised her to increase and decrease coffee  · Drinking carbonated beverages-no  · Sufficient exercise-some walking; starting PT soon and agrees to start walking  · Using NSAIDs regularly-no  · Using nicotine-no  · Using alcohol-very rarely  Had COVID-19 last week     Initial weight in our office :201lbs 6/14/2023        Food logging: no   Increased appetite/cravings: yes   Grazing all day:  Exercise:not much  Hydration: 64 oz  Alcohol: on the cruise but rarely now  Sleep:ok  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[place] [employer]      The following portions of the patient's history were reviewed and updated as appropriate: allergies, current medications, past family history, past medical history, past social history, past surgical history, and problem list       Review of Systems   Constitutional: Negative for activity change  Fatigue  HENT: Negative for trouble swallowing  Respiratory: Negative for shortness of breath      Cardiovascular: Negative for chest pain, edema  Gastrointestinal: Negative for abdominal pain, nausea and vomiting, acid reflux, constipation/diarrhea  Psychiatric/Behavioral: Negative for behavioral problems , anxiety or depression    Objective:  /82 (BP Location: Left arm, Patient Position: Sitting, Cuff Size: Large)   Pulse 80   Resp 16   Ht 5' 2\" (1 575 m)   Wt 91 3 kg (201 lb 3 2 oz)   BMI 36 80 kg/m² " Constitutional: Well-developed, well-nourished and Obese Body mass index is 36 8 kg/m²  Erika Rodriguez HEENT: No conjunctival pallor or jaundice  Pulmonary: No increased work of breathing or signs of respiratory distress  CV: well perfused  GI: Obese  Non-distended   MSK: No edema   Neuro: Oriented to person, place and time  Normal Speech  Normal gait  Psych: Normal affect and mood  Normal thought process no delusions   Labs and Imaging  Recent labs and imaging have been personally reviewed    Lab Results   Component Value Date    HCT 41 3 06/05/2023    HGB 13 6 06/05/2023    MCV 94 06/05/2023     06/05/2023    WBC 7 18 06/05/2023     Lab Results   Component Value Date    AGAP 10 06/05/2023    ALKPHOS 68 06/05/2023    ALT 15 06/05/2023    ANIONGAP 5 06/17/2015    AST 20 06/05/2023    BUN 11 06/05/2023    CALCIUM 8 8 06/05/2023     06/05/2023    CO2 23 06/05/2023    CREATININE 0 78 06/05/2023    EGFR 83 06/05/2023    GLUC 117 06/05/2023    GLUF 92 01/13/2023    K 3 5 06/05/2023     06/17/2015    SODIUM 136 06/05/2023    TBILI 0 49 06/05/2023    TP 6 5 06/05/2023     Lab Results   Component Value Date    HGBA1C 5 0 05/11/2023     Lab Results   Component Value Date    VNM3MIJKCJGV 2 240 02/23/2022     Lab Results   Component Value Date    CHOLESTEROL 177 01/13/2023     Lab Results   Component Value Date    HDL 78 01/13/2023     Lab Results   Component Value Date    TRIG 48 01/13/2023     Lab Results   Component Value Date    LDLCALC 89 01/13/2023

## 2023-06-15 ENCOUNTER — TELEPHONE (OUTPATIENT)
Dept: BARIATRICS | Facility: CLINIC | Age: 58
End: 2023-06-15

## 2023-06-15 DIAGNOSIS — E78.00 HYPERCHOLESTEROLEMIA: ICD-10-CM

## 2023-06-15 RX ORDER — PRAVASTATIN SODIUM 20 MG
TABLET ORAL
Qty: 90 TABLET | Refills: 1 | Status: SHIPPED | OUTPATIENT
Start: 2023-06-15

## 2023-06-29 ENCOUNTER — OFFICE VISIT (OUTPATIENT)
Dept: BARIATRICS | Facility: CLINIC | Age: 58
End: 2023-06-29

## 2023-06-29 VITALS — HEIGHT: 62 IN | BODY MASS INDEX: 37.25 KG/M2 | WEIGHT: 202.4 LBS

## 2023-06-29 DIAGNOSIS — R63.5 ABNORMAL WEIGHT GAIN: ICD-10-CM

## 2023-06-29 PROCEDURE — RECHECK

## 2023-06-29 PROCEDURE — WMDI30

## 2023-08-02 ENCOUNTER — RA CDI HCC (OUTPATIENT)
Dept: OTHER | Facility: HOSPITAL | Age: 58
End: 2023-08-02

## 2023-08-02 NOTE — PROGRESS NOTES
[address] [facility] coding opportunities       Chart reviewed, no opportunity found: CHART REVIEWED, NO OPPORTUNITY FOUND        Patients Insurance        Commercial Insurance: [employer]

## 2023-08-07 ENCOUNTER — OFFICE VISIT (OUTPATIENT)
Dept: FAMILY MEDICINE CLINIC | Facility: CLINIC | Age: 58
End: 2023-08-07
Payer: COMMERCIAL

## 2023-08-07 VITALS
WEIGHT: 208 LBS | BODY MASS INDEX: 38.28 KG/M2 | HEART RATE: 59 BPM | HEIGHT: 62 IN | TEMPERATURE: 97.9 F | OXYGEN SATURATION: 98 %

## 2023-08-07 DIAGNOSIS — R10.12 LUQ PAIN: ICD-10-CM

## 2023-08-07 DIAGNOSIS — R41.3 MEMORY DIFFICULTIES: Primary | ICD-10-CM

## 2023-08-07 DIAGNOSIS — D32.0 MENINGIOMA, CEREBRAL (HCC): ICD-10-CM

## 2023-08-07 PROCEDURE — 99214 OFFICE O/P EST MOD 30 MIN: CPT | Performed by: FAMILY MEDICINE

## 2023-08-07 NOTE — PROGRESS NOTES
Name: Marshall Police      : 1965      MRN: [id number]  Encounter Provider: Amada Acosta MD  Encounter Date: 2023   Encounter department: [address] [place] [address]     1. Memory difficulties  Assessment & Plan:  MMSE 29/30, with 2/3 5min recall. Pt does not sleep well (multiple causes) which may be playing a role. Pt also admits to trying to multi task which may play a role with her focus. Will have pt attempt to increase focus, and finish tasks/converstations/etc before moving on. Discussed improving sleep. Recheck 1m if not improving      2. LUQ pain  Assessment & Plan: With GERD and intermittent nausea. Pt is s/p bariatric surgery, which raises incidence of GERD, ulcer and gastritis REC: restart OTC omeprazole 20mg qd, but use med at least 4 weeks. Pt to call in 3-4w if not improved - earlier if worse    Orders:  -     omeprazole (PriLOSEC) 20 mg delayed release capsule; Take 1 capsule (20 mg total) by mouth daily before breakfast    3. Meningioma, cerebral [facility])  Assessment & Plan:  Small. Does not appear to be related to memory issues. For repeat MRI later this year. F/u with neuro           Subjective     f/u multiple med issues  - pt notes increase GERD and intermittent nausea over the last month or so. OTC omeprazole seemed to help. Pt denies RUQ pain, diarrhea, melena or hematochezia. Pt is 7y s/p gastric bypass. - pt feels that her memory is "going". Pt has noted that she is more forgetful. Her father is suffering from memory issues, but he is 80. She finds that she may forget conversations, what she was doing etc. PT has a hx of meningioma and is due for MRI in December. - pt compliant with Vit D and Calcium. - pt denies CP, palpitations, lightheadedness or other CV symptoms with or without exertion    Review of Systems   Constitutional: Negative. HENT: Negative. Eyes: Negative. Respiratory: Negative. Cardiovascular: Negative. Gastrointestinal: Positive for nausea. Negative for abdominal distention, abdominal pain, blood in stool, constipation and diarrhea. (+) GERD   Genitourinary: Negative. Musculoskeletal: Positive for arthralgias and back pain. Skin: Negative. Neurological: Negative for dizziness, speech difficulty, weakness, light-headedness, numbness and headaches. Psychiatric/Behavioral: Positive for sleep disturbance. Negative for agitation, decreased concentration and dysphoric mood. The patient is nervous/anxious (occasional). ?memory issues? Past Medical History:   Diagnosis Date   • Abdominal pain    • Chronic pain disorder     abdominal   • Depression    • Diverticula of colon    • Former smoker     Resolved 2012    • GERD (gastroesophageal reflux disease)    • Hemorrhoids    • Hiatal hernia    • Hyperlipidemia    • Intestinal malabsorption following gastrectomy    • Morbid obesity ([address])     Resolved 10/2/2015    • Obesity    • Postgastrectomy malabsorption    • Vitamin D insufficiency     Resolved 2016      Past Surgical History:   Procedure Laterality Date   • ABDOMINAL SURGERY      gastric bipass   • ANKLE SURGERY     •  SECTION      (2) ,     • COLONOSCOPY     • ESOPHAGOGASTRODUODENOSCOPY      x2   • GASTRIC BYPASS  2015   • HERNIA REPAIR  2015    Paraesophageal hiatus hernia. Managed by Radha Bobo (General Surgery)   • NC EGD TRANSORAL BIOPSY SINGLE/MULTIPLE N/A 2017    Procedure: ESOPHAGOGASTRODUODENOSCOPY (EGD); Surgeon: Jim Vergara MD;  Location: [facility];   Service: Bariatrics   • TONSILLECTOMY     • UTERINE FIBROID SURGERY       Family History   Problem Relation Age of Onset   • Breast cancer Mother 32   • Prostate cancer Father 48   • Hypertension Father    • Heart disease Father         Coronary arteriosclerosis    • Hyperlipidemia Father    • Heart disease Maternal Grandmother         Coronary arteriosclerosis    • Heart disease Maternal Grandfather         Coronary arteriosclerosis    • Diabetes Paternal Grandmother    • No Known Problems Brother    • No Known Problems Daughter    • No Known Problems Daughter    • No Known Problems Paternal Aunt    • Stroke Neg Hx    • Thyroid disease Neg Hx      Social History     Socioeconomic History   • Marital status: /Civil Union     Spouse name: None   • Number of children: None   • Years of education: None   • Highest education level: None   Occupational History   • None   Tobacco Use   • Smoking status: Former     Types: Cigarettes     Quit date:      Years since quittin.6   • Smokeless tobacco: Never   • Tobacco comments:     20+ pack year hx - As per [employer]    Vaping Use   • Vaping Use: Never used   Substance and Sexual Activity   • Alcohol use:  Yes   • Drug use: No   • Sexual activity: None   Other Topics Concern   • None   Social History Narrative    Daily coffee consumption (2 cups/day)    Denied: History of daily cola consumption (__cans/day)    Daily tea consumption (__cups/day)    Former smoker: quit  - As per [employer] or homemaker - As per [employer]    Uses safety equipment - Seatbelts      Social Determinants of Health     Financial Resource Strain: Not on file   Food Insecurity: Not on file   Transportation Needs: Not on file   Physical Activity: Not on file   Stress: Not on file   Social Connections: Not on file   Intimate Partner Violence: Not on file   Housing Stability: Not on file     Current Outpatient Medications on File Prior to Visit   Medication Sig   • Calcium Citrate-Vitamin D 250-200 MG-UNIT TABS Take 1 tablet by mouth 2 (two) times a day   • Cholecalciferol (D3 5000) 125 MCG (5000 UT) capsule    • ferrous gluconate (FERGON) 324 mg tablet Take 324 mg by mouth every other day Every other day   • FLUoxetine (PROzac) 20 mg capsule TAKE 2 CAPSULES BY MOUTH EVERY DAY (Patient taking differently: Take 20 mg by mouth daily)   • Melatonin 5 MG CAPS Take 5 mg by mouth daily at bedtime    • MULTIPLE VITAMIN PO Take 1 tablet by mouth daily   • pravastatin (PRAVACHOL) 20 mg tablet TAKE 1 TABLET BY MOUTH EVERY DAY     Allergies   Allergen Reactions   • Ibuprofen Other (See Comments)     Gastric bypass     Immunization History   Administered Date(s) Administered   • COVID-19 PFIZER VACCINE 0.3 ML IM 04/09/2021, 05/04/2021   • Fluzone Split Quad 0.25 mL 11/07/2014   • Fluzone Split Quad 0.5 mL 11/19/2019   • INFLUENZA 02/19/2016, 11/07/2016, 09/18/2017, 10/05/2018, 10/17/2020, 10/01/2022   • Influenza Quadrivalent 3 years and older 10/05/2018   • Influenza Quadrivalent Preservative Free 3 years and older IM 10/09/2020, 10/17/2020   • Influenza Quadrivalent, 6-35 Months IM 11/07/2014   • Influenza, Quadrivalent (nasal) 02/19/2016, 11/07/2016   • Influenza, injectable, quadrivalent, preservative free 0.5 mL 10/05/2018, 11/19/2019, 10/09/2020, 10/17/2020   • Influenza, seasonal, injectable 10/05/2011, 10/09/2012   • Tdap 11/07/2014   • Tuberculin Skin Test-PPD Intradermal 10/05/2011       Objective     Pulse 59   Temp 97.9 °F (36.6 °C)   Ht 5' 2" (1.575 m)   Wt 94.3 kg (208 lb)   SpO2 98%   BMI 38.04 kg/m²     Physical Exam  Vitals reviewed. Constitutional:       Appearance: She is well-developed. She is not diaphoretic. HENT:      Head: Normocephalic and atraumatic. Right Ear: Tympanic membrane, ear canal and external ear normal.      Left Ear: Tympanic membrane, ear canal and external ear normal.      Mouth/Throat:      Mouth: Mucous membranes are moist.   Eyes:      General: No scleral icterus. Extraocular Movements: Extraocular movements intact. Conjunctiva/sclera: Conjunctivae normal.      Pupils: Pupils are equal, round, and reactive to light. Neck:      Thyroid: No thyromegaly. Vascular: No JVD. Cardiovascular:      Rate and Rhythm: Normal rate and regular rhythm. Pulses: Normal pulses.       Heart sounds: No murmur heard.  Pulmonary:      Effort: Pulmonary effort is normal.      Breath sounds: Normal breath sounds. Abdominal:      General: There is no distension. Palpations: Abdomen is soft. There is no mass. Tenderness: There is no abdominal tenderness. Musculoskeletal:         General: No swelling or tenderness. Normal range of motion. Cervical back: Normal range of motion and neck supple. No tenderness. No muscular tenderness. Right lower leg: No edema. Left lower leg: No edema. Lymphadenopathy:      Cervical: No cervical adenopathy. Skin:     General: Skin is warm and dry. Capillary Refill: Capillary refill takes less than 2 seconds. Neurological:      Mental Status: She is alert and oriented to person, place, and time. Cranial Nerves: No cranial nerve deficit. Sensory: No sensory deficit. Motor: No weakness or abnormal muscle tone. Gait: Gait normal.      Deep Tendon Reflexes: Reflexes are normal and symmetric. Comments: MMSE 29/30   Psychiatric:         Behavior: Behavior normal.         Thought Content: Thought content normal.         Judgment: Judgment normal.      Comments: PHQ-2/9 Depression Screening    Little interest or pleasure in doing things: 0 - not at all  Feeling down, depressed, or hopeless: 1 - several days  Trouble falling or staying asleep, or sleeping too much: 3 - nearly every   day  Feeling tired or having little energy: 1 - several days  Poor appetite or overeating: 3 - nearly every day  Feeling bad about yourself - or that you are a failure or have let   yourself or your family down: 0 - not at all  Trouble concentrating on things, such as reading the newspaper or watching   television: 0 - not at all  Moving or speaking so slowly that other people could have noticed.  Or the   opposite - being so fidgety or restless that you have been moving around a   lot more than usual: 0 - not at all  Thoughts that you would be better off dead, or of hurting yourself in some   way: 0 - not at all  PHQ-9 Score: 8   PHQ-9 Interpretation: Mild depression           Lacie Weldon MD

## 2023-08-10 PROBLEM — R41.3 MEMORY DIFFICULTIES: Status: ACTIVE | Noted: 2023-08-10

## 2023-08-10 PROBLEM — R10.12 LUQ PAIN: Status: ACTIVE | Noted: 2023-08-10

## 2023-08-10 RX ORDER — OMEPRAZOLE 20 MG/1
20 CAPSULE, DELAYED RELEASE ORAL
Qty: 30 CAPSULE | Refills: 5
Start: 2023-08-10 | End: 2024-02-06

## 2023-08-10 NOTE — ASSESSMENT & PLAN NOTE
MMSE 29/30, with 2/3 5min recall. Pt does not sleep well (multiple causes) which may be playing a role. Pt also admits to trying to multi task which may play a role with her focus. Will have pt attempt to increase focus, and finish tasks/converstations/etc before moving on. Discussed improving sleep.  Recheck 1m if not improving

## 2023-08-10 NOTE — ASSESSMENT & PLAN NOTE
Small. Does not appear to be related to memory issues. For repeat MRI later this year.  F/u with neuro

## 2023-08-10 NOTE — ASSESSMENT & PLAN NOTE
With GERD and intermittent nausea. Pt is s/p bariatric surgery, which raises incidence of GERD, ulcer and gastritis REC: restart OTC omeprazole 20mg qd, but use med at least 4 weeks.  Pt to call in 3-4w if not improved - earlier if worse

## 2023-08-15 ENCOUNTER — TELEPHONE (OUTPATIENT)
Dept: BARIATRICS | Facility: CLINIC | Age: 58
End: 2023-08-15

## 2023-08-15 NOTE — TELEPHONE ENCOUNTER
Spoke with patient and encourage her to call the insurance and check which medicine will they be able to provide to the patient, any question for Dr. Jennifer Tim will be addressed on her next Office visit. Patient verbally agreed.

## 2023-08-15 NOTE — TELEPHONE ENCOUNTER
----- Message from Jeremy Esquivel. Dennie Patron sent at 8/15/2023  8:42 AM EDT -----  [address]: [phone]  As discussed at the appointment, you mentioned it may not be covered and that is why you put it thru to the pharmacy to see if it was covered at all. I am asking you if there are any affordable medications or even supplements you feel could help me. I am having a very difficult time with my appetite. You also mentioned the nutritional plan. Are you referring to my discussion with Tomeka Hebert or something else?

## 2023-08-31 DIAGNOSIS — F32.A DEPRESSION, UNSPECIFIED DEPRESSION TYPE: ICD-10-CM

## 2023-08-31 RX ORDER — FLUOXETINE HYDROCHLORIDE 20 MG/1
CAPSULE ORAL
Qty: 180 CAPSULE | Refills: 1 | Status: SHIPPED | OUTPATIENT
Start: 2023-08-31

## 2023-09-08 DIAGNOSIS — E78.00 HYPERCHOLESTEROLEMIA: ICD-10-CM

## 2023-09-08 RX ORDER — PRAVASTATIN SODIUM 20 MG
TABLET ORAL
Qty: 90 TABLET | Refills: 2 | Status: SHIPPED | OUTPATIENT
Start: 2023-09-08

## 2023-09-13 ENCOUNTER — OFFICE VISIT (OUTPATIENT)
Dept: BARIATRICS | Facility: CLINIC | Age: 58
End: 2023-09-13

## 2023-09-13 VITALS — WEIGHT: 208 LBS | BODY MASS INDEX: 38.28 KG/M2 | HEIGHT: 62 IN

## 2023-09-13 DIAGNOSIS — R63.5 ABNORMAL WEIGHT GAIN: Primary | ICD-10-CM

## 2023-09-13 PROCEDURE — RECHECK

## 2023-09-13 NOTE — PROGRESS NOTES
Weight Management Medical Nutrition Assessment  Xavier Crump was here today for meal planning. Today she weighs 208.0 lbs giving her a gain of 5.6 lbs since her last visit in June. She is not currently exercising but can walk and does have a stationary bike. She logs her food but admits that she is not always consistent. She struggles as night with grazing. Discussed chewing sugar free gum or having a sugar free mint. Provided her with a handout about eating triggers. Recommend that she start logging more consistently and also that she exercise at least 3 times week for 15 - 20 minutes. Patient seen by Medical Provider in past 6 months:  yes  Requested to schedule appointment with Medical Provider: No        Anthropometric Measurements  Start Weight (#): 202.4  Current Weight (#): 208  TBW % Change from start weight:-3%  Ideal Body Weight (#):110  Goal Weight (#): 175  Highest:280  Lowest: 160     Weight Loss History  Previous weight loss attempts: surgery     Food and Nutrition Related History     Food Recall  Breakfast: oatmeal and greek yogurt  Or eggs with fruit and 1 toast, or protein shake  Snack: bar  Lunch: salad with protein   Snack:  Dinner:chicken with veg  Snack:  Peanuts, popcorn, grazes        Beverages: water  Volume of beverage intake: not sure     Weekends: Same  Cravings: sweets  Trouble area of day:midday     Frequency of Eating out: irregularly  Food restrictions:none  Cooking: self   Food Shopping: self     Physical Activity Intake  Activity:Walking  Frequency:infrequently  Physical limitations/barriers to exercise: none     Estimated Needs  Energy    [employer] Needs: BMR : 9377    1-2# loss weekly sedentary:   1266        1-2# loss weekly lightly active: 1024 - 1524  Maintenance calories for sedentary activity level:1766  Protein: 60 - 75    (1.2-1.5g/kg IBW)  Fluid:  58    (35mL/kg IBW)     Nutrition Diagnosis  Yes;     Overweight/obesity  related to Excess energy intake as evidenced by  BMI more than normative standard for age and sex (obesity-grade II 35-39. 9)     Nutrition Intervention     Nutrition Prescription  Calories:1200 - 1400  Protein: 61 - 75  Fluid:58     Meal Plan ([place]/Pro/Carb)     Nutrition Education:    Calorie controlled menu  Lean protein food choices  Healthy snack options  Food journaling tips        Nutrition Counseling:  Strategies: meal planning, portion sizes, healthy snack choices, hydration, fiber intake, protein intake, exercise, food journal        Monitoring and Evaluation:  Evaluation criteria:  Energy Intake  Meet protein needs  Maintain adequate hydration  Monitor weekly weight  Meal planning/preparation  Food journal   Decreased portions at mealtimes and snacks  Physical activity      Barriers to learning:none  Readiness to change: Action:  (Changing behavior)  Comprehension: good  Expected Compliance: good

## 2023-12-14 ENCOUNTER — HOSPITAL ENCOUNTER (OUTPATIENT)
Dept: MRI IMAGING | Facility: HOSPITAL | Age: 58
Discharge: HOME | End: 2023-12-14
Payer: COMMERCIAL

## 2023-12-14 DIAGNOSIS — D32.0 MENINGIOMA, CEREBRAL (HCC): ICD-10-CM

## 2023-12-14 PROCEDURE — 70553 MRI BRAIN STEM W/O & W/DYE: CPT

## 2023-12-14 PROCEDURE — A9585 GADOBUTROL INJECTION: HCPCS | Performed by: FAMILY MEDICINE

## 2023-12-14 PROCEDURE — G1004 CDSM NDSC: HCPCS

## 2023-12-14 RX ORDER — GADOBUTROL 604.72 MG/ML
9 INJECTION INTRAVENOUS
Status: COMPLETED | OUTPATIENT
Start: 2023-12-14 | End: 2023-12-14

## 2023-12-14 RX ADMIN — GADOBUTROL 9 ML: 604.72 INJECTION INTRAVENOUS at 18:47

## 2023-12-22 NOTE — ASSESSMENT & PLAN NOTE
Returns for annual surveillance of a left frontal meningioma.  Incidentally found on work up for dizziness by Neurology in May 2019.  Exam: Able to name object, perform simple calculation.  No dysarthria or facial asymmetry.  Tongue midline, EOMI,  DARNELL x 4. Denies sensory deficit.    Imaging:  MRI brain 12/14/23: 1.5 cm left frontal meningioma is stable from last year. No significant mass effect or edema.     Plan:  Reviewed imaging with patient. Stable left frontal meningioma with no surrounding edema.  Discussed natural history of meningiomas. They are generally asymptomatic, benign and slow growing.    Recommend follow up as directed by the NCCN guidelines.   If there is growth or patient were to have symptoms, there are options for management including surgical resection or SRS.   Given stability, recommend follow up in 1 year with MRI brain w/wo to see AP.  Review red flag s/s.   Call sooner with any questions or concerns.

## 2023-12-27 ENCOUNTER — TELEMEDICINE (OUTPATIENT)
Dept: NEUROSURGERY | Facility: CLINIC | Age: 58
End: 2023-12-27
Payer: COMMERCIAL

## 2023-12-27 ENCOUNTER — TELEPHONE (OUTPATIENT)
Dept: NEUROSURGERY | Facility: CLINIC | Age: 58
End: 2023-12-27

## 2023-12-27 VITALS — BODY MASS INDEX: 36.8 KG/M2 | HEIGHT: 62 IN | WEIGHT: 200 LBS

## 2023-12-27 DIAGNOSIS — D32.0 MENINGIOMA, CEREBRAL (HCC): Primary | ICD-10-CM

## 2023-12-27 PROCEDURE — 99212 OFFICE O/P EST SF 10 MIN: CPT | Performed by: PHYSICIAN ASSISTANT

## 2023-12-27 NOTE — TELEPHONE ENCOUNTER
LVM FOR PT TO CALL BACK TO R/S 1 YR F/U AP WITH MRI BRAIN. I ALSO ADDED A RECALL IN THE SYSTEM IF SHE DIDN'T WANT TO CALL BACK TO SCHEDULE AT THIS TIME.

## 2023-12-27 NOTE — PROGRESS NOTES
Virtual Regular Visit    Verification of patient location:    Patient is located at Home in the following state in which I hold an active license [place]      Assessment/Plan:    Problem List Items Addressed This Visit       Meningioma, cerebral (HCC) - Primary     Returns for annual surveillance of a left frontal meningioma.  Incidentally found on work up for dizziness by Neurology in May 2019.  Exam: Able to name object, perform simple calculation.  No dysarthria or facial asymmetry.  Tongue midline, EOMI,  DARNELL x 4. Denies sensory deficit.    Imaging:  MRI brain 12/14/23: 1.5 cm left frontal meningioma is stable from last year. No significant mass effect or edema.     Plan:  Reviewed imaging with patient. Stable left frontal meningioma with no surrounding edema.  Discussed natural history of meningiomas. They are generally asymptomatic, benign and slow growing.    Recommend follow up as directed by the NCCN guidelines.   If there is growth or patient were to have symptoms, there are options for management including surgical resection or SRS.   Given stability, recommend follow up in 1 year with MRI brain w/wo to see AP.  Review red flag s/s.   Call sooner with any questions or concerns.          Relevant Orders    BUN    Creatinine, serum    MRI brain w wo contrast            Reason for visit is   Chief Complaint   Patient presents with    Virtual Regular Visit    Follow-up     Meningioma, cerebral (HCC) - Primary          Encounter provider Rebeca Gomez PA-C    Provider located at [facility] NEUROSURGICAL [address] [place] [id number] [phone]      Recent Visits  No visits were found meeting these conditions.  Showing recent visits within past 7 days and meeting all other requirements  Today's Visits  Date Type Provider Dept   12/27/23 Telemedicine Rebeca Gomez PA-C  [facility]   Showing today's visits and meeting all other  requirements  Future Appointments  No visits were found meeting these conditions.  Showing future appointments within next 150 days and meeting all other requirements       The patient was identified by name and date of birth. Florida Clinton was informed that this is a telemedicine visit and that the visit is being conducted through the [employer] platform. She agrees to proceed..  My office door was closed. No one else was in the room.  She acknowledged consent and understanding of privacy and security of the video platform. The patient has agreed to participate and understands they can discontinue the visit at any time.    Patient is aware this is a billable service.     Subjective  Florida Clinton is a 58 y.o. female  .      58 year old female seen for 1 year follow up of a meningioma. This was incidentally discovered on work up for dizziness in 2019. Felt to be related to her blood sugars. Other than intermittent dizziness, she is doing well. Denies headaches, seizures, vision changes.          Past Medical History:   Diagnosis Date    Abdominal pain     Chronic pain disorder     abdominal    Depression     Diverticula of colon     Former smoker     Resolved 2012     GERD (gastroesophageal reflux disease)     Hemorrhoids     Hiatal hernia     Hyperlipidemia     Intestinal malabsorption following gastrectomy     Morbid obesity (HCC)     Resolved 10/2/2015     Obesity     Postgastrectomy malabsorption     Vitamin D insufficiency     Resolved 2016        Past Surgical History:   Procedure Laterality Date    ABDOMINAL SURGERY      gastric bipass    ANKLE SURGERY       SECTION      (2) ,      COLONOSCOPY      ESOPHAGOGASTRODUODENOSCOPY      x2    GASTRIC BYPASS  2015    HERNIA REPAIR  2015    Paraesophageal hiatus hernia. Managed by Eliezer Mishra (General Surgery)    SD EGD TRANSORAL BIOPSY SINGLE/MULTIPLE N/A 2017    Procedure: ESOPHAGOGASTRODUODENOSCOPY (EGD);  Surgeon: Emmie  "Lazarus Rosen MD;  Location: [facility];  Service: Bariatrics    TONSILLECTOMY  1984    UTERINE FIBROID SURGERY         Current Outpatient Medications   Medication Sig Dispense Refill    Calcium Citrate-Vitamin D 250-200 MG-UNIT TABS Take 1 tablet by mouth 2 (two) times a day      Cholecalciferol (D3 5000) 125 MCG (5000 UT) capsule       ferrous gluconate (FERGON) 324 mg tablet Take 324 mg by mouth every other day Every other day      FLUoxetine (PROzac) 20 mg capsule TAKE 2 CAPSULES BY MOUTH EVERY  capsule 1    Melatonin 5 MG CAPS Take 5 mg by mouth daily at bedtime       MULTIPLE VITAMIN PO Take 1 tablet by mouth daily      omeprazole (PriLOSEC) 20 mg delayed release capsule Take 1 capsule (20 mg total) by mouth daily before breakfast 30 capsule 5    pravastatin (PRAVACHOL) 20 mg tablet TAKE 1 TABLET BY MOUTH EVERY DAY 90 tablet 2     No current facility-administered medications for this visit.        Allergies   Allergen Reactions    Ibuprofen Other (See Comments)     Gastric bypass       Review of Systems   Constitutional: Negative.    HENT: Negative.     Eyes: Negative.    Respiratory: Negative.     Cardiovascular: Negative.    Gastrointestinal: Negative.    Endocrine: Negative.         Following with Endocrinologist   Genitourinary: Negative.    Musculoskeletal: Negative.    Skin: Negative.    Allergic/Immunologic: Negative.    Neurological:  Positive for dizziness, light-headedness and numbness (hands).   Hematological: Negative.    Psychiatric/Behavioral: Negative.     All other systems reviewed and are negative.      Video Exam    Vitals:    12/27/23 1124   Weight: 90.7 kg (200 lb)   Height: 5' 2\" (1.575 m)       Physical Exam  Constitutional:       Appearance: Normal appearance.   HENT:      Head: Normocephalic.   Eyes:      Conjunctiva/sclera: Conjunctivae normal.   Pulmonary:      Effort: Pulmonary effort is normal.   Skin:     General: Skin is warm and dry.   Neurological:      Mental Status: She is " alert and oriented to person, place, and time.      GCS: GCS eye subscore is 4. GCS verbal subscore is 5. GCS motor subscore is 6.      Comments: Able to name object, perform simple calculation.  No dysarthria or facial asymmetry.  Tongue midline, EOMI,  DARNELL x 4. Denies sensory deficit.   Psychiatric:         Mood and Affect: Mood normal.         Behavior: Behavior normal.         Thought Content: Thought content normal.         Judgment: Judgment normal.          Visit Time  Total Visit Duration: 15 minutes

## 2024-01-03 ENCOUNTER — TELEPHONE (OUTPATIENT)
Age: 59
End: 2024-01-03

## 2024-01-03 DIAGNOSIS — U07.1 COVID-19: Primary | ICD-10-CM

## 2024-01-03 RX ORDER — NIRMATRELVIR AND RITONAVIR 300-100 MG
3 KIT ORAL 2 TIMES DAILY
Qty: 30 TABLET | Refills: 0 | Status: SHIPPED | OUTPATIENT
Start: 2024-01-03 | End: 2024-01-08

## 2024-01-03 NOTE — TELEPHONE ENCOUNTER
Patient tested herself for COVID this morning and was positive. Her symptoms began slightly yesterday but has worsen today fever 101, cough, body ache, sinus pressure. She wanted to know if the provider would prescribe or recommend anything for her. Please call her back to advise. No appointment available today.

## 2024-01-16 ENCOUNTER — RA CDI HCC (OUTPATIENT)
Dept: OTHER | Facility: HOSPITAL | Age: 59
End: 2024-01-16

## 2024-01-20 ENCOUNTER — PATIENT MESSAGE (OUTPATIENT)
Dept: FAMILY MEDICINE CLINIC | Facility: CLINIC | Age: 59
End: 2024-01-20

## 2024-01-26 ENCOUNTER — OFFICE VISIT (OUTPATIENT)
Dept: FAMILY MEDICINE CLINIC | Facility: CLINIC | Age: 59
End: 2024-01-26
Payer: COMMERCIAL

## 2024-01-26 VITALS
SYSTOLIC BLOOD PRESSURE: 110 MMHG | HEART RATE: 61 BPM | WEIGHT: 204.4 LBS | HEIGHT: 63 IN | TEMPERATURE: 96.1 F | OXYGEN SATURATION: 99 % | DIASTOLIC BLOOD PRESSURE: 78 MMHG | BODY MASS INDEX: 36.21 KG/M2

## 2024-01-26 DIAGNOSIS — Z90.3 POSTGASTRECTOMY MALABSORPTION: ICD-10-CM

## 2024-01-26 DIAGNOSIS — E78.00 HYPERCHOLESTEROLEMIA: ICD-10-CM

## 2024-01-26 DIAGNOSIS — E21.3 HYPERPARATHYROIDISM (HCC): ICD-10-CM

## 2024-01-26 DIAGNOSIS — Z00.00 ANNUAL PHYSICAL EXAM: Primary | ICD-10-CM

## 2024-01-26 DIAGNOSIS — K91.2 POSTGASTRECTOMY MALABSORPTION: ICD-10-CM

## 2024-01-26 DIAGNOSIS — Z12.31 SCREENING MAMMOGRAM FOR BREAST CANCER: ICD-10-CM

## 2024-01-26 PROCEDURE — 99396 PREV VISIT EST AGE 40-64: CPT | Performed by: FAMILY MEDICINE

## 2024-01-26 PROCEDURE — 99214 OFFICE O/P EST MOD 30 MIN: CPT | Performed by: FAMILY MEDICINE

## 2024-01-26 NOTE — PROGRESS NOTES
ADULT ANNUAL PHYSICAL  [facility]    NAME: Florida Clinton  AGE: 58 y.o. SEX: female  : 1965     DATE: 2024     Assessment and Plan:     Problem List Items Addressed This Visit        Digestive    Postgastrectomy malabsorption     Due for labs - ordered. Continue present care. Monitor diet. Recheck 1y         Relevant Orders    CBC and differential    Comprehensive metabolic panel    Ferritin    PTH, intact    Vitamin A    Vitamin B1, whole blood    Vitamin B12/Folate, Serum Panel    Vitamin D 25 hydroxy       Endocrine    Hyperparathyroidism (HCC)     Check labs. F/u with Endo         Relevant Orders    Comprehensive metabolic panel    TSH, 3rd generation with Free T4 reflex       Other    Hypercholesterolemia     Check labs. Continue pravastatin. Recheck 1y         Relevant Orders    Lipid panel    TSH, 3rd generation with Free T4 reflex   Other Visit Diagnoses     Annual physical exam    -  Primary    Screening mammogram for breast cancer        Relevant Orders    Mammo screening bilateral w 3d & cad            Immunizations and preventive care screenings were discussed with patient today. Appropriate education was printed on patient's after visit summary.    Counseling:  Exercise: the importance of regular exercise/physical activity was discussed. Recommend exercise 3-5 times per week for at least 30 minutes.          Return in about 1 year (around 2025).     Chief Complaint:     Chief Complaint   Patient presents with   • Annual Exam      History of Present Illness:     Adult Annual Physical   Patient here for a comprehensive physical exam. The patient reports problems - as below .  - increased fatigue since having COVID at the beginning of the month. No SOB  - MRI in Dec showed meningioma to be unchanged      Diet and Physical Activity  Diet/Nutrition: well balanced diet and tries to maintain bariatric diet .   Exercise: no formal  "exercise.      Depression Screening  PHQ-2/9 Depression Screening    Little interest or pleasure in doing things: 1 - several days  Feeling down, depressed, or hopeless: 0 - not at all  Trouble falling or staying asleep, or sleeping too much: 3 - nearly every day  Feeling tired or having little energy: 1 - several days  Poor appetite or overeatin - more than half the days  Feeling bad about yourself - or that you are a failure or have let yourself or your family down: 0 - not at all  Trouble concentrating on things, such as reading the newspaper or watching television: 0 - not at all  Moving or speaking so slowly that other people could have noticed. Or the opposite - being so fidgety or restless that you have been moving around a lot more than usual: 0 - not at all  Thoughts that you would be better off dead, or of hurting yourself in some way: 0 - not at all       General Health  Sleep: sleeps poorly and gets 4-6 hours of sleep on average.   Hearing: normal - bilateral.  Vision: most recent eye exam <1 year ago and wears glasses.   Dental: regular dental visits and brushes teeth twice daily.       /GYN Health  Follows with gynecology? yes   Patient is: postmenopausal  Last menstrual period: \"years ago\"       Review of Systems:     Review of Systems   Constitutional:  Positive for fatigue. Negative for activity change and appetite change.   HENT: Negative.     Eyes: Negative.    Respiratory: Negative.     Cardiovascular: Negative.    Gastrointestinal: Negative.    Endocrine: Negative.    Genitourinary: Negative.    Musculoskeletal: Negative.    Skin: Negative.    Allergic/Immunologic: Negative.    Neurological: Negative.    Hematological: Negative.    Psychiatric/Behavioral: Negative.        Past Medical History:     Past Medical History:   Diagnosis Date   • Abdominal pain    • Chronic pain disorder     abdominal   • Depression    • Diverticula of colon    • Former smoker     Resolved 2012    • GERD " (gastroesophageal reflux disease)    • Headache(784.0) 10/1/2020   • Hemorrhoids    • Hiatal hernia    • Hyperlipidemia    • Intestinal malabsorption following gastrectomy    • Morbid obesity (HCC)     Resolved 10/2/2015    • Obesity    • Postgastrectomy malabsorption    • Vitamin D insufficiency     Resolved 2016       Past Surgical History:     Past Surgical History:   Procedure Laterality Date   • ABDOMINAL SURGERY      gastric bipass   • ANKLE SURGERY     •  SECTION      (2) ,     • COLONOSCOPY     • ESOPHAGOGASTRODUODENOSCOPY      x2   • GASTRIC BYPASS  2015   • HERNIA REPAIR  2015    Paraesophageal hiatus hernia. Managed by Eliezer Mishra (General Surgery)   • [place] EGD TRANSORAL BIOPSY SINGLE/MULTIPLE N/A 2017    Procedure: ESOPHAGOGASTRODUODENOSCOPY (EGD);  Surgeon: Emmie Rosen MD;  Location: [facility];  Service: Bariatrics   • TONSILLECTOMY     • UTERINE FIBROID SURGERY        Social History:     Social History     Socioeconomic History   • Marital status: /Civil Union     Spouse name: None   • Number of children: None   • Years of education: None   • Highest education level: None   Occupational History   • None   Tobacco Use   • Smoking status: Former     Current packs/day: 0.00     Types: Cigarettes     Quit date:      Years since quittin.0   • Smokeless tobacco: Never   • Tobacco comments:     20+ pack year hx - As per [employer]    Vaping Use   • Vaping status: Never Used   Substance and Sexual Activity   • Alcohol use: Yes   • Drug use: No   • Sexual activity: Yes     Partners: Male     Birth control/protection: Post-menopausal, Male Sterilization, None   Other Topics Concern   • None   Social History Narrative    Daily coffee consumption (2 cups/day)    Denied: History of daily cola consumption (__cans/day)    Daily tea consumption (__cups/day)    Former smoker: quit  - As per [employer]    Housewife or homemaker - As per [employer]     Uses safety equipment - Seatbelts      Social Determinants of Health     Financial Resource Strain: Not on file   Food Insecurity: Not on file   Transportation Needs: Not on file   Physical Activity: Not on file   Stress: Not on file   Social Connections: Not on file   Intimate Partner Violence: Not on file   Housing Stability: Not on file      Family History:     Family History   Problem Relation Age of Onset   • Breast cancer Mother 31   • Prostate cancer Father 50   • Hypertension Father    • Heart disease Father         Coronary arteriosclerosis    • Hyperlipidemia Father    • Heart disease Maternal Grandmother         Coronary arteriosclerosis    • Heart disease Maternal Grandfather         Coronary arteriosclerosis    • COPD Maternal Grandfather         heavy smoker   • Diabetes Paternal Grandmother    • No Known Problems Brother    • No Known Problems Daughter    • No Known Problems Daughter    • No Known Problems Paternal Aunt    • Stroke Neg Hx    • Thyroid disease Neg Hx       Current Medications:     Current Outpatient Medications   Medication Sig Dispense Refill   • Calcium Citrate-Vitamin D 250-200 MG-UNIT TABS Take 1 tablet by mouth 2 (two) times a day     • Cholecalciferol (D3 5000) 125 MCG (5000 UT) capsule      • ferrous gluconate (FERGON) 324 mg tablet Take 324 mg by mouth every other day Every other day     • FLUoxetine (PROzac) 20 mg capsule TAKE 2 CAPSULES BY MOUTH EVERY  capsule 1   • Melatonin 5 MG CAPS Take 5 mg by mouth daily at bedtime      • MULTIPLE VITAMIN PO Take 1 tablet by mouth daily     • omeprazole (PriLOSEC) 20 mg delayed release capsule Take 1 capsule (20 mg total) by mouth daily before breakfast 30 capsule 5   • pravastatin (PRAVACHOL) 20 mg tablet TAKE 1 TABLET BY MOUTH EVERY DAY 90 tablet 2     No current facility-administered medications for this visit.      Allergies:     Allergies   Allergen Reactions   • Ibuprofen Other (See Comments)     Gastric bypass       "Physical Exam:     /78 (BP Location: Left arm, Patient Position: Sitting, Cuff Size: Large)   Pulse 61   Temp (!) 96.1 °F (35.6 °C)   Ht 5' 2.91\" (1.598 m)   Wt 92.7 kg (204 lb 6.4 oz)   SpO2 99%   BMI 36.31 kg/m²     Physical Exam  Vitals reviewed.   Constitutional:       Appearance: She is well-developed.   HENT:      Head: Normocephalic and atraumatic.      Right Ear: Tympanic membrane, ear canal and external ear normal.      Left Ear: Tympanic membrane, ear canal and external ear normal.      Nose: Nose normal.      Mouth/Throat:      Mouth: Mucous membranes are moist.   Eyes:      Extraocular Movements: Extraocular movements intact.      Conjunctiva/sclera: Conjunctivae normal.      Pupils: Pupils are equal, round, and reactive to light.   Neck:      Thyroid: No thyromegaly.      Vascular: No JVD.   Cardiovascular:      Rate and Rhythm: Normal rate and regular rhythm.      Pulses: Normal pulses.      Heart sounds: Normal heart sounds. No murmur heard.  Pulmonary:      Effort: Pulmonary effort is normal.      Breath sounds: Normal breath sounds. No wheezing.   Abdominal:      General: Bowel sounds are normal. There is no distension.      Palpations: Abdomen is soft. There is no mass.      Tenderness: There is no abdominal tenderness.   Musculoskeletal:         General: No swelling, tenderness or deformity. Normal range of motion.      Cervical back: Normal range of motion and neck supple. No tenderness. No muscular tenderness.      Right lower leg: No edema.      Left lower leg: No edema.   Lymphadenopathy:      Cervical: No cervical adenopathy.   Skin:     General: Skin is warm.      Capillary Refill: Capillary refill takes less than 2 seconds.   Neurological:      General: No focal deficit present.      Mental Status: She is alert and oriented to person, place, and time.      Cranial Nerves: No cranial nerve deficit.      Sensory: No sensory deficit.      Motor: No weakness or abnormal muscle tone. "      Coordination: Coordination normal.      Gait: Gait normal.      Deep Tendon Reflexes: Reflexes normal.   Psychiatric:         Mood and Affect: Mood normal.         Behavior: Behavior normal.         Thought Content: Thought content normal.         Judgment: Judgment normal.      Comments: PHQ-2/9 Depression Screening    Little interest or pleasure in doing things: 1 - several days  Feeling down, depressed, or hopeless: 0 - not at all  Trouble falling or staying asleep, or sleeping too much: 3 - nearly every   day  Feeling tired or having little energy: 1 - several days  Poor appetite or overeatin - more than half the days  Feeling bad about yourself - or that you are a failure or have let   yourself or your family down: 0 - not at all  Trouble concentrating on things, such as reading the newspaper or watching   television: 0 - not at all  Moving or speaking so slowly that other people could have noticed. Or the   opposite - being so fidgety or restless that you have been moving around a   lot more than usual: 0 - not at all  Thoughts that you would be better off dead, or of hurting yourself in some   way: 0 - not at all            Rayray Knight MD  [facility]

## 2024-01-28 PROBLEM — B35.3 TINEA PEDIS: Status: RESOLVED | Noted: 2021-01-11 | Resolved: 2024-01-28

## 2024-02-21 PROBLEM — K52.9 GASTROENTERITIS: Status: RESOLVED | Noted: 2017-05-12 | Resolved: 2024-02-21

## 2024-02-26 ENCOUNTER — HOSPITAL ENCOUNTER (INPATIENT)
Facility: HOSPITAL | Age: 59
LOS: 1 days | Discharge: HOME | DRG: 391 | End: 2024-02-28
Attending: EMERGENCY MEDICINE | Admitting: SURGERY
Payer: COMMERCIAL

## 2024-02-26 ENCOUNTER — APPOINTMENT (EMERGENCY)
Dept: RADIOLOGY | Facility: HOSPITAL | Age: 59
DRG: 391 | End: 2024-02-26
Payer: COMMERCIAL

## 2024-02-26 DIAGNOSIS — K52.9 ENTEROCOLITIS: ICD-10-CM

## 2024-02-26 DIAGNOSIS — K35.32 RUPTURED APPENDICITIS: Primary | ICD-10-CM

## 2024-02-26 LAB
ALBUMIN SERPL BCP-MCNC: 4 G/DL (ref 3.5–5)
ALP SERPL-CCNC: 78 U/L (ref 34–104)
ALT SERPL W P-5'-P-CCNC: 10 U/L (ref 7–52)
ANION GAP SERPL CALCULATED.3IONS-SCNC: 10 MMOL/L
APTT PPP: 26 SECONDS (ref 23–37)
AST SERPL W P-5'-P-CCNC: 13 U/L (ref 13–39)
BACTERIA UR QL AUTO: ABNORMAL /HPF
BASOPHILS # BLD AUTO: 0.03 THOUSANDS/ÂΜL (ref 0–0.1)
BASOPHILS NFR BLD AUTO: 0 % (ref 0–1)
BILIRUB SERPL-MCNC: 0.69 MG/DL (ref 0.2–1)
BILIRUB UR QL STRIP: NEGATIVE
BUN SERPL-MCNC: 11 MG/DL (ref 5–25)
CALCIUM SERPL-MCNC: 9.5 MG/DL (ref 8.4–10.2)
CHLORIDE SERPL-SCNC: 102 MMOL/L (ref 96–108)
CLARITY UR: CLEAR
CO2 SERPL-SCNC: 23 MMOL/L (ref 21–32)
COLOR UR: ABNORMAL
CREAT SERPL-MCNC: 0.65 MG/DL (ref 0.6–1.3)
EOSINOPHIL # BLD AUTO: 0.17 THOUSAND/ÂΜL (ref 0–0.61)
EOSINOPHIL NFR BLD AUTO: 1 % (ref 0–6)
ERYTHROCYTE [DISTWIDTH] IN BLOOD BY AUTOMATED COUNT: 12.5 % (ref 11.6–15.1)
FLUAV RNA RESP QL NAA+PROBE: NEGATIVE
FLUBV RNA RESP QL NAA+PROBE: NEGATIVE
GFR SERPL CREATININE-BSD FRML MDRD: 96 ML/MIN/1.73SQ M
GLUCOSE SERPL-MCNC: 118 MG/DL (ref 65–140)
GLUCOSE UR STRIP-MCNC: NEGATIVE MG/DL
HCT VFR BLD AUTO: 40.4 % (ref 34.8–46.1)
HGB BLD-MCNC: 13.5 G/DL (ref 11.5–15.4)
HGB UR QL STRIP.AUTO: NEGATIVE
IMM GRANULOCYTES # BLD AUTO: 0.05 THOUSAND/UL (ref 0–0.2)
IMM GRANULOCYTES NFR BLD AUTO: 0 % (ref 0–2)
INR PPP: 1.04 (ref 0.84–1.19)
KETONES UR STRIP-MCNC: ABNORMAL MG/DL
LACTATE SERPL-SCNC: 1.4 MMOL/L (ref 0.5–2)
LEUKOCYTE ESTERASE UR QL STRIP: NEGATIVE
LIPASE SERPL-CCNC: <6 U/L (ref 11–82)
LYMPHOCYTES # BLD AUTO: 1.47 THOUSANDS/ÂΜL (ref 0.6–4.47)
LYMPHOCYTES NFR BLD AUTO: 11 % (ref 14–44)
MCH RBC QN AUTO: 31.6 PG (ref 26.8–34.3)
MCHC RBC AUTO-ENTMCNC: 33.4 G/DL (ref 31.4–37.4)
MCV RBC AUTO: 95 FL (ref 82–98)
MONOCYTES # BLD AUTO: 0.73 THOUSAND/ÂΜL (ref 0.17–1.22)
MONOCYTES NFR BLD AUTO: 6 % (ref 4–12)
MUCOUS THREADS UR QL AUTO: ABNORMAL
NEUTROPHILS # BLD AUTO: 10.4 THOUSANDS/ÂΜL (ref 1.85–7.62)
NEUTS SEG NFR BLD AUTO: 82 % (ref 43–75)
NITRITE UR QL STRIP: NEGATIVE
NON-SQ EPI CELLS URNS QL MICRO: ABNORMAL /HPF
NRBC BLD AUTO-RTO: 0 /100 WBCS
PH UR STRIP.AUTO: 6 [PH]
PLATELET # BLD AUTO: 237 THOUSANDS/UL (ref 149–390)
PMV BLD AUTO: 10 FL (ref 8.9–12.7)
POTASSIUM SERPL-SCNC: 3.4 MMOL/L (ref 3.5–5.3)
PROCALCITONIN SERPL-MCNC: 0.18 NG/ML
PROT SERPL-MCNC: 6.9 G/DL (ref 6.4–8.4)
PROT UR STRIP-MCNC: ABNORMAL MG/DL
PROTHROMBIN TIME: 14.3 SECONDS (ref 11.6–14.5)
RBC # BLD AUTO: 4.27 MILLION/UL (ref 3.81–5.12)
RBC #/AREA URNS AUTO: ABNORMAL /HPF
RSV RNA RESP QL NAA+PROBE: NEGATIVE
SARS-COV-2 RNA RESP QL NAA+PROBE: NEGATIVE
SODIUM SERPL-SCNC: 135 MMOL/L (ref 135–147)
SP GR UR STRIP.AUTO: 1.01 (ref 1–1.03)
UROBILINOGEN UR STRIP-ACNC: 2 MG/DL
WBC # BLD AUTO: 12.85 THOUSAND/UL (ref 4.31–10.16)
WBC #/AREA URNS AUTO: ABNORMAL /HPF

## 2024-02-26 PROCEDURE — 36415 COLL VENOUS BLD VENIPUNCTURE: CPT

## 2024-02-26 PROCEDURE — 0241U HB NFCT DS VIR RESP RNA 4 TRGT: CPT

## 2024-02-26 PROCEDURE — 80053 COMPREHEN METABOLIC PANEL: CPT

## 2024-02-26 PROCEDURE — 99285 EMERGENCY DEPT VISIT HI MDM: CPT | Performed by: EMERGENCY MEDICINE

## 2024-02-26 PROCEDURE — 83690 ASSAY OF LIPASE: CPT

## 2024-02-26 PROCEDURE — 81001 URINALYSIS AUTO W/SCOPE: CPT

## 2024-02-26 PROCEDURE — 87040 BLOOD CULTURE FOR BACTERIA: CPT

## 2024-02-26 PROCEDURE — 84145 PROCALCITONIN (PCT): CPT

## 2024-02-26 PROCEDURE — 99285 EMERGENCY DEPT VISIT HI MDM: CPT

## 2024-02-26 PROCEDURE — 83605 ASSAY OF LACTIC ACID: CPT

## 2024-02-26 PROCEDURE — 96361 HYDRATE IV INFUSION ADD-ON: CPT

## 2024-02-26 PROCEDURE — 71045 X-RAY EXAM CHEST 1 VIEW: CPT

## 2024-02-26 PROCEDURE — 85025 COMPLETE CBC W/AUTO DIFF WBC: CPT

## 2024-02-26 PROCEDURE — 85730 THROMBOPLASTIN TIME PARTIAL: CPT

## 2024-02-26 PROCEDURE — 85610 PROTHROMBIN TIME: CPT

## 2024-02-26 PROCEDURE — 96375 TX/PRO/DX INJ NEW DRUG ADDON: CPT

## 2024-02-26 PROCEDURE — 87086 URINE CULTURE/COLONY COUNT: CPT | Performed by: EMERGENCY MEDICINE

## 2024-02-26 RX ORDER — POTASSIUM CHLORIDE 20 MEQ/1
20 TABLET, EXTENDED RELEASE ORAL ONCE
Status: COMPLETED | OUTPATIENT
Start: 2024-02-27 | End: 2024-02-27

## 2024-02-26 RX ORDER — MORPHINE SULFATE 4 MG/ML
4 INJECTION, SOLUTION INTRAMUSCULAR; INTRAVENOUS ONCE
Status: COMPLETED | OUTPATIENT
Start: 2024-02-26 | End: 2024-02-26

## 2024-02-26 RX ADMIN — IOHEXOL 50 ML: 240 INJECTION, SOLUTION INTRATHECAL; INTRAVASCULAR; INTRAVENOUS; ORAL at 23:01

## 2024-02-26 RX ADMIN — SODIUM CHLORIDE 1000 ML: 0.9 INJECTION, SOLUTION INTRAVENOUS at 22:53

## 2024-02-26 RX ADMIN — MORPHINE SULFATE 4 MG: 4 INJECTION INTRAVENOUS at 23:42

## 2024-02-26 NOTE — Clinical Note
Case was discussed with Dr. Hansen and the patient's admission status was agreed to be Admission Status: inpatient status to the service of Dr. cleveland .

## 2024-02-26 NOTE — LETTER
[facility] 4TH FLOOR MED SURG UNIT  [address]  [place] [address]  Dept: [phone]    February 28, 2024     Patient: Florida Clinton   YOB: 1965   Date of Visit: 2/26/2024       To Whom it May Concern:    Florida Clinton is under my professional care. She was seen in the hospital from 2/26/2024 to 02/28/24. She may return to work on March 7, 2024 without limitations.    If you have any questions or concerns, please don't hesitate to call.         Sincerely,          Stephie Hamlin MD

## 2024-02-27 ENCOUNTER — APPOINTMENT (EMERGENCY)
Dept: CT IMAGING | Facility: HOSPITAL | Age: 59
DRG: 391 | End: 2024-02-27
Payer: COMMERCIAL

## 2024-02-27 ENCOUNTER — TELEPHONE (OUTPATIENT)
Age: 59
End: 2024-02-27

## 2024-02-27 LAB
ANION GAP SERPL CALCULATED.3IONS-SCNC: 9 MMOL/L
BASOPHILS # BLD AUTO: 0.03 THOUSANDS/ÂΜL (ref 0–0.1)
BASOPHILS NFR BLD AUTO: 0 % (ref 0–1)
BUN SERPL-MCNC: 9 MG/DL (ref 5–25)
CALCIUM SERPL-MCNC: 9 MG/DL (ref 8.4–10.2)
CHLORIDE SERPL-SCNC: 104 MMOL/L (ref 96–108)
CO2 SERPL-SCNC: 24 MMOL/L (ref 21–32)
CREAT SERPL-MCNC: 0.66 MG/DL (ref 0.6–1.3)
EOSINOPHIL # BLD AUTO: 0.17 THOUSAND/ÂΜL (ref 0–0.61)
EOSINOPHIL NFR BLD AUTO: 2 % (ref 0–6)
ERYTHROCYTE [DISTWIDTH] IN BLOOD BY AUTOMATED COUNT: 12.6 % (ref 11.6–15.1)
GFR SERPL CREATININE-BSD FRML MDRD: 96 ML/MIN/1.73SQ M
GLUCOSE SERPL-MCNC: 99 MG/DL (ref 65–140)
HCT VFR BLD AUTO: 40.3 % (ref 34.8–46.1)
HGB BLD-MCNC: 12.9 G/DL (ref 11.5–15.4)
IMM GRANULOCYTES # BLD AUTO: 0.04 THOUSAND/UL (ref 0–0.2)
IMM GRANULOCYTES NFR BLD AUTO: 0 % (ref 0–2)
LYMPHOCYTES # BLD AUTO: 1.32 THOUSANDS/ÂΜL (ref 0.6–4.47)
LYMPHOCYTES NFR BLD AUTO: 12 % (ref 14–44)
MAGNESIUM SERPL-MCNC: 1.8 MG/DL (ref 1.9–2.7)
MCH RBC QN AUTO: 30.3 PG (ref 26.8–34.3)
MCHC RBC AUTO-ENTMCNC: 32 G/DL (ref 31.4–37.4)
MCV RBC AUTO: 95 FL (ref 82–98)
MONOCYTES # BLD AUTO: 0.58 THOUSAND/ÂΜL (ref 0.17–1.22)
MONOCYTES NFR BLD AUTO: 5 % (ref 4–12)
NEUTROPHILS # BLD AUTO: 8.71 THOUSANDS/ÂΜL (ref 1.85–7.62)
NEUTS SEG NFR BLD AUTO: 81 % (ref 43–75)
NRBC BLD AUTO-RTO: 0 /100 WBCS
PHOSPHATE SERPL-MCNC: 2.3 MG/DL (ref 2.3–4.1)
PLATELET # BLD AUTO: 245 THOUSANDS/UL (ref 149–390)
PMV BLD AUTO: 10.4 FL (ref 8.9–12.7)
POTASSIUM SERPL-SCNC: 3.7 MMOL/L (ref 3.5–5.3)
RBC # BLD AUTO: 4.26 MILLION/UL (ref 3.81–5.12)
SODIUM SERPL-SCNC: 137 MMOL/L (ref 135–147)
WBC # BLD AUTO: 10.85 THOUSAND/UL (ref 4.31–10.16)

## 2024-02-27 PROCEDURE — 80048 BASIC METABOLIC PNL TOTAL CA: CPT

## 2024-02-27 PROCEDURE — 83735 ASSAY OF MAGNESIUM: CPT

## 2024-02-27 PROCEDURE — 74177 CT ABD & PELVIS W/CONTRAST: CPT

## 2024-02-27 PROCEDURE — 96365 THER/PROPH/DIAG IV INF INIT: CPT

## 2024-02-27 PROCEDURE — 36415 COLL VENOUS BLD VENIPUNCTURE: CPT

## 2024-02-27 PROCEDURE — 96375 TX/PRO/DX INJ NEW DRUG ADDON: CPT

## 2024-02-27 PROCEDURE — 85025 COMPLETE CBC W/AUTO DIFF WBC: CPT

## 2024-02-27 PROCEDURE — 84100 ASSAY OF PHOSPHORUS: CPT

## 2024-02-27 PROCEDURE — 93005 ELECTROCARDIOGRAM TRACING: CPT

## 2024-02-27 PROCEDURE — 99222 1ST HOSP IP/OBS MODERATE 55: CPT | Performed by: SURGERY

## 2024-02-27 RX ORDER — HYDROMORPHONE HCL IN WATER/PF 6 MG/30 ML
0.2 PATIENT CONTROLLED ANALGESIA SYRINGE INTRAVENOUS
Status: DISCONTINUED | OUTPATIENT
Start: 2024-02-27 | End: 2024-02-27

## 2024-02-27 RX ORDER — OXYCODONE HYDROCHLORIDE 5 MG/1
5 TABLET ORAL EVERY 4 HOURS PRN
Status: DISCONTINUED | OUTPATIENT
Start: 2024-02-27 | End: 2024-02-28 | Stop reason: HOSPADM

## 2024-02-27 RX ORDER — HYDROMORPHONE HCL/PF 1 MG/ML
0.5 SYRINGE (ML) INJECTION ONCE
Status: COMPLETED | OUTPATIENT
Start: 2024-02-27 | End: 2024-02-27

## 2024-02-27 RX ORDER — SODIUM CHLORIDE, SODIUM GLUCONATE, SODIUM ACETATE, POTASSIUM CHLORIDE, MAGNESIUM CHLORIDE, SODIUM PHOSPHATE, DIBASIC, AND POTASSIUM PHOSPHATE .53; .5; .37; .037; .03; .012; .00082 G/100ML; G/100ML; G/100ML; G/100ML; G/100ML; G/100ML; G/100ML
125 INJECTION, SOLUTION INTRAVENOUS CONTINUOUS
Status: DISCONTINUED | OUTPATIENT
Start: 2024-02-27 | End: 2024-02-27

## 2024-02-27 RX ORDER — FLUOXETINE HYDROCHLORIDE 20 MG/1
40 CAPSULE ORAL DAILY
Status: DISCONTINUED | OUTPATIENT
Start: 2024-02-27 | End: 2024-02-28 | Stop reason: HOSPADM

## 2024-02-27 RX ORDER — ACETAMINOPHEN 10 MG/ML
1000 INJECTION, SOLUTION INTRAVENOUS EVERY 6 HOURS SCHEDULED
Status: DISCONTINUED | OUTPATIENT
Start: 2024-02-27 | End: 2024-02-28 | Stop reason: HOSPADM

## 2024-02-27 RX ORDER — DEXTROSE MONOHYDRATE, SODIUM CHLORIDE, AND POTASSIUM CHLORIDE 50; 1.49; 4.5 G/1000ML; G/1000ML; G/1000ML
75 INJECTION, SOLUTION INTRAVENOUS CONTINUOUS
Status: DISCONTINUED | OUTPATIENT
Start: 2024-02-27 | End: 2024-02-28

## 2024-02-27 RX ORDER — HYDROMORPHONE HCL/PF 1 MG/ML
0.5 SYRINGE (ML) INJECTION
Status: DISCONTINUED | OUTPATIENT
Start: 2024-02-27 | End: 2024-02-28 | Stop reason: HOSPADM

## 2024-02-27 RX ORDER — OXYCODONE HYDROCHLORIDE 10 MG/1
10 TABLET ORAL EVERY 4 HOURS PRN
Status: DISCONTINUED | OUTPATIENT
Start: 2024-02-27 | End: 2024-02-28 | Stop reason: HOSPADM

## 2024-02-27 RX ORDER — ENOXAPARIN SODIUM 100 MG/ML
40 INJECTION SUBCUTANEOUS DAILY
Status: DISCONTINUED | OUTPATIENT
Start: 2024-02-27 | End: 2024-02-28 | Stop reason: HOSPADM

## 2024-02-27 RX ORDER — ONDANSETRON 2 MG/ML
4 INJECTION INTRAMUSCULAR; INTRAVENOUS EVERY 6 HOURS PRN
Status: DISCONTINUED | OUTPATIENT
Start: 2024-02-27 | End: 2024-02-28 | Stop reason: HOSPADM

## 2024-02-27 RX ADMIN — OXYCODONE HYDROCHLORIDE 5 MG: 5 TABLET ORAL at 04:01

## 2024-02-27 RX ADMIN — PIPERACILLIN SODIUM AND TAZOBACTAM SODIUM 3.38 G: 36; 4.5 INJECTION, POWDER, LYOPHILIZED, FOR SOLUTION INTRAVENOUS at 13:38

## 2024-02-27 RX ADMIN — ENOXAPARIN SODIUM 40 MG: 40 INJECTION SUBCUTANEOUS at 08:17

## 2024-02-27 RX ADMIN — HYDROMORPHONE HYDROCHLORIDE 0.5 MG: 1 INJECTION, SOLUTION INTRAMUSCULAR; INTRAVENOUS; SUBCUTANEOUS at 09:50

## 2024-02-27 RX ADMIN — HYDROMORPHONE HYDROCHLORIDE 0.5 MG: 1 INJECTION, SOLUTION INTRAMUSCULAR; INTRAVENOUS; SUBCUTANEOUS at 01:46

## 2024-02-27 RX ADMIN — ACETAMINOPHEN 1000 MG: 10 INJECTION INTRAVENOUS at 17:31

## 2024-02-27 RX ADMIN — PIPERACILLIN SODIUM AND TAZOBACTAM SODIUM 3.38 G: 36; 4.5 INJECTION, POWDER, LYOPHILIZED, FOR SOLUTION INTRAVENOUS at 08:18

## 2024-02-27 RX ADMIN — FLUOXETINE 20 MG: 20 CAPSULE ORAL at 08:17

## 2024-02-27 RX ADMIN — OXYCODONE HYDROCHLORIDE 5 MG: 5 TABLET ORAL at 08:17

## 2024-02-27 RX ADMIN — PIPERACILLIN SODIUM AND TAZOBACTAM SODIUM 4.5 G: 36; 4.5 INJECTION, POWDER, LYOPHILIZED, FOR SOLUTION INTRAVENOUS at 01:46

## 2024-02-27 RX ADMIN — IOHEXOL 85 ML: 350 INJECTION, SOLUTION INTRAVENOUS at 00:30

## 2024-02-27 RX ADMIN — ACETAMINOPHEN 1000 MG: 10 INJECTION INTRAVENOUS at 11:36

## 2024-02-27 RX ADMIN — SODIUM CHLORIDE, SODIUM GLUCONATE, SODIUM ACETATE, POTASSIUM CHLORIDE, MAGNESIUM CHLORIDE, SODIUM PHOSPHATE, DIBASIC, AND POTASSIUM PHOSPHATE 125 ML/HR: .53; .5; .37; .037; .03; .012; .00082 INJECTION, SOLUTION INTRAVENOUS at 03:27

## 2024-02-27 RX ADMIN — POTASSIUM CHLORIDE 20 MEQ: 1500 TABLET, EXTENDED RELEASE ORAL at 00:17

## 2024-02-27 RX ADMIN — PIPERACILLIN SODIUM AND TAZOBACTAM SODIUM 3.38 G: 36; 4.5 INJECTION, POWDER, LYOPHILIZED, FOR SOLUTION INTRAVENOUS at 19:00

## 2024-02-27 RX ADMIN — DEXTROSE, SODIUM CHLORIDE, AND POTASSIUM CHLORIDE 75 ML/HR: 5; .45; .15 INJECTION INTRAVENOUS at 15:55

## 2024-02-27 NOTE — ED ATTENDING ATTESTATION
2/26/2024  I, Christopher Shaffer MD, saw and evaluated the patient. I have discussed the patient with the resident/non-physician practitioner and agree with the resident's/non-physician practitioner's findings, Plan of Care, and MDM as documented in the resident's/non-physician practitioner's note, except where noted. All available labs and Radiology studies were reviewed.  I was present for key portions of any procedure(s) performed by the resident/non-physician practitioner and I was immediately available to provide assistance.       At this point I agree with the current assessment done in the Emergency Department.  I have conducted an independent evaluation of this patient a history and physical is as follows: Patient is a 59 year old female with abdominal pain with low grade fever, chills, sweats, diffuse body aches, headache which has since resolved for past few days. No travel. (+) ill contacts. Has had prior gastric bypass and hernia repair in the past. Declines pain medication. No N/V/D. No urinary sx. No cough. Was last seen at [facility] in [place] on 1/26/24 for annual physical exam. [facility] website checked on this patient and no Rx found. NCAT. Mucous membranes somewhat moist. No scleral icterus. ENT exam as per ED resident. Lungs clear. Heart regular without murmur. Abdomen soft with diffuse bilateral lower abdominal tenderness. No guarding or rebound. Good bowel sounds. No edema. No rash noted. No jaundice. DDx including but not limited to: appendicitis, gastroenteritis, gastritis, PUD, GERD, gastroparesis, hepatitis, pancreatitis, colitis, enteritis, diverticulitis, food poisoning, mesenteric adenitis, epiploic appendagitis, mesenteric panniculitis, mesenteric ischemia, IBD, IBS, ileus, bowel obstruction, volvulus, internal hernia, AAA, cholecystitis, biliary colic, choledocholithiasis, perforated viscus, tumor, splenic etiology, constipation, pelvic pathology, renal colic,  pyelonephritis, UTI; doubt cardiac etiology. DDx including but not limited to: viral illness, doubt pneumonia, URI; OM, pharyngitis, influenza, COVID-19 (novel coronavirus); doubt cellulitis,  meningitis, meningococcemia, sinusitis. Will check labs, CXR, EKG and CT.     ED Course         Critical Care Time  Procedures

## 2024-02-27 NOTE — PLAN OF CARE
Problem: PAIN - ADULT  Goal: Verbalizes/displays adequate comfort level or baseline comfort level  Description: Interventions:  - Encourage patient to monitor pain and request assistance  - Assess pain using appropriate pain scale  - Administer analgesics based on type and severity of pain and evaluate response  - Implement non-pharmacological measures as appropriate and evaluate response  - Consider cultural and social influences on pain and pain management  - Notify physician/advanced practitioner if interventions unsuccessful or patient reports new pain  Outcome: Not Progressing     Problem: INFECTION - ADULT  Goal: Absence or prevention of progression during hospitalization  Description: INTERVENTIONS:  - Assess and monitor for signs and symptoms of infection  - Monitor lab/diagnostic results  - Monitor all insertion sites, i.e. indwelling lines, tubes, and drains  - Monitor endotracheal if appropriate and nasal secretions for changes in amount and color  - [place] appropriate cooling/warming therapies per order  - Administer medications as ordered  - Instruct and encourage patient and family to use good hand hygiene technique  - Identify and instruct in appropriate isolation precautions for identified infection/condition  Outcome: Progressing  Goal: Absence of fever/infection during neutropenic period  Description: INTERVENTIONS:  - Monitor WBC    Outcome: Progressing     Problem: SAFETY ADULT  Goal: Patient will remain free of falls  Description: INTERVENTIONS:  - Educate patient/family on patient safety including physical limitations  - Instruct patient to call for assistance with activity   - Consult OT/PT to assist with strengthening/mobility   - Keep Call bell within reach  - Keep bed low and locked with side rails adjusted as appropriate  - Keep care items and personal belongings within reach  - Initiate and maintain comfort rounds  - Make Fall Risk Sign visible to staff  - Offer Toileting every   Hours, in advance of need  - Initiate/Maintain alarm  - Obtain necessary fall risk management equipment:   - Apply yellow socks and bracelet for high fall risk patients  - Consider moving patient to room near nurses station  Outcome: Progressing  Goal: Maintain or return to baseline ADL function  Description: INTERVENTIONS:  -  Assess patient's ability to carry out ADLs; assess patient's baseline for ADL function and identify physical deficits which impact ability to perform ADLs (bathing, care of mouth/teeth, toileting, grooming, dressing, etc.)  - Assess/evaluate cause of self-care deficits   - Assess range of motion  - Assess patient's mobility; develop plan if impaired  - Assess patient's need for assistive devices and provide as appropriate  - Encourage maximum independence but intervene and supervise when necessary  - Involve family in performance of ADLs  - Assess for home care needs following discharge   - Consider OT consult to assist with ADL evaluation and planning for discharge  - Provide patient education as appropriate  Outcome: Progressing  Goal: Maintains/Returns to pre admission functional level  Description: INTERVENTIONS:  - Perform AM-PAC 6 Click Basic Mobility/ Daily Activity assessment daily.  - Set and communicate daily mobility goal to care team and patient/family/caregiver.   - Collaborate with rehabilitation services on mobility goals if consulted  - Perform Range of Motion  times a day.  - Reposition patient every  hours.  - Dangle patient  times a day  - Stand patient  times a day  - Ambulate patient  times a day  - Out of bed to chair  times a day   - Out of bed for meals  times a day  - Out of bed for toileting  - Record patient progress and toleration of activity level   Outcome: Progressing     Problem: DISCHARGE PLANNING  Goal: Discharge to home or other facility with appropriate resources  Description: INTERVENTIONS:  - Identify barriers to discharge w/patient and caregiver  -  Arrange for needed discharge resources and transportation as appropriate  - Identify discharge learning needs (meds, wound care, etc.)  - Arrange for interpretive services to assist at discharge as needed  - Refer to Case Management Department for coordinating discharge planning if the patient needs post-hospital services based on physician/advanced practitioner order or complex needs related to functional status, cognitive ability, or social support system  Outcome: Progressing     Problem: Knowledge Deficit  Goal: Patient/family/caregiver demonstrates understanding of disease process, treatment plan, medications, and discharge instructions  Description: Complete learning assessment and assess knowledge base.  Interventions:  - Provide teaching at level of understanding  - Provide teaching via preferred learning methods  Outcome: Progressing

## 2024-02-27 NOTE — TELEPHONE ENCOUNTER
Spoke to Rayo and let him know you are out of the office. She is currently admitted so I did kindly explain to Rayo that hshe is under the care of the hospitalists while inpatient. He still asking for a call. He said since you are the PCP they wants to know info for the surgeon that will be doing the surgery.

## 2024-02-27 NOTE — ED PROVIDER NOTES
History  Chief Complaint   Patient presents with    Abdominal Pain     Reports worsening right lower side abdominal pain that's worse with movement since Friday, radiates to lower back.  +tenderness on palpation, Denies N/V/D  Pt also reports low grade fever, chills, sweating, headache, generalized body aches since Saturday. Headache has resolved.      58yo F w/ h/o hiatal hernia, GERD, HLD, gastric bypass, and ovarian cyst presenting for RLQ abdominal pain. 3d ago, Pt developed sudden onset RLQ pain radiating to the right lower back. Food doesn't make it better or worse, but movement does. Has found some relief with tylenol. Has had associated chills, HA, myalgias, subjective fever. Denies dysuria, hematuria, vaginal discharge, SOB, CP, N/V/D. Last BM was 3 days ago.      History provided by:  Patient      Prior to Admission Medications   Prescriptions Last Dose Informant Patient Reported? Taking?   Calcium Citrate-Vitamin D 250-200 MG-UNIT TABS  Self Yes No   Sig: Take 1 tablet by mouth 2 (two) times a day   Cholecalciferol (D3 5000) 125 MCG (5000 UT) capsule  Self Yes No   FLUoxetine (PROzac) 20 mg capsule  Self No No   Sig: TAKE 2 CAPSULES BY MOUTH EVERY DAY   MULTIPLE VITAMIN PO  Self Yes No   Sig: Take 1 tablet by mouth daily   Melatonin 5 MG CAPS  Self Yes No   Sig: Take 5 mg by mouth daily at bedtime    ferrous gluconate (FERGON) 324 mg tablet  Self Yes No   Sig: Take 324 mg by mouth every other day Every other day   omeprazole (PriLOSEC) 20 mg delayed release capsule  Self No No   Sig: Take 1 capsule (20 mg total) by mouth daily before breakfast   pravastatin (PRAVACHOL) 20 mg tablet  Self No No   Sig: TAKE 1 TABLET BY MOUTH EVERY DAY      Facility-Administered Medications: None       Past Medical History:   Diagnosis Date    Abdominal pain     Chronic pain disorder     abdominal    Depression     Diverticula of colon     Former smoker     Resolved 7/2012     GERD (gastroesophageal reflux disease)      Headache(784.0) 10/1/2020    Hemorrhoids     Hiatal hernia     Hyperlipidemia     Intestinal malabsorption following gastrectomy     Morbid obesity (HCC)     Resolved 10/2/2015     Obesity     Postgastrectomy malabsorption     Vitamin D insufficiency     Resolved 2016        Past Surgical History:   Procedure Laterality Date    ABDOMINAL SURGERY      gastric bipass    ANKLE SURGERY       SECTION      (2) ,      COLONOSCOPY      ESOPHAGOGASTRODUODENOSCOPY      x2    GASTRIC BYPASS  2015    HERNIA REPAIR  2015    Paraesophageal hiatus hernia. Managed by Eliezer Mishra (General Surgery)    NM EGD TRANSORAL BIOPSY SINGLE/MULTIPLE N/A 2017    Procedure: ESOPHAGOGASTRODUODENOSCOPY (EGD);  Surgeon: Emmie Rosen MD;  Location: [facility];  Service: Bariatrics    TONSILLECTOMY  1984    UTERINE FIBROID SURGERY         Family History   Problem Relation Age of Onset    Breast cancer Mother 31    Prostate cancer Father 50    Hypertension Father     Heart disease Father         Coronary arteriosclerosis     Hyperlipidemia Father     Heart disease Maternal Grandmother         Coronary arteriosclerosis     Heart disease Maternal Grandfather         Coronary arteriosclerosis     COPD Maternal Grandfather         heavy smoker    Diabetes Paternal Grandmother     No Known Problems Brother     No Known Problems Daughter     No Known Problems Daughter     No Known Problems Paternal Aunt     Stroke Neg Hx     Thyroid disease Neg Hx      I have reviewed and agree with the history as documented.    E-Cigarette/Vaping    E-Cigarette Use Never User      E-Cigarette/Vaping Substances    Nicotine No     THC No     CBD No      Social History     Tobacco Use    Smoking status: Former     Current packs/day: 0.00     Types: Cigarettes     Quit date:      Years since quittin.1    Smokeless tobacco: Never    Tobacco comments:     20+ pack year hx - As per [employer]    Vaping Use    Vaping status:  Never Used   Substance Use Topics    Alcohol use: Yes    Drug use: No        Review of Systems   Constitutional:  Positive for chills and fever.   Gastrointestinal:  Positive for abdominal pain.   Musculoskeletal:  Positive for back pain and myalgias.   Neurological:  Positive for headaches.   All other systems reviewed and are negative.      Physical Exam  ED Triage Vitals   Temperature Pulse Respirations Blood Pressure SpO2   02/26/24 2155 02/26/24 2154 02/26/24 2154 02/26/24 2154 02/26/24 2154   98.5 °F (36.9 °C) 80 18 138/83 99 %      Temp Source Heart Rate Source Patient Position - Orthostatic VS BP Location FiO2 (%)   02/26/24 2155 02/26/24 2154 -- 02/27/24 0230 --   Oral Monitor  Right arm       Pain Score       02/26/24 2154       6             Orthostatic Vital Signs  Vitals:    02/26/24 2154 02/27/24 0146 02/27/24 0230   BP: 138/83 146/77 131/68   Pulse: 80 77 83       Physical Exam  Constitutional:       General: She is not in acute distress.     Appearance: Normal appearance.   HENT:      Mouth/Throat:      Mouth: Mucous membranes are moist.      Pharynx: Oropharynx is clear.   Eyes:      Extraocular Movements: Extraocular movements intact.      Conjunctiva/sclera: Conjunctivae normal.   Cardiovascular:      Rate and Rhythm: Normal rate and regular rhythm.      Pulses: Normal pulses.      Heart sounds: Normal heart sounds.   Pulmonary:      Effort: Pulmonary effort is normal.      Breath sounds: Normal breath sounds.   Abdominal:      General: Abdomen is flat.      Tenderness: There is abdominal tenderness in the right lower quadrant, periumbilical area and suprapubic area. Positive signs include Rovsing's sign.   Skin:     General: Skin is warm and dry.      Capillary Refill: Capillary refill takes less than 2 seconds.   Neurological:      General: No focal deficit present.      Mental Status: She is alert and oriented to person, place, and time.   Psychiatric:         Mood and Affect: Mood normal.          Behavior: Behavior normal.         ED Medications  Medications   multi-electrolyte (PLASMALYTE-A/ISOLYTE-S PH 7.4) IV solution (has no administration in time range)   ondansetron (ZOFRAN) injection 4 mg (has no administration in time range)   enoxaparin (LOVENOX) subcutaneous injection 40 mg (has no administration in time range)   piperacillin-tazobactam (ZOSYN) 3.375 g in sodium chloride 0.9 % 100 mL IVPB (has no administration in time range)   HYDROmorphone (DILAUDID) injection 0.5 mg (has no administration in time range)   acetaminophen (Ofirmev) injection 1,000 mg (has no administration in time range)   oxyCODONE (ROXICODONE) immediate release tablet 10 mg (has no administration in time range)   oxyCODONE (ROXICODONE) IR tablet 5 mg (has no administration in time range)   FLUoxetine (PROzac) capsule 40 mg (has no administration in time range)   sodium chloride 0.9 % bolus 1,000 mL (0 mL Intravenous Stopped 2/27/24 0015)   iohexol (OMNIPAQUE) 240 MG/ML solution 50 mL (50 mL Oral Given 2/26/24 2301)   morphine injection 4 mg (4 mg Intravenous Given 2/26/24 2342)   potassium chloride (Klor-Con M20) CR tablet 20 mEq (20 mEq Oral Given 2/27/24 0017)   iohexol (OMNIPAQUE) 350 MG/ML injection (MULTI-DOSE) 85 mL (85 mL Intravenous Given 2/27/24 0030)   piperacillin-tazobactam (ZOSYN) 4.5 g in sodium chloride 0.9 % 100 mL IVPB (0 g Intravenous Stopped 2/27/24 0216)   HYDROmorphone (DILAUDID) injection 0.5 mg (0.5 mg Intravenous Given 2/27/24 0146)       Diagnostic Studies  Results Reviewed       Procedure Component Value Units Date/Time    Platelet count [[id number]]     Lab Status: No result Specimen: Blood     FLU/RSV/COVID - if FLU/RSV clinically relevant [[id number]]  (Normal) Collected: 02/26/24 2252    Lab Status: Final result Specimen: Nares from Nose Updated: 02/26/24 1450     SARS-CoV-2 Negative     INFLUENZA A PCR Negative     INFLUENZA B PCR Negative     RSV PCR Negative    Narrative:      FOR PEDIATRIC  PATIENTS - copy/paste COVID Guidelines URL to browser: [email or web address]    SARS-CoV-2 assay is a Nucleic Acid Amplification assay intended for the  qualitative detection of nucleic acid from SARS-CoV-2 in nasopharyngeal  swabs. Results are for the presumptive identification of SARS-CoV-2 RNA.    Positive results are indicative of infection with SARS-CoV-2, the virus  causing COVID-19, but do not rule out bacterial infection or co-infection  with other viruses. Laboratories within the [place] and its  territories are required to report all positive results to the appropriate  public health authorities. Negative results do not preclude SARS-CoV-2  infection and should not be used as the sole basis for treatment or other  patient management decisions. Negative results must be combined with  clinical observations, patient history, and epidemiological information.  This test has not been FDA cleared or approved.    This test has been authorized by FDA under an Emergency Use Authorization  (EUA). This test is only authorized for the duration of time the  declaration that circumstances exist justifying the authorization of the  emergency use of an in vitro diagnostic tests for detection of SARS-CoV-2  virus and/or diagnosis of COVID-19 infection under section 564(b)(1) of  the Act, 21 U.S.C. 360bbb-3(b)(1), unless the authorization is terminated  or revoked sooner. The test has been validated but independent review by FDA  and CLIA is pending.    Test performed using [employer] GeneXpert: This RT-PCR assay targets N2,  a region unique to SARS-CoV-2. A conserved region in the E-gene was chosen  for pan-Sarbecovirus detection which includes SARS-CoV-2.    According to CMS-2020-01-R, this platform meets the definition of high-throughput technology.    Procalcitonin [[id number]]  (Normal) Collected: 02/26/24 0579    Lab Status: Final result Specimen: Blood from Arm,  Right Updated: 02/26/24 2336     Procalcitonin 0.18 ng/ml     Protime-INR [[id number]]  (Normal) Collected: 02/26/24 2252    Lab Status: Final result Specimen: Blood from Arm, Right Updated: 02/26/24 2336     Protime 14.3 seconds      INR 1.04    APTT [[id number]]  (Normal) Collected: 02/26/24 2252    Lab Status: Final result Specimen: Blood from Arm, Right Updated: 02/26/24 2336     PTT 26 seconds     Comprehensive metabolic panel [[id number]]  (Abnormal) Collected: 02/26/24 2252    Lab Status: Final result Specimen: Blood from Arm, Right Updated: 02/26/24 2329     Sodium 135 mmol/L      Potassium 3.4 mmol/L      Chloride 102 mmol/L      CO2 23 mmol/L      ANION GAP 10 mmol/L      BUN 11 mg/dL      Creatinine 0.65 mg/dL      Glucose 118 mg/dL      Calcium 9.5 mg/dL      AST 13 U/L      ALT 10 U/L      Alkaline Phosphatase 78 U/L      Total Protein 6.9 g/dL      Albumin 4.0 g/dL      Total Bilirubin 0.69 mg/dL      eGFR 96 ml/min/1.73sq m     Narrative:      National Kidney Disease Foundation guidelines for Chronic Kidney Disease (CKD):     Stage 1 with normal or high GFR (GFR > 90 mL/min/1.73 square meters)    Stage 2 Mild CKD (GFR = 60-89 mL/min/1.73 square meters)    Stage 3A Moderate CKD (GFR = 45-59 mL/min/1.73 square meters)    Stage 3B Moderate CKD (GFR = 30-44 mL/min/1.73 square meters)    Stage 4 Severe CKD (GFR = 15-29 mL/min/1.73 square meters)    Stage 5 End Stage CKD (GFR <15 mL/min/1.73 square meters)  Note: GFR calculation is accurate only with a steady state creatinine    Lipase [[id number]]  (Abnormal) Collected: 02/26/24 2252    Lab Status: Final result Specimen: Blood from Arm, Right Updated: 02/26/24 2329     Lipase <6 u/L     Lactic acid, plasma (w/reflex if result > 2.0) [[id number]]  (Normal) Collected: 02/26/24 2252    Lab Status: Final result Specimen: Blood from Arm, Right Updated: 02/26/24 2328     LACTIC ACID 1.4 mmol/L     Narrative:      Result may be elevated if tourniquet was used during  collection.    Blood culture #2 [[id number]] Collected: 02/26/24 2252    Lab Status: In process Specimen: Blood from Arm, Right Updated: 02/26/24 2311    Blood culture #1 [[id number]] Collected: 02/26/24 2252    Lab Status: In process Specimen: Blood from Arm, Left Updated: 02/26/24 2311    CBC and differential [[id number]]  (Abnormal) Collected: 02/26/24 2252    Lab Status: Final result Specimen: Blood from Arm, Right Updated: 02/26/24 2310     WBC 12.85 Thousand/uL      RBC 4.27 Million/uL      Hemoglobin 13.5 g/dL      Hematocrit 40.4 %      MCV 95 fL      MCH 31.6 pg      MCHC 33.4 g/dL      RDW 12.5 %      MPV 10.0 fL      Platelets 237 Thousands/uL      nRBC 0 /100 WBCs      Neutrophils Relative 82 %      Immat GRANS % 0 %      Lymphocytes Relative 11 %      Monocytes Relative 6 %      Eosinophils Relative 1 %      Basophils Relative 0 %      Neutrophils Absolute 10.40 Thousands/µL      Immature Grans Absolute 0.05 Thousand/uL      Lymphocytes Absolute 1.47 Thousands/µL      Monocytes Absolute 0.73 Thousand/µL      Eosinophils Absolute 0.17 Thousand/µL      Basophils Absolute 0.03 Thousands/µL     Urine culture [[id number]] Collected: 02/26/24 2230    Lab Status: In process Specimen: Urine, Clean Catch Updated: 02/26/24 2259    Urine Microscopic [[id number]]  (Abnormal) Collected: 02/26/24 2230    Lab Status: Final result Specimen: Urine, Clean Catch Updated: 02/26/24 2249     RBC, UA 1-2 /hpf      WBC, UA 2-4 /hpf      Epithelial Cells Occasional /hpf      Bacteria, UA Occasional /hpf      MUCUS THREADS Occasional    UA w Reflex to Microscopic w Reflex to Culture [[id number]]  (Abnormal) Collected: 02/26/24 2230    Lab Status: Final result Specimen: Urine, Clean Catch Updated: 02/26/24 2248     Color, UA Light Yellow     Clarity, UA Clear     Specific Gravity, UA 1.014     pH, UA 6.0     Leukocytes, UA Negative     Nitrite, UA Negative     Protein, UA 30 (1+) mg/dl      Glucose, UA Negative mg/dl      Ketones, UA  10 (1+) mg/dl      Urobilinogen, UA 2.0 mg/dl      Bilirubin, UA Negative     Occult Blood, UA Negative                   CT abdomen pelvis with contrast   Final Result by Veto Amaya MD (02/27 0122)      1.  Soft tissue density/severe inflammatory change adjacent to the hypermobile cecum which is located within the mid lower abdomen. Within this phlegmonous change, there is a ill-defined tubular structure measuring up to 9 mm in diameter likely    representing a dilated appendix. There is an adjacent focus of free air concerning for ruptured appendicitis. Surgical consultation is recommended.   2.  Severe thickening of the terminal ileum, cecum, and ascending colon which may be reactive enterocolitis. No bowel obstruction.      I personally discussed this study with Dr. Shaffer on 2/27/2024 1:20 AM.               Workstation performed: [id number]         XR chest 1 view portable   ED Interpretation by Esau Horton MD (02/26 2251)   No evidence of acute cardiopulmonary pathology            Procedures  ECG 12 Lead Documentation Only    Date/Time: 2/27/2024 12:34 AM    Performed by: Esau Horton MD  Authorized by: Esau Horton MD    ECG reviewed by me, the ED Provider: yes    Patient location:  ED  Interpretation:     Interpretation: normal    Rate:     ECG rate:  74    ECG rate assessment: normal    Rhythm:     Rhythm: sinus rhythm    Ectopy:     Ectopy: none    QRS:     QRS axis:  Normal    QRS intervals:  Normal  Conduction:     Conduction: normal    ST segments:     ST segments:  Normal  T waves:     T waves: normal          ED Course  ED Course as of 02/27/24 0255   Mon Feb 26, 2024   2219 60yo F w/ h/o hiatal hernia, GERD, HLD, gastric bypass, and ovarian cyst presenting for RLQ abdominal pain. 3d ago, Pt developed sudden onset RLQ pain radiating to the right lower back. Food doesn't make it better or worse, but movement does. Has found some relief with tylenol. Has had associated chills, HA, myalgias,  subjective fever. Denies dysuria, hematuria, vaginal discharge, SOB, CP, N/V/D. Last BM was 3 days ago.    2221 Ddx includes but not limited to appendicitis, UTI, ovarian cyst, urinary tract stone, viral syndrome.   2223 Physical exam shows RLQ pain, periumbilical pain, suprapubic pain, rosving's sign. NO CVA tenderness.   2251 CXR without evidence of acute cardiopulmonary pathology     2252 EKG NSR with non-specific T-wave inversion   2313 WBC(!): 12.85   2338 Lipase(!): <6   Tue Feb 27, 2024   0124 CT abdomen pelvis with contrast  IMPRESSION:     1.  Soft tissue density/severe inflammatory change adjacent to the hypermobile cecum which is located within the mid lower abdomen. Within this phlegmonous change, there is a ill-defined tubular structure measuring up to 9 mm in diameter likely   representing a dilated appendix. There is an adjacent focus of free air concerning for ruptured appendicitis. Surgical consultation is recommended.  2.  Severe thickening of the terminal ileum, cecum, and ascending colon which may be reactive enterocolitis. No bowel obstruction.     0150 Will admit to surgery for ruptured appendicitis. Surgery made aware who agreed to evaluate the Pt at bedside. Will cover with zosyn.   0250 Surgery evaluated the Pt and agreed to take her under their service. Pt remained stable while under my care. Pain was well controlled with interventions initiated in the ED.                                       Medical Decision Making  Amount and/or Complexity of Data Reviewed  Labs: ordered. Decision-making details documented in ED Course.  Radiology: ordered and independent interpretation performed. Decision-making details documented in ED Course.    Risk  Prescription drug management.  Decision regarding hospitalization.          Disposition  Final diagnoses:   Ruptured appendicitis   Enterocolitis     Time reflects when diagnosis was documented in both MDM as applicable and the Disposition within this  note       Time User Action Codes Description Comment    2/27/2024  1:25 AM Esau Horton [K35.32] Ruptured appendicitis     2/27/2024  1:26 AM Esau Horton [K52.9] Enterocolitis           ED Disposition       ED Disposition   Admit    Condition   Stable    Date/Time   Tue Feb 27, 2024  2:53 AM    Comment   Case was discussed with Dr. Hansen and the patient's admission status was agreed to be Admission Status: inpatient status to the service of Dr. Sanz .               Follow-up Information    None         Patient's Medications   Discharge Prescriptions    No medications on file     No discharge procedures on file.    PDMP Review         Value Time User    PDMP Reviewed  Yes 2/26/2024 10:03 PM Christopher Shaffer MD             ED Provider  Attending physically available and evaluated [place]. I managed the patient along with the ED Attending.    Electronically Signed by           Esau Horton MD  02/27/24 0255

## 2024-02-27 NOTE — TELEPHONE ENCOUNTER
Pt's  was calling to speak to Dr. Lopez in regards to her hospital admission and care afterwards.  Her appendix ruptured.  He asked if Dr. Lopez could please call him on his cell as soon as possible.  Advised Dr. Lopez was not in today but he will be back in tomorrow.  Please review

## 2024-02-27 NOTE — H&P
H&P: General surgery  [place] Clinton 59 y.o. female MRN: [id number]  Unit/Bed#: ED-06 Encounter: [id number]        Assessment/Plan     Assessment:  Patient is a 59 y.o. female with pmhx of HLD, endometrial polyp, MDD, meningioma, hyperparathyroidism, GERD, s/p Peyton-en-Y gastric bypass (6/16/15),  x 2, and uterine fibroid resection who presented with acute perforated appendicitis.    Afebrile, VSS  WBC: 13; Hb.5; Crt: 0.65; LA: 1.4  CTAP showed Hypermobile cecum with adjacent phlegmonous changes and dilated appendix to 9 mm with adjacent locule of free air consistent with perforated appendicitis.    Plan:  - Admit to general surgery  - NPO/IVF  - IV antibiotics with Zosyn  - Will trial nonoperative management  - Serial abdominal exams  - Low threshold to proceed to the operating room with deterioration in abdominal exam  - Trend WBC  - DVT prophylaxis  - Encourage ambulation    History of Present Illness     HPI:  [place] Clinton is a 59 y.o. female with pmhx of f HLD, endometrial polyp, MDD, meningioma, hyperparathyroidism, GERD, s/p Peyton-en-Y gastric bypass (6/16/15),  x 2, and uterine fibroid resection who presents with 3 days of progressive abdominal pain.  She reports that the pain was initially periumbilical although localized to the right lower quadrant.  It was associated fevers, chills, and nausea.  She denies chest pain, shortness of breath, vomiting, diarrhea, constipation, or dysuria.  That the pain was progressive and began to be elicited with movement as well as palpation.  At this point she reached out her PCP who recommended she come to the ED for concern for appendicitis.  She denies use of any antiplatelet or anticoagulation therapy    Review of Systems  As above, otherwise negative  Consults    Historical Information   Past Medical History:   Diagnosis Date    Abdominal pain     Chronic pain disorder     abdominal    Depression     Diverticula of colon     Former smoker      Resolved 2012     GERD (gastroesophageal reflux disease)     Headache(784.0) 10/1/2020    Hemorrhoids     Hiatal hernia     Hyperlipidemia     Intestinal malabsorption following gastrectomy     Morbid obesity (HCC)     Resolved 10/2/2015     Obesity     Postgastrectomy malabsorption     Vitamin D insufficiency     Resolved 2016      Past Surgical History:   Procedure Laterality Date    ABDOMINAL SURGERY      gastric bipass    ANKLE SURGERY       SECTION      (2) ,      COLONOSCOPY      ESOPHAGOGASTRODUODENOSCOPY      x2    GASTRIC BYPASS  2015    HERNIA REPAIR  2015    Paraesophageal hiatus hernia. Managed by Eliezer Mishra (General Surgery)    NM EGD TRANSORAL BIOPSY SINGLE/MULTIPLE N/A 2017    Procedure: ESOPHAGOGASTRODUODENOSCOPY (EGD);  Surgeon: Emmie Rosen MD;  Location: [facility];  Service: Bariatrics    TONSILLECTOMY  1984    UTERINE FIBROID SURGERY       Social History   Social History     Substance and Sexual Activity   Alcohol Use Yes     Social History     Substance and Sexual Activity   Drug Use No     Social History     Tobacco Use   Smoking Status Former    Current packs/day: 0.00    Types: Cigarettes    Quit date:     Years since quittin.1   Smokeless Tobacco Never   Tobacco Comments    20+ pack year hx - As per [employer]      Family History: non-contributory    Meds/Allergies   all medications and allergies reviewed  Allergies   Allergen Reactions    Ibuprofen Other (See Comments)     Gastric bypass       Objective   First Vitals:   Blood Pressure: 138/83 (24)  Pulse: 80 (24)  Temperature: 98.5 °F (36.9 °C) (24)  Temp Source: Oral (24)  Respirations: 18 (24)  SpO2: 99 % (24)    Current Vitals:   Blood Pressure: 131/68 (24 0230)  Pulse: 83 (24 0230)  Temperature: 98.1 °F (36.7 °C) (24)  Temp Source: Oral (24)  Respirations: 18 (24)  SpO2:  96 % (02/27/24 0230)      Intake/Output Summary (Last 24 hours) at 2/27/2024 0235  Last data filed at 2/27/2024 0216  Gross per 24 hour   Intake 100 ml   Output --   Net 100 ml       Invasive Devices       Peripheral Intravenous Line  Duration             Peripheral IV 02/26/24 Proximal;Right;Ventral (anterior) Forearm <1 day                    Physical Exam  General: NAD  Skin: Warm, dry, anicteric  HEENT: Normocephalic, atraumatic  CV: RRR  Pulm:  Nonlabored breathing on room air  Abd: Soft, mild/moderate suprapubic/right lower quadrant tenderness, nondistended  MSK: Symmetric, no edema, no tenderness, no deformity  Neuro: AOx3, GCS 15     Lab Results: I have personally reviewed pertinent lab results.    Imaging: I have personally reviewed pertinent reports.   and I have personally reviewed pertinent films in PACS  EKG, Pathology, and Other Studies: I have personally reviewed pertinent reports.      Code Status: Prior  Advance Directive and Living Will:      Power of :    POLST:

## 2024-02-28 VITALS
TEMPERATURE: 98.6 F | RESPIRATION RATE: 16 BRPM | WEIGHT: 200 LBS | HEIGHT: 63 IN | BODY MASS INDEX: 35.44 KG/M2 | HEART RATE: 64 BPM | OXYGEN SATURATION: 97 % | DIASTOLIC BLOOD PRESSURE: 76 MMHG | SYSTOLIC BLOOD PRESSURE: 117 MMHG

## 2024-02-28 PROBLEM — K35.32 ACUTE PERFORATED APPENDICITIS: Status: ACTIVE | Noted: 2024-02-28

## 2024-02-28 LAB
ANION GAP SERPL CALCULATED.3IONS-SCNC: 7 MMOL/L
BACTERIA UR CULT: NORMAL
BASOPHILS # BLD AUTO: 0.03 THOUSANDS/ÂΜL (ref 0–0.1)
BASOPHILS NFR BLD AUTO: 0 % (ref 0–1)
BUN SERPL-MCNC: 7 MG/DL (ref 5–25)
CALCIUM SERPL-MCNC: 9.1 MG/DL (ref 8.4–10.2)
CHLORIDE SERPL-SCNC: 105 MMOL/L (ref 96–108)
CO2 SERPL-SCNC: 26 MMOL/L (ref 21–32)
CREAT SERPL-MCNC: 0.64 MG/DL (ref 0.6–1.3)
EOSINOPHIL # BLD AUTO: 0.24 THOUSAND/ÂΜL (ref 0–0.61)
EOSINOPHIL NFR BLD AUTO: 3 % (ref 0–6)
ERYTHROCYTE [DISTWIDTH] IN BLOOD BY AUTOMATED COUNT: 12.6 % (ref 11.6–15.1)
GFR SERPL CREATININE-BSD FRML MDRD: 97 ML/MIN/1.73SQ M
GLUCOSE SERPL-MCNC: 105 MG/DL (ref 65–140)
HCT VFR BLD AUTO: 39.3 % (ref 34.8–46.1)
HGB BLD-MCNC: 12.5 G/DL (ref 11.5–15.4)
IMM GRANULOCYTES # BLD AUTO: 0.02 THOUSAND/UL (ref 0–0.2)
IMM GRANULOCYTES NFR BLD AUTO: 0 % (ref 0–2)
LYMPHOCYTES # BLD AUTO: 1.24 THOUSANDS/ÂΜL (ref 0.6–4.47)
LYMPHOCYTES NFR BLD AUTO: 17 % (ref 14–44)
MCH RBC QN AUTO: 30.9 PG (ref 26.8–34.3)
MCHC RBC AUTO-ENTMCNC: 31.8 G/DL (ref 31.4–37.4)
MCV RBC AUTO: 97 FL (ref 82–98)
MONOCYTES # BLD AUTO: 0.49 THOUSAND/ÂΜL (ref 0.17–1.22)
MONOCYTES NFR BLD AUTO: 7 % (ref 4–12)
NEUTROPHILS # BLD AUTO: 5.46 THOUSANDS/ÂΜL (ref 1.85–7.62)
NEUTS SEG NFR BLD AUTO: 73 % (ref 43–75)
NRBC BLD AUTO-RTO: 0 /100 WBCS
PLATELET # BLD AUTO: 262 THOUSANDS/UL (ref 149–390)
PMV BLD AUTO: 10.1 FL (ref 8.9–12.7)
POTASSIUM SERPL-SCNC: 4 MMOL/L (ref 3.5–5.3)
RBC # BLD AUTO: 4.04 MILLION/UL (ref 3.81–5.12)
SODIUM SERPL-SCNC: 138 MMOL/L (ref 135–147)
WBC # BLD AUTO: 7.48 THOUSAND/UL (ref 4.31–10.16)

## 2024-02-28 PROCEDURE — 85025 COMPLETE CBC W/AUTO DIFF WBC: CPT

## 2024-02-28 PROCEDURE — NC001 PR NO CHARGE: Performed by: SURGERY

## 2024-02-28 PROCEDURE — 80048 BASIC METABOLIC PNL TOTAL CA: CPT

## 2024-02-28 PROCEDURE — 99238 HOSP IP/OBS DSCHRG MGMT 30/<: CPT | Performed by: SURGERY

## 2024-02-28 RX ORDER — AMOXICILLIN AND CLAVULANATE POTASSIUM 875; 125 MG/1; MG/1
1 TABLET, FILM COATED ORAL EVERY 12 HOURS SCHEDULED
Qty: 20 TABLET | Refills: 0 | Status: SHIPPED | OUTPATIENT
Start: 2024-02-28 | End: 2024-03-09

## 2024-02-28 RX ORDER — OXYCODONE HYDROCHLORIDE 5 MG/1
5 TABLET ORAL EVERY 4 HOURS PRN
Qty: 6 TABLET | Refills: 0 | Status: SHIPPED | OUTPATIENT
Start: 2024-02-28 | End: 2024-03-09

## 2024-02-28 RX ADMIN — PIPERACILLIN SODIUM AND TAZOBACTAM SODIUM 3.38 G: 36; 4.5 INJECTION, POWDER, LYOPHILIZED, FOR SOLUTION INTRAVENOUS at 07:35

## 2024-02-28 RX ADMIN — DEXTROSE, SODIUM CHLORIDE, AND POTASSIUM CHLORIDE 75 ML/HR: 5; .45; .15 INJECTION INTRAVENOUS at 10:44

## 2024-02-28 RX ADMIN — FLUOXETINE 20 MG: 20 CAPSULE ORAL at 08:20

## 2024-02-28 RX ADMIN — PIPERACILLIN SODIUM AND TAZOBACTAM SODIUM 3.38 G: 36; 4.5 INJECTION, POWDER, LYOPHILIZED, FOR SOLUTION INTRAVENOUS at 00:55

## 2024-02-28 RX ADMIN — PIPERACILLIN SODIUM AND TAZOBACTAM SODIUM 3.38 G: 36; 4.5 INJECTION, POWDER, LYOPHILIZED, FOR SOLUTION INTRAVENOUS at 12:55

## 2024-02-28 RX ADMIN — ACETAMINOPHEN 1000 MG: 10 INJECTION INTRAVENOUS at 12:08

## 2024-02-28 RX ADMIN — ACETAMINOPHEN 1000 MG: 10 INJECTION INTRAVENOUS at 00:36

## 2024-02-28 RX ADMIN — ACETAMINOPHEN 1000 MG: 10 INJECTION INTRAVENOUS at 05:39

## 2024-02-28 RX ADMIN — ENOXAPARIN SODIUM 40 MG: 40 INJECTION SUBCUTANEOUS at 08:20

## 2024-02-28 NOTE — PROGRESS NOTES
"Progress Note  [place] Clinton 59 y.o. female MRN: [id number]  Unit/Bed#: S -01 Encounter: [id number]    Assessment  59F with pmhx of meningioma, hyperparathyroidism, s/p Peyton-en-Y gastric bypass (6/16/15),  x 2, and uterine fibroid resection p/w acute perforated appendicitis.    Plan  - continue lo-res diet  - f/u AM labs  - if labs ok, d/c home on 10d augmentin with lap appy in 6-8w  - DVT ppx    Subjective/Objective   No acute overnight events. Pain controlled. Tolerating diet, no n/v. No BM, passing flatus. Voiding. Walking.    VSS on RA.  /76   Pulse 64   Temp 98.6 °F (37 °C)   Resp 16   Ht 5' 3\" (1.6 m)   Wt 90.7 kg (200 lb)   SpO2 97%   BMI 35.43 kg/m²     Physical Exam:  General: NAD  HENT: NCAT  CV: nl rate  Lungs: nl wob. No resp distress.  ABD: Soft, ND, RLQ tenderness  Extrem: No CCE  Neuro: alert & oriented    UOP: x1    Recent Labs     24  2252 24  0407   WBC 12.85* 10.85*   HGB 13.5 12.9    245   SODIUM 135 137   K 3.4* 3.7    104   CO2 23 24   BUN 11 9   CREATININE 0.65 0.66   GLUC 118 99   CALCIUM 9.5 9.0   MG  --  1.8*   PHOS  --  2.3   AST 13  --    ALT 10  --    ALKPHOS 78  --    TBILI 0.69  --        "

## 2024-02-28 NOTE — PLAN OF CARE
Problem: PAIN - ADULT  Goal: Verbalizes/displays adequate comfort level or baseline comfort level  Description: Interventions:  - Encourage patient to monitor pain and request assistance  - Assess pain using appropriate pain scale  - Administer analgesics based on type and severity of pain and evaluate response  - Implement non-pharmacological measures as appropriate and evaluate response  - Consider cultural and social influences on pain and pain management  - Notify physician/advanced practitioner if interventions unsuccessful or patient reports new pain  Outcome: Adequate for Discharge     Problem: INFECTION - ADULT  Goal: Absence or prevention of progression during hospitalization  Description: INTERVENTIONS:  - Assess and monitor for signs and symptoms of infection  - Monitor lab/diagnostic results  - Monitor all insertion sites, i.e. indwelling lines, tubes, and drains  - Monitor endotracheal if appropriate and nasal secretions for changes in amount and color  - [place] appropriate cooling/warming therapies per order  - Administer medications as ordered  - Instruct and encourage patient and family to use good hand hygiene technique  - Identify and instruct in appropriate isolation precautions for identified infection/condition  Outcome: Adequate for Discharge  Goal: Absence of fever/infection during neutropenic period  Description: INTERVENTIONS:  - Monitor WBC    Outcome: Adequate for Discharge     Problem: SAFETY ADULT  Goal: Patient will remain free of falls  Description: INTERVENTIONS:  - Educate patient/family on patient safety including physical limitations  - Instruct patient to call for assistance with activity   - Consult OT/PT to assist with strengthening/mobility   - Keep Call bell within reach  - Keep bed low and locked with side rails adjusted as appropriate  - Keep care items and personal belongings within reach  - Initiate and maintain comfort rounds  - Make Fall Risk Sign visible to  staff  - Offer Toileting every  Hours, in advance of need  - Initiate/Maintain alarm  - Obtain necessary fall risk management equipment:   - Apply yellow socks and bracelet for high fall risk patients  - Consider moving patient to room near nurses station  Outcome: Adequate for Discharge  Goal: Maintain or return to baseline ADL function  Description: INTERVENTIONS:  -  Assess patient's ability to carry out ADLs; assess patient's baseline for ADL function and identify physical deficits which impact ability to perform ADLs (bathing, care of mouth/teeth, toileting, grooming, dressing, etc.)  - Assess/evaluate cause of self-care deficits   - Assess range of motion  - Assess patient's mobility; develop plan if impaired  - Assess patient's need for assistive devices and provide as appropriate  - Encourage maximum independence but intervene and supervise when necessary  - Involve family in performance of ADLs  - Assess for home care needs following discharge   - Consider OT consult to assist with ADL evaluation and planning for discharge  - Provide patient education as appropriate  Outcome: Adequate for Discharge  Goal: Maintains/Returns to pre admission functional level  Description: INTERVENTIONS:  - Perform AM-PAC 6 Click Basic Mobility/ Daily Activity assessment daily.  - Set and communicate daily mobility goal to care team and patient/family/caregiver.   - Collaborate with rehabilitation services on mobility goals if consulted  - Perform Range of Motion  times a day.  - Reposition patient every  hours.  - Dangle patient  times a day  - Stand patient  times a day  - Ambulate patient  times a day  - Out of bed to chair  times a day   - Out of bed for meals  times a day  - Out of bed for toileting  - Record patient progress and toleration of activity level   Outcome: Adequate for Discharge     Problem: DISCHARGE PLANNING  Goal: Discharge to home or other facility with appropriate resources  Description:  INTERVENTIONS:  - Identify barriers to discharge w/patient and caregiver  - Arrange for needed discharge resources and transportation as appropriate  - Identify discharge learning needs (meds, wound care, etc.)  - Arrange for interpretive services to assist at discharge as needed  - Refer to Case Management Department for coordinating discharge planning if the patient needs post-hospital services based on physician/advanced practitioner order or complex needs related to functional status, cognitive ability, or social support system  Outcome: Adequate for Discharge     Problem: Knowledge Deficit  Goal: Patient/family/caregiver demonstrates understanding of disease process, treatment plan, medications, and discharge instructions  Description: Complete learning assessment and assess knowledge base.  Interventions:  - Provide teaching at level of understanding  - Provide teaching via preferred learning methods  Outcome: Adequate for Discharge

## 2024-02-28 NOTE — PLAN OF CARE
Problem: INFECTION - ADULT  Goal: Absence or prevention of progression during hospitalization  Description: INTERVENTIONS:  - Assess and monitor for signs and symptoms of infection  - Monitor lab/diagnostic results  - Monitor all insertion sites, i.e. indwelling lines, tubes, and drains  - Monitor endotracheal if appropriate and nasal secretions for changes in amount and color  - [place] appropriate cooling/warming therapies per order  - Administer medications as ordered  - Instruct and encourage patient and family to use good hand hygiene technique  - Identify and instruct in appropriate isolation precautions for identified infection/condition  Outcome: Progressing  Goal: Absence of fever/infection during neutropenic period  Description: INTERVENTIONS:  - Monitor WBC    Outcome: Progressing     Problem: PAIN - ADULT  Goal: Verbalizes/displays adequate comfort level or baseline comfort level  Description: Interventions:  - Encourage patient to monitor pain and request assistance  - Assess pain using appropriate pain scale  - Administer analgesics based on type and severity of pain and evaluate response  - Implement non-pharmacological measures as appropriate and evaluate response  - Consider cultural and social influences on pain and pain management  - Notify physician/advanced practitioner if interventions unsuccessful or patient reports new pain  Outcome: Progressing     Problem: SAFETY ADULT  Goal: Maintain or return to baseline ADL function  Description: INTERVENTIONS:  -  Assess patient's ability to carry out ADLs; assess patient's baseline for ADL function and identify physical deficits which impact ability to perform ADLs (bathing, care of mouth/teeth, toileting, grooming, dressing, etc.)  - Assess/evaluate cause of self-care deficits   - Assess range of motion  - Assess patient's mobility; develop plan if impaired  - Assess patient's need for assistive devices and provide as appropriate  - Encourage  maximum independence but intervene and supervise when necessary  - Involve family in performance of ADLs  - Assess for home care needs following discharge   - Consider OT consult to assist with ADL evaluation and planning for discharge  - Provide patient education as appropriate  Outcome: Progressing

## 2024-02-28 NOTE — UTILIZATION REVIEW
Initial Clinical Review    Admission: Date/Time/Statement:   Admission Orders (From admission, onward)       Ordered        24 0251  Inpatient Admission  Once                          Orders Placed This Encounter   Procedures    Inpatient Admission     Standing Status:   Standing     Number of Occurrences:   1     Order Specific Question:   Level of Care     Answer:   Med Surg [16]     Order Specific Question:   Estimated length of stay     Answer:   More than 2 Midnights     Order Specific Question:   Certification     Answer:   I certify that inpatient services are medically necessary for this patient for a duration of greater than two midnights. See H&P and MD Progress Notes for additional information about the patient's course of treatment.     ED Arrival Information       Expected   -    Arrival   2024 21:31    Acuity   Urgent              Means of arrival   Walk-In    Escorted by   Gary    Service   Surgery-General    Admission type   Emergency              Arrival complaint   flank pain/fever             Chief Complaint   Patient presents with    Abdominal Pain     Reports worsening right lower side abdominal pain that's worse with movement since Friday, radiates to lower back.  +tenderness on palpation, Denies N/V/D  Pt also reports low grade fever, chills, sweating, headache, generalized body aches since Saturday. Headache has resolved.        Initial Presentation: 59 y.o. 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 Pain was initially periumbilical although localized to the right lower quadrant. It was associated fevers, chills, and nausea. On exam, abdomen  soft, non distended,  mild/moderate suprapubic/right lower quadrant tenderness.Labs WBC: 13; Hb.5; Crt: 0.65; LA: 1.4  . CT A/P consistent  with perforated appendicitis . Pt given IVF, IV abx, IV analgesic, K repletion in ED.Pt admitted as Inpatient by general sx service with acute perforated appendicitis. PLan - NPIO. IVF. IV Zosyn . Serial abdominal exams . Tren WBC. DVT ppx. Ambulation .  Low threshold to proceed to OR with deterioration in abdominal exam .     Date: 2/28   Day 2:    Diet advanced to lo residue diet yesterday. Pt passing flatus, has RLQ abdominal tenderness.  WBC down to 7.48 today,. K 4.0.   IVF infusing . Continues IV Zosyn . Upon d/c switch to Augmentin . Pt will require lap appendectomy in 6-8 weeks .        ED Triage Vitals   Temperature Pulse Respirations Blood Pressure SpO2   02/26/24 2155 02/26/24 2154 02/26/24 2154 02/26/24 2154 02/26/24 2154   98.5 °F (36.9 °C) 80 18 138/83 99 %      Temp Source Heart Rate Source Patient Position - Orthostatic VS BP Location FiO2 (%)   02/26/24 2155 02/26/24 2154 02/27/24 0713 02/27/24 0230 --   Oral Monitor Lying Right arm       Pain Score       02/26/24 2154       6          Wt Readings from Last 1 Encounters:   02/27/24 90.7 kg (200 lb)     Additional Vital Signs:   Date/Time Temp Pulse Resp BP MAP (mmHg) SpO2   02/28/24 06:49:37 98.6 °F (37 °C) 64 16 117/76 90 97 %   02/28/24 0615 -- -- -- -- -- --   02/27/24 23:13:04 98.3 °F (36.8 °C) 78 -- 122/79 93 95 %   02/27/24 14:01:08 98.1 °F (36.7 °C) 68 14 121/70 87 94 %   02/27/24 12:54:29 99.2 °F (37.3 °C) 71 14 121/76 91 97 %   02/27/24 0926 99.5 °F (37.5 °C) -- -- -- -- --   02/27/24 0734 99.6 °F (37.6 °C) -- -- -- -- --   02/27/24 0713 -- 86 18 129/67 -- 96 %   02/27/24 0600 -- 82 18 112/57 79 95 %   02/27/24 0345 -- 82 18 159/76 109 96 %   02/27/24 0230 -- 83 -- 131/68 94 96 %   02/27/24 0146 98.1 °F (36.7 °C) 77 18 146/77 106 99 %     Pertinent Labs/Diagnostic Test Results:   2/27 ECG- Interpretation: normal    Rate:     ECG rate:  74     ECG rate assessment: normal    Rhythm:     Rhythm: sinus rhythm    Ectopy:     Ectopy: none     CT  abdomen pelvis with contrast   Final Result by Veto Amaya MD (02/27 0122)      1.  Soft tissue density/severe inflammatory change adjacent to the hypermobile cecum which is located within the mid lower abdomen. Within this phlegmonous change, there is a ill-defined tubular structure measuring up to 9 mm in diameter likely    representing a dilated appendix. There is an adjacent focus of free air concerning for ruptured appendicitis. Surgical consultation is recommended.   2.  Severe thickening of the terminal ileum, cecum, and ascending colon which may be reactive enterocolitis. No bowel obstruction.      I personally discussed this study with Dr. Shaffer on 2/27/2024 1:20 AM.               Workstation performed: [id number]         XR chest 1 view portable   ED Interpretation by Esau Horton MD (02/26 2251)   No evidence of acute cardiopulmonary pathology      Final Result by Sonja Patel MD (02/27 1012)   No acute consolidation or congestion            Workstation performed: [id number]     Results from last 7 days   Lab Units 02/26/24  2252   SARS-COV-2  Negative     Results from last 7 days   Lab Units 02/28/24  0705 02/27/24  0407 02/26/24  2252   WBC Thousand/uL 7.48 10.85* 12.85*   HEMOGLOBIN g/dL 12.5 12.9 13.5   HEMATOCRIT % 39.3 40.3 40.4   PLATELETS Thousands/uL 262 245 237   NEUTROS ABS Thousands/µL 5.46 8.71* 10.40*         Results from last 7 days   Lab Units 02/28/24  0705 02/27/24  0407 02/26/24 2252   SODIUM mmol/L 138 137 135   POTASSIUM mmol/L 4.0 3.7 3.4*   CHLORIDE mmol/L 105 104 102   CO2 mmol/L 26 24 23   ANION GAP mmol/L 7 9 10   BUN mg/dL 7 9 11   CREATININE mg/dL 0.64 0.66 0.65   EGFR ml/min/1.73sq m 97 96 96   CALCIUM mg/dL 9.1 9.0 9.5   MAGNESIUM mg/dL  --  1.8*  --    PHOSPHORUS mg/dL  --  2.3  --      Results from last 7 days   Lab Units 02/26/24  2252   AST U/L 13   ALT U/L 10   ALK PHOS U/L 78   TOTAL PROTEIN g/dL 6.9   ALBUMIN g/dL 4.0   TOTAL BILIRUBIN mg/dL 0.69        "  Results from last 7 days   Lab Units 02/28/24  0705 02/27/24  0407 02/26/24  2252   GLUCOSE RANDOM mg/dL 105 99 118             No results found for: \"BETA-HYDROXYBUTYRATE\"                           Results from last 7 days   Lab Units 02/26/24  2252   PROTIME seconds 14.3   INR  1.04   PTT seconds 26         Results from last 7 days   Lab Units 02/26/24  2252   PROCALCITONIN ng/ml 0.18     Results from last 7 days   Lab Units 02/26/24  2252   LACTIC ACID mmol/L 1.4                                 Results from last 7 days   Lab Units 02/26/24  2252   LIPASE u/L <6*                 Results from last 7 days   Lab Units 02/26/24  2230   CLARITY UA  Clear   COLOR UA  Light Yellow   SPEC GRAV UA  1.014   PH UA  6.0   GLUCOSE UA mg/dl Negative   KETONES UA mg/dl 10 (1+)*   BLOOD UA  Negative   PROTEIN UA mg/dl 30 (1+)*   NITRITE UA  Negative   BILIRUBIN UA  Negative   UROBILINOGEN UA (BE) mg/dl 2.0*   LEUKOCYTES UA  Negative   WBC UA /hpf 2-4*   RBC UA /hpf 1-2   BACTERIA UA /hpf Occasional   EPITHELIAL CELLS WET PREP /hpf Occasional   MUCUS THREADS  Occasional*     Results from last 7 days   Lab Units 02/26/24  2252   INFLUENZA A PCR  Negative   INFLUENZA B PCR  Negative   RSV PCR  Negative                             Results from last 7 days   Lab Units 02/26/24 2252 02/26/24 2230   BLOOD CULTURE  No Growth at 24 hrs.  No Growth at 24 hrs.  --    URINE CULTURE   --  20,000-29,000 cfu/ml                   ED Treatment:   Medication Administration from 02/26/2024 2131 to 02/27/2024 0720         Date/Time Order Dose Route Action     02/27/2024 0015 EST sodium chloride 0.9 % bolus 1,000 mL 0 mL Intravenous Stopped     02/26/2024 2253 EST sodium chloride 0.9 % bolus 1,000 mL 1,000 mL Intravenous New Bag     02/26/2024 2301 EST iohexol (OMNIPAQUE) 240 MG/ML solution 50 mL 50 mL Oral Given     02/26/2024 2342 EST morphine injection 4 mg 4 mg Intravenous Given     02/27/2024 0017 EST potassium chloride (Klor-Con M20) CR " tablet 20 mEq 20 mEq Oral Given     02/27/2024 0030 EST iohexol (OMNIPAQUE) 350 MG/ML injection (MULTI-DOSE) 85 mL 85 mL Intravenous Given     02/27/2024 0216 EST piperacillin-tazobactam (ZOSYN) 4.5 g in sodium chloride 0.9 % 100 mL IVPB 0 g Intravenous Stopped     02/27/2024 0146 EST piperacillin-tazobactam (ZOSYN) 4.5 g in sodium chloride 0.9 % 100 mL IVPB 4.5 g Intravenous New Bag     02/27/2024 0146 EST HYDROmorphone (DILAUDID) injection 0.5 mg 0.5 mg Intravenous Given     02/27/2024 0327 EST multi-electrolyte (PLASMALYTE-A/ISOLYTE-S PH 7.4) IV solution 125 mL/hr Intravenous New Bag     02/27/2024 0611 EST acetaminophen (Ofirmev) injection 1,000 mg 0 mg Intravenous Hold     02/27/2024 0401 EST oxyCODONE (ROXICODONE) IR tablet 5 mg 5 mg Oral Given          Past Medical History:   Diagnosis Date    Abdominal pain     Chronic pain disorder     abdominal    Depression     Diverticula of colon     Former smoker     Resolved 7/2012     GERD (gastroesophageal reflux disease)     Headache(784.0) 10/1/2020    Hemorrhoids     Hiatal hernia     Hyperlipidemia     Intestinal malabsorption following gastrectomy     Morbid obesity (HCC)     Resolved 10/2/2015     Obesity     Postgastrectomy malabsorption     Vitamin D insufficiency     Resolved 1/14/2016      Present on Admission:  **None**      Admitting Diagnosis: Enterocolitis [K52.9]  Ruptured appendicitis [K35.32]  Unspecified abdominal pain [R10.9]  Age/Sex: 59 y.o. female  Admission Orders:  Scheduled Medications:  acetaminophen, 1,000 mg, Intravenous, Q6H HANY  enoxaparin, 40 mg, Subcutaneous, Daily  FLUoxetine, 40 mg, Oral, Daily  piperacillin-tazobactam, 3.375 g, Intravenous, Q6H      Continuous IV Infusions:  dextrose 5 % and sodium chloride 0.45 % with KCl 20 mEq/L, 75 mL/hr, Intravenous, Continuous    multi-electrolyte (PLASMALYTE-A/ISOLYTE-S PH 7.4) IV solution  Rate: 125 mL/hr Dose: 125 mL/hr  Freq: Continuous Route: IV  Last Dose: Stopped (02/27/24  1539)  Start: 02/27/24 0300 End: 02/27/24 1527   PRN Meds:  HYDROmorphone, 0.5 mg, Intravenous, Q3H PRN x1 1/27   ondansetron, 4 mg, Intravenous, Q6H PRN  oxyCODONE, 10 mg, Oral, Q4H PRN  oxyCODONE, 5 mg, Oral, Q4H PRN x1 2/27       NPO--->surgical CL ---->GI lo residue lo fiber, no carbonation diet    SCD   Ambulate TID     Network Utilization Review Department  ATTENTION: Please call with any questions or concerns to [phone] and carefully listen to the prompts so that you are directed to the right person. All voicemails are confidential.   For Discharge needs, contact Care Management DC Support Team at [phone] opt. 2  Send all requests for admission clinical reviews, approved or denied determinations and any other requests to dedicated fax number below belonging to the campus where the patient is receiving treatment. List of dedicated fax numbers for the Facilities:  FACILITY NAME UR FAX NUMBER   ADMISSION DENIALS (Administrative/Medical Necessity) [phone]   DISCHARGE SUPPORT TEAM (NETWORK) [phone]   PARENT CHILD HEALTH (Maternity/NICU/Pediatrics) [phone]   [facility] [phone]   [facility] [phone]   [facility] [phone]   [facility] [phone]   [facility] [phone]   [facility] [phone]   [facility] [phone]   [facility] [phone]   [facility] [phone]   [facility] hospital [phone]   [facility] [phone]   [facility] [phone]

## 2024-02-28 NOTE — DISCHARGE INSTR - AVS FIRST PAGE
Please continue antibiotics by mouth (Augmentin) for a total of 10 days.  Please call the office to schedule a follow up in clinic in 2 weeks to evaluate progress and discuss potential laparoscopic appendectomy in 6-8 weeks.  Please obtain repeat CT scan 2-3 days before your follow up appointment.    If you develop worsening pain, fevers, chills, please present to the emergency department for evaluation.

## 2024-02-28 NOTE — DISCHARGE SUMMARY
Discharge Summary - General Surgery   [place] 59 y.o. female MRN: [id number]  Unit/Bed#: S -01 Encounter: [id number]    Admission Date:   Admission Orders (From admission, onward)       Ordered        02/27/24 0251  Inpatient Admission  Once                             Discharge Date: 2/28/24    Admitting Diagnosis: Enterocolitis [K52.9]  Ruptured appendicitis [K35.32]  Unspecified abdominal pain [R10.9]    Discharge Diagnosis: Perforated appendicitis     Medical Problems       Resolved Problems  Date Reviewed: 2/28/2024   None         Attending: Dr. Jad Sanz MD    Consulting Physician(s): N/A    Procedures Performed:   Orders Placed This Encounter   Procedures    ED ECG Documentation Only     Pathology: N/A    Hospital Course:   59 year old female presented on 2/26 with abdominal pain, found to have perforated appendicitis. She was admitted to the general surgery service, kept NPO and started on IV antibiotics. Her diet was advanced during her hospital stay which she tolerated without nausea or vomiting. Her leukocytosis resolved on IV antibiotics and her pain improved. She was deemed appropriate for discharge home on 2/28/24 with a course of 20 days oral antibiotics. Discharge and follow up instructions were explained to the patient, which included repeat CT scan and follow up in 2 weeks with further discussion planned for interval appendectomy.     Condition at Discharge: stable     Discharge instructions/Information to patient and family:   See after visit summary for information provided to patient and family.      Provisions for Follow-Up Care:  See after visit summary for information related to follow-up care and any pertinent home health orders.      Disposition: Home          Planned Readmission: No    Discharge Statement   I spent 20 minutes discharging the patient. This time was spent on the day of discharge. I had direct contact with the patient on the day of discharge. Additional  documentation is required if more than 30 minutes were spent on discharge.     Discharge Medications:  See after visit summary for reconciled discharge medications provided to patient and family.

## 2024-02-29 ENCOUNTER — TELEPHONE (OUTPATIENT)
Dept: FAMILY MEDICINE CLINIC | Facility: CLINIC | Age: 59
End: 2024-02-29

## 2024-02-29 ENCOUNTER — TRANSITIONAL CARE MANAGEMENT (OUTPATIENT)
Dept: FAMILY MEDICINE CLINIC | Facility: CLINIC | Age: 59
End: 2024-02-29

## 2024-02-29 LAB
ATRIAL RATE: 74 BPM
P AXIS: 56 DEGREES
PR INTERVAL: 140 MS
QRS AXIS: 62 DEGREES
QRSD INTERVAL: 88 MS
QT INTERVAL: 368 MS
QTC INTERVAL: 408 MS
T WAVE AXIS: 14 DEGREES
VENTRICULAR RATE: 74 BPM

## 2024-02-29 PROCEDURE — 93010 ELECTROCARDIOGRAM REPORT: CPT | Performed by: INTERNAL MEDICINE

## 2024-02-29 NOTE — TELEPHONE ENCOUNTER
Called patient to do TCM, she refused appt. She prefers to just follow up with surgeon. She does have a medication question. She was given Oxycodone. She prefers not to use that and prefers tylenol. She was given IV tylenol in hosp and that worked great. She wants to know how much is recommended and safe for her to take daily

## 2024-03-01 ENCOUNTER — TELEPHONE (OUTPATIENT)
Age: 59
End: 2024-03-01

## 2024-03-01 ENCOUNTER — NURSE TRIAGE (OUTPATIENT)
Age: 59
End: 2024-03-01

## 2024-03-01 NOTE — UTILIZATION REVIEW
NOTIFICATION OF ADMISSION DISCHARGE   This is a Notification of Discharge from [facility]. Please be advised that this patient has been discharge from our facility. Below you will find the admission and discharge date and time including the patient’s disposition.   UTILIZATION REVIEW CONTACT:  Harvinder Valencia  Utilization   Network Utilization Review Department  Phone: [phone] x carefully listen to the prompts. All voicemails are confidential.  Email: [email or web address]     ADMISSION INFORMATION  PRESENTATION DATE: 2/26/2024  9:56 PM  OBERVATION ADMISSION DATE:   INPATIENT ADMISSION DATE: 2/27/24  2:51 AM   DISCHARGE DATE: 2/28/2024  3:18 PM   DISPOSITION:PRA - Home    Network Utilization Review Department  ATTENTION: Please call with any questions or concerns to [phone] and carefully listen to the prompts so that you are directed to the right person. All voicemails are confidential.   For Discharge needs, contact Care Management DC Support Team at [phone] opt. 2  Send all requests for admission clinical reviews, approved or denied determinations and any other requests to dedicated fax number below belonging to the campus where the patient is receiving treatment. List of dedicated fax numbers for the Facilities:  FACILITY NAME UR FAX NUMBER   ADMISSION DENIALS (Administrative/Medical Necessity) [phone]   DISCHARGE SUPPORT TEAM ([facility]) [phone]   PARENT CHILD HEALTH (Maternity/NICU/Pediatrics) [phone]   [facility] [phone]   [facility] [phone]   [facility] [phone]   [facility] [phone]   Community [facility] [phone]   General acute hospital [phone]   [facility] [phone]   [facility] [phone]   [facility]  [facility] [phone]   [facility] [phone]   [facility] [phone]   [facility] [phone]

## 2024-03-01 NOTE — TELEPHONE ENCOUNTER
Pt called  because was in ER on 02/26 and need to follow up with gen surg after CT scan..gave pt scheduling phone number and she will call back to schedule appt once she has a date for scan. She had questions on how to monitor her pain level, warm transfer to nurse triage

## 2024-03-01 NOTE — TELEPHONE ENCOUNTER
"Patient called about abdominal pain. Patient was seen in ED on 2/26/24 for ruptured appendix and was discharged home on oral antibiotics.    Patient describes lower abdominal pain around belly button area and describes some bloating in her stomach like it is \"warm inside\"    Stated that she has had some diarrhea related to antibiotic intake.    Denies fever, n/v.  Is tolerating a normal diet and fluid intake.    Warm transferred to [facility] in office.    # [phone]    Reason for Disposition   Abdominal pain is a chronic symptom (recurrent or ongoing AND lasting > 4 weeks)    Answer Assessment - Initial Assessment Questions  1. LOCATION: \"Where does it hurt?\"       lower  2. RADIATION: \"Does the pain shoot anywhere else?\" (e.g., chest, back)      denies  3. ONSET: \"When did the pain begin?\" (e.g., minutes, hours or days ago)       2/26  4. SUDDEN: \"Gradual or sudden onset?\"      sudden  5. PATTERN \"Does the pain come and go, or is it constant?\"     - If constant: \"Is it getting better, staying the same, or worsening?\"       (Note: Constant means the pain never goes away completely; most serious pain is constant and it progresses)      - If intermittent: \"How long does it last?\" \"Do you have pain now?\"      (Note: Intermittent means the pain goes away completely between bouts)      Bloating, tenderness  6. SEVERITY: \"How bad is the pain?\"  (e.g., Scale 1-10; mild, moderate, or severe)     - MILD (1-3): doesn't interfere with normal activities, abdomen soft and not tender to touch      - MODERATE (4-7): interferes with normal activities or awakens from sleep, tender to touch      - SEVERE (8-10): excruciating pain, doubled over, unable to do any normal activities        4  7. RECURRENT SYMPTOM: \"Have you ever had this type of stomach pain before?\" If Yes, ask: \"When was the last time?\" and \"What happened that time?\"       denies  8. AGGRAVATING FACTORS: \"Does anything seem to cause this pain?\" (e.g., foods, stress, " "alcohol)      Ruptured appendix  9. CARDIAC SYMPTOMS: \"Do you have any of the following symptoms: chest pain, difficulty breathing, sweating, nausea?\"      denies  10. OTHER SYMPTOMS: \"Do you have any other symptoms?\" (e.g., fever, vomiting, diarrhea)        diarrhea    Protocols used: Abdominal Pain - Upper-ADULT-OH    "

## 2024-03-03 LAB
BACTERIA BLD CULT: NORMAL
BACTERIA BLD CULT: NORMAL

## 2024-03-07 ENCOUNTER — TELEPHONE (OUTPATIENT)
Age: 59
End: 2024-03-07

## 2024-03-07 NOTE — TELEPHONE ENCOUNTER
Pt calling in requesting information on if her CT scan for Monday is with IV or PO contrast. Pt would like to be called Friday with this information so she can pick it up if needed.     Pt also would like to note she has hx of bariatric surgery and was unsure if this is a problem r/t contrast.

## 2024-03-07 NOTE — TELEPHONE ENCOUNTER
Pt calling to clarify whether or not she has to drink Barium before CT Scan. Transferred to CTS Marguerite Newsome.

## 2024-03-08 ENCOUNTER — TELEPHONE (OUTPATIENT)
Dept: SURGERY | Facility: CLINIC | Age: 59
End: 2024-03-08

## 2024-03-11 ENCOUNTER — TELEPHONE (OUTPATIENT)
Age: 59
End: 2024-03-11

## 2024-03-11 ENCOUNTER — HOSPITAL ENCOUNTER (OUTPATIENT)
Dept: RADIOLOGY | Age: 59
Discharge: HOME | End: 2024-03-11
Payer: COMMERCIAL

## 2024-03-11 DIAGNOSIS — K35.32 RUPTURED APPENDICITIS: ICD-10-CM

## 2024-03-11 PROCEDURE — 74177 CT ABD & PELVIS W/CONTRAST: CPT

## 2024-03-11 RX ADMIN — IOHEXOL 100 ML: 350 INJECTION, SOLUTION INTRAVENOUS at 15:01

## 2024-03-11 NOTE — TELEPHONE ENCOUNTER
Alma from radiology reading room called in inform us there were findings on patients CT scan that need to be reviewed.

## 2024-03-11 NOTE — TELEPHONE ENCOUNTER
Pt received test results and is concerned; wanted to s/w a member of clinical team at [facility] where she has appt Thursday, seeking sooner appt and advice abt results.

## 2024-03-12 DIAGNOSIS — K35.32 ACUTE PERFORATED APPENDICITIS: Primary | ICD-10-CM

## 2024-03-12 RX ORDER — CEPHALEXIN 500 MG/1
500 CAPSULE ORAL EVERY 6 HOURS SCHEDULED
Qty: 28 CAPSULE | Refills: 0 | Status: SHIPPED | OUTPATIENT
Start: 2024-03-12 | End: 2024-03-19

## 2024-03-12 RX ORDER — METRONIDAZOLE 500 MG/1
500 TABLET ORAL EVERY 8 HOURS SCHEDULED
Qty: 21 TABLET | Refills: 0 | Status: SHIPPED | OUTPATIENT
Start: 2024-03-12 | End: 2024-03-20

## 2024-03-13 ENCOUNTER — TELEPHONE (OUTPATIENT)
Dept: FAMILY MEDICINE CLINIC | Facility: CLINIC | Age: 59
End: 2024-03-13

## 2024-03-13 NOTE — TELEPHONE ENCOUNTER
Patient called and said she got your my chart message but asked if you want to see her after her appt. With the surgeon on Thursday.

## 2024-03-14 ENCOUNTER — OFFICE VISIT (OUTPATIENT)
Dept: SURGERY | Facility: CLINIC | Age: 59
End: 2024-03-14
Payer: COMMERCIAL

## 2024-03-14 VITALS — HEIGHT: 63 IN | TEMPERATURE: 98 F | BODY MASS INDEX: 35.68 KG/M2 | WEIGHT: 201.4 LBS

## 2024-03-14 DIAGNOSIS — K35.32 ACUTE PERFORATED APPENDICITIS: ICD-10-CM

## 2024-03-14 DIAGNOSIS — K35.33 APPENDICEAL ABSCESS: Primary | ICD-10-CM

## 2024-03-14 PROCEDURE — 99214 OFFICE O/P EST MOD 30 MIN: CPT | Performed by: SURGERY

## 2024-03-14 NOTE — ASSESSMENT & PLAN NOTE
Discussed CT scan findings with IR on-call via TT, currently not amenable to drainage.  IR has recommended repeat CT scan in 1 week.  This was discussed with patient in visit and I have called patient to update regarding IR plan - I have also ordered CBC and BMP for the patient in the meantime.  We discussed indications to return to the ER including worsening pain, flulike symptoms, nausea and vomiting, etc.  Specifically, we will repeat CT scan on 20 March and at that time determine if this is drainable or getting smaller and we will proceed with plan after that time.  Patient is to be seen on March 21 in our office to review CT scan findings and assess clinical progress.

## 2024-03-14 NOTE — PROGRESS NOTES
Office Visit - General Surgery  Florida Clinton MRN: [id number]  Encounter: [id number]  Assessment/Plan    Problem List Items Addressed This Visit          Digestive    Acute perforated appendicitis     Discussed CT scan findings with IR on-call via TT, currently not amenable to drainage.  IR has recommended repeat CT scan in 1 week.  This was discussed with patient in visit and I have called patient to update regarding IR plan - I have also ordered CBC and BMP for the patient in the meantime.  We discussed indications to return to the ER including worsening pain, flulike symptoms, nausea and vomiting, etc.  Specifically, we will repeat CT scan on 20 March and at that time determine if this is drainable or getting smaller and we will proceed with plan after that time.  Patient is to be seen on March 21 in our office to review CT scan findings and assess clinical progress.          Other Visit Diagnoses       Appendiceal abscess    -  Primary    Relevant Orders    Basic metabolic panel    CBC and differential    CT abdomen pelvis w contrast            Subjective    Florida Clinton is a 59 y.o. female who presents after hospitalization for acute appendicitis with phlegmon, concern for perforation.  The patient was discharged on a course of antibiotics, which has been extended for an additional 7 days and on or about 3/20/2024.  Since being home, she says she does not feel quite right, she has some pain in the lower abdomen in a bandlike distribution.  She has no fevers or chills.  No nausea or vomiting.  She does note that she has persistent discomfort.  A follow-up CT scan was obtained which does show a 5 cm abscess.    -------------------------------------------------------------------------      Pt  has a past medical history of Abdominal pain, Chronic pain disorder, Depression, Diverticula of colon, Former smoker, GERD (gastroesophageal reflux disease), Headache(784.0) (10/1/2020), Hemorrhoids, Hiatal hernia,  Hyperlipidemia, Intestinal malabsorption following gastrectomy, Morbid obesity (HCC), Obesity, Postgastrectomy malabsorption, and Vitamin D insufficiency.    Pt  has a past surgical history that includes Abdominal surgery;  section; Uterine fibroid surgery; Gastric bypass (2015); Colonoscopy; Esophagogastroduodenoscopy; pr egd transoral biopsy single/multiple (N/A, 2017); Hernia repair (2015); Tonsillectomy (); and Ankle surgery.    family history includes Breast cancer (age of onset: 31) in her mother; COPD in her maternal grandfather; Diabetes in her paternal grandmother; Heart disease in her father, maternal grandfather, and maternal grandmother; Hyperlipidemia in her father; Hypertension in her father; No Known Problems in her brother, daughter, daughter, and paternal aunt; Prostate cancer (age of onset: 50) in her father.    Florida SINGH Mariohien reports that she quit smoking about 13 years ago. Her smoking use included cigarettes. She has never used smokeless tobacco. She reports current alcohol use. She reports that she does not use drugs.      Current Outpatient Medications on File Prior to Visit   Medication Sig Dispense Refill    Calcium Citrate-Vitamin D 250-200 MG-UNIT TABS Take 1 tablet by mouth 2 (two) times a day      cephalexin (KEFLEX) 500 mg capsule Take 1 capsule (500 mg total) by mouth every 6 (six) hours for 7 days 28 capsule 0    Cholecalciferol (D3 5000) 125 MCG (5000 UT) capsule       ferrous gluconate (FERGON) 324 mg tablet Take 324 mg by mouth every other day Every other day      FLUoxetine (PROzac) 20 mg capsule TAKE 2 CAPSULES BY MOUTH EVERY  capsule 1    Melatonin 5 MG CAPS Take 5 mg by mouth daily at bedtime       metroNIDAZOLE (FLAGYL) 500 mg tablet Take 1 tablet (500 mg total) by mouth every 8 (eight) hours for 7 days 21 tablet 0    MULTIPLE VITAMIN PO Take 1 tablet by mouth daily      pravastatin (PRAVACHOL) 20 mg tablet TAKE 1 TABLET BY MOUTH EVERY DAY 90  tablet 2    omeprazole (PriLOSEC) 20 mg delayed release capsule Take 1 capsule (20 mg total) by mouth daily before breakfast 30 capsule 5     No current facility-administered medications on file prior to visit.     Allergies   Allergen Reactions    Ibuprofen Other (See Comments)     Gastric bypass       Review of Systems   Constitutional:  Negative for activity change, appetite change, chills and fever.   Eyes:  Negative for photophobia and visual disturbance.   Respiratory:  Negative for chest tightness and shortness of breath.    Cardiovascular:  Negative for chest pain and palpitations.   Gastrointestinal:  Positive for abdominal pain. Negative for abdominal distention, nausea and vomiting.   Genitourinary:  Negative for dysuria.       Objective    Vitals:    03/14/24 0843   Temp: 98 °F (36.7 °C)       Physical Exam  Cardiovascular:      Rate and Rhythm: Normal rate and regular rhythm.   Pulmonary:      Effort: Pulmonary effort is normal. No respiratory distress.      Breath sounds: No stridor.   Abdominal:      General: There is no distension.      Palpations: Abdomen is soft.      Tenderness: There is abdominal tenderness. There is no guarding or rebound.      Comments: Mild mid hypogastric tenderness to palpation, minimal if any RLQ tenderness

## 2024-03-16 ENCOUNTER — TELEPHONE (OUTPATIENT)
Dept: OTHER | Facility: HOSPITAL | Age: 59
End: 2024-03-16

## 2024-03-16 ENCOUNTER — LAB (OUTPATIENT)
Dept: LAB | Facility: MEDICAL CENTER | Age: 59
End: 2024-03-16
Payer: COMMERCIAL

## 2024-03-16 DIAGNOSIS — E78.00 HYPERCHOLESTEROLEMIA: ICD-10-CM

## 2024-03-16 DIAGNOSIS — Z90.3 POSTGASTRECTOMY MALABSORPTION: ICD-10-CM

## 2024-03-16 DIAGNOSIS — E21.3 HYPERPARATHYROIDISM (HCC): ICD-10-CM

## 2024-03-16 DIAGNOSIS — E66.9 OBESITY, CLASS II, BMI 35-39.9: ICD-10-CM

## 2024-03-16 DIAGNOSIS — K91.2 POSTGASTRECTOMY MALABSORPTION: ICD-10-CM

## 2024-03-16 DIAGNOSIS — K35.33 APPENDICEAL ABSCESS: ICD-10-CM

## 2024-03-16 LAB
25(OH)D3 SERPL-MCNC: 72.2 NG/ML (ref 30–100)
ALBUMIN SERPL BCP-MCNC: 4.3 G/DL (ref 3.5–5)
ALP SERPL-CCNC: 90 U/L (ref 34–104)
ALT SERPL W P-5'-P-CCNC: 21 U/L (ref 7–52)
ANION GAP SERPL CALCULATED.3IONS-SCNC: 9 MMOL/L (ref 4–13)
AST SERPL W P-5'-P-CCNC: 26 U/L (ref 13–39)
BASOPHILS # BLD AUTO: 0.04 THOUSANDS/ÂΜL (ref 0–0.1)
BASOPHILS NFR BLD AUTO: 0 % (ref 0–1)
BILIRUB SERPL-MCNC: 0.61 MG/DL (ref 0.2–1)
BUN SERPL-MCNC: 15 MG/DL (ref 5–25)
CALCIUM SERPL-MCNC: 9.9 MG/DL (ref 8.4–10.2)
CHLORIDE SERPL-SCNC: 101 MMOL/L (ref 96–108)
CHOLEST SERPL-MCNC: 171 MG/DL
CO2 SERPL-SCNC: 28 MMOL/L (ref 21–32)
CREAT SERPL-MCNC: 0.83 MG/DL (ref 0.6–1.3)
EOSINOPHIL # BLD AUTO: 0.11 THOUSAND/ÂΜL (ref 0–0.61)
EOSINOPHIL NFR BLD AUTO: 1 % (ref 0–6)
ERYTHROCYTE [DISTWIDTH] IN BLOOD BY AUTOMATED COUNT: 12.4 % (ref 11.6–15.1)
EST. AVERAGE GLUCOSE BLD GHB EST-MCNC: 120 MG/DL
FERRITIN SERPL-MCNC: 40 NG/ML (ref 11–307)
FOLATE SERPL-MCNC: >22.3 NG/ML
GFR SERPL CREATININE-BSD FRML MDRD: 76 ML/MIN/1.73SQ M
GLUCOSE P FAST SERPL-MCNC: 101 MG/DL (ref 65–99)
HBA1C MFR BLD: 5.8 %
HCT VFR BLD AUTO: 46.4 % (ref 34.8–46.1)
HDLC SERPL-MCNC: 63 MG/DL
HGB BLD-MCNC: 14.6 G/DL (ref 11.5–15.4)
IMM GRANULOCYTES # BLD AUTO: 0.02 THOUSAND/UL (ref 0–0.2)
IMM GRANULOCYTES NFR BLD AUTO: 0 % (ref 0–2)
IRON SATN MFR SERPL: 18 % (ref 15–50)
IRON SERPL-MCNC: 62 UG/DL (ref 50–212)
LDLC SERPL CALC-MCNC: 96 MG/DL (ref 0–100)
LYMPHOCYTES # BLD AUTO: 1.84 THOUSANDS/ÂΜL (ref 0.6–4.47)
LYMPHOCYTES NFR BLD AUTO: 18 % (ref 14–44)
MCH RBC QN AUTO: 30.4 PG (ref 26.8–34.3)
MCHC RBC AUTO-ENTMCNC: 31.5 G/DL (ref 31.4–37.4)
MCV RBC AUTO: 97 FL (ref 82–98)
MONOCYTES # BLD AUTO: 0.54 THOUSAND/ÂΜL (ref 0.17–1.22)
MONOCYTES NFR BLD AUTO: 5 % (ref 4–12)
NEUTROPHILS # BLD AUTO: 7.48 THOUSANDS/ÂΜL (ref 1.85–7.62)
NEUTS SEG NFR BLD AUTO: 76 % (ref 43–75)
NONHDLC SERPL-MCNC: 108 MG/DL
NRBC BLD AUTO-RTO: 0 /100 WBCS
PLATELET # BLD AUTO: 457 THOUSANDS/UL (ref 149–390)
PMV BLD AUTO: 11.2 FL (ref 8.9–12.7)
POTASSIUM SERPL-SCNC: 4.2 MMOL/L (ref 3.5–5.3)
PROT SERPL-MCNC: 6.9 G/DL (ref 6.4–8.4)
PTH-INTACT SERPL-MCNC: 48.5 PG/ML (ref 12–88)
RBC # BLD AUTO: 4.81 MILLION/UL (ref 3.81–5.12)
SODIUM SERPL-SCNC: 138 MMOL/L (ref 135–147)
TIBC SERPL-MCNC: 337 UG/DL (ref 250–450)
TRIGL SERPL-MCNC: 58 MG/DL
TSH SERPL DL<=0.05 MIU/L-ACNC: 2.82 UIU/ML (ref 0.45–4.5)
UIBC SERPL-MCNC: 275 UG/DL (ref 155–355)
VIT B12 SERPL-MCNC: 792 PG/ML (ref 180–914)
WBC # BLD AUTO: 10.03 THOUSAND/UL (ref 4.31–10.16)

## 2024-03-16 PROCEDURE — 82746 ASSAY OF FOLIC ACID SERUM: CPT

## 2024-03-16 PROCEDURE — 83540 ASSAY OF IRON: CPT

## 2024-03-16 PROCEDURE — 80061 LIPID PANEL: CPT

## 2024-03-16 PROCEDURE — 80053 COMPREHEN METABOLIC PANEL: CPT

## 2024-03-16 PROCEDURE — 82728 ASSAY OF FERRITIN: CPT

## 2024-03-16 PROCEDURE — 83970 ASSAY OF PARATHORMONE: CPT

## 2024-03-16 PROCEDURE — 84443 ASSAY THYROID STIM HORMONE: CPT

## 2024-03-16 PROCEDURE — 83036 HEMOGLOBIN GLYCOSYLATED A1C: CPT

## 2024-03-16 PROCEDURE — 82607 VITAMIN B-12: CPT

## 2024-03-16 PROCEDURE — 84425 ASSAY OF VITAMIN B-1: CPT

## 2024-03-16 PROCEDURE — 36415 COLL VENOUS BLD VENIPUNCTURE: CPT

## 2024-03-16 PROCEDURE — 85025 COMPLETE CBC W/AUTO DIFF WBC: CPT

## 2024-03-16 PROCEDURE — 84590 ASSAY OF VITAMIN A: CPT

## 2024-03-16 PROCEDURE — 83550 IRON BINDING TEST: CPT

## 2024-03-16 PROCEDURE — 84630 ASSAY OF ZINC: CPT

## 2024-03-16 PROCEDURE — 82306 VITAMIN D 25 HYDROXY: CPT

## 2024-03-16 NOTE — QUICK NOTE
Called patient about lab results. Some lower abdominal pain, tightness/bandlike discomfort but it has improved with resting which the patient is currently doing. Discussed option to proceed to ED if pain worse or concerns about clinical condition. Otherwise plan for CT scan on 3/20 with clinic visit 3/21.

## 2024-03-18 ENCOUNTER — TELEPHONE (OUTPATIENT)
Dept: BARIATRICS | Facility: CLINIC | Age: 59
End: 2024-03-18

## 2024-03-18 NOTE — TELEPHONE ENCOUNTER
Spoke to patient and gave her lab results & Cris's recommendations.  She stated she [facility] and she is following up with PCP.  Also sent message via [employer].

## 2024-03-18 NOTE — RESULT ENCOUNTER NOTE
Please advise patient of her labs from 03/18/24:    -HgbA1C is trending up and now in pre-DM range - continue f/u with MWM and RD    -Platelets elevated - may be r/t recent ruptured appendix - please continue f/u with general surgeon and PCP    -Rest of vitamins within normal limits so far, continue current regimen and I hope she gets well asap, thank you

## 2024-03-18 NOTE — TELEPHONE ENCOUNTER
----- Message from Cris Cao PA-C sent at 3/18/2024  7:11 AM EDT -----  Please advise patient of her labs from 03/18/24:    -HgbA1C is trending up and now in pre-DM range - continue f/u with MWM and RD    -Platelets elevated - may be r/t recent ruptured appendix - please continue f/u with general surgeon and PCP    -Rest of vitamins within normal limits so far, continue current regimen and I hope she gets well asap, thank you

## 2024-03-19 LAB — ZINC SERPL-MCNC: 68 UG/DL (ref 44–115)

## 2024-03-20 ENCOUNTER — OFFICE VISIT (OUTPATIENT)
Dept: FAMILY MEDICINE CLINIC | Facility: CLINIC | Age: 59
End: 2024-03-20
Payer: COMMERCIAL

## 2024-03-20 ENCOUNTER — HOSPITAL ENCOUNTER (OUTPATIENT)
Dept: CT IMAGING | Facility: HOSPITAL | Age: 59
Discharge: HOME | End: 2024-03-20
Attending: SURGERY
Payer: COMMERCIAL

## 2024-03-20 VITALS
HEIGHT: 63 IN | BODY MASS INDEX: 35.44 KG/M2 | TEMPERATURE: 97.2 F | SYSTOLIC BLOOD PRESSURE: 132 MMHG | OXYGEN SATURATION: 98 % | HEART RATE: 74 BPM | DIASTOLIC BLOOD PRESSURE: 82 MMHG | WEIGHT: 200 LBS

## 2024-03-20 DIAGNOSIS — R93.5 ABNORMAL CT OF THE ABDOMEN: ICD-10-CM

## 2024-03-20 DIAGNOSIS — K35.32 ACUTE PERFORATED APPENDICITIS: Primary | ICD-10-CM

## 2024-03-20 DIAGNOSIS — K35.33 APPENDICEAL ABSCESS: ICD-10-CM

## 2024-03-20 PROCEDURE — 74177 CT ABD & PELVIS W/CONTRAST: CPT

## 2024-03-20 PROCEDURE — 99214 OFFICE O/P EST MOD 30 MIN: CPT | Performed by: FAMILY MEDICINE

## 2024-03-20 RX ADMIN — IOHEXOL 100 ML: 350 INJECTION, SOLUTION INTRAVENOUS at 12:16

## 2024-03-20 NOTE — PROGRESS NOTES
Name: Florida Clinton      : 1965      MRN: [id number]  Encounter Provider: Rayray Knight MD  Encounter Date: 3/20/2024   Encounter department: [facility]    Assessment & Plan     1. Acute perforated appendicitis  Assessment & Plan:  I reviewed with pt.  Discussed recent CT results.  Abscess has improved. Will need appendectomy, but unclear if she will need the abscess evacuated first. Continue present care. F/u with Surgery tomorrow. Recheck 1-2      2. Abnormal CT of the abdomen  Assessment & Plan:  Unclear significance of the CT.  Mentioned in only one of 3 studies. No high risk behavior or hx noted. Liver enzymes and Bili have been normal. Discussed with pt. Surgery may be able to eval during her appendectomy - pt will discuss with her surgery at her appt tomorrow. Will monitor.  Recheck 1-2m          Depression Screening and Follow-up Plan: Patient was screened for depression during today's encounter. They screened negative with a PHQ-9 score of 4.        Subjective     Pt here for follow up of recent issues  - 58 yo female was in normal state of health until  when developed low abd pain. Pt initially thought it could be viral, but pain persisted and sl worsened over the next 2 days. Pt noted that pain worsened with movement. By , pain worsened to a level where she felt that she needed to be seen. Pt was seen at [place] ED on  where she was found to have a slightly elevated WBC and signs of ruptured appendix on CT.  Surgery was consulted and pt was admitted.  Pt was started on IV abx and monitored. She improved over the next 48h. Due to amount of inflammation, pt was planned to be treated with abx with eventual plan to have appendectomy as outpatient. By , pt was found to be stable and was discharged to home on po Abx. Over the next few weeks, pain improved but has not resolved. She denies fever/chills. Repeat CT on 3/11 revealed improved inflammation  but interval development of a 5 cm left lower quadrant periappendiceal abscess. Abx were continued. Repeat CT on 3/20 showed that the abscess has decreased in size. Pt here to discuss  -pt denies CV, resp, or  complaints  - I reviewed recent studies and labs with pt. Of note, last CT noted possible nodular changes? (?mild cirrhosis?).  Previous studies did not mention these findings. Pt denies alcohol use or previous episodes of hepatitis         Review of Systems   Constitutional:  Positive for activity change. Negative for appetite change, chills, fatigue and fever.   HENT: Negative.     Eyes: Negative.    Respiratory: Negative.     Cardiovascular: Negative.    Gastrointestinal:  Positive for abdominal pain (improving). Negative for abdominal distention, diarrhea and nausea.   Genitourinary: Negative.    Musculoskeletal: Negative.    Skin: Negative.    Neurological: Negative.        Past Medical History:   Diagnosis Date   • Abdominal pain    • Chronic pain disorder     abdominal   • Depression    • Diverticula of colon    • Former smoker     Resolved 2012    • GERD (gastroesophageal reflux disease)    • Headache(784.0) 10/1/2020   • Hemorrhoids    • Hiatal hernia    • Hyperlipidemia    • Intestinal malabsorption following gastrectomy    • Morbid obesity (HCC)     Resolved 10/2/2015    • Obesity    • Postgastrectomy malabsorption    • Vitamin D insufficiency     Resolved 2016      Past Surgical History:   Procedure Laterality Date   • ABDOMINAL SURGERY      gastric bipass   • ANKLE SURGERY     •  SECTION      (2) ,     • COLONOSCOPY     • ESOPHAGOGASTRODUODENOSCOPY      x2   • GASTRIC BYPASS  2015   • HERNIA REPAIR  2015    Paraesophageal hiatus hernia. Managed by Eilezer Mishra (General Surgery)   • NC EGD TRANSORAL BIOPSY SINGLE/MULTIPLE N/A 2017    Procedure: ESOPHAGOGASTRODUODENOSCOPY (EGD);  Surgeon: Emmie Rosen MD;  Location: [facility];  Service: Bariatrics   •  TONSILLECTOMY  1984   • UTERINE FIBROID SURGERY       Family History   Problem Relation Age of Onset   • Breast cancer Mother 31   • Prostate cancer Father 50   • Hypertension Father    • Heart disease Father         Coronary arteriosclerosis    • Hyperlipidemia Father    • Heart disease Maternal Grandmother         Coronary arteriosclerosis    • Heart disease Maternal Grandfather         Coronary arteriosclerosis    • COPD Maternal Grandfather         heavy smoker   • Diabetes Paternal Grandmother    • No Known Problems Brother    • No Known Problems Daughter    • No Known Problems Daughter    • No Known Problems Paternal Aunt    • Stroke Neg Hx    • Thyroid disease Neg Hx      Social History     Socioeconomic History   • Marital status: /Civil Union     Spouse name: None   • Number of children: None   • Years of education: None   • Highest education level: None   Occupational History   • None   Tobacco Use   • Smoking status: Former     Current packs/day: 0.00     Types: Cigarettes     Quit date:      Years since quittin.2   • Smokeless tobacco: Never   • Tobacco comments:     20+ pack year hx - As per [employer]    Vaping Use   • Vaping status: Never Used   Substance and Sexual Activity   • Alcohol use: Yes   • Drug use: No   • Sexual activity: Yes     Partners: Male     Birth control/protection: Post-menopausal, Male Sterilization, None   Other Topics Concern   • None   Social History Narrative    Daily coffee consumption (2 cups/day)    Denied: History of daily cola consumption (__cans/day)    Daily tea consumption (__cups/day)    Former smoker: quit  - As per [employer]    Housewife or homemaker - As per [employer]    Uses safety equipment - Seatbelts      Social Determinants of Health     Financial Resource Strain: Not on file   Food Insecurity: Not on file   Transportation Needs: Not on file   Physical Activity: Not on file   Stress: Not on file   Social Connections: Not on file  "  Intimate Partner Violence: Not on file   Housing Stability: Not on file     Current Outpatient Medications on File Prior to Visit   Medication Sig   • Calcium Citrate-Vitamin D 250-200 MG-UNIT TABS Take 1 tablet by mouth 2 (two) times a day   • Cholecalciferol (D3 5000) 125 MCG (5000 UT) capsule    • ferrous gluconate (FERGON) 324 mg tablet Take 324 mg by mouth every other day Every other day   • FLUoxetine (PROzac) 20 mg capsule TAKE 2 CAPSULES BY MOUTH EVERY DAY   • Melatonin 5 MG CAPS Take 5 mg by mouth daily at bedtime    • MULTIPLE VITAMIN PO Take 1 tablet by mouth daily   • pravastatin (PRAVACHOL) 20 mg tablet TAKE 1 TABLET BY MOUTH EVERY DAY   • omeprazole (PriLOSEC) 20 mg delayed release capsule Take 1 capsule (20 mg total) by mouth daily before breakfast     Allergies   Allergen Reactions   • Ibuprofen Other (See Comments)     Gastric bypass     Immunization History   Administered Date(s) Administered   • COVID-19 PFIZER VACCINE 0.3 ML IM 04/09/2021, 05/04/2021   • Fluzone Split Quad 0.25 mL 11/07/2014   • Fluzone Split Quad 0.5 mL 11/19/2019   • INFLUENZA 02/19/2016, 11/07/2016, 09/18/2017, 10/05/2018, 10/17/2020, 10/01/2022   • Influenza Quadrivalent 3 years and older 10/05/2018   • Influenza Quadrivalent Preservative Free 3 years and older IM 10/09/2020, 10/17/2020   • Influenza Quadrivalent, 6-35 Months IM 11/07/2014   • Influenza, Quadrivalent (nasal) 02/19/2016, 11/07/2016   • Influenza, injectable, quadrivalent, preservative free 0.5 mL 10/05/2018, 11/19/2019, 10/09/2020, 10/17/2020   • Influenza, seasonal, injectable 10/05/2011, 10/09/2012   • Tdap 11/07/2014   • Tuberculin Skin Test-PPD Intradermal 10/05/2011       Objective     /82 (BP Location: Left arm, Patient Position: Sitting, Cuff Size: Large)   Pulse 74   Temp (!) 97.2 °F (36.2 °C)   Ht 5' 2.5\" (1.588 m)   Wt 90.7 kg (200 lb)   SpO2 98%   BMI 36.00 kg/m²     Physical Exam  Vitals reviewed.   HENT:      Head: Normocephalic. "   Eyes:      General: No scleral icterus.     Extraocular Movements: Extraocular movements intact.      Conjunctiva/sclera: Conjunctivae normal.      Pupils: Pupils are equal, round, and reactive to light.   Cardiovascular:      Rate and Rhythm: Normal rate and regular rhythm.      Pulses: Normal pulses.   Pulmonary:      Effort: Pulmonary effort is normal.   Abdominal:      General: There is no distension.      Palpations: There is no mass.      Tenderness: There is abdominal tenderness (mild suprapubic and RLQ discomfort to palpation. No G/R).   Musculoskeletal:         General: Normal range of motion.      Cervical back: No tenderness.      Right lower leg: No edema.      Left lower leg: No edema.   Lymphadenopathy:      Cervical: No cervical adenopathy.   Skin:     General: Skin is warm.      Capillary Refill: Capillary refill takes less than 2 seconds.   Neurological:      General: No focal deficit present.      Mental Status: She is alert and oriented to person, place, and time.       Rayray Knight MD

## 2024-03-21 ENCOUNTER — OFFICE VISIT (OUTPATIENT)
Dept: SURGERY | Facility: CLINIC | Age: 59
End: 2024-03-21
Payer: COMMERCIAL

## 2024-03-21 VITALS — HEIGHT: 62 IN | TEMPERATURE: 98.1 F | WEIGHT: 199.6 LBS | BODY MASS INDEX: 36.73 KG/M2

## 2024-03-21 DIAGNOSIS — K35.32 ACUTE PERFORATED APPENDICITIS: Primary | ICD-10-CM

## 2024-03-21 LAB
VIT A SERPL-MCNC: 55.3 UG/DL (ref 22–69.5)
VIT B1 BLD-SCNC: 189.3 NMOL/L (ref 66.5–200)

## 2024-03-21 PROCEDURE — 99213 OFFICE O/P EST LOW 20 MIN: CPT | Performed by: SURGERY

## 2024-03-21 NOTE — ASSESSMENT & PLAN NOTE
59-year-old female with a history of complicated appendicitis with periappendiceal abscess, improved.    Plan:  We discussed the pathophysiology of perforated appendicitis and its subsequent management.  She has significantly improved, and her most recent CT scan, while still showing some persistent inflammation, has a decrease in size of her abscess.  Clinically she is much improved.  I would like to get her set up for a colonoscopy with gastroenterology to rule out any cecal pathology, with the plan for interval appendectomy.  She may return to clinic following her colonoscopy, and at that time we can discuss scheduling and timing of surgery.    Additionally, given the findings of some nodularity to her liver, at the time of surgery we would plan for core needle liver biopsy to rule out any liver pathology.

## 2024-03-21 NOTE — PROGRESS NOTES
Office Visit - General Surgery  Florida Clinton MRN: [id number]  Encounter: [id number]  Assessment/Plan    Problem List Items Addressed This Visit       Acute perforated appendicitis - Primary     59-year-old female with a history of complicated appendicitis with periappendiceal abscess, improved.    Plan:  We discussed the pathophysiology of perforated appendicitis and its subsequent management.  She has significantly improved, and her most recent CT scan, while still showing some persistent inflammation, has a decrease in size of her abscess.  Clinically she is much improved.  I would like to get her set up for a colonoscopy with gastroenterology to rule out any cecal pathology, with the plan for interval appendectomy.  She may return to clinic following her colonoscopy, and at that time we can discuss scheduling and timing of surgery.    Additionally, given the findings of some nodularity to her liver, at the time of surgery we would plan for core needle liver biopsy to rule out any liver pathology.         Relevant Orders    Ambulatory Referral to Gastroenterology       Subjective    Florida Clinton is a 59 y.o. female who presents with perforated, complicated appendicitis.  Since her last visit she underwent repeat CT of the abdomen and pelvis on 3/20/2024, which I reviewed in PACS.  This showed a decrease in size of her periappendiceal abscess, with still some persistent appendiceal dilation and periappendiceal fat stranding.  Clinically, she is feeling well.  She denies any significant pain, fevers, chills, chest pain, or shortness of breath.  She is tolerating a diet, and moving her bowels normally.  She states that in the past 2 weeks she has had significant improvement in her symptomatology.  She recently completed a second course of p.o. antibiotics.      Pt  has a past medical history of Abdominal pain, Chronic pain disorder, Depression, Diverticula of colon, Former smoker, GERD (gastroesophageal reflux  disease), Headache(784.0) (10/1/2020), Hemorrhoids, Hiatal hernia, Hyperlipidemia, Intestinal malabsorption following gastrectomy, Morbid obesity (HCC), Obesity, Postgastrectomy malabsorption, and Vitamin D insufficiency.    Pt  has a past surgical history that includes Abdominal surgery;  section; Uterine fibroid surgery; Gastric bypass (2015); Colonoscopy; Esophagogastroduodenoscopy; pr egd transoral biopsy single/multiple (N/A, 2017); Hernia repair (2015); Tonsillectomy (); and Ankle surgery.    family history includes Breast cancer (age of onset: 31) in her mother; COPD in her maternal grandfather; Diabetes in her paternal grandmother; Heart disease in her father, maternal grandfather, and maternal grandmother; Hyperlipidemia in her father; Hypertension in her father; No Known Problems in her brother, daughter, daughter, and paternal aunt; Prostate cancer (age of onset: 50) in her father.    [place] SINGH Mary Beth reports that she quit smoking about 13 years ago. Her smoking use included cigarettes. She has never used smokeless tobacco. She reports current alcohol use. She reports that she does not use drugs.      Current Outpatient Medications on File Prior to Visit   Medication Sig Dispense Refill    Calcium Citrate-Vitamin D 250-200 MG-UNIT TABS Take 1 tablet by mouth 2 (two) times a day      Cholecalciferol (D3 5000) 125 MCG (5000 UT) capsule       ferrous gluconate (FERGON) 324 mg tablet Take 324 mg by mouth every other day Every other day      FLUoxetine (PROzac) 20 mg capsule TAKE 2 CAPSULES BY MOUTH EVERY  capsule 1    Melatonin 5 MG CAPS Take 5 mg by mouth daily at bedtime       MULTIPLE VITAMIN PO Take 1 tablet by mouth daily      pravastatin (PRAVACHOL) 20 mg tablet TAKE 1 TABLET BY MOUTH EVERY DAY 90 tablet 2    omeprazole (PriLOSEC) 20 mg delayed release capsule Take 1 capsule (20 mg total) by mouth daily before breakfast 30 capsule 5     Current Facility-Administered  Medications on File Prior to Visit   Medication Dose Route Frequency Provider Last Rate Last Admin    [COMPLETED] iohexol (OMNIPAQUE) 350 MG/ML injection (MULTI-DOSE) 100 mL  100 mL Intravenous Once in imaging Javi Ann DO   100 mL at 03/20/24 1216     Allergies   Allergen Reactions    Ibuprofen Other (See Comments)     Gastric bypass       Review of Systems   Constitutional:  Negative for chills, fatigue and fever.   HENT:  Negative for ear pain, facial swelling, sinus pressure and sinus pain.    Eyes:  Negative for pain.   Respiratory:  Negative for cough, shortness of breath and wheezing.    Cardiovascular:  Negative for chest pain.   Gastrointestinal:  Negative for abdominal pain, constipation, diarrhea, nausea and vomiting.   Endocrine: Negative for cold intolerance and heat intolerance.   Genitourinary:  Negative for dysuria and flank pain.   Musculoskeletal:  Negative for back pain and neck pain.   Skin:  Negative for wound.   Neurological:  Negative for syncope, facial asymmetry, light-headedness and numbness.   Psychiatric/Behavioral:  Negative for behavioral problems, confusion and suicidal ideas.        Objective    Vitals:    03/21/24 0758   Temp: 98.1 °F (36.7 °C)       Physical Exam  Vitals and nursing note reviewed.   Constitutional:       General: She is not in acute distress.     Appearance: Normal appearance. She is not toxic-appearing.   HENT:      Head: Normocephalic and atraumatic.      Mouth/Throat:      Mouth: Mucous membranes are moist.   Eyes:      Extraocular Movements: Extraocular movements intact.      Pupils: Pupils are equal, round, and reactive to light.   Cardiovascular:      Rate and Rhythm: Normal rate and regular rhythm.      Pulses: Normal pulses.   Pulmonary:      Effort: Pulmonary effort is normal. No respiratory distress.      Breath sounds: Normal breath sounds. No wheezing.   Abdominal:      General: There is no distension.      Palpations: Abdomen is soft.  There is no mass.      Tenderness: There is no abdominal tenderness. There is no guarding or rebound.      Hernia: No hernia is present.      Comments: Mild tenderness in the suprapubic area, no rebound or guarding.   Musculoskeletal:         General: No swelling or deformity. Normal range of motion.      Cervical back: Normal range of motion and neck supple.      Right lower leg: No edema.      Left lower leg: No edema.   Skin:     General: Skin is warm and dry.      Coloration: Skin is not jaundiced.   Neurological:      General: No focal deficit present.      Mental Status: She is alert and oriented to person, place, and time.   Psychiatric:         Mood and Affect: Mood normal.         Behavior: Behavior normal.

## 2024-03-25 PROBLEM — R93.5 ABNORMAL CT OF THE ABDOMEN: Status: ACTIVE | Noted: 2024-03-25

## 2024-03-25 NOTE — ASSESSMENT & PLAN NOTE
I reviewed with pt.  Discussed recent CT results.  Abscess has improved. Will need appendectomy, but unclear if she will need the abscess evacuated first. Continue present care. F/u with Surgery tomorrow. Recheck 1-2

## 2024-03-25 NOTE — ASSESSMENT & PLAN NOTE
Unclear significance of the CT.  Mentioned in only one of 3 studies. No high risk behavior or hx noted. Liver enzymes and Bili have been normal. Discussed with pt. Surgery may be able to eval during her appendectomy - pt will discuss with her surgery at her appt tomorrow. Will monitor.  Recheck 1-2m

## 2024-03-28 ENCOUNTER — TELEPHONE (OUTPATIENT)
Age: 59
End: 2024-03-28

## 2024-03-28 NOTE — TELEPHONE ENCOUNTER
Patient called in to let us know she sent a message via [employer] that she would like Dr Araiza to look at.

## 2024-04-11 ENCOUNTER — OFFICE VISIT (OUTPATIENT)
Dept: GASTROENTEROLOGY | Facility: CLINIC | Age: 59
End: 2024-04-11

## 2024-04-11 VITALS
HEIGHT: 62 IN | BODY MASS INDEX: 35.92 KG/M2 | WEIGHT: 195.2 LBS | TEMPERATURE: 98.6 F | SYSTOLIC BLOOD PRESSURE: 110 MMHG | DIASTOLIC BLOOD PRESSURE: 72 MMHG

## 2024-04-11 DIAGNOSIS — R93.2 ABNORMAL CT OF LIVER: ICD-10-CM

## 2024-04-11 DIAGNOSIS — R10.13 EPIGASTRIC PAIN: ICD-10-CM

## 2024-04-11 DIAGNOSIS — K35.32 ACUTE PERFORATED APPENDICITIS: ICD-10-CM

## 2024-04-11 DIAGNOSIS — K21.9 GASTROESOPHAGEAL REFLUX DISEASE, UNSPECIFIED WHETHER ESOPHAGITIS PRESENT: ICD-10-CM

## 2024-04-11 DIAGNOSIS — Z98.84 HISTORY OF GASTRIC BYPASS: ICD-10-CM

## 2024-04-11 DIAGNOSIS — Z12.11 SCREENING FOR COLON CANCER: Primary | ICD-10-CM

## 2024-04-11 RX ORDER — SODIUM PICOSULFATE, MAGNESIUM OXIDE, AND ANHYDROUS CITRIC ACID 12; 3.5; 1 G/175ML; G/175ML; MG/175ML
LIQUID ORAL
Qty: 350 ML | Refills: 0 | Status: SHIPPED | OUTPATIENT
Start: 2024-04-11

## 2024-04-11 RX ORDER — OMEPRAZOLE 20 MG/1
20 CAPSULE, DELAYED RELEASE ORAL 2 TIMES DAILY PRN
Qty: 60 CAPSULE | Refills: 3 | Status: SHIPPED | OUTPATIENT
Start: 2024-04-11 | End: 2024-10-08

## 2024-04-11 NOTE — PROGRESS NOTES
[facility] - Outpatient Consultation  [place] 59 y.o. female MRN: [id number]  Encounter: [id number]          ASSESSMENT AND PLAN:      1. Screening for colon cancer  2. Acute perforated appendicitis  She was diagnosed with complicated appendicitis in February which was managed conservatively with antibiotics. Repeat CT from 3/20 shows decrease in size of periappendiceal abscess with some persistent appendiceal dilation and periappendiceal fat stranding. General surgery would like her to have colonoscopy to rule out cecal lesion prior to scheduling interval appendectomy. She had a normal colonoscopy in 2016. We will get her scheduled for colonoscopy as soon as possible. Gave instructions for Clenpiq which is a lower volume prep which she should hopefully tolerate given her history of gastric bypass.    I obtained informed consent from the patient. The risks/benefits/alternatives of the procedure were discussed with the patient. Risks included, but not limited to, infection, bleeding, perforation, injury to organs in the abdomen, missed lesion and incomplete procedure were discussed. Patient was agreeable and electronic signature was obtained.    - sodium picosulfate, magnesium oxide, citric acid (Clenpiq) oral solution; Take as directed by the office for colonoscopy.  Dispense: 350 mL; Refill: 0  - Colonoscopy; Future    3. Gastroesophageal reflux disease, unspecified whether esophagitis present  4. Epigastric pain  She reports chronic GERD for 10+ years which did improve following gastric bypass and hiatal hernia repair in 2015, but symptoms have been worsening in recent years. She has been taking omeprazole 20 mg daily with relief, but sometimes requires a second dose later in the day due to breakthrough reflux. I sent prescription for omeprazole 20 mg twice daily as needed. Discussed and gave handout on GERD diet and lifestyle modifications.  We will plan EGD at the time of  colonoscopy to evaluate her chronic GERD and epigastric pain. We can evaluate for marginal ulcer and take gastric biopsies to assess for H. pylori.    - omeprazole (PriLOSEC) 20 mg delayed release capsule; Take 1 capsule (20 mg total) by mouth 2 (two) times a day as needed (reflux)  Dispense: 60 capsule; Refill: 3  - EGD; Future    5. History of gastric bypass  Status post Peyton-en-Y gastric bypass in 2015. She has been following with the bariatric dietitian and she is on iron, calcium, vitamin D and general multivitamin due to postgastrectomy malabsorption.    6. Abnormal CT of liver  Recent CT scans show mild surface nodularity of the liver concerning for possible cirrhosis. There is no evidence of suspicious hepatic lesion or ductal dilatation. Her hepatic and portal veins are patent. Labs are normal though including liver enzymes, bilirubin, albumin, INR, platelets which goes against chronic liver disease and cirrhosis. When surgery performed appendectomy, they are planning core needle liver biopsy to assess for liver pathology. I certainly agree with this plan.    Follow-up in 3 months  ______________________________________________________________________    HPI: 59-year-old female with history of Peyton-en-Y gastric bypass and hiatal hernia repair in 2015, post gastrectomy malabsorption, chronic GERD, hypercholesterolemia referred by general surgeon Dr. Araiza for history of perforated appendicitis.    She was admitted in February with perforated appendicitis which was managed conservatively with antibiotics. Unfortunately, she developed an abscess. Her most recent CT scan from 3/20 shows decrease in size of periappendiceal abscess with some persistent appendiceal dilation and periappendiceal fat stranding. Surgery would like us to perform colonoscopy to rule out cecal lesion prior to interval appendectomy.    Her lower abdominal pain has resolved. Her bowel movements are normal. She denies rectal bleeding. She  has lost a few pounds since February related to her acute illness. She has been eating well. No nausea or vomiting. She does report history of GERD symptoms for 10+ years which did improve following gastric bypass in 2015 but in recent years, has become more frequent. She reports regurgitation particularly at nighttime and acid taste in her mouth. Lately, she has been waking up with epigastric burning pain. She was taking Tums PRN but recently started taking omeprazole 20 mg daily with relief of the pain, but sometimes she needs to take a second dose later in the day due to worsening symptoms. She denies difficulty swallowing.    She had normal colonoscopy in 2016.  She denies family history of colon cancer.    Answers submitted by the patient for this visit:  Abdominal Pain Questionnaire (Submitted on 4/10/2024)  Chief Complaint: Abdominal pain  Chronicity: new  Onset: more than 1 month ago  Onset quality: sudden  Frequency: daily  Episode duration: 7 Days  Progression since onset: gradually improving  Pain location: epigastric region, periumbilical region  Pain - numeric: 4/10  Pain quality: aching, burning  Radiates to: epigastric region, periumbilical region  anorexia: No  arthralgias: No  belching: Yes  constipation: No  diarrhea: No  dysuria: No  fever: No  flatus: Yes  frequency: No  headaches: No  hematochezia: No  hematuria: No  melena: No  myalgias: No  nausea: No  weight loss: No  vomiting: No  Aggravated by: eating  Relieved by: belching, sitting up  Diagnostic workup: CT scan      REVIEW OF SYSTEMS:    CONSTITUTIONAL: Denies any fever, chills, rigors, + mild weight loss.  HEENT: No earache or tinnitus. Denies hearing loss or visual disturbances.  CARDIOVASCULAR: No chest pain or palpitations.   RESPIRATORY: Denies any cough, hemoptysis, shortness of breath or dyspnea on exertion.  GASTROINTESTINAL: As noted in the History of Present Illness.   GENITOURINARY: No problems with urination. Denies any  hematuria or dysuria.  NEUROLOGIC: No dizziness or vertigo, denies headaches.   MUSCULOSKELETAL: Denies any muscle or joint pain.   SKIN: Denies skin rashes or itching.   ENDOCRINE: Denies excessive thirst. Denies intolerance to heat or cold.  PSYCHOSOCIAL: Denies depression or anxiety. Denies any recent memory loss.       Historical Information   Past Medical History:   Diagnosis Date    Abdominal pain     Chronic pain disorder     abdominal    Depression     Diverticula of colon     Former smoker     Resolved 2012     GERD (gastroesophageal reflux disease)     Headache(784.0) 10/1/2020    Hemorrhoids     Hiatal hernia     Hyperlipidemia     Intestinal malabsorption following gastrectomy     Morbid obesity (HCC)     Resolved 10/2/2015     Obesity     Postgastrectomy malabsorption     Vitamin D insufficiency     Resolved 2016      Past Surgical History:   Procedure Laterality Date    ABDOMINAL SURGERY      gastric bipass    ANKLE SURGERY       SECTION      (2) ,      COLONOSCOPY      ESOPHAGOGASTRODUODENOSCOPY      x2    GASTRIC BYPASS  2015    HERNIA REPAIR  2015    Paraesophageal hiatus hernia. Managed by Eliezer Mishra (General Surgery)    HI EGD TRANSORAL BIOPSY SINGLE/MULTIPLE N/A 2017    Procedure: ESOPHAGOGASTRODUODENOSCOPY (EGD);  Surgeon: Emmie Rosen MD;  Location: [facility];  Service: Bariatrics    TONSILLECTOMY  1984    UTERINE FIBROID SURGERY       Social History   Social History     Substance and Sexual Activity   Alcohol Use Yes     Social History     Substance and Sexual Activity   Drug Use No     Social History     Tobacco Use   Smoking Status Former    Current packs/day: 0.00    Types: Cigarettes    Quit date:     Years since quittin.2   Smokeless Tobacco Never   Tobacco Comments    20+ pack year hx - As per [employer]      Family History   Problem Relation Age of Onset    Breast cancer Mother 31    Prostate cancer Father 50    Hypertension Father  "    Heart disease Father         Coronary arteriosclerosis     Hyperlipidemia Father     Heart disease Maternal Grandmother         Coronary arteriosclerosis     Heart disease Maternal Grandfather         Coronary arteriosclerosis     COPD Maternal Grandfather         heavy smoker    Diabetes Paternal Grandmother     No Known Problems Brother     No Known Problems Daughter     No Known Problems Daughter     No Known Problems Paternal Aunt     Stroke Neg Hx     Thyroid disease Neg Hx        Meds/Allergies       Current Outpatient Medications:     Calcium Citrate-Vitamin D 250-200 MG-UNIT TABS    Cholecalciferol (D3 5000) 125 MCG (5000 UT) capsule    ferrous gluconate (FERGON) 324 mg tablet    FLUoxetine (PROzac) 20 mg capsule    Melatonin 5 MG CAPS    MULTIPLE VITAMIN PO    omeprazole (PriLOSEC) 20 mg delayed release capsule    pravastatin (PRAVACHOL) 20 mg tablet    sodium picosulfate, magnesium oxide, citric acid (Clenpiq) oral solution    Allergies   Allergen Reactions    Ibuprofen Other (See Comments)     Gastric bypass           Objective     Blood pressure 110/72, temperature 98.6 °F (37 °C), temperature source Tympanic, height 5' 2\" (1.575 m), weight 88.5 kg (195 lb 3.2 oz). Body mass index is 35.7 kg/m².        PHYSICAL EXAM:      General Appearance:   Alert, cooperative, no distress   HEENT:   Normocephalic, atraumatic, anicteric.     Neck:  Supple, symmetrical, trachea midline   Lungs:   Clear to auscultation bilaterally; no rales, rhonchi or wheezing; respirations unlabored    Heart::   Regular rate and rhythm   Abdomen:   Soft, mild epigastric and left upper quadrant tenderness, non-distended; normal bowel sounds   Genitalia:   Deferred    Rectal:   Deferred    Extremities:  No cyanosis, clubbing or edema    Pulses:  2+ and symmetric    Skin:  No jaundice, rashes, or lesions    Lymph nodes:  No palpable cervical lymphadenopathy        Lab Results:   No visits with results within 1 Day(s) from this visit. "   Latest known visit with results is:   Lab on 03/16/2024   Component Date Value    Zinc 03/16/2024 68     Vitamin B-12 03/16/2024 792     Vitamin B1, Whole Blood 03/16/2024 189.3     Vitamin A 03/16/2024 55.3     WBC 03/16/2024 10.03     RBC 03/16/2024 4.81     Hemoglobin 03/16/2024 14.6     Hematocrit 03/16/2024 46.4 (H)     MCV 03/16/2024 97     MCH 03/16/2024 30.4     MCHC 03/16/2024 31.5     RDW 03/16/2024 12.4     MPV 03/16/2024 11.2     Platelets 03/16/2024 457 (H)     nRBC 03/16/2024 0     Segmented % 03/16/2024 76 (H)     Immature Grans % 03/16/2024 0     Lymphocytes % 03/16/2024 18     Monocytes % 03/16/2024 5     Eosinophils Relative 03/16/2024 1     Basophils Relative 03/16/2024 0     Absolute Neutrophils 03/16/2024 7.48     Absolute Immature Grans 03/16/2024 0.02     Absolute Lymphocytes 03/16/2024 1.84     Absolute Monocytes 03/16/2024 0.54     Eosinophils Absolute 03/16/2024 0.11     Basophils Absolute 03/16/2024 0.04     Folate 03/16/2024 >22.3     Hemoglobin A1C 03/16/2024 5.8 (H)     EAG 03/16/2024 120     Sodium 03/16/2024 138     Potassium 03/16/2024 4.2     Chloride 03/16/2024 101     CO2 03/16/2024 28     ANION GAP 03/16/2024 9     BUN 03/16/2024 15     Creatinine 03/16/2024 0.83     Glucose, Fasting 03/16/2024 101 (H)     Calcium 03/16/2024 9.9     AST 03/16/2024 26     ALT 03/16/2024 21     Alkaline Phosphatase 03/16/2024 90     Total Protein 03/16/2024 6.9     Albumin 03/16/2024 4.3     Total Bilirubin 03/16/2024 0.61     eGFR 03/16/2024 76     Cholesterol 03/16/2024 171     Triglycerides 03/16/2024 58     HDL, Direct 03/16/2024 63     LDL Calculated 03/16/2024 96     Non-HDL-Chol (CHOL-HDL) 03/16/2024 108     TSH 3RD GENERATON 03/16/2024 2.824     PTH 03/16/2024 48.5     Vit D, 25-Hydroxy 03/16/2024 72.2     Iron Saturation 03/16/2024 18     TIBC 03/16/2024 337     Iron 03/16/2024 62     UIBC 03/16/2024 275     Ferritin 03/16/2024 40          Radiology Results:   CT abdomen pelvis w  contrast    Result Date: 3/20/2024  Narrative: CT ABDOMEN AND PELVIS WITH IV CONTRAST INDICATION: K35.33: Acute appendicitis with perforation, localized peritonitis, and gangrene, with abscess. COMPARISON: CT abdomen pelvis March 11, 2024. Correlation with CT of the pelvis February 27, 2024 TECHNIQUE: CT examination of the abdomen and pelvis was performed. Multiplanar 2D reformatted images were created from the source data. This examination, like all CT scans performed in the [facility], was performed utilizing techniques to minimize radiation dose exposure, including the use of iterative reconstruction and automated exposure control. Radiation dose length product (DLP) for this visit: 980 mGy-cm IV Contrast: 100 mL of iohexol (OMNIPAQUE) Enteric Contrast: Not administered. FINDINGS: ABDOMEN LOWER CHEST: No clinically significant abnormality in the visualized lower chest. LIVER/BILIARY TREE: Mild surface nodularity. No suspicious hepatic lesion. Patent hepatic and portal veins. No biliary ductal dilation. GALLBLADDER: No calcified gallstones. No pericholecystic inflammatory change. SPLEEN: Unremarkable. PANCREAS: Unremarkable. ADRENAL GLANDS: Unremarkable. KIDNEYS/URETERS: Unremarkable. No hydronephrosis. STOMACH AND BOWEL, APPENDIX AND ABDOMINOPELVIC CAVITY: The appendix is again present in the left lower abdomen, measuring 8 mm in caliber, and there is persistent mural thickening (series 2, image 89). There is no appendicolith. Surrounding fat stranding  is similar to prior study (for example series 2, images 96). The prior adjacent fluid collection has decreased in size and no longer contains air, and now measures 1.9 x 1.2 x 2.0 cm, previously 3.6 x 3.2  x 4.3 cm (series 2, image 81; series 601, image  63 compared to series 2, image 85 and series 601, image 54 on prior study). No new fluid collection. Post Peyton-en-Y gastric bypass. No bowel obstruction. No ascites. No pneumoperitoneum. No  lymphadenopathy. VESSELS: Unremarkable for patient's age. PELVIS REPRODUCTIVE ORGANS: 1.7 x 1.6 x 1.5 cm area of hyperattenuation within the left side of the uterus and extending towards the endometrial canal is hyperattenuating relative to the myometrium (series 2, image 128; series 602, image 133). URINARY BLADDER: Unremarkable. ABDOMINAL WALL/INGUINAL REGIONS: Unremarkable. BONES: No acute fracture or suspicious osseous lesion. Spinal degenerative changes.     Impression: 1.  Periappendiceal abscess in the left lower quadrant of the abdomen has decreased in size since March 11, 2024. 2.  Probable uterine fibroid with suspected submucosal and/or intracavitary component. Pelvic ultrasound is recommended for further assessment. 3.  Possible mild hepatic cirrhosis. The study was marked in EPIC for immediate notification. The study was marked in EPIC for significant notification. Workstation performed: [id number]

## 2024-04-11 NOTE — H&P (VIEW-ONLY)
[facility] - Outpatient Consultation  [place] 59 y.o. female MRN: [id number]  Encounter: [id number]          ASSESSMENT AND PLAN:      1. Screening for colon cancer  2. Acute perforated appendicitis  She was diagnosed with complicated appendicitis in February which was managed conservatively with antibiotics. Repeat CT from 3/20 shows decrease in size of periappendiceal abscess with some persistent appendiceal dilation and periappendiceal fat stranding. General surgery would like her to have colonoscopy to rule out cecal lesion prior to scheduling interval appendectomy. She had a normal colonoscopy in 2016. We will get her scheduled for colonoscopy as soon as possible. Gave instructions for Clenpiq which is a lower volume prep which she should hopefully tolerate given her history of gastric bypass.    I obtained informed consent from the patient. The risks/benefits/alternatives of the procedure were discussed with the patient. Risks included, but not limited to, infection, bleeding, perforation, injury to organs in the abdomen, missed lesion and incomplete procedure were discussed. Patient was agreeable and electronic signature was obtained.    - sodium picosulfate, magnesium oxide, citric acid (Clenpiq) oral solution; Take as directed by the office for colonoscopy.  Dispense: 350 mL; Refill: 0  - Colonoscopy; Future    3. Gastroesophageal reflux disease, unspecified whether esophagitis present  4. Epigastric pain  She reports chronic GERD for 10+ years which did improve following gastric bypass and hiatal hernia repair in 2015, but symptoms have been worsening in recent years. She has been taking omeprazole 20 mg daily with relief, but sometimes requires a second dose later in the day due to breakthrough reflux. I sent prescription for omeprazole 20 mg twice daily as needed. Discussed and gave handout on GERD diet and lifestyle modifications.  We will plan EGD at the time of  colonoscopy to evaluate her chronic GERD and epigastric pain. We can evaluate for marginal ulcer and take gastric biopsies to assess for H. pylori.    - omeprazole (PriLOSEC) 20 mg delayed release capsule; Take 1 capsule (20 mg total) by mouth 2 (two) times a day as needed (reflux)  Dispense: 60 capsule; Refill: 3  - EGD; Future    5. History of gastric bypass  Status post Peyton-en-Y gastric bypass in 2015. She has been following with the bariatric dietitian and she is on iron, calcium, vitamin D and general multivitamin due to postgastrectomy malabsorption.    6. Abnormal CT of liver  Recent CT scans show mild surface nodularity of the liver concerning for possible cirrhosis. There is no evidence of suspicious hepatic lesion or ductal dilatation. Her hepatic and portal veins are patent. Labs are normal though including liver enzymes, bilirubin, albumin, INR, platelets which goes against chronic liver disease and cirrhosis. When surgery performed appendectomy, they are planning core needle liver biopsy to assess for liver pathology. I certainly agree with this plan.    Follow-up in 3 months  ______________________________________________________________________    HPI: 59-year-old female with history of Peyton-en-Y gastric bypass and hiatal hernia repair in 2015, post gastrectomy malabsorption, chronic GERD, hypercholesterolemia referred by general surgeon Dr. Araiza for history of perforated appendicitis.    She was admitted in February with perforated appendicitis which was managed conservatively with antibiotics. Unfortunately, she developed an abscess. Her most recent CT scan from 3/20 shows decrease in size of periappendiceal abscess with some persistent appendiceal dilation and periappendiceal fat stranding. Surgery would like us to perform colonoscopy to rule out cecal lesion prior to interval appendectomy.    Her lower abdominal pain has resolved. Her bowel movements are normal. She denies rectal bleeding. She  has lost a few pounds since February related to her acute illness. She has been eating well. No nausea or vomiting. She does report history of GERD symptoms for 10+ years which did improve following gastric bypass in 2015 but in recent years, has become more frequent. She reports regurgitation particularly at nighttime and acid taste in her mouth. Lately, she has been waking up with epigastric burning pain. She was taking Tums PRN but recently started taking omeprazole 20 mg daily with relief of the pain, but sometimes she needs to take a second dose later in the day due to worsening symptoms. She denies difficulty swallowing.    She had normal colonoscopy in 2016.  She denies family history of colon cancer.    Answers submitted by the patient for this visit:  Abdominal Pain Questionnaire (Submitted on 4/10/2024)  Chief Complaint: Abdominal pain  Chronicity: new  Onset: more than 1 month ago  Onset quality: sudden  Frequency: daily  Episode duration: 7 Days  Progression since onset: gradually improving  Pain location: epigastric region, periumbilical region  Pain - numeric: 4/10  Pain quality: aching, burning  Radiates to: epigastric region, periumbilical region  anorexia: No  arthralgias: No  belching: Yes  constipation: No  diarrhea: No  dysuria: No  fever: No  flatus: Yes  frequency: No  headaches: No  hematochezia: No  hematuria: No  melena: No  myalgias: No  nausea: No  weight loss: No  vomiting: No  Aggravated by: eating  Relieved by: belching, sitting up  Diagnostic workup: CT scan      REVIEW OF SYSTEMS:    CONSTITUTIONAL: Denies any fever, chills, rigors, + mild weight loss.  HEENT: No earache or tinnitus. Denies hearing loss or visual disturbances.  CARDIOVASCULAR: No chest pain or palpitations.   RESPIRATORY: Denies any cough, hemoptysis, shortness of breath or dyspnea on exertion.  GASTROINTESTINAL: As noted in the History of Present Illness.   GENITOURINARY: No problems with urination. Denies any  hematuria or dysuria.  NEUROLOGIC: No dizziness or vertigo, denies headaches.   MUSCULOSKELETAL: Denies any muscle or joint pain.   SKIN: Denies skin rashes or itching.   ENDOCRINE: Denies excessive thirst. Denies intolerance to heat or cold.  PSYCHOSOCIAL: Denies depression or anxiety. Denies any recent memory loss.       Historical Information   Past Medical History:   Diagnosis Date    Abdominal pain     Chronic pain disorder     abdominal    Depression     Diverticula of colon     Former smoker     Resolved 2012     GERD (gastroesophageal reflux disease)     Headache(784.0) 10/1/2020    Hemorrhoids     Hiatal hernia     Hyperlipidemia     Intestinal malabsorption following gastrectomy     Morbid obesity (HCC)     Resolved 10/2/2015     Obesity     Postgastrectomy malabsorption     Vitamin D insufficiency     Resolved 2016      Past Surgical History:   Procedure Laterality Date    ABDOMINAL SURGERY      gastric bipass    ANKLE SURGERY       SECTION      (2) ,      COLONOSCOPY      ESOPHAGOGASTRODUODENOSCOPY      x2    GASTRIC BYPASS  2015    HERNIA REPAIR  2015    Paraesophageal hiatus hernia. Managed by Eliezer Mishra (General Surgery)    [place] EGD TRANSORAL BIOPSY SINGLE/MULTIPLE N/A 2017    Procedure: ESOPHAGOGASTRODUODENOSCOPY (EGD);  Surgeon: Emmie Rosen MD;  Location: [facility];  Service: Bariatrics    TONSILLECTOMY  1984    UTERINE FIBROID SURGERY       Social History   Social History     Substance and Sexual Activity   Alcohol Use Yes     Social History     Substance and Sexual Activity   Drug Use No     Social History     Tobacco Use   Smoking Status Former    Current packs/day: 0.00    Types: Cigarettes    Quit date:     Years since quittin.2   Smokeless Tobacco Never   Tobacco Comments    20+ pack year hx - As per [employer]      Family History   Problem Relation Age of Onset    Breast cancer Mother 31    Prostate cancer Father 50    Hypertension Father  "    Heart disease Father         Coronary arteriosclerosis     Hyperlipidemia Father     Heart disease Maternal Grandmother         Coronary arteriosclerosis     Heart disease Maternal Grandfather         Coronary arteriosclerosis     COPD Maternal Grandfather         heavy smoker    Diabetes Paternal Grandmother     No Known Problems Brother     No Known Problems Daughter     No Known Problems Daughter     No Known Problems Paternal Aunt     Stroke Neg Hx     Thyroid disease Neg Hx        Meds/Allergies       Current Outpatient Medications:     Calcium Citrate-Vitamin D 250-200 MG-UNIT TABS    Cholecalciferol (D3 5000) 125 MCG (5000 UT) capsule    ferrous gluconate (FERGON) 324 mg tablet    FLUoxetine (PROzac) 20 mg capsule    Melatonin 5 MG CAPS    MULTIPLE VITAMIN PO    omeprazole (PriLOSEC) 20 mg delayed release capsule    pravastatin (PRAVACHOL) 20 mg tablet    sodium picosulfate, magnesium oxide, citric acid (Clenpiq) oral solution    Allergies   Allergen Reactions    Ibuprofen Other (See Comments)     Gastric bypass           Objective     Blood pressure 110/72, temperature 98.6 °F (37 °C), temperature source Tympanic, height 5' 2\" (1.575 m), weight 88.5 kg (195 lb 3.2 oz). Body mass index is 35.7 kg/m².        PHYSICAL EXAM:      General Appearance:   Alert, cooperative, no distress   HEENT:   Normocephalic, atraumatic, anicteric.     Neck:  Supple, symmetrical, trachea midline   Lungs:   Clear to auscultation bilaterally; no rales, rhonchi or wheezing; respirations unlabored    Heart::   Regular rate and rhythm   Abdomen:   Soft, mild epigastric and left upper quadrant tenderness, non-distended; normal bowel sounds   Genitalia:   Deferred    Rectal:   Deferred    Extremities:  No cyanosis, clubbing or edema    Pulses:  2+ and symmetric    Skin:  No jaundice, rashes, or lesions    Lymph nodes:  No palpable cervical lymphadenopathy        Lab Results:   No visits with results within 1 Day(s) from this visit. "   Latest known visit with results is:   Lab on 03/16/2024   Component Date Value    Zinc 03/16/2024 68     Vitamin B-12 03/16/2024 792     Vitamin B1, Whole Blood 03/16/2024 189.3     Vitamin A 03/16/2024 55.3     WBC 03/16/2024 10.03     RBC 03/16/2024 4.81     Hemoglobin 03/16/2024 14.6     Hematocrit 03/16/2024 46.4 (H)     MCV 03/16/2024 97     MCH 03/16/2024 30.4     MCHC 03/16/2024 31.5     RDW 03/16/2024 12.4     MPV 03/16/2024 11.2     Platelets 03/16/2024 457 (H)     nRBC 03/16/2024 0     Segmented % 03/16/2024 76 (H)     Immature Grans % 03/16/2024 0     Lymphocytes % 03/16/2024 18     Monocytes % 03/16/2024 5     Eosinophils Relative 03/16/2024 1     Basophils Relative 03/16/2024 0     Absolute Neutrophils 03/16/2024 7.48     Absolute Immature Grans 03/16/2024 0.02     Absolute Lymphocytes 03/16/2024 1.84     Absolute Monocytes 03/16/2024 0.54     Eosinophils Absolute 03/16/2024 0.11     Basophils Absolute 03/16/2024 0.04     Folate 03/16/2024 >22.3     Hemoglobin A1C 03/16/2024 5.8 (H)     EAG 03/16/2024 120     Sodium 03/16/2024 138     Potassium 03/16/2024 4.2     Chloride 03/16/2024 101     CO2 03/16/2024 28     ANION GAP 03/16/2024 9     BUN 03/16/2024 15     Creatinine 03/16/2024 0.83     Glucose, Fasting 03/16/2024 101 (H)     Calcium 03/16/2024 9.9     AST 03/16/2024 26     ALT 03/16/2024 21     Alkaline Phosphatase 03/16/2024 90     Total Protein 03/16/2024 6.9     Albumin 03/16/2024 4.3     Total Bilirubin 03/16/2024 0.61     eGFR 03/16/2024 76     Cholesterol 03/16/2024 171     Triglycerides 03/16/2024 58     HDL, Direct 03/16/2024 63     LDL Calculated 03/16/2024 96     Non-HDL-Chol (CHOL-HDL) 03/16/2024 108     TSH 3RD GENERATON 03/16/2024 2.824     PTH 03/16/2024 48.5     Vit D, 25-Hydroxy 03/16/2024 72.2     Iron Saturation 03/16/2024 18     TIBC 03/16/2024 337     Iron 03/16/2024 62     UIBC 03/16/2024 275     Ferritin 03/16/2024 40          Radiology Results:   CT abdomen pelvis w  contrast    Result Date: 3/20/2024  Narrative: CT ABDOMEN AND PELVIS WITH IV CONTRAST INDICATION: K35.33: Acute appendicitis with perforation, localized peritonitis, and gangrene, with abscess. COMPARISON: CT abdomen pelvis March 11, 2024. Correlation with CT of the pelvis February 27, 2024 TECHNIQUE: CT examination of the abdomen and pelvis was performed. Multiplanar 2D reformatted images were created from the source data. This examination, like all CT scans performed in the [facility], was performed utilizing techniques to minimize radiation dose exposure, including the use of iterative reconstruction and automated exposure control. Radiation dose length product (DLP) for this visit: 980 mGy-cm IV Contrast: 100 mL of iohexol (OMNIPAQUE) Enteric Contrast: Not administered. FINDINGS: ABDOMEN LOWER CHEST: No clinically significant abnormality in the visualized lower chest. LIVER/BILIARY TREE: Mild surface nodularity. No suspicious hepatic lesion. Patent hepatic and portal veins. No biliary ductal dilation. GALLBLADDER: No calcified gallstones. No pericholecystic inflammatory change. SPLEEN: Unremarkable. PANCREAS: Unremarkable. ADRENAL GLANDS: Unremarkable. KIDNEYS/URETERS: Unremarkable. No hydronephrosis. STOMACH AND BOWEL, APPENDIX AND ABDOMINOPELVIC CAVITY: The appendix is again present in the left lower abdomen, measuring 8 mm in caliber, and there is persistent mural thickening (series 2, image 89). There is no appendicolith. Surrounding fat stranding  is similar to prior study (for example series 2, images 96). The prior adjacent fluid collection has decreased in size and no longer contains air, and now measures 1.9 x 1.2 x 2.0 cm, previously 3.6 x 3.2  x 4.3 cm (series 2, image 81; series 601, image  63 compared to series 2, image 85 and series 601, image 54 on prior study). No new fluid collection. Post Peyton-en-Y gastric bypass. No bowel obstruction. No ascites. No pneumoperitoneum. No  lymphadenopathy. VESSELS: Unremarkable for patient's age. PELVIS REPRODUCTIVE ORGANS: 1.7 x 1.6 x 1.5 cm area of hyperattenuation within the left side of the uterus and extending towards the endometrial canal is hyperattenuating relative to the myometrium (series 2, image 128; series 602, image 133). URINARY BLADDER: Unremarkable. ABDOMINAL WALL/INGUINAL REGIONS: Unremarkable. BONES: No acute fracture or suspicious osseous lesion. Spinal degenerative changes.     Impression: 1.  Periappendiceal abscess in the left lower quadrant of the abdomen has decreased in size since March 11, 2024. 2.  Probable uterine fibroid with suspected submucosal and/or intracavitary component. Pelvic ultrasound is recommended for further assessment. 3.  Possible mild hepatic cirrhosis. The study was marked in EPIC for immediate notification. The study was marked in EPIC for significant notification. Workstation performed: [id number]

## 2024-04-11 NOTE — PATIENT INSTRUCTIONS
Scheduled date of EGD/colonoscopy (as of today):04.23.24  Physician performing EGD/colonoscopy:DR [email or web address]  Location of EGD/colonoscopy:[place]  Desired bowel prep reviewed with patient:CLENPIQ  Instructions reviewed with patient by:[email or web address]  Clearances:N/A

## 2024-04-11 NOTE — LETTER
April 11, 2024     Jona Araiza MD  [address] [facility]  [address]  [facility] [address]    Patient: [place] Clinton   YOB: 1965   Date of Visit: 4/11/2024       Dear Dr. Araiza:    Thank you for referring [place] Clinton to me for evaluation. Below are my notes for this consultation.    If you have questions, please do not hesitate to call me. I look forward to following your patient along with you.         Sincerely,        Anne-Marie Dupree PA-C        CC: No Recipients    Anne-Marie Dupree PA-C  4/11/2024  8:52 AM  Sign when Signing Visit  [facility] - Outpatient Consultation  [place] Clinton 59 y.o. female MRN: [id number]  Encounter: [id number]          ASSESSMENT AND PLAN:      1. Screening for colon cancer  2. Acute perforated appendicitis  She was diagnosed with complicated appendicitis in February which was managed conservatively with antibiotics. Repeat CT from 3/20 shows decrease in size of periappendiceal abscess with some persistent appendiceal dilation and periappendiceal fat stranding. General surgery would like her to have colonoscopy to rule out cecal lesion prior to scheduling interval appendectomy. She had a normal colonoscopy in 2016. We will get her scheduled for colonoscopy as soon as possible. Gave instructions for Clenpiq which is a lower volume prep which she should hopefully tolerate given her history of gastric bypass.    I obtained informed consent from the patient. The risks/benefits/alternatives of the procedure were discussed with the patient. Risks included, but not limited to, infection, bleeding, perforation, injury to organs in the abdomen, missed lesion and incomplete procedure were discussed. Patient was agreeable and electronic signature was obtained.    - sodium picosulfate, magnesium oxide, citric acid (Clenpiq) oral solution; Take as directed by the office for colonoscopy.  Dispense: 350 mL; Refill: 0  - Colonoscopy; Future    3.  Gastroesophageal reflux disease, unspecified whether esophagitis present  4. Epigastric pain  She reports chronic GERD for 10+ years which did improve following gastric bypass and hiatal hernia repair in 2015, but symptoms have been worsening in recent years. She has been taking omeprazole 20 mg daily with relief, but sometimes requires a second dose later in the day due to breakthrough reflux. I sent prescription for omeprazole 20 mg twice daily as needed. Discussed and gave handout on GERD diet and lifestyle modifications.  We will plan EGD at the time of colonoscopy to evaluate her chronic GERD and epigastric pain. We can evaluate for marginal ulcer and take gastric biopsies to assess for H. pylori.    - omeprazole (PriLOSEC) 20 mg delayed release capsule; Take 1 capsule (20 mg total) by mouth 2 (two) times a day as needed (reflux)  Dispense: 60 capsule; Refill: 3  - EGD; Future    5. History of gastric bypass  Status post Peyton-en-Y gastric bypass in 2015. She has been following with the bariatric dietitian and she is on iron, calcium, vitamin D and general multivitamin due to postgastrectomy malabsorption.    6. Abnormal CT of liver  Recent CT scans show mild surface nodularity of the liver concerning for possible cirrhosis. There is no evidence of suspicious hepatic lesion or ductal dilatation. Her hepatic and portal veins are patent. Labs are normal though including liver enzymes, bilirubin, albumin, INR, platelets which goes against chronic liver disease and cirrhosis. When surgery performed appendectomy, they are planning core needle liver biopsy to assess for liver pathology. I certainly agree with this plan.    Follow-up in 3 months  ______________________________________________________________________    HPI: 59-year-old female with history of Peyton-en-Y gastric bypass and hiatal hernia repair in 2015, post gastrectomy malabsorption, chronic GERD, hypercholesterolemia referred by general surgeon Dr. Araiza  for history of perforated appendicitis.    She was admitted in February with perforated appendicitis which was managed conservatively with antibiotics. Unfortunately, she developed an abscess. Her most recent CT scan from 3/20 shows decrease in size of periappendiceal abscess with some persistent appendiceal dilation and periappendiceal fat stranding. Surgery would like us to perform colonoscopy to rule out cecal lesion prior to interval appendectomy.    Her lower abdominal pain has resolved. Her bowel movements are normal. She denies rectal bleeding. She has lost a few pounds since February related to her acute illness. She has been eating well. No nausea or vomiting. She does report history of GERD symptoms for 10+ years which did improve following gastric bypass in 2015 but in recent years, has become more frequent. She reports regurgitation particularly at nighttime and acid taste in her mouth. Lately, she has been waking up with epigastric burning pain. She was taking Tums PRN but recently started taking omeprazole 20 mg daily with relief of the pain, but sometimes she needs to take a second dose later in the day due to worsening symptoms. She denies difficulty swallowing.    She had normal colonoscopy in 2016.  She denies family history of colon cancer.    Answers submitted by the patient for this visit:  Abdominal Pain Questionnaire (Submitted on 4/10/2024)  Chief Complaint: Abdominal pain  Chronicity: new  Onset: more than 1 month ago  Onset quality: sudden  Frequency: daily  Episode duration: 7 Days  Progression since onset: gradually improving  Pain location: epigastric region, periumbilical region  Pain - numeric: 4/10  Pain quality: aching, burning  Radiates to: epigastric region, periumbilical region  anorexia: No  arthralgias: No  belching: Yes  constipation: No  diarrhea: No  dysuria: No  fever: No  flatus: Yes  frequency: No  headaches: No  hematochezia: No  hematuria: No  melena: No  myalgias:  No  nausea: No  weight loss: No  vomiting: No  Aggravated by: eating  Relieved by: belching, sitting up  Diagnostic workup: CT scan      REVIEW OF SYSTEMS:    CONSTITUTIONAL: Denies any fever, chills, rigors, + mild weight loss.  HEENT: No earache or tinnitus. Denies hearing loss or visual disturbances.  CARDIOVASCULAR: No chest pain or palpitations.   RESPIRATORY: Denies any cough, hemoptysis, shortness of breath or dyspnea on exertion.  GASTROINTESTINAL: As noted in the History of Present Illness.   GENITOURINARY: No problems with urination. Denies any hematuria or dysuria.  NEUROLOGIC: No dizziness or vertigo, denies headaches.   MUSCULOSKELETAL: Denies any muscle or joint pain.   SKIN: Denies skin rashes or itching.   ENDOCRINE: Denies excessive thirst. Denies intolerance to heat or cold.  PSYCHOSOCIAL: Denies depression or anxiety. Denies any recent memory loss.       Historical Information  Past Medical History:   Diagnosis Date   • Abdominal pain    • Chronic pain disorder     abdominal   • Depression    • Diverticula of colon    • Former smoker     Resolved 2012    • GERD (gastroesophageal reflux disease)    • Headache(784.0) 10/1/2020   • Hemorrhoids    • Hiatal hernia    • Hyperlipidemia    • Intestinal malabsorption following gastrectomy    • Morbid obesity (HCC)     Resolved 10/2/2015    • Obesity    • Postgastrectomy malabsorption    • Vitamin D insufficiency     Resolved 2016      Past Surgical History:   Procedure Laterality Date   • ABDOMINAL SURGERY      gastric bipass   • ANKLE SURGERY     •  SECTION      (2) ,     • COLONOSCOPY     • ESOPHAGOGASTRODUODENOSCOPY      x2   • GASTRIC BYPASS  2015   • HERNIA REPAIR  2015    Paraesophageal hiatus hernia. Managed by Eliezer Mishra (General Surgery)   • [facility] TRANSORAL BIOPSY SINGLE/MULTIPLE N/A 2017    Procedure: ESOPHAGOGASTRODUODENOSCOPY (EGD);  Surgeon: Emmie Rosen MD;  Location: [facility];  Service:  "Bariatrics   • TONSILLECTOMY     • UTERINE FIBROID SURGERY       Social History  Social History     Substance and Sexual Activity   Alcohol Use Yes     Social History     Substance and Sexual Activity   Drug Use No     Social History     Tobacco Use   Smoking Status Former   • Current packs/day: 0.00   • Types: Cigarettes   • Quit date:    • Years since quittin.2   Smokeless Tobacco Never   Tobacco Comments    20+ pack year hx - As per [employer]      Family History   Problem Relation Age of Onset   • Breast cancer Mother 31   • Prostate cancer Father 50   • Hypertension Father    • Heart disease Father         Coronary arteriosclerosis    • Hyperlipidemia Father    • Heart disease Maternal Grandmother         Coronary arteriosclerosis    • Heart disease Maternal Grandfather         Coronary arteriosclerosis    • COPD Maternal Grandfather         heavy smoker   • Diabetes Paternal Grandmother    • No Known Problems Brother    • No Known Problems Daughter    • No Known Problems Daughter    • No Known Problems Paternal Aunt    • Stroke Neg Hx    • Thyroid disease Neg Hx        Meds/Allergies      Current Outpatient Medications:   •  Calcium Citrate-Vitamin D 250-200 MG-UNIT TABS  •  Cholecalciferol (D3 5000) 125 MCG (5000 UT) capsule  •  ferrous gluconate (FERGON) 324 mg tablet  •  FLUoxetine (PROzac) 20 mg capsule  •  Melatonin 5 MG CAPS  •  MULTIPLE VITAMIN PO  •  omeprazole (PriLOSEC) 20 mg delayed release capsule  •  pravastatin (PRAVACHOL) 20 mg tablet  •  sodium picosulfate, magnesium oxide, citric acid (Clenpiq) oral solution    Allergies   Allergen Reactions   • Ibuprofen Other (See Comments)     Gastric bypass           Objective    Blood pressure 110/72, temperature 98.6 °F (37 °C), temperature source Tympanic, height 5' 2\" (1.575 m), weight 88.5 kg (195 lb 3.2 oz). Body mass index is 35.7 kg/m².        PHYSICAL EXAM:      General Appearance:   Alert, cooperative, no distress   HEENT:   " Normocephalic, atraumatic, anicteric.     Neck:  Supple, symmetrical, trachea midline   Lungs:   Clear to auscultation bilaterally; no rales, rhonchi or wheezing; respirations unlabored    Heart::   Regular rate and rhythm   Abdomen:   Soft, mild epigastric and left upper quadrant tenderness, non-distended; normal bowel sounds   Genitalia:   Deferred    Rectal:   Deferred    Extremities:  No cyanosis, clubbing or edema    Pulses:  2+ and symmetric    Skin:  No jaundice, rashes, or lesions    Lymph nodes:  No palpable cervical lymphadenopathy        Lab Results:   No visits with results within 1 Day(s) from this visit.   Latest known visit with results is:   Lab on 03/16/2024   Component Date Value   • Zinc 03/16/2024 68    • Vitamin B-12 03/16/2024 792    • Vitamin B1, Whole Blood 03/16/2024 189.3    • Vitamin A 03/16/2024 55.3    • WBC 03/16/2024 10.03    • RBC 03/16/2024 4.81    • Hemoglobin 03/16/2024 14.6    • Hematocrit 03/16/2024 46.4 (H)    • MCV 03/16/2024 97    • MCH 03/16/2024 30.4    • MCHC 03/16/2024 31.5    • RDW 03/16/2024 12.4    • MPV 03/16/2024 11.2    • Platelets 03/16/2024 457 (H)    • nRBC 03/16/2024 0    • Segmented % 03/16/2024 76 (H)    • Immature Grans % 03/16/2024 0    • Lymphocytes % 03/16/2024 18    • Monocytes % 03/16/2024 5    • Eosinophils Relative 03/16/2024 1    • Basophils Relative 03/16/2024 0    • Absolute Neutrophils 03/16/2024 7.48    • Absolute Immature Grans 03/16/2024 0.02    • Absolute Lymphocytes 03/16/2024 1.84    • Absolute Monocytes 03/16/2024 0.54    • Eosinophils Absolute 03/16/2024 0.11    • Basophils Absolute 03/16/2024 0.04    • Folate 03/16/2024 >22.3    • Hemoglobin A1C 03/16/2024 5.8 (H)    • EAG 03/16/2024 120    • Sodium 03/16/2024 138    • Potassium 03/16/2024 4.2    • Chloride 03/16/2024 101    • CO2 03/16/2024 28    • ANION GAP 03/16/2024 9    • BUN 03/16/2024 15    • Creatinine 03/16/2024 0.83    • Glucose, Fasting 03/16/2024 101 (H)    • Calcium 03/16/2024  9.9    • AST 03/16/2024 26    • ALT 03/16/2024 21    • Alkaline Phosphatase 03/16/2024 90    • Total Protein 03/16/2024 6.9    • Albumin 03/16/2024 4.3    • Total Bilirubin 03/16/2024 0.61    • eGFR 03/16/2024 76    • Cholesterol 03/16/2024 171    • Triglycerides 03/16/2024 58    • HDL, Direct 03/16/2024 63    • LDL Calculated 03/16/2024 96    • Non-HDL-Chol (CHOL-HDL) 03/16/2024 108    • TSH 3RD GENERATON 03/16/2024 2.824    • PTH 03/16/2024 48.5    • Vit D, 25-Hydroxy 03/16/2024 72.2    • Iron Saturation 03/16/2024 18    • TIBC 03/16/2024 337    • Iron 03/16/2024 62    • UIBC 03/16/2024 275    • Ferritin 03/16/2024 40          Radiology Results:   CT abdomen pelvis w contrast    Result Date: 3/20/2024  Narrative: CT ABDOMEN AND PELVIS WITH IV CONTRAST INDICATION: K35.33: Acute appendicitis with perforation, localized peritonitis, and gangrene, with abscess. COMPARISON: CT abdomen pelvis March 11, 2024. Correlation with CT of the pelvis February 27, 2024 TECHNIQUE: CT examination of the abdomen and pelvis was performed. Multiplanar 2D reformatted images were created from the source data. This examination, like all CT scans performed in the [facility], was performed utilizing techniques to minimize radiation dose exposure, including the use of iterative reconstruction and automated exposure control. Radiation dose length product (DLP) for this visit: 980 mGy-cm IV Contrast: 100 mL of iohexol (OMNIPAQUE) Enteric Contrast: Not administered. FINDINGS: ABDOMEN LOWER CHEST: No clinically significant abnormality in the visualized lower chest. LIVER/BILIARY TREE: Mild surface nodularity. No suspicious hepatic lesion. Patent hepatic and portal veins. No biliary ductal dilation. GALLBLADDER: No calcified gallstones. No pericholecystic inflammatory change. SPLEEN: Unremarkable. PANCREAS: Unremarkable. ADRENAL GLANDS: Unremarkable. KIDNEYS/URETERS: Unremarkable. No hydronephrosis. STOMACH AND BOWEL, APPENDIX  AND ABDOMINOPELVIC CAVITY: The appendix is again present in the left lower abdomen, measuring 8 mm in caliber, and there is persistent mural thickening (series 2, image 89). There is no appendicolith. Surrounding fat stranding  is similar to prior study (for example series 2, images 96). The prior adjacent fluid collection has decreased in size and no longer contains air, and now measures 1.9 x 1.2 x 2.0 cm, previously 3.6 x 3.2  x 4.3 cm (series 2, image 81; series 601, image  63 compared to series 2, image 85 and series 601, image 54 on prior study). No new fluid collection. Post Peyton-en-Y gastric bypass. No bowel obstruction. No ascites. No pneumoperitoneum. No lymphadenopathy. VESSELS: Unremarkable for patient's age. PELVIS REPRODUCTIVE ORGANS: 1.7 x 1.6 x 1.5 cm area of hyperattenuation within the left side of the uterus and extending towards the endometrial canal is hyperattenuating relative to the myometrium (series 2, image 128; series 602, image 133). URINARY BLADDER: Unremarkable. ABDOMINAL WALL/INGUINAL REGIONS: Unremarkable. BONES: No acute fracture or suspicious osseous lesion. Spinal degenerative changes.     Impression: 1.  Periappendiceal abscess in the left lower quadrant of the abdomen has decreased in size since March 11, 2024. 2.  Probable uterine fibroid with suspected submucosal and/or intracavitary component. Pelvic ultrasound is recommended for further assessment. 3.  Possible mild hepatic cirrhosis. The study was marked in EPIC for immediate notification. The study was marked in EPIC for significant notification. Workstation performed: [id number]

## 2024-04-23 ENCOUNTER — HOSPITAL ENCOUNTER (OUTPATIENT)
Dept: GASTROENTEROLOGY | Facility: HOSPITAL | Age: 59
Setting detail: OUTPATIENT SURGERY
Discharge: HOME | End: 2024-04-23
Payer: COMMERCIAL

## 2024-04-23 ENCOUNTER — ANESTHESIA EVENT (OUTPATIENT)
Dept: GASTROENTEROLOGY | Facility: HOSPITAL | Age: 59
End: 2024-04-23

## 2024-04-23 ENCOUNTER — ANESTHESIA (OUTPATIENT)
Dept: GASTROENTEROLOGY | Facility: HOSPITAL | Age: 59
End: 2024-04-23

## 2024-04-23 VITALS
BODY MASS INDEX: 36.19 KG/M2 | SYSTOLIC BLOOD PRESSURE: 141 MMHG | OXYGEN SATURATION: 99 % | RESPIRATION RATE: 16 BRPM | HEIGHT: 62 IN | TEMPERATURE: 97.6 F | HEART RATE: 57 BPM | DIASTOLIC BLOOD PRESSURE: 88 MMHG | WEIGHT: 196.65 LBS

## 2024-04-23 DIAGNOSIS — K21.9 GASTROESOPHAGEAL REFLUX DISEASE, UNSPECIFIED WHETHER ESOPHAGITIS PRESENT: ICD-10-CM

## 2024-04-23 DIAGNOSIS — Z12.11 SCREENING FOR COLON CANCER: ICD-10-CM

## 2024-04-23 DIAGNOSIS — K35.32 ACUTE PERFORATED APPENDICITIS: ICD-10-CM

## 2024-04-23 DIAGNOSIS — R10.12 LUQ PAIN: Primary | ICD-10-CM

## 2024-04-23 DIAGNOSIS — R10.13 EPIGASTRIC PAIN: ICD-10-CM

## 2024-04-23 PROCEDURE — 88305 TISSUE EXAM BY PATHOLOGIST: CPT | Performed by: PATHOLOGY

## 2024-04-23 PROCEDURE — G0121 COLON CA SCRN NOT HI RSK IND: HCPCS | Performed by: INTERNAL MEDICINE

## 2024-04-23 PROCEDURE — 43239 EGD BIOPSY SINGLE/MULTIPLE: CPT | Performed by: INTERNAL MEDICINE

## 2024-04-23 RX ORDER — LIDOCAINE HYDROCHLORIDE 20 MG/ML
INJECTION, SOLUTION EPIDURAL; INFILTRATION; INTRACAUDAL; PERINEURAL AS NEEDED
Status: DISCONTINUED | OUTPATIENT
Start: 2024-04-23 | End: 2024-04-23

## 2024-04-23 RX ORDER — PROPOFOL 10 MG/ML
INJECTION, EMULSION INTRAVENOUS AS NEEDED
Status: DISCONTINUED | OUTPATIENT
Start: 2024-04-23 | End: 2024-04-23

## 2024-04-23 RX ORDER — SODIUM CHLORIDE, SODIUM LACTATE, POTASSIUM CHLORIDE, CALCIUM CHLORIDE 600; 310; 30; 20 MG/100ML; MG/100ML; MG/100ML; MG/100ML
INJECTION, SOLUTION INTRAVENOUS CONTINUOUS PRN
Status: DISCONTINUED | OUTPATIENT
Start: 2024-04-23 | End: 2024-04-23

## 2024-04-23 RX ADMIN — PROPOFOL 20 MG: 10 INJECTION, EMULSION INTRAVENOUS at 12:36

## 2024-04-23 RX ADMIN — PROPOFOL 50 MG: 10 INJECTION, EMULSION INTRAVENOUS at 12:21

## 2024-04-23 RX ADMIN — PROPOFOL 50 MG: 10 INJECTION, EMULSION INTRAVENOUS at 12:32

## 2024-04-23 RX ADMIN — LIDOCAINE HYDROCHLORIDE 100 MG: 20 INJECTION, SOLUTION EPIDURAL; INFILTRATION; INTRACAUDAL; PERINEURAL at 12:17

## 2024-04-23 RX ADMIN — PROPOFOL 50 MG: 10 INJECTION, EMULSION INTRAVENOUS at 12:20

## 2024-04-23 RX ADMIN — PROPOFOL 150 MG: 10 INJECTION, EMULSION INTRAVENOUS at 12:17

## 2024-04-23 RX ADMIN — PROPOFOL 20 MG: 10 INJECTION, EMULSION INTRAVENOUS at 12:34

## 2024-04-23 RX ADMIN — PROPOFOL 20 MG: 10 INJECTION, EMULSION INTRAVENOUS at 12:26

## 2024-04-23 RX ADMIN — PROPOFOL 20 MG: 10 INJECTION, EMULSION INTRAVENOUS at 12:38

## 2024-04-23 RX ADMIN — PROPOFOL 30 MG: 10 INJECTION, EMULSION INTRAVENOUS at 12:29

## 2024-04-23 RX ADMIN — PROPOFOL 50 MG: 10 INJECTION, EMULSION INTRAVENOUS at 12:24

## 2024-04-23 RX ADMIN — SODIUM CHLORIDE, SODIUM LACTATE, POTASSIUM CHLORIDE, AND CALCIUM CHLORIDE: .6; .31; .03; .02 INJECTION, SOLUTION INTRAVENOUS at 12:12

## 2024-04-23 RX ADMIN — PROPOFOL 20 MG: 10 INJECTION, EMULSION INTRAVENOUS at 12:40

## 2024-04-23 NOTE — INTERVAL H&P NOTE
H&P reviewed. After examining the patient I find no changes in the patients condition since the H&P had been written.    Vitals:    04/23/24 1108   BP: 126/79   Pulse: 85   Resp: 18   Temp: 97.6 °F (36.4 °C)   SpO2: 96%

## 2024-04-23 NOTE — ANESTHESIA POSTPROCEDURE EVALUATION
Post-Op Assessment Note    CV Status:  Stable  Pain Score: 0    Pain management: adequate       Mental Status:  Alert and awake   Hydration Status:  Euvolemic   PONV Controlled:  Controlled   Airway Patency:  Patent     Post Op Vitals Reviewed: Yes    No anethesia notable event occurred.    Staff: CRNA               /72 (04/23/24 1245)    Temp 97.6 °F (36.4 °C) (04/23/24 1245)    Pulse 73 (04/23/24 1245)   Resp 16 (04/23/24 1245)    SpO2 94 % (04/23/24 1245)

## 2024-04-23 NOTE — ANESTHESIA PREPROCEDURE EVALUATION
Procedure:  EGD  COLONOSCOPY    Relevant Problems   ANESTHESIA   (+) Sea sickness      CARDIO   (+) Hypercholesterolemia      ENDO   (+) Hyperparathyroidism (HCC)      GI/HEPATIC   (+) Esophageal reflux      MUSCULOSKELETAL   (+) Chronic bilateral low back pain without sciatica      NEURO/PSYCH   (+) Chronic bilateral low back pain without sciatica   (+) Major depressive disorder with single episode, in partial remission (HCC)        Physical Exam    Airway    Mallampati score: II  TM Distance: >3 FB  Neck ROM: full     Dental   No notable dental hx     Cardiovascular  Cardiovascular exam normal    Pulmonary  Pulmonary exam normal     Other Findings  post-pubertal.      Anesthesia Plan  ASA Score- 2     Anesthesia Type- IV sedation with anesthesia with ASA Monitors.         Additional Monitors:     Airway Plan:            Plan Factors-Exercise tolerance (METS): >4 METS.    Chart reviewed.    Patient summary reviewed.    Patient is not a current smoker.              Induction- intravenous.    Postoperative Plan-     Informed Consent- Anesthetic plan and risks discussed with patient.

## 2024-04-24 ENCOUNTER — TELEPHONE (OUTPATIENT)
Age: 59
End: 2024-04-24

## 2024-04-24 NOTE — TELEPHONE ENCOUNTER
Patient would like to know if she needs to come in for a follow up appointment to see Dr. Araiza before scheduling surgery. Scheduled patient for a follow up on 4/26/24 at 12:30 but patient would like to know if she can do a telephone appointment as it's a long drive into the office to discuss scheduling surgery. Please call the patient back at [phone] to discuss as patient will need to reschedule Friday's appt if she needs to come into the office.

## 2024-04-25 PROCEDURE — 88305 TISSUE EXAM BY PATHOLOGIST: CPT | Performed by: PATHOLOGY

## 2024-04-26 ENCOUNTER — OFFICE VISIT (OUTPATIENT)
Dept: SURGERY | Facility: CLINIC | Age: 59
End: 2024-04-26

## 2024-04-26 DIAGNOSIS — K35.32 ACUTE PERFORATED APPENDICITIS: Primary | ICD-10-CM

## 2024-04-26 PROCEDURE — NA001 NO CHARGE AUDIO ONLY: Performed by: SURGERY

## 2024-04-26 NOTE — PROGRESS NOTES
Reach the patient by phone.  We discussed her colonoscopy results which were benign.  We will plan to get her scheduled for laparoscopic interval appendectomy with liver biopsy.  We will do consent at the time of surgery, as she was unable to present in person to clinic today.

## 2024-05-04 ENCOUNTER — HOSPITAL ENCOUNTER (EMERGENCY)
Facility: HOSPITAL | Age: 59
End: 2024-05-04
Attending: EMERGENCY MEDICINE
Payer: COMMERCIAL

## 2024-05-04 ENCOUNTER — HOSPITAL ENCOUNTER (INPATIENT)
Facility: HOSPITAL | Age: 59
LOS: 1 days | Discharge: HOME | DRG: 337 | End: 2024-05-05
Attending: SURGERY | Admitting: SURGERY
Payer: COMMERCIAL

## 2024-05-04 ENCOUNTER — APPOINTMENT (EMERGENCY)
Dept: CT IMAGING | Facility: HOSPITAL | Age: 59
End: 2024-05-04
Payer: COMMERCIAL

## 2024-05-04 ENCOUNTER — ANESTHESIA (INPATIENT)
Dept: PERIOP | Facility: HOSPITAL | Age: 59
DRG: 337 | End: 2024-05-04
Payer: COMMERCIAL

## 2024-05-04 ENCOUNTER — ANESTHESIA EVENT (INPATIENT)
Dept: PERIOP | Facility: HOSPITAL | Age: 59
DRG: 337 | End: 2024-05-04
Payer: COMMERCIAL

## 2024-05-04 VITALS
OXYGEN SATURATION: 100 % | SYSTOLIC BLOOD PRESSURE: 110 MMHG | HEART RATE: 56 BPM | TEMPERATURE: 97.9 F | DIASTOLIC BLOOD PRESSURE: 63 MMHG | RESPIRATION RATE: 18 BRPM

## 2024-05-04 DIAGNOSIS — K46.0 OBSTRUCTION CONCURRENT WITH AND DUE TO INTERNAL HERNIA OF ABDOMEN: ICD-10-CM

## 2024-05-04 DIAGNOSIS — K21.9 GASTROESOPHAGEAL REFLUX DISEASE, UNSPECIFIED WHETHER ESOPHAGITIS PRESENT: ICD-10-CM

## 2024-05-04 DIAGNOSIS — K56.609 SBO (SMALL BOWEL OBSTRUCTION) (HCC): ICD-10-CM

## 2024-05-04 DIAGNOSIS — K45.8 INTERNAL HERNIA: Primary | ICD-10-CM

## 2024-05-04 DIAGNOSIS — K56.51 INTESTINAL ADHESIONS WITH PARTIAL OBSTRUCTION (HCC): Primary | ICD-10-CM

## 2024-05-04 DIAGNOSIS — R10.9 ABDOMINAL PAIN: ICD-10-CM

## 2024-05-04 DIAGNOSIS — K45.8 INTERNAL HERNIA: ICD-10-CM

## 2024-05-04 LAB
ALBUMIN SERPL BCP-MCNC: 4.5 G/DL (ref 3.5–5)
ALP SERPL-CCNC: 77 U/L (ref 34–104)
ALT SERPL W P-5'-P-CCNC: 14 U/L (ref 7–52)
AMORPH URATE CRY URNS QL MICRO: ABNORMAL
ANION GAP SERPL CALCULATED.3IONS-SCNC: 14 MMOL/L (ref 4–13)
AST SERPL W P-5'-P-CCNC: 18 U/L (ref 13–39)
BACTERIA UR QL AUTO: ABNORMAL /HPF
BASOPHILS # BLD AUTO: 0.02 THOUSANDS/ÂΜL (ref 0–0.1)
BASOPHILS NFR BLD AUTO: 0 % (ref 0–1)
BILIRUB DIRECT SERPL-MCNC: 0.13 MG/DL (ref 0–0.2)
BILIRUB SERPL-MCNC: 0.53 MG/DL (ref 0.2–1)
BILIRUB UR QL STRIP: NEGATIVE
BUN SERPL-MCNC: 18 MG/DL (ref 5–25)
CALCIUM SERPL-MCNC: 10 MG/DL (ref 8.4–10.2)
CHLORIDE SERPL-SCNC: 105 MMOL/L (ref 96–108)
CLARITY UR: ABNORMAL
CO2 SERPL-SCNC: 22 MMOL/L (ref 21–32)
COLOR UR: YELLOW
CREAT SERPL-MCNC: 0.9 MG/DL (ref 0.6–1.3)
EOSINOPHIL # BLD AUTO: 0.09 THOUSAND/ÂΜL (ref 0–0.61)
EOSINOPHIL NFR BLD AUTO: 1 % (ref 0–6)
ERYTHROCYTE [DISTWIDTH] IN BLOOD BY AUTOMATED COUNT: 12.4 % (ref 11.6–15.1)
GFR SERPL CREATININE-BSD FRML MDRD: 69 ML/MIN/1.73SQ M
GLUCOSE SERPL-MCNC: 146 MG/DL (ref 65–140)
GLUCOSE UR STRIP-MCNC: NEGATIVE MG/DL
HCT VFR BLD AUTO: 45 % (ref 34.8–46.1)
HGB BLD-MCNC: 14.9 G/DL (ref 11.5–15.4)
HGB UR QL STRIP.AUTO: NEGATIVE
IMM GRANULOCYTES # BLD AUTO: 0.02 THOUSAND/UL (ref 0–0.2)
IMM GRANULOCYTES NFR BLD AUTO: 0 % (ref 0–2)
KETONES UR STRIP-MCNC: ABNORMAL MG/DL
LACTATE SERPL-SCNC: 1.2 MMOL/L (ref 0.5–2)
LEUKOCYTE ESTERASE UR QL STRIP: NEGATIVE
LIPASE SERPL-CCNC: 17 U/L (ref 11–82)
LYMPHOCYTES # BLD AUTO: 2.54 THOUSANDS/ÂΜL (ref 0.6–4.47)
LYMPHOCYTES NFR BLD AUTO: 26 % (ref 14–44)
MCH RBC QN AUTO: 30.3 PG (ref 26.8–34.3)
MCHC RBC AUTO-ENTMCNC: 33.1 G/DL (ref 31.4–37.4)
MCV RBC AUTO: 92 FL (ref 82–98)
MONOCYTES # BLD AUTO: 0.48 THOUSAND/ÂΜL (ref 0.17–1.22)
MONOCYTES NFR BLD AUTO: 5 % (ref 4–12)
MUCOUS THREADS UR QL AUTO: ABNORMAL
NEUTROPHILS # BLD AUTO: 6.72 THOUSANDS/ÂΜL (ref 1.85–7.62)
NEUTS SEG NFR BLD AUTO: 68 % (ref 43–75)
NITRITE UR QL STRIP: NEGATIVE
NON-SQ EPI CELLS URNS QL MICRO: ABNORMAL /HPF
NRBC BLD AUTO-RTO: 0 /100 WBCS
PH UR STRIP.AUTO: 8 [PH]
PLATELET # BLD AUTO: 292 THOUSANDS/UL (ref 149–390)
PMV BLD AUTO: 10.1 FL (ref 8.9–12.7)
POTASSIUM SERPL-SCNC: 3.8 MMOL/L (ref 3.5–5.3)
PROT SERPL-MCNC: 7.1 G/DL (ref 6.4–8.4)
PROT UR STRIP-MCNC: ABNORMAL MG/DL
RBC # BLD AUTO: 4.92 MILLION/UL (ref 3.81–5.12)
RBC #/AREA URNS AUTO: ABNORMAL /HPF
SODIUM SERPL-SCNC: 141 MMOL/L (ref 135–147)
SP GR UR STRIP.AUTO: 1.02 (ref 1–1.03)
UROBILINOGEN UR STRIP-ACNC: <2 MG/DL
WBC # BLD AUTO: 9.87 THOUSAND/UL (ref 4.31–10.16)
WBC #/AREA URNS AUTO: ABNORMAL /HPF

## 2024-05-04 PROCEDURE — 88305 TISSUE EXAM BY PATHOLOGIST: CPT | Performed by: STUDENT IN AN ORGANIZED HEALTH CARE EDUCATION/TRAINING PROGRAM

## 2024-05-04 PROCEDURE — 0DNB4ZZ RELEASE ILEUM, PERCUTANEOUS ENDOSCOPIC APPROACH: ICD-10-PCS | Performed by: SURGERY

## 2024-05-04 PROCEDURE — 83690 ASSAY OF LIPASE: CPT | Performed by: EMERGENCY MEDICINE

## 2024-05-04 PROCEDURE — 3E0T3BZ INTRODUCTION OF ANESTHETIC AGENT INTO PERIPHERAL NERVES AND PLEXI, PERCUTANEOUS APPROACH: ICD-10-PCS | Performed by: SURGERY

## 2024-05-04 PROCEDURE — 0DBJ4ZZ EXCISION OF APPENDIX, PERCUTANEOUS ENDOSCOPIC APPROACH: ICD-10-PCS | Performed by: SURGERY

## 2024-05-04 PROCEDURE — 36415 COLL VENOUS BLD VENIPUNCTURE: CPT | Performed by: EMERGENCY MEDICINE

## 2024-05-04 PROCEDURE — 96372 THER/PROPH/DIAG INJ SC/IM: CPT

## 2024-05-04 PROCEDURE — 80048 BASIC METABOLIC PNL TOTAL CA: CPT | Performed by: EMERGENCY MEDICINE

## 2024-05-04 PROCEDURE — 80076 HEPATIC FUNCTION PANEL: CPT | Performed by: EMERGENCY MEDICINE

## 2024-05-04 PROCEDURE — 96376 TX/PRO/DX INJ SAME DRUG ADON: CPT

## 2024-05-04 PROCEDURE — 99285 EMERGENCY DEPT VISIT HI MDM: CPT

## 2024-05-04 PROCEDURE — 99284 EMERGENCY DEPT VISIT MOD MDM: CPT

## 2024-05-04 PROCEDURE — 85025 COMPLETE CBC W/AUTO DIFF WBC: CPT | Performed by: EMERGENCY MEDICINE

## 2024-05-04 PROCEDURE — C9290 INJ, BUPIVACAINE LIPOSOME: HCPCS | Performed by: STUDENT IN AN ORGANIZED HEALTH CARE EDUCATION/TRAINING PROGRAM

## 2024-05-04 PROCEDURE — 96374 THER/PROPH/DIAG INJ IV PUSH: CPT

## 2024-05-04 PROCEDURE — 96361 HYDRATE IV INFUSION ADD-ON: CPT

## 2024-05-04 PROCEDURE — 74177 CT ABD & PELVIS W/CONTRAST: CPT

## 2024-05-04 PROCEDURE — 96375 TX/PRO/DX INJ NEW DRUG ADDON: CPT

## 2024-05-04 PROCEDURE — 81001 URINALYSIS AUTO W/SCOPE: CPT | Performed by: EMERGENCY MEDICINE

## 2024-05-04 PROCEDURE — 93005 ELECTROCARDIOGRAM TRACING: CPT

## 2024-05-04 PROCEDURE — 83605 ASSAY OF LACTIC ACID: CPT

## 2024-05-04 RX ORDER — ENOXAPARIN SODIUM 100 MG/ML
40 INJECTION SUBCUTANEOUS DAILY
Status: DISCONTINUED | OUTPATIENT
Start: 2024-05-05 | End: 2024-05-05 | Stop reason: HOSPADM

## 2024-05-04 RX ORDER — PROPOFOL 10 MG/ML
INJECTION, EMULSION INTRAVENOUS AS NEEDED
Status: DISCONTINUED | OUTPATIENT
Start: 2024-05-04 | End: 2024-05-04

## 2024-05-04 RX ORDER — METOCLOPRAMIDE HYDROCHLORIDE 5 MG/ML
10 INJECTION INTRAMUSCULAR; INTRAVENOUS EVERY 6 HOURS PRN
Status: DISCONTINUED | OUTPATIENT
Start: 2024-05-04 | End: 2024-05-05 | Stop reason: HOSPADM

## 2024-05-04 RX ORDER — OXYCODONE HCL 5 MG/5 ML
10 SOLUTION, ORAL ORAL EVERY 4 HOURS PRN
Status: DISCONTINUED | OUTPATIENT
Start: 2024-05-04 | End: 2024-05-05 | Stop reason: HOSPADM

## 2024-05-04 RX ORDER — ONDANSETRON 2 MG/ML
4 INJECTION INTRAMUSCULAR; INTRAVENOUS ONCE
Status: COMPLETED | OUTPATIENT
Start: 2024-05-04 | End: 2024-05-04

## 2024-05-04 RX ORDER — SODIUM CHLORIDE 9 MG/ML
INJECTION, SOLUTION INTRAVENOUS AS NEEDED
Status: DISCONTINUED | OUTPATIENT
Start: 2024-05-04 | End: 2024-05-04 | Stop reason: HOSPADM

## 2024-05-04 RX ORDER — SIMETHICONE 80 MG
80 TABLET,CHEWABLE ORAL 4 TIMES DAILY PRN
Status: DISCONTINUED | OUTPATIENT
Start: 2024-05-04 | End: 2024-05-05 | Stop reason: HOSPADM

## 2024-05-04 RX ORDER — SODIUM CHLORIDE 9 MG/ML
125 INJECTION, SOLUTION INTRAVENOUS CONTINUOUS
Status: DISCONTINUED | OUTPATIENT
Start: 2024-05-04 | End: 2024-05-04 | Stop reason: HOSPADM

## 2024-05-04 RX ORDER — BUPIVACAINE HYDROCHLORIDE 2.5 MG/ML
INJECTION, SOLUTION EPIDURAL; INFILTRATION; INTRACAUDAL AS NEEDED
Status: DISCONTINUED | OUTPATIENT
Start: 2024-05-04 | End: 2024-05-04 | Stop reason: HOSPADM

## 2024-05-04 RX ORDER — CEFAZOLIN SODIUM 2 G/50ML
2000 SOLUTION INTRAVENOUS ONCE
Status: COMPLETED | OUTPATIENT
Start: 2024-05-04 | End: 2024-05-04

## 2024-05-04 RX ORDER — HYDROMORPHONE HCL IN WATER/PF 6 MG/30 ML
0.2 PATIENT CONTROLLED ANALGESIA SYRINGE INTRAVENOUS ONCE
Status: COMPLETED | OUTPATIENT
Start: 2024-05-04 | End: 2024-05-04

## 2024-05-04 RX ORDER — DIPHENHYDRAMINE HCL 25 MG
25 TABLET ORAL
Status: DISCONTINUED | OUTPATIENT
Start: 2024-05-04 | End: 2024-05-05 | Stop reason: HOSPADM

## 2024-05-04 RX ORDER — SODIUM CHLORIDE 9 MG/ML
125 INJECTION, SOLUTION INTRAVENOUS CONTINUOUS
Status: DISCONTINUED | OUTPATIENT
Start: 2024-05-04 | End: 2024-05-04

## 2024-05-04 RX ORDER — FENTANYL CITRATE 50 UG/ML
INJECTION, SOLUTION INTRAMUSCULAR; INTRAVENOUS AS NEEDED
Status: DISCONTINUED | OUTPATIENT
Start: 2024-05-04 | End: 2024-05-04

## 2024-05-04 RX ORDER — HYDROMORPHONE HCL/PF 1 MG/ML
SYRINGE (ML) INJECTION AS NEEDED
Status: DISCONTINUED | OUTPATIENT
Start: 2024-05-04 | End: 2024-05-04

## 2024-05-04 RX ORDER — FLUOXETINE HYDROCHLORIDE 20 MG/1
40 CAPSULE ORAL DAILY
Status: DISCONTINUED | OUTPATIENT
Start: 2024-05-05 | End: 2024-05-05 | Stop reason: HOSPADM

## 2024-05-04 RX ORDER — MORPHINE SULFATE 4 MG/ML
4 INJECTION, SOLUTION INTRAMUSCULAR; INTRAVENOUS EVERY 4 HOURS PRN
Status: DISCONTINUED | OUTPATIENT
Start: 2024-05-04 | End: 2024-05-05 | Stop reason: HOSPADM

## 2024-05-04 RX ORDER — ACETAMINOPHEN 160 MG/5ML
640 SUSPENSION ORAL EVERY 8 HOURS
Qty: 140 ML | Refills: 0 | Status: SHIPPED | OUTPATIENT
Start: 2024-05-04 | End: 2024-05-07

## 2024-05-04 RX ORDER — ROCURONIUM BROMIDE 10 MG/ML
INJECTION, SOLUTION INTRAVENOUS AS NEEDED
Status: DISCONTINUED | OUTPATIENT
Start: 2024-05-04 | End: 2024-05-04

## 2024-05-04 RX ORDER — PROMETHAZINE HYDROCHLORIDE 25 MG/ML
25 INJECTION, SOLUTION INTRAMUSCULAR; INTRAVENOUS ONCE
Status: COMPLETED | OUTPATIENT
Start: 2024-05-04 | End: 2024-05-04

## 2024-05-04 RX ORDER — FLUOXETINE HYDROCHLORIDE 20 MG/1
40 CAPSULE ORAL DAILY
Status: DISCONTINUED | OUTPATIENT
Start: 2024-05-04 | End: 2024-05-04

## 2024-05-04 RX ORDER — FAMOTIDINE 10 MG/ML
20 INJECTION, SOLUTION INTRAVENOUS EVERY 12 HOURS SCHEDULED
Status: DISCONTINUED | OUTPATIENT
Start: 2024-05-04 | End: 2024-05-04

## 2024-05-04 RX ORDER — ONDANSETRON 2 MG/ML
4 INJECTION INTRAMUSCULAR; INTRAVENOUS EVERY 6 HOURS PRN
Status: DISCONTINUED | OUTPATIENT
Start: 2024-05-04 | End: 2024-05-04

## 2024-05-04 RX ORDER — OXYCODONE HCL 5 MG/5 ML
5 SOLUTION, ORAL ORAL EVERY 4 HOURS PRN
Status: DISCONTINUED | OUTPATIENT
Start: 2024-05-04 | End: 2024-05-05 | Stop reason: HOSPADM

## 2024-05-04 RX ORDER — PROMETHAZINE HYDROCHLORIDE 25 MG/ML
12.5 INJECTION, SOLUTION INTRAMUSCULAR; INTRAVENOUS EVERY 4 HOURS PRN
Status: DISCONTINUED | OUTPATIENT
Start: 2024-05-04 | End: 2024-05-04

## 2024-05-04 RX ORDER — OXYCODONE HYDROCHLORIDE 5 MG/1
5 TABLET ORAL EVERY 6 HOURS PRN
Qty: 5 TABLET | Refills: 0 | Status: SHIPPED | OUTPATIENT
Start: 2024-05-04 | End: 2024-05-09

## 2024-05-04 RX ORDER — HYDROMORPHONE HCL/PF 1 MG/ML
0.5 SYRINGE (ML) INJECTION
Status: DISCONTINUED | OUTPATIENT
Start: 2024-05-04 | End: 2024-05-04

## 2024-05-04 RX ORDER — ONDANSETRON 2 MG/ML
4 INJECTION INTRAMUSCULAR; INTRAVENOUS EVERY 4 HOURS PRN
Status: DISCONTINUED | OUTPATIENT
Start: 2024-05-04 | End: 2024-05-05 | Stop reason: HOSPADM

## 2024-05-04 RX ORDER — EPHEDRINE SULFATE 50 MG/ML
INJECTION INTRAVENOUS AS NEEDED
Status: DISCONTINUED | OUTPATIENT
Start: 2024-05-04 | End: 2024-05-04

## 2024-05-04 RX ORDER — FAMOTIDINE 10 MG/ML
20 INJECTION, SOLUTION INTRAVENOUS EVERY 12 HOURS SCHEDULED
Status: DISCONTINUED | OUTPATIENT
Start: 2024-05-04 | End: 2024-05-05 | Stop reason: HOSPADM

## 2024-05-04 RX ORDER — MIDAZOLAM HYDROCHLORIDE 2 MG/2ML
INJECTION, SOLUTION INTRAMUSCULAR; INTRAVENOUS AS NEEDED
Status: DISCONTINUED | OUTPATIENT
Start: 2024-05-04 | End: 2024-05-04

## 2024-05-04 RX ORDER — PRAVASTATIN SODIUM 20 MG
20 TABLET ORAL DAILY
Status: DISCONTINUED | OUTPATIENT
Start: 2024-05-04 | End: 2024-05-04

## 2024-05-04 RX ORDER — HEPARIN SODIUM 5000 [USP'U]/ML
5000 INJECTION, SOLUTION INTRAVENOUS; SUBCUTANEOUS ONCE
Status: DISCONTINUED | OUTPATIENT
Start: 2024-05-04 | End: 2024-05-04

## 2024-05-04 RX ORDER — SUCCINYLCHOLINE/SOD CL,ISO/PF 100 MG/5ML
SYRINGE (ML) INTRAVENOUS AS NEEDED
Status: DISCONTINUED | OUTPATIENT
Start: 2024-05-04 | End: 2024-05-04

## 2024-05-04 RX ORDER — PRAVASTATIN SODIUM 20 MG
20 TABLET ORAL DAILY
Status: DISCONTINUED | OUTPATIENT
Start: 2024-05-05 | End: 2024-05-05 | Stop reason: HOSPADM

## 2024-05-04 RX ORDER — SODIUM CHLORIDE, SODIUM LACTATE, POTASSIUM CHLORIDE, CALCIUM CHLORIDE 600; 310; 30; 20 MG/100ML; MG/100ML; MG/100ML; MG/100ML
75 INJECTION, SOLUTION INTRAVENOUS CONTINUOUS
Status: DISCONTINUED | OUTPATIENT
Start: 2024-05-04 | End: 2024-05-05

## 2024-05-04 RX ORDER — DEXAMETHASONE SODIUM PHOSPHATE 10 MG/ML
INJECTION, SOLUTION INTRAMUSCULAR; INTRAVENOUS AS NEEDED
Status: DISCONTINUED | OUTPATIENT
Start: 2024-05-04 | End: 2024-05-04

## 2024-05-04 RX ORDER — ACETAMINOPHEN 10 MG/ML
1000 INJECTION, SOLUTION INTRAVENOUS ONCE
Status: COMPLETED | OUTPATIENT
Start: 2024-05-04 | End: 2024-05-04

## 2024-05-04 RX ORDER — SODIUM CHLORIDE 9 MG/ML
125 INJECTION, SOLUTION INTRAVENOUS CONTINUOUS
Status: CANCELLED | OUTPATIENT
Start: 2024-05-04

## 2024-05-04 RX ORDER — FENTANYL CITRATE 50 UG/ML
50 INJECTION, SOLUTION INTRAMUSCULAR; INTRAVENOUS
Status: DISCONTINUED | OUTPATIENT
Start: 2024-05-04 | End: 2024-05-04

## 2024-05-04 RX ADMIN — FAMOTIDINE 20 MG: 10 INJECTION INTRAVENOUS at 20:35

## 2024-05-04 RX ADMIN — SUGAMMADEX 200 MG: 100 INJECTION, SOLUTION INTRAVENOUS at 11:47

## 2024-05-04 RX ADMIN — SODIUM CHLORIDE: 0.9 INJECTION, SOLUTION INTRAVENOUS at 11:18

## 2024-05-04 RX ADMIN — DEXAMETHASONE SODIUM PHOSPHATE 8 MG: 10 INJECTION INTRAMUSCULAR; INTRAVENOUS at 10:40

## 2024-05-04 RX ADMIN — HYDROMORPHONE HYDROCHLORIDE 0.2 MG: 0.2 INJECTION, SOLUTION INTRAMUSCULAR; INTRAVENOUS; SUBCUTANEOUS at 05:03

## 2024-05-04 RX ADMIN — MORPHINE SULFATE 4 MG: 4 INJECTION INTRAVENOUS at 09:38

## 2024-05-04 RX ADMIN — ONDANSETRON 4 MG: 2 INJECTION INTRAMUSCULAR; INTRAVENOUS at 06:16

## 2024-05-04 RX ADMIN — PROMETHAZINE HYDROCHLORIDE 25 MG: 25 INJECTION INTRAMUSCULAR; INTRAVENOUS at 07:48

## 2024-05-04 RX ADMIN — ONDANSETRON 4 MG: 2 INJECTION INTRAMUSCULAR; INTRAVENOUS at 04:41

## 2024-05-04 RX ADMIN — HYDROMORPHONE HYDROCHLORIDE 0.2 MG: 0.2 INJECTION, SOLUTION INTRAMUSCULAR; INTRAVENOUS; SUBCUTANEOUS at 07:36

## 2024-05-04 RX ADMIN — SODIUM CHLORIDE, SODIUM LACTATE, POTASSIUM CHLORIDE, AND CALCIUM CHLORIDE 75 ML/HR: .6; .31; .03; .02 INJECTION, SOLUTION INTRAVENOUS at 12:51

## 2024-05-04 RX ADMIN — EPHEDRINE SULFATE 5 MG: 50 INJECTION, SOLUTION INTRAVENOUS at 11:30

## 2024-05-04 RX ADMIN — ROCURONIUM BROMIDE 35 MG: 10 INJECTION, SOLUTION INTRAVENOUS at 10:33

## 2024-05-04 RX ADMIN — ONDANSETRON 4 MG: 2 INJECTION INTRAMUSCULAR; INTRAVENOUS at 09:38

## 2024-05-04 RX ADMIN — ACETAMINOPHEN 1000 MG: 10 INJECTION INTRAVENOUS at 10:13

## 2024-05-04 RX ADMIN — MIDAZOLAM 2 MG: 1 INJECTION INTRAMUSCULAR; INTRAVENOUS at 10:25

## 2024-05-04 RX ADMIN — IOHEXOL 85 ML: 350 INJECTION, SOLUTION INTRAVENOUS at 05:40

## 2024-05-04 RX ADMIN — OXYCODONE HYDROCHLORIDE 10 MG: 5 SOLUTION ORAL at 19:25

## 2024-05-04 RX ADMIN — SODIUM CHLORIDE 150 MG: 0.9 INJECTION, SOLUTION INTRAVENOUS at 10:42

## 2024-05-04 RX ADMIN — SODIUM CHLORIDE: 0.9 INJECTION, SOLUTION INTRAVENOUS at 10:17

## 2024-05-04 RX ADMIN — SODIUM CHLORIDE 1000 ML: 0.9 INJECTION, SOLUTION INTRAVENOUS at 05:04

## 2024-05-04 RX ADMIN — SODIUM CHLORIDE 125 ML/HR: 0.9 INJECTION, SOLUTION INTRAVENOUS at 07:48

## 2024-05-04 RX ADMIN — FENTANYL CITRATE 25 MCG: 50 INJECTION INTRAMUSCULAR; INTRAVENOUS at 10:59

## 2024-05-04 RX ADMIN — HYDROMORPHONE HYDROCHLORIDE 0.5 MG: 1 INJECTION, SOLUTION INTRAMUSCULAR; INTRAVENOUS; SUBCUTANEOUS at 11:26

## 2024-05-04 RX ADMIN — FENTANYL CITRATE 50 MCG: 50 INJECTION INTRAMUSCULAR; INTRAVENOUS at 10:35

## 2024-05-04 RX ADMIN — HYDROMORPHONE HYDROCHLORIDE 0.2 MG: 0.2 INJECTION, SOLUTION INTRAMUSCULAR; INTRAVENOUS; SUBCUTANEOUS at 06:19

## 2024-05-04 RX ADMIN — Medication 100 MG: at 10:29

## 2024-05-04 RX ADMIN — HYDROMORPHONE HYDROCHLORIDE 0.2 MG: 0.2 INJECTION, SOLUTION INTRAMUSCULAR; INTRAVENOUS; SUBCUTANEOUS at 05:46

## 2024-05-04 RX ADMIN — PROPOFOL 200 MG: 10 INJECTION, EMULSION INTRAVENOUS at 10:29

## 2024-05-04 RX ADMIN — ROCURONIUM BROMIDE 5 MG: 10 INJECTION, SOLUTION INTRAVENOUS at 10:29

## 2024-05-04 RX ADMIN — EPHEDRINE SULFATE 5 MG: 50 INJECTION, SOLUTION INTRAVENOUS at 11:09

## 2024-05-04 RX ADMIN — FENTANYL CITRATE 50 MCG: 50 INJECTION INTRAMUSCULAR; INTRAVENOUS at 10:19

## 2024-05-04 RX ADMIN — CEFAZOLIN SODIUM 2000 MG: 2 SOLUTION INTRAVENOUS at 10:32

## 2024-05-04 RX ADMIN — FOLIC ACID: 5 INJECTION, SOLUTION INTRAMUSCULAR; INTRAVENOUS; SUBCUTANEOUS at 15:12

## 2024-05-04 NOTE — ANESTHESIA POSTPROCEDURE EVALUATION
Post-Op Assessment Note    CV Status:  Stable    Pain management: adequate       Mental Status:  Alert and awake   Hydration Status:  Euvolemic   PONV Controlled:  Controlled   Airway Patency:  Patent     Post Op Vitals Reviewed: Yes    No anethesia notable event occurred.    Staff: Anesthesiologist               BP      Temp      Pulse     Resp      SpO2      /76   Pulse 76   Temp 98.3 °F (36.8 °C)   Resp 16   Wt 88.5 kg (195 lb 1.7 oz)   SpO2 (!) 84%   BMI 35.69 kg/m²

## 2024-05-04 NOTE — PLAN OF CARE
Problem: PAIN - ADULT  Goal: Verbalizes/displays adequate comfort level or baseline comfort level  Description: Interventions:  - Encourage patient to monitor pain and request assistance  - Assess pain using appropriate pain scale  - Administer analgesics based on type and severity of pain and evaluate response  - Implement non-pharmacological measures as appropriate and evaluate response  - Consider cultural and social influences on pain and pain management  - Notify physician/advanced practitioner if interventions unsuccessful or patient reports new pain  Outcome: Progressing     Problem: INFECTION - ADULT  Goal: Absence or prevention of progression during hospitalization  Description: INTERVENTIONS:  - Assess and monitor for signs and symptoms of infection  - Monitor lab/diagnostic results  - Monitor all insertion sites, i.e. indwelling lines, tubes, and drains  - Monitor endotracheal if appropriate and nasal secretions for changes in amount and color  - [place] appropriate cooling/warming therapies per order  - Administer medications as ordered  - Instruct and encourage patient and family to use good hand hygiene technique  - Identify and instruct in appropriate isolation precautions for identified infection/condition  Outcome: Progressing  Goal: Absence of fever/infection during neutropenic period  Description: INTERVENTIONS:  - Monitor WBC    Outcome: Progressing     Problem: SAFETY ADULT  Goal: Patient will remain free of falls  Description: INTERVENTIONS:  - Educate patient/family on patient safety including physical limitations  - Instruct patient to call for assistance with activity   - Consult OT/PT to assist with strengthening/mobility   - Keep Call bell within reach  - Keep bed low and locked with side rails adjusted as appropriate  - Keep care items and personal belongings within reach  - Initiate and maintain comfort rounds  - Make Fall Risk Sign visible to staff  - Offer Toileting every  Hours,  in advance of need  - Initiate/Maintain alarm  - Obtain necessary fall risk management equipment:  - Apply yellow socks and bracelet for high fall risk patients  - Consider moving patient to room near nurses station  Outcome: Progressing  Goal: Maintain or return to baseline ADL function  Description: INTERVENTIONS:  -  Assess patient's ability to carry out ADLs; assess patient's baseline for ADL function and identify physical deficits which impact ability to perform ADLs (bathing, care of mouth/teeth, toileting, grooming, dressing, etc.)  - Assess/evaluate cause of self-care deficits   - Assess range of motion  - Assess patient's mobility; develop plan if impaired  - Assess patient's need for assistive devices and provide as appropriate  - Encourage maximum independence but intervene and supervise when necessary  - Involve family in performance of ADLs  - Assess for home care needs following discharge   - Consider OT consult to assist with ADL evaluation and planning for discharge  - Provide patient education as appropriate  Outcome: Progressing  Goal: Maintains/Returns to pre admission functional level  Description: INTERVENTIONS:  - Perform AM-PAC 6 Click Basic Mobility/ Daily Activity assessment daily.  - Set and communicate daily mobility goal to care team and patient/family/caregiver.   - Collaborate with rehabilitation services on mobility goals if consulted  - Perform Range of Motion  times a day.  - Reposition patient every  hours.  - Dangle patient  times a day  - Stand patient  times a day  - Ambulate patient  times a day  - Out of bed to chair  times a day   - Out of bed for meals  times a day  - Out of bed for toileting  - Record patient progress and toleration of activity level   Outcome: Progressing     Problem: DISCHARGE PLANNING  Goal: Discharge to home or other facility with appropriate resources  Description: INTERVENTIONS:  - Identify barriers to discharge w/patient and caregiver  - Arrange for  needed discharge resources and transportation as appropriate  - Identify discharge learning needs (meds, wound care, etc.)  - Arrange for interpretive services to assist at discharge as needed  - Refer to Case Management Department for coordinating discharge planning if the patient needs post-hospital services based on physician/advanced practitioner order or complex needs related to functional status, cognitive ability, or social support system  Outcome: Progressing     Problem: Knowledge Deficit  Goal: Patient/family/caregiver demonstrates understanding of disease process, treatment plan, medications, and discharge instructions  Description: Complete learning assessment and assess knowledge base.  Interventions:  - Provide teaching at level of understanding  - Provide teaching via preferred learning methods  Outcome: Progressing

## 2024-05-04 NOTE — ANESTHESIA POSTPROCEDURE EVALUATION
Post-Op Assessment Note    CV Status:  Stable    Pain management: adequate    Multimodal analgesia used between 6 hours prior to anesthesia start to PACU discharge    Mental Status:  Alert and awake   PONV Controlled:  Controlled   Airway Patency:  Patent  Airway: intubated     Post Op Vitals Reviewed: Yes      Staff: CRNA               BP      Temp      Pulse     Resp      SpO2

## 2024-05-04 NOTE — H&P
H&P Exam -Bariatric Surgery   Florida Clinton 59 y.o. female MRN: [id number]  Unit/Bed#:  Encounter: [id number]        Assessment/Plan     Assessment:  Florida Clinton is a 59 y.o. female with history of laparoscopic Peyton-en-Y gastric bypass and repair of paraesophageal hernia with mesh 6/2015 with Dr. Farooq for BMI 46.5 presented to [facility] ED with severe abdominal pain and vomiting, CT scan with findings of bowel obstruction and possible internal hernia.    Patient admitted to the hospital approximately 2 months ago for ruptured appendicitis managed nonoperatively.    Plan:  Patient seen and evaluated upon transfer to [place].  Admit to bariatric surgery service  Discussed with Dr. Farooq, bariatric surgeon on-call  To OR emergently for diagnostic laparoscopy, possible endoscopy.  Patient consented and agreeable to proceed  Heparin for DVT prophylaxis  Protonix for GI prophylaxis  N.p.o. with IV fluids  IV vitamin infusion postprocedure    History of Present Illness   HPI:  Florida Clinton is a 59 y.o. female with history of laparoscopic Peyton-en-Y gastric bypass and paraesophageal hernia repair with mesh with Dr. Farooq in 2015 who presents with severe abdominal pain and vomiting, to [facility] ED.  She underwent a CT scan concerning for bowel obstruction from an internal hernia.    Of note the patient developed ruptured appendicitis late February managed nonoperatively by general surgery who plans on scheduling an interval appendectomy in June.    The patient has a recent EGD and colonoscopy on last week 4/23 with no significant findings, except a type 1 hiatal hernia in setting of PEHR in 2015. Colonoscopy was normal.     The patient reports persistent abdominal pain and nausea on arrival to [place] ED, also hypertension /92. Patient denies fevers or chills.    The patient reports no longer following up with weight management center,    The patient denies any additional abdominal surgery after the RnYGB.  The patient denies taking blood thinners. She last ate around 7pm yesterday.    Review of Systems   Constitutional:  Negative for chills and fever.   HENT:  Negative for ear pain and sore throat.    Eyes:  Negative for pain and visual disturbance.   Respiratory:  Negative for cough and shortness of breath.    Cardiovascular:  Negative for chest pain and palpitations.   Gastrointestinal:  Positive for abdominal pain and vomiting.   Genitourinary:  Negative for dysuria and hematuria.   Musculoskeletal:  Negative for arthralgias and back pain.   Skin:  Negative for color change and rash.   Neurological:  Negative for seizures and syncope.   All other systems reviewed and are negative.      Historical Information   Past Medical History:   Diagnosis Date    Abdominal pain     Chronic pain disorder     abdominal    Depression     Diverticula of colon     Former smoker     Resolved 2012     GERD (gastroesophageal reflux disease)     Headache(784.0) 10/1/2020    Hemorrhoids     Hiatal hernia     Hyperlipidemia     Intestinal malabsorption following gastrectomy     Morbid obesity (HCC)     Resolved 10/2/2015     Obesity     Postgastrectomy malabsorption     Vitamin D insufficiency     Resolved 2016      Past Surgical History:   Procedure Laterality Date    ABDOMINAL SURGERY      gastric bipass    ANKLE SURGERY       SECTION      (2) ,      COLONOSCOPY      ESOPHAGOGASTRODUODENOSCOPY      x2    GASTRIC BYPASS  2015    HERNIA REPAIR  2015    Paraesophageal hiatus hernia. Managed by Eliezer Msihra (General Surgery)    [place] EGD TRANSORAL BIOPSY SINGLE/MULTIPLE N/A 2017    Procedure: ESOPHAGOGASTRODUODENOSCOPY (EGD);  Surgeon: Emmie Rosen MD;  Location: [facility];  Service: Bariatrics    TONSILLECTOMY  1984    UTERINE FIBROID SURGERY       Social History   Social History     Substance and Sexual Activity   Alcohol Use Yes    Comment: social     Social History     Substance and Sexual  Activity   Drug Use No     Social History     Tobacco Use   Smoking Status Former    Current packs/day: 0.00    Types: Cigarettes    Quit date:     Years since quittin.3   Smokeless Tobacco Never   Tobacco Comments    20+ pack year hx - As per [employer]      E-Cigarette/Vaping    E-Cigarette Use Never User       E-Cigarette/Vaping Substances    Nicotine No     THC No     CBD No      Family History: non-contributory    Meds/Allergies   all medications and allergies reviewed  Allergies   Allergen Reactions    Ibuprofen Other (See Comments)     Gastric bypass       Objective   Vitals: There were no vitals taken for this visit.,There is no height or weight on file to calculate BMI.    No intake or output data in the 24 hours ending 24 0754    Invasive Devices       Peripheral Intravenous Line  Duration             Peripheral IV 24 Proximal;Right;Ventral (anterior) Forearm <1 day                    Physical Exam  Vitals and nursing note reviewed.   Constitutional:       General: She is not in acute distress.     Appearance: She is well-developed.   HENT:      Head: Normocephalic and atraumatic.   Eyes:      Conjunctiva/sclera: Conjunctivae normal.   Cardiovascular:      Rate and Rhythm: Normal rate and regular rhythm.      Heart sounds: No murmur heard.  Pulmonary:      Effort: Pulmonary effort is normal. No respiratory distress.      Breath sounds: Normal breath sounds.   Abdominal:      Palpations: Abdomen is soft.      Tenderness: There is abdominal tenderness.   Musculoskeletal:         General: No swelling.      Cervical back: Neck supple.   Skin:     General: Skin is warm and dry.      Capillary Refill: Capillary refill takes less than 2 seconds.   Neurological:      Mental Status: She is alert.   Psychiatric:         Mood and Affect: Mood normal.         Lab Results: I have personally reviewed pertinent labs.  CBC:   Lab Results   Component Value Date    WBC 9.87 2024    HGB 14.9  05/04/2024    HCT 45.0 05/04/2024    MCV 92 05/04/2024     05/04/2024    RBC 4.92 05/04/2024    MCH 30.3 05/04/2024    MCHC 33.1 05/04/2024    RDW 12.4 05/04/2024    MPV 10.1 05/04/2024    NRBC 0 05/04/2024     CMP:   Lab Results   Component Value Date    SODIUM 141 05/04/2024     05/04/2024    CO2 22 05/04/2024    BUN 18 05/04/2024    CREATININE 0.90 05/04/2024    CALCIUM 10.0 05/04/2024    AST 18 05/04/2024    ALT 14 05/04/2024    ALKPHOS 77 05/04/2024    EGFR 69 05/04/2024     Lactic Acid:   Lab Results   Component Value Date    LACTICACID 1.2 05/04/2024     Imaging: I have personally reviewed pertinent reports.   and I have personally reviewed pertinent films in PACS  EKG, Pathology, and Other Studies: I have personally reviewed pertinent reports.   and I have personally reviewed pertinent films in PACS    Code Status: Prior  Advance Directive and Living Will:      Power of :    POLST:      Counseling / Coordination of Care  Total floor / unit time spent today 41 minutes.  Greater than 50% of total time was spent with the patient and / or family counseling and / or coordination of care.  A description of the counseling / coordination of care: Transferring patient for surgical management.    Jona Knox MD  Bariatric Surgery

## 2024-05-04 NOTE — DISCHARGE INSTRUCTIONS
You had emergency surgery for bowel obstruction  The bowel obstruction was related to adhesions from prior perforated appendicitis  Your gastric bypass anatomy is normal on our evaluation  Since you had surgery he will have pain from the incision sites, take Tylenol around-the-clock.  Try to limit narcotics as this can be constipating    Diet  When you go home continue full liquid diet for 3 to 4 days  Afterwards advance to puréed diet for 3 to 4 days  If you are tolerating puréed diet then you can advance to soft foods until you see us in clinic  This diet plan is helpful after you experienced a bowel obstruction.

## 2024-05-04 NOTE — EMTALA/ACUTE CARE TRANSFER
[facility]LD[facility] EMERGENCY DEPARTMENT  [address] [place] [address]  Dept: [phone]      EMTALA TRANSFER CONSENT    NAME Florida Clinton                                         1965                              MRN [id number]    I have been informed of my rights regarding examination, treatment, and transfer   by Dr. Amrita wolfe. providers found    Benefits: Specialized equipment and/or services available at the receiving facility (Include comment)________________________ (Bariatric Surgery)    Risks: Potential for delay in receiving treatment, Possible worsening of condition or death during transfer, Potential deterioration of medical condition      Consent for Transfer:  I acknowledge that my medical condition has been evaluated and explained to me by the emergency department physician or other qualified medical person and/or my attending physician, who has recommended that I be transferred to the service of  Accepting Physician: Dr. Farooq at Accepting Facility Name, City & State : [facility], [place]. The above potential benefits of such transfer, the potential risks associated with such transfer, and the probable risks of not being transferred have been explained to me, and I fully understand them.  The doctor has explained that, in my case, the benefits of transfer outweigh the risks.  I agree to be transferred.    I authorize the performance of emergency medical procedures and treatments upon me in both transit and upon arrival at the receiving facility.  Additionally, I authorize the release of any and all medical records to the receiving facility and request they be transported with me, if possible.  I understand that the safest mode of transportation during a medical emergency is an ambulance and that the Hospital advocates the use of this mode of transport. Risks of traveling to the receiving facility by car, including absence of medical control, life  sustaining equipment, such as oxygen, and medical personnel has been explained to me and I fully understand them.    (MITCH CORRECT BOX BELOW)  [  ]  I consent to the stated transfer and to be transported by ambulance/helicopter.  [  ]  I consent to the stated transfer, but refuse transportation by ambulance and accept full responsibility for my transportation by car.  I understand the risks of non-ambulance transfers and I exonerate the Hospital and its staff from any deterioration in my condition that results from this refusal.    X___________________________________________    DATE  24  TIME________  Signature of patient or legally responsible individual signing on patient behalf           RELATIONSHIP TO PATIENT_________________________          Provider Certification    NAME Florida Clinton                                         1965                              MRN [id number]    A medical screening exam was performed on the above named patient.  Based on the examination:    Condition Necessitating Transfer There were no encounter diagnoses.    Patient Condition: The patient has been stabilized such that within reasonable medical probability, no material deterioration of the patient condition or the condition of the unborn child(pedro luis) is likely to result from the transfer    Reason for Transfer: Level of Care needed not available at this facility    Transfer Requirements: Facility [place], [place]   Space available and qualified personnel available for treatment as acknowledged by Kimberly Castillo  Agreed to accept transfer and to provide appropriate medical treatment as acknowledged by       Dr. Farooq  Appropriate medical records of the examination and treatment of the patient are provided at the time of transfer   STAFF INITIAL WHEN COMPLETED _______  Transfer will be performed by qualified personnel from [facility]  and appropriate transfer equipment as required, including the use of  necessary and appropriate life support measures.    Provider Certification: I have examined the patient and explained the following risks and benefits of being transferred/refusing transfer to the patient/family:  General risk, such as traffic hazards, adverse weather conditions, rough terrain or turbulence, possible failure of equipment (including vehicle or aircraft), or consequences of actions of persons outside the control of the transport personnel, Unanticipated needs of medical equipment and personnel during transport, Risk of worsening condition, The possibility of a transport vehicle being unavailable      Based on these reasonable risks and benefits to the patient and/or the unborn child(pedro luis), and based upon the information available at the time of the patient’s examination, I certify that the medical benefits reasonably to be expected from the provision of appropriate medical treatments at another medical facility outweigh the increasing risks, if any, to the individual’s medical condition, and in the case of labor to the unborn child, from effecting the transfer.    X____________________________________________ DATE 05/04/24        TIME_______      ORIGINAL - SEND TO MEDICAL RECORDS   COPY - SEND WITH PATIENT DURING TRANSFER

## 2024-05-04 NOTE — PLAN OF CARE
Problem: PAIN - ADULT  Goal: Verbalizes/displays adequate comfort level or baseline comfort level  Description: Interventions:  - Encourage patient to monitor pain and request assistance  - Assess pain using appropriate pain scale  - Administer analgesics based on type and severity of pain and evaluate response  - Implement non-pharmacological measures as appropriate and evaluate response  - Consider cultural and social influences on pain and pain management  - Notify physician/advanced practitioner if interventions unsuccessful or patient reports new pain  Outcome: Progressing     Problem: INFECTION - ADULT  Goal: Absence or prevention of progression during hospitalization  Description: INTERVENTIONS:  - Assess and monitor for signs and symptoms of infection  - Monitor lab/diagnostic results  - Monitor all insertion sites, i.e. indwelling lines, tubes, and drains  - Monitor endotracheal if appropriate and nasal secretions for changes in amount and color  - [place] appropriate cooling/warming therapies per order  - Administer medications as ordered  - Instruct and encourage patient and family to use good hand hygiene technique  - Identify and instruct in appropriate isolation precautions for identified infection/condition  Outcome: Progressing  Goal: Absence of fever/infection during neutropenic period  Description: INTERVENTIONS:  - Monitor WBC    Outcome: Progressing     Problem: SAFETY ADULT  Goal: Patient will remain free of falls  Description: INTERVENTIONS:  - Educate patient/family on patient safety including physical limitations  - Instruct patient to call for assistance with activity   - Consult OT/PT to assist with strengthening/mobility   - Keep Call bell within reach  - Keep bed low and locked with side rails adjusted as appropriate  - Keep care items and personal belongings within reach  - Initiate and maintain comfort rounds  - Make Fall Risk Sign visible to staff  - Offer Toileting every  Hours,  in advance of need  - Initiate/Maintain alarm  - Obtain necessary fall risk management equipment:  - Apply yellow socks and bracelet for high fall risk patients  - Consider moving patient to room near nurses station  Outcome: Progressing  Goal: Maintain or return to baseline ADL function  Description: INTERVENTIONS:  -  Assess patient's ability to carry out ADLs; assess patient's baseline for ADL function and identify physical deficits which impact ability to perform ADLs (bathing, care of mouth/teeth, toileting, grooming, dressing, etc.)  - Assess/evaluate cause of self-care deficits   - Assess range of motion  - Assess patient's mobility; develop plan if impaired  - Assess patient's need for assistive devices and provide as appropriate  - Encourage maximum independence but intervene and supervise when necessary  - Involve family in performance of ADLs  - Assess for home care needs following discharge   - Consider OT consult to assist with ADL evaluation and planning for discharge  - Provide patient education as appropriate  Outcome: Progressing  Goal: Maintains/Returns to pre admission functional level  Description: INTERVENTIONS:  - Perform AM-PAC 6 Click Basic Mobility/ Daily Activity assessment daily.  - Set and communicate daily mobility goal to care team and patient/family/caregiver.   - Collaborate with rehabilitation services on mobility goals if consulted  - Perform Range of Motion  times a day.  - Reposition patient every  hours.  - Dangle patient  times a day  - Stand patient  times a day  - Ambulate patient  times a day  - Out of bed to chair  times a day   - Out of bed for meals  times a day  - Out of bed for toileting  - Record patient progress and toleration of activity level   Outcome: Progressing     Problem: DISCHARGE PLANNING  Goal: Discharge to home or other facility with appropriate resources  Description: INTERVENTIONS:  - Identify barriers to discharge w/patient and caregiver  - Arrange for  needed discharge resources and transportation as appropriate  - Identify discharge learning needs (meds, wound care, etc.)  - Arrange for interpretive services to assist at discharge as needed  - Refer to Case Management Department for coordinating discharge planning if the patient needs post-hospital services based on physician/advanced practitioner order or complex needs related to functional status, cognitive ability, or social support system  Outcome: Progressing     Problem: Knowledge Deficit  Goal: Patient/family/caregiver demonstrates understanding of disease process, treatment plan, medications, and discharge instructions  Description: Complete learning assessment and assess knowledge base.  Interventions:  - Provide teaching at level of understanding  - Provide teaching via preferred learning methods  Outcome: Progressing

## 2024-05-04 NOTE — DISCHARGE INSTR - AVS FIRST PAGE
Bariatric/Weight Loss Surgery  Emergency Surgery  Hospital Discharge Instructions  ACTIVITY:  Progress as feels comfortable - a good rule is:  if you are doing something and it begins to hurt, stop doing the activity. Walk every hour while at home.  You may walk stairs if you do so slowly  You may shower 48 hours after surgery.  Do not scrub incision sites.  Blot gently with clean towel to dry incisions. (see #4 below)   Use your incentive spirometer 10 times per hour while awake for 1 week after surgery.  Do NOT drive for 48 hours after surgery. No driving 24 hours after taking certain prescription pain medications. Examples of such medication are Percocet, Darvocet, Oxycodone, Tylenol #3, and Tylenol with Codeine.     DIET  Stay on full liquid diet for 3 to 4 days, then advance to puréed diet for 3 to 4 days.  If you tolerate puréed diet then you can advance to soft diet until you see us in the clinic.  This is a post bowel obstruction diet plan.    MEDICATIONS:  The abdominal nerve block will wear off during the first 1-2 days that you are home, and you may become sore (especially over incision site/sites where abdominal wall is sutured). This may create a pulling sensation, especially while moving around, and will fade over time.  Continue to take your Tylenol and your pain medication as instructed.   Start vitamins and minerals one week after surgery or when you start stage 3/puree diet.   Anti-acid Medication as per prescription.  Other medications as indicated on the Physician Patient Discharge Instructions form given to you at the time of discharge.  Make sure that you are splitting your pill or tablet medications in halves or fourths or even crushing them before you take them. Capsules should be opened and mixed with water or jello. You need to do this for at least 4 weeks after surgery. Eventually you will be able to take your medications the regular way as they were prescribed.   You will need to consult  with your Family Doctor in regards to all your prescribed medication, particularly those for blood pressure and diabetes.  As you lose weight, medical conditions may change, requiring an alteration or elimination of the drug dose. Monitor blood pressure closely and call PCP with any concerns.       INCISION CARE  You may shower and get incisions wet 2 days after surgery. No soaking tub baths or swimming for 30 days after surgery. Keep abdominal area and incisions clean. Use soap and water to create a good lather and rinse off.  Do not scrub incisions.   If you have a drain, empty the drain as the nurses instructed.    FOLLOW-UP APPOINTMENT should be made for 2 weeks after discharge. Call surgeon’s office at [phone] to schedule an appointment.    CALL YOUR DOCTOR FOR:  pain not controlled by pain medications, a temperature greater than 101.5° F, any increase or change in drainage or redness from any incision, any vomiting or inability to keep liquids down, shortness of breath, shoulder pain, or bleeding

## 2024-05-04 NOTE — ANESTHESIA PREPROCEDURE EVALUATION
Procedure:  LAPAROSCOPY DIAGNOSTIC (Abdomen)    Relevant Problems   ANESTHESIA   (+) Sea sickness      CARDIO   (+) Hypercholesterolemia      ENDO   (+) Hyperparathyroidism (HCC)      GI/HEPATIC   (+) Esophageal reflux      MUSCULOSKELETAL   (+) Chronic bilateral low back pain without sciatica      NEURO/PSYCH   (+) Chronic bilateral low back pain without sciatica   (+) Major depressive disorder with single episode, in partial remission (HCC)      Neurology/Sleep   (+) Memory difficulties      Surgery/Wound/Pain   (+) Weight gain following gastric bypass surgery      Cardiovascular/Peripheral Vascular   (+) Postural dizziness with near syncope      Other   (+) Class 2 obesity without serious comorbidity with body mass index (BMI) of 36.0 to 36.9 in adult        Physical Exam    Airway    Mallampati score: II  TM Distance: >3 FB  Neck ROM: full     Dental   No notable dental hx     Cardiovascular  Cardiovascular exam normal    Pulmonary  Pulmonary exam normal     Other Findings  post-pubertal.      Anesthesia Plan  ASA Score- 2 Emergent    Anesthesia Type- general with ASA Monitors.         Additional Monitors:     Airway Plan: ETT.           Plan Factors-Exercise tolerance (METS): >4 METS.    Chart reviewed.  Imaging results reviewed. Existing labs reviewed. Patient summary reviewed.    Patient is not a current smoker.              Induction- intravenous.    Postoperative Plan-     Informed Consent- Anesthetic plan and risks discussed with patient.

## 2024-05-05 VITALS
HEART RATE: 57 BPM | OXYGEN SATURATION: 94 % | SYSTOLIC BLOOD PRESSURE: 109 MMHG | TEMPERATURE: 98.6 F | DIASTOLIC BLOOD PRESSURE: 65 MMHG | BODY MASS INDEX: 35.69 KG/M2 | RESPIRATION RATE: 16 BRPM | WEIGHT: 195.11 LBS

## 2024-05-05 LAB
ANION GAP SERPL CALCULATED.3IONS-SCNC: 5 MMOL/L (ref 4–13)
BUN SERPL-MCNC: 12 MG/DL (ref 5–25)
CALCIUM SERPL-MCNC: 9.3 MG/DL (ref 8.4–10.2)
CHLORIDE SERPL-SCNC: 109 MMOL/L (ref 96–108)
CO2 SERPL-SCNC: 23 MMOL/L (ref 21–32)
CREAT SERPL-MCNC: 0.72 MG/DL (ref 0.6–1.3)
ERYTHROCYTE [DISTWIDTH] IN BLOOD BY AUTOMATED COUNT: 12.7 % (ref 11.6–15.1)
GFR SERPL CREATININE-BSD FRML MDRD: 91 ML/MIN/1.73SQ M
GLUCOSE SERPL-MCNC: 111 MG/DL (ref 65–140)
HCT VFR BLD AUTO: 37.7 % (ref 34.8–46.1)
HGB BLD-MCNC: 12.4 G/DL (ref 11.5–15.4)
MCH RBC QN AUTO: 30.5 PG (ref 26.8–34.3)
MCHC RBC AUTO-ENTMCNC: 32.9 G/DL (ref 31.4–37.4)
MCV RBC AUTO: 93 FL (ref 82–98)
PLATELET # BLD AUTO: 227 THOUSANDS/UL (ref 149–390)
PMV BLD AUTO: 11 FL (ref 8.9–12.7)
POTASSIUM SERPL-SCNC: 4.2 MMOL/L (ref 3.5–5.3)
RBC # BLD AUTO: 4.06 MILLION/UL (ref 3.81–5.12)
SODIUM SERPL-SCNC: 137 MMOL/L (ref 135–147)
WBC # BLD AUTO: 7.91 THOUSAND/UL (ref 4.31–10.16)

## 2024-05-05 PROCEDURE — 85027 COMPLETE CBC AUTOMATED: CPT | Performed by: STUDENT IN AN ORGANIZED HEALTH CARE EDUCATION/TRAINING PROGRAM

## 2024-05-05 PROCEDURE — NC001 PR NO CHARGE: Performed by: STUDENT IN AN ORGANIZED HEALTH CARE EDUCATION/TRAINING PROGRAM

## 2024-05-05 PROCEDURE — 80048 BASIC METABOLIC PNL TOTAL CA: CPT | Performed by: STUDENT IN AN ORGANIZED HEALTH CARE EDUCATION/TRAINING PROGRAM

## 2024-05-05 PROCEDURE — 99024 POSTOP FOLLOW-UP VISIT: CPT | Performed by: STUDENT IN AN ORGANIZED HEALTH CARE EDUCATION/TRAINING PROGRAM

## 2024-05-05 RX ADMIN — PRAVASTATIN SODIUM 20 MG: 20 TABLET ORAL at 08:34

## 2024-05-05 RX ADMIN — ENOXAPARIN SODIUM 40 MG: 40 INJECTION SUBCUTANEOUS at 08:34

## 2024-05-05 RX ADMIN — OXYCODONE HYDROCHLORIDE 10 MG: 5 SOLUTION ORAL at 11:04

## 2024-05-05 RX ADMIN — FLUOXETINE 40 MG: 20 CAPSULE ORAL at 08:34

## 2024-05-05 RX ADMIN — SODIUM CHLORIDE, SODIUM LACTATE, POTASSIUM CHLORIDE, AND CALCIUM CHLORIDE 75 ML/HR: .6; .31; .03; .02 INJECTION, SOLUTION INTRAVENOUS at 01:41

## 2024-05-05 RX ADMIN — OXYCODONE HYDROCHLORIDE 10 MG: 5 SOLUTION ORAL at 06:42

## 2024-05-05 RX ADMIN — FOLIC ACID: 5 INJECTION, SOLUTION INTRAMUSCULAR; INTRAVENOUS; SUBCUTANEOUS at 08:33

## 2024-05-05 RX ADMIN — FAMOTIDINE 20 MG: 10 INJECTION INTRAVENOUS at 08:34

## 2024-05-05 NOTE — DISCHARGE SUMMARY
Discharge Summary - Florida Clinton 59 y.o. female MRN: [id number]    Unit/Bed#: E5 -01 Encounter: [id number]      Pre-Operative Diagnosis: Pre-Op Diagnosis Codes:     * Internal hernia [K45.8]    Post-Operative Diagnosis: Post-Op Diagnosis Codes:     * Internal hernia [K45.8]    Procedures Performed:  Procedure(s):  LAPAROSCOPY DIAGNOSTIC    Surgeon: Eliezer Farooq MD    See H & P for full details of admission and Operative Note for full details of operations performed.     Patient tolerated surgery well without complications. In the morning postoperative Day 1, the patient had no nausea and mild incisional abdominal pain. Tolerated a clear liquid diet without vomiting. Able to ambulate and voiding independently. Patient was deemed ready for discharge home.     Patient was seen and examined prior to discharge.      Provisions for Follow-Up Care:  See After Visit Summary/Discharge Instructions for information related to follow-up care and home orders.      Disposition: Home, in stable condition.     Planned Readmission: No    Discharge Medications:  See After Visit Summary/Discharge Instructions for reconciled discharge medications provided to patient and family.      Post Operative instructions: Reviewed with patient and/or family.    The patient will stay on full liquid diet and puréed diet for about a week then advance to soft diet.    She will see Dr. Farooq in 2 weeks.    Signature:   Jona Knox MD  Date: 5/5/2024 Time: 9:51 AM

## 2024-05-05 NOTE — ED PROVIDER NOTES
History  Chief Complaint   Patient presents with    Abdominal Pain     Pt reports her appendix ruptured about 2 months ago, but it was not removed. Started having abdominal pain and nausea started again at 1 this morning.     Patient is a 59-year-old female with history gastric bypass as well as ruptured appendicitis who presented to the ED for abdominal pain and nausea/vomiting.  Patient stated that she woke up at 1 AM with 10/10 abdominal pain, diffuse but worse in the epigastric region, without radiation.  Patient stated that the pain has been constant since then.  She is also had intractable vomiting as well as inability to tolerate p.o. intake.  She stated that it felt similar but worse than recent ruptured appendicitis 2 months ago.  Reportedly, patient had this ruptured appendicitis 2 months ago and was treated medically and plan is for surgical removal in a few months.  She endorsed some chills associated with the pain but denies any fevers.  On evaluation, patient appeared in acute distress due to abdominal pain and was also noted to be mildly diaphoretic and generally ill-appearing.        Prior to Admission Medications   Prescriptions Last Dose Informant Patient Reported? Taking?   Calcium Citrate-Vitamin D 250-200 MG-UNIT TABS  Self Yes No   Sig: Take 1 tablet by mouth 2 (two) times a day   Cholecalciferol (D3 5000) 125 MCG (5000 UT) capsule  Self Yes No   FLUoxetine (PROzac) 20 mg capsule  Self No No   Sig: TAKE 2 CAPSULES BY MOUTH EVERY DAY   MULTIPLE VITAMIN PO  Self Yes No   Sig: Take 1 tablet by mouth daily   Melatonin 5 MG CAPS  Self Yes No   Sig: Take 5 mg by mouth daily at bedtime    ferrous gluconate (FERGON) 324 mg tablet  Self Yes No   Sig: Take 324 mg by mouth every other day Every other day   pravastatin (PRAVACHOL) 20 mg tablet  Self No No   Sig: TAKE 1 TABLET BY MOUTH EVERY DAY   sodium picosulfate, magnesium oxide, citric acid (Clenpiq) oral solution   No No   Sig: Take as directed by the  office for colonoscopy.      Facility-Administered Medications: None       Past Medical History:   Diagnosis Date    Abdominal pain     Chronic pain disorder     abdominal    Depression     Diverticula of colon     Former smoker     Resolved 2012     GERD (gastroesophageal reflux disease)     Headache(784.0) 10/1/2020    Hemorrhoids     Hiatal hernia     Hyperlipidemia     Intestinal malabsorption following gastrectomy     Morbid obesity (HCC)     Resolved 10/2/2015     Obesity     Postgastrectomy malabsorption     Vitamin D insufficiency     Resolved 2016        Past Surgical History:   Procedure Laterality Date    ABDOMINAL SURGERY      gastric bipass    ANKLE SURGERY       SECTION      (2) ,      COLONOSCOPY      ESOPHAGOGASTRODUODENOSCOPY      x2    GASTRIC BYPASS  2015    HERNIA REPAIR  2015    Paraesophageal hiatus hernia. Managed by Eliezer Mishra (General Surgery)    FL EGD TRANSORAL BIOPSY SINGLE/MULTIPLE N/A 2017    Procedure: ESOPHAGOGASTRODUODENOSCOPY (EGD);  Surgeon: Emmie Rosen MD;  Location: [facility];  Service: Bariatrics    FL LAPS ABD PRTM&OMENTUM DX W/WO SPEC BR/WA SPX N/A 2024    Procedure: LAPAROSCOPY DIAGNOSTIC;  Surgeon: Eliezer Farooq MD;  Location: [facility];  Service: Bariatrics    TONSILLECTOMY  1984    UTERINE FIBROID SURGERY         Family History   Problem Relation Age of Onset    Breast cancer Mother 31    Prostate cancer Father 50    Hypertension Father     Heart disease Father         Coronary arteriosclerosis     Hyperlipidemia Father     Heart disease Maternal Grandmother         Coronary arteriosclerosis     Heart disease Maternal Grandfather         Coronary arteriosclerosis     COPD Maternal Grandfather         heavy smoker    Diabetes Paternal Grandmother     No Known Problems Brother     No Known Problems Daughter     No Known Problems Daughter     No Known Problems Paternal Aunt     Stroke Neg Hx     Thyroid disease Neg Hx       I have reviewed and agree with the history as documented.    E-Cigarette/Vaping    E-Cigarette Use Never User      E-Cigarette/Vaping Substances    Nicotine No     THC No     CBD No      Social History     Tobacco Use    Smoking status: Former     Current packs/day: 0.00     Types: Cigarettes     Quit date:      Years since quittin.3    Smokeless tobacco: Never    Tobacco comments:     20+ pack year hx - As per [employer]    Vaping Use    Vaping status: Never Used   Substance Use Topics    Alcohol use: Yes     Comment: social    Drug use: No        Review of Systems   Constitutional:  Positive for chills and diaphoresis.   HENT: Negative.     Respiratory:  Negative for cough and shortness of breath.    Cardiovascular:  Negative for chest pain and palpitations.   Gastrointestinal:  Positive for abdominal pain, nausea and vomiting.   Genitourinary:  Positive for difficulty urinating.   Skin:  Negative for color change and pallor.   Neurological:  Negative for syncope and headaches.   Psychiatric/Behavioral:  Negative for agitation, behavioral problems and confusion.    All other systems reviewed and are negative.      Physical Exam  ED Triage Vitals   Temperature Pulse Respirations Blood Pressure SpO2   24 0427 24 0427 24 0427 24 0430 24 0427   97.9 °F (36.6 °C) 55 20 (!) 195/93 100 %      Temp Source Heart Rate Source Patient Position - Orthostatic VS BP Location FiO2 (%)   24 0427 24 0427 24 0430 24 0430 --   Oral Monitor Lying Right arm       Pain Score       24 0435       8             Orthostatic Vital Signs  Vitals:    24 0615 24 0630 24 0700 24 0800   BP: (!) 182/96 (!) 180/88 (!) 174/89 110/63   Pulse: (!) 52 (!) 49 (!) 52 56   Patient Position - Orthostatic VS: Sitting Sitting         Physical Exam  Vitals and nursing note reviewed.   Constitutional:       General: She is in acute distress.      Appearance: She is  well-developed.   HENT:      Head: Normocephalic and atraumatic.   Eyes:      Extraocular Movements: Extraocular movements intact.      Pupils: Pupils are equal, round, and reactive to light.   Cardiovascular:      Rate and Rhythm: Normal rate and regular rhythm.      Heart sounds: Normal heart sounds.   Pulmonary:      Effort: Pulmonary effort is normal.      Breath sounds: Normal breath sounds.   Abdominal:      General: Abdomen is flat. Bowel sounds are normal.      Palpations: Abdomen is soft.      Tenderness: There is generalized abdominal tenderness. There is guarding.   Skin:     General: Skin is warm and dry.      Capillary Refill: Capillary refill takes less than 2 seconds.   Neurological:      General: No focal deficit present.      Mental Status: She is alert and oriented to person, place, and time.   Psychiatric:         Mood and Affect: Mood normal.         Behavior: Behavior normal.         ED Medications  Medications   ondansetron (ZOFRAN) injection 4 mg (4 mg Intravenous Given 5/4/24 0441)   HYDROmorphone HCl (DILAUDID) injection 0.2 mg (0.2 mg Intravenous Given 5/4/24 0503)   sodium chloride 0.9 % bolus 1,000 mL (1,000 mL Intravenous New Bag 5/4/24 0504)   HYDROmorphone HCl (DILAUDID) injection 0.2 mg (0.2 mg Intravenous Given 5/4/24 0546)   iohexol (OMNIPAQUE) 350 MG/ML injection (MULTI-DOSE) 85 mL (85 mL Intravenous Given 5/4/24 0540)   ondansetron (ZOFRAN) injection 4 mg (4 mg Intravenous Given 5/4/24 0616)   HYDROmorphone HCl (DILAUDID) injection 0.2 mg (0.2 mg Intravenous Given 5/4/24 0619)   HYDROmorphone HCl (DILAUDID) injection 0.2 mg (0.2 mg Intravenous Given 5/4/24 0736)   promethazine (PHENERGAN) injection 25 mg (25 mg Intramuscular Given 5/4/24 0748)       Diagnostic Studies  Results Reviewed       Procedure Component Value Units Date/Time    Lactic acid, plasma (w/reflex if result > 2.0) [[id number]]  (Normal) Collected: 05/04/24 0700    Lab Status: Final result Specimen: Blood from Arm,  Right Updated: 05/04/24 0729     LACTIC ACID 1.2 mmol/L     Narrative:      Result may be elevated if tourniquet was used during collection.    Urine Microscopic [[id number]]  (Abnormal) Collected: 05/04/24 0535    Lab Status: Final result Specimen: Urine, Clean Catch Updated: 05/04/24 0557     RBC, UA 2-4 /hpf      WBC, UA None Seen /hpf      Epithelial Cells Occasional /hpf      Bacteria, UA Occasional /hpf      MUCUS THREADS Occasional     Amorphous Crystals, UA Occasional    UA w Reflex to Microscopic w Reflex to Culture [[id number]]  (Abnormal) Collected: 05/04/24 0535    Lab Status: Final result Specimen: Urine, Clean Catch Updated: 05/04/24 0552     Color, UA Yellow     Clarity, UA Turbid     Specific Gravity, UA 1.021     pH, UA 8.0     Leukocytes, UA Negative     Nitrite, UA Negative     Protein, UA Trace mg/dl      Glucose, UA Negative mg/dl      Ketones, UA 40 (2+) mg/dl      Urobilinogen, UA <2.0 mg/dl      Bilirubin, UA Negative     Occult Blood, UA Negative    Basic metabolic panel [[id number]]  (Abnormal) Collected: 05/04/24 0439    Lab Status: Final result Specimen: Blood from Arm, Right Updated: 05/04/24 0507     Sodium 141 mmol/L      Potassium 3.8 mmol/L      Chloride 105 mmol/L      CO2 22 mmol/L      ANION GAP 14 mmol/L      BUN 18 mg/dL      Creatinine 0.90 mg/dL      Glucose 146 mg/dL      Calcium 10.0 mg/dL      eGFR 69 ml/min/1.73sq m     Narrative:      National Kid[employer] Foundation guidelines for Chronic Kidney Disease (CKD):     Stage 1 with normal or high GFR (GFR > 90 mL/min/1.73 square meters)    Stage 2 Mild CKD (GFR = 60-89 mL/min/1.73 square meters)    Stage 3A Moderate CKD (GFR = 45-59 mL/min/1.73 square meters)    Stage 3B Moderate CKD (GFR = 30-44 mL/min/1.73 square meters)    Stage 4 Severe CKD (GFR = 15-29 mL/min/1.73 square meters)    Stage 5 End Stage CKD (GFR <15 mL/min/1.73 square meters)  Note: GFR calculation is accurate only with a steady state creatinine    Hepatic  function panel [[id number]]  (Normal) Collected: 05/04/24 0439    Lab Status: Final result Specimen: Blood from Arm, Right Updated: 05/04/24 0507     Total Bilirubin 0.53 mg/dL      Bilirubin, Direct 0.13 mg/dL      Alkaline Phosphatase 77 U/L      AST 18 U/L      ALT 14 U/L      Total Protein 7.1 g/dL      Albumin 4.5 g/dL     Lipase [[id number]]  (Normal) Collected: 05/04/24 0439    Lab Status: Final result Specimen: Blood from Arm, Right Updated: 05/04/24 0507     Lipase 17 u/L     CBC and differential [[id number]] Collected: 05/04/24 0439    Lab Status: Final result Specimen: Blood from Arm, Right Updated: 05/04/24 0446     WBC 9.87 Thousand/uL      RBC 4.92 Million/uL      Hemoglobin 14.9 g/dL      Hematocrit 45.0 %      MCV 92 fL      MCH 30.3 pg      MCHC 33.1 g/dL      RDW 12.4 %      MPV 10.1 fL      Platelets 292 Thousands/uL      nRBC 0 /100 WBCs      Segmented % 68 %      Immature Grans % 0 %      Lymphocytes % 26 %      Monocytes % 5 %      Eosinophils Relative 1 %      Basophils Relative 0 %      Absolute Neutrophils 6.72 Thousands/µL      Absolute Immature Grans 0.02 Thousand/uL      Absolute Lymphocytes 2.54 Thousands/µL      Absolute Monocytes 0.48 Thousand/µL      Eosinophils Absolute 0.09 Thousand/µL      Basophils Absolute 0.02 Thousands/µL                    CT abdomen pelvis with contrast   Final Result by Vasyl Weiner MD (05/04 0658)      Dilated small bowel in the left mid abdomen attributed to SBO with additional features suggestive of closed loop SBO secondary to internal hernia. Dilated small bowel distended up to 3.5 cm with perienteric stranding. No pneumatosis intestinalis or    pneumoperitoneum.      Mobile cecum in the left upper abdomen in the vicinity of the internal hernia.      Nondilated appendix.      I personally discussed this study with Esau Morel on 5/4/2024 at 06:55                     Workstation performed: [id number]               Procedures  ECG 12 Lead  Documentation Only    Date/Time: 5/4/2024 6:15 AM    Performed by: Esau Morel MD  Authorized by: Esau Morel MD    Indications / Diagnosis:  Abdominal Pain  ECG reviewed by me, the ED Provider: yes    Patient location:  ED  Previous ECG:     Comparison to cardiac monitor: Yes    Interpretation:     Interpretation: normal    Rate:     ECG rate:  51    ECG rate assessment: bradycardic    Rhythm:     Rhythm: sinus bradycardia    Ectopy:     Ectopy: none    QRS:     QRS axis:  Normal    QRS intervals:  Normal  Conduction:     Conduction: normal    ST segments:     ST segments:  Normal  T waves:     T waves: normal          ED Course  ED Course as of 05/07/24 0830   Sat May 04, 2024   0557 Carbon Dioxide: 22   0707 CT abdomen pelvis with contrast  Patient found to have SBO with internal hernia, I contacted surgery at 7:02am about this                                       Medical Decision Making  Patient is a 59-year-old female with history gastric bypass as well as ruptured appendicitis who presented to the ED for abdominal pain and nausea/vomiting.  Patient stated that she woke up at 1 AM with 10/10 abdominal pain, diffuse but worse in the epigastric region, without radiation.  Patient stated that the pain has been constant since then.  She is also had intractable vomiting as well as inability to tolerate p.o. intake.  She stated that it felt similar but worse than recent ruptured appendicitis 2 months ago.  Reportedly, patient had this ruptured appendicitis 2 months ago and was treated medically and plan is for surgical removal in a few months.  She endorsed some chills associated with the pain but denies any fevers.  On evaluation, patient appeared in acute distress due to abdominal pain and was also noted to be mildly diaphoretic and generally ill-appearing.    Ddx includes but is not limited to recurrent appendicitis, gastric bypass complication, cholecystitis, pancreatitis, diverticulitis.  CBC, CMP,  lipase grossly unremarkable.  UA without signs of UTI.  CT abdomen revealed internal hernia with SBO.  As soon as I got this report from radiologist I talked to surgery who stated that patient would need to be referred to bariatric surgery at [facility].  Bariatric surgeon was contacted who stated the patient should be priority 1 transfer to [facility].  Patient was counseled on this and transfer request was initiated.  Patient remained hemodynamically stable for duration of ED stay and was transferred to [facility].    Amount and/or Complexity of Data Reviewed  Labs: ordered. Decision-making details documented in ED Course.  Radiology: ordered. Decision-making details documented in ED Course.    Risk  Prescription drug management.          Disposition  Final diagnoses:   None     ED Disposition       ED Disposition   Transfer to Another Facility-In Network    Condition   --    Date/Time   Sat May 4, 2024  7:29 AM    Comment   [place] Clinton should be transferred out to [facility].               MD Documentation      Flowsheet Row Most Recent Value   Patient Condition The patient has been stabilized such that within reasonable medical probability, no material deterioration of the patient condition or the condition of the unborn child(pedro luis) is likely to result from the transfer   Reason for Transfer Level of Care needed not available at this facility   Benefits of Transfer Specialized equipment and/or services available at the receiving facility (Include comment)________________________  [Bariatric Surgery]   Risks of Transfer Potential for delay in receiving treatment, Possible worsening of condition or death during transfer, Potential deterioration of medical condition   Accepting Physician Dr. Farooq   Accepting Facility Name, [facility]    (Name & Tel number) Kimberly Castillo   Transported by (Company and Unit #) [id number]   Sending MD Esau Morel    Provider Certification General risk, such as traffic hazards, adverse weather conditions, rough terrain or turbulence, possible failure of equipment (including vehicle or aircraft), or consequences of actions of persons outside the control of the transport personnel, Unanticipated needs of medical equipment and personnel during transport, Risk of worsening condition, The possibility of a transport vehicle being unavailable          RN Documentation      Flowsheet Row Most Recent Value   Accepting Facility Name, [facility]  YESSENIA Busch    (Name & Tel number) Kimberly Castillo   Transported by (Company and Unit #) [id number]          Follow-up Information    None         Discharge Medication List as of 5/4/2024  8:19 AM        CONTINUE these medications which have NOT CHANGED    Details   Calcium Citrate-Vitamin D 250-200 MG-UNIT TABS Take 1 tablet by mouth 2 (two) times a day, Historical Med      Cholecalciferol (D3 5000) 125 MCG (5000 UT) capsule Starting Sat 10/1/2022, Historical Med      ferrous gluconate (FERGON) 324 mg tablet Take 324 mg by mouth every other day Every other day, Historical Med      FLUoxetine (PROzac) 20 mg capsule TAKE 2 CAPSULES BY MOUTH EVERY DAY, Normal      Melatonin 5 MG CAPS Take 5 mg by mouth daily at bedtime , Historical Med      MULTIPLE VITAMIN PO Take 1 tablet by mouth daily, Historical Med      pravastatin (PRAVACHOL) 20 mg tablet TAKE 1 TABLET BY MOUTH EVERY DAY, Normal      sodium picosulfate, magnesium oxide, citric acid (Clenpiq) oral solution Take as directed by the office for colonoscopy., Normal      omeprazole (PriLOSEC) 20 mg delayed release capsule Take 1 capsule (20 mg total) by mouth 2 (two) times a day as needed (reflux), Starting Thu 4/11/2024, Until Tue 10/8/2024 at 2359, Normal           No discharge procedures on file.    PDMP Review         Value Time User    PDMP Reviewed  Yes 5/4/2024 12:09 PM Jona Knox MD              ED Provider  Attending physically available and evaluated [place] Clinton. I managed the patient along with the ED Attending.    Electronically Signed by           Esau Morel MD  05/07/24 0895

## 2024-05-05 NOTE — PROGRESS NOTES
Progress Note - Bariatric Surgery   [place] Clinton 59 y.o. female MRN: [id number]  Unit/Bed#: E5 -01 Encounter: [id number]    Assessment:  [place] Clinton is a 59 y.o. female s/p diagnostic laparoscopy for partial small bowel obstruction resulting from adhesions and underwent lysis of adhesive disease from resolved perforated appendicitis      Plan:  Advance to bariatric full liquid diet  Pain control p.o. meds  Encourage ambulation being out of bed  Incentive spirometer  Labs and vitals were reviewed and are stable  Okay for Lovenox for VTE prophylaxis  Pepcid for GI prophylaxis  Replace electrolytes as needed  Likely discharge today    Subjective/Objective   Chief Complaint: No complaints    Subjective: No acute overnight events, patient denies nausea or vomiting.  Patient tolerating clear liquid diet.  Patient is ambulating without issue.  Patient is voiding.     Objective:     Vitals: Blood pressure 109/65, pulse 57, temperature 98.6 °F (37 °C), temperature source Oral, resp. rate 16, weight 88.5 kg (195 lb 1.7 oz), SpO2 94%.,Body mass index is 35.69 kg/m².      Intake/Output Summary (Last 24 hours) at 5/5/2024 0903  Last data filed at 5/5/2024 0844  Gross per 24 hour   Intake 3340 ml   Output 740 ml   Net 2600 ml       Invasive Devices       Peripheral Intravenous Line  Duration             Peripheral IV 05/04/24 Proximal;Right;Ventral (anterior) Forearm 1 day                    Physical Exam: /65 (BP Location: Left arm)   Pulse 57   Temp 98.6 °F (37 °C) (Oral)   Resp 16   Wt 88.5 kg (195 lb 1.7 oz)   SpO2 94%   BMI 35.69 kg/m²   General appearance: alert and oriented, in no acute distress  Head: Normocephalic, without obvious abnormality, atraumatic  Eyes: negative  Lungs:  No apparent respiratory distress on room air  Abdomen:  Soft nontender nondistended, laparoscopic port sites clean dry and intact  Extremities: extremities normal, warm and well-perfused; no cyanosis, clubbing, or  edema  Skin: Skin color, texture, turgor normal. No rashes or lesions    Lab, Imaging and other studies: I have personally reviewed pertinent labs.  CBC:   Lab Results   Component Value Date    WBC 7.91 05/05/2024    HGB 12.4 05/05/2024    HCT 37.7 05/05/2024    MCV 93 05/05/2024     05/05/2024    RBC 4.06 05/05/2024    MCH 30.5 05/05/2024    MCHC 32.9 05/05/2024    RDW 12.7 05/05/2024    MPV 11.0 05/05/2024     CMP:   Lab Results   Component Value Date    SODIUM 137 05/05/2024     (H) 05/05/2024    CO2 23 05/05/2024    BUN 12 05/05/2024    CREATININE 0.72 05/05/2024    CALCIUM 9.3 05/05/2024    EGFR 91 05/05/2024     VTE Pharmacologic Prophylaxis: Enoxaparin (Lovenox)  VTE Mechanical Prophylaxis: sequential compression device    Jona Knox MD  Bariatric Surgery

## 2024-05-05 NOTE — NURSING NOTE
Reviewed discharge instructions/AVS with patient, spouse at bedside. Both verbalized their understanding, they offered no questions/concerns.

## 2024-05-06 ENCOUNTER — TELEPHONE (OUTPATIENT)
Dept: MEDSURG UNIT | Facility: HOSPITAL | Age: 59
End: 2024-05-06

## 2024-05-06 ENCOUNTER — TRANSITIONAL CARE MANAGEMENT (OUTPATIENT)
Dept: FAMILY MEDICINE CLINIC | Facility: CLINIC | Age: 59
End: 2024-05-06

## 2024-05-06 ENCOUNTER — NURSE TRIAGE (OUTPATIENT)
Age: 59
End: 2024-05-06

## 2024-05-06 RX ORDER — OMEPRAZOLE 20 MG/1
20 CAPSULE, DELAYED RELEASE ORAL 2 TIMES DAILY PRN
Qty: 180 CAPSULE | Refills: 1 | Status: SHIPPED | OUTPATIENT
Start: 2024-05-06 | End: 2024-11-02

## 2024-05-06 NOTE — UTILIZATION REVIEW
Initial Clinical Review    Elective Inpatient surgical procedure  Age/Sex: 59 y.o. female  Surgery Date: 5/4/2024   Procedure: Diagnostic Laparoscopy   Anesthesia: General   Operative Findings: Single dense scar band from the mesentery of the distal/mid common channel to the base mesentery of the cecum encroaching the common channel.     POD#1 Progress Note: : No acute overnight events, patient denies nausea or vomiting, tolerating clear liquid diet.  Patient is ambulating without issue and  voiding.   Plan: Advance to bariatric full liquid diet. Pain control p.o. meds. Encourage ambulation being out of bed. Incentive spirometer. Okay for Lovenox for VTE prophylaxis. Pepcid for GI prophylaxis. Replace electrolytes as needed      Admission Orders: Date/Time/Statement:   Admission Orders (From admission, onward)       Ordered        05/04/24 0925  Inpatient Admission  Once                          Orders Placed This Encounter   Procedures    Inpatient Admission     Standing Status:   Standing     Number of Occurrences:   1     Order Specific Question:   Level of Care     Answer:   Med Surg [16]     Order Specific Question:   Bed Type     Answer:   Bariatric [1]     Order Specific Question:   Estimated length of stay     Answer:   Inpatient Only Surgery     Vital Signs: /65 (BP Location: Left arm)   Pulse 57   Temp 98.6 °F (37 °C) (Oral)   Resp 16   Wt 88.5 kg (195 lb 1.7 oz)   SpO2 94%   BMI 35.69 kg/m²     Pertinent Labs/Diagnostic Test Results:   No orders to display         Results from last 7 days   Lab Units 05/05/24  0626 05/04/24  0439   WBC Thousand/uL 7.91 9.87   HEMOGLOBIN g/dL 12.4 14.9   HEMATOCRIT % 37.7 45.0   PLATELETS Thousands/uL 227 292   TOTAL NEUT ABS Thousands/µL  --  6.72         Results from last 7 days   Lab Units 05/05/24  0626 05/04/24  0439   SODIUM mmol/L 137 141   POTASSIUM mmol/L 4.2 3.8   CHLORIDE mmol/L 109* 105   CO2 mmol/L 23 22   ANION GAP mmol/L 5 14*   BUN mg/dL 12 18    CREATININE mg/dL 0.72 0.90   EGFR ml/min/1.73sq m 91 69   CALCIUM mg/dL 9.3 10.0     Results from last 7 days   Lab Units 05/04/24  0439   AST U/L 18   ALT U/L 14   ALK PHOS U/L 77   TOTAL PROTEIN g/dL 7.1   ALBUMIN g/dL 4.5   TOTAL BILIRUBIN mg/dL 0.53   BILIRUBIN DIRECT mg/dL 0.13         Results from last 7 days   Lab Units 05/05/24  0626 05/04/24  0439   GLUCOSE RANDOM mg/dL 111 146*             Results from last 7 days   Lab Units 05/04/24  0700   LACTIC ACID mmol/L 1.2                                 Results from last 7 days   Lab Units 05/04/24  0439   LIPASE u/L 17                 Results from last 7 days   Lab Units 05/04/24  0535   CLARITY UA  Turbid   COLOR UA  Yellow   SPEC GRAV UA  1.021   PH UA  8.0   GLUCOSE UA mg/dl Negative   KETONES UA mg/dl 40 (2+)*   BLOOD UA  Negative   PROTEIN UA mg/dl Trace*   NITRITE UA  Negative   BILIRUBIN UA  Negative   UROBILINOGEN UA (BE) mg/dl <2.0   LEUKOCYTES UA  Negative   WBC UA /hpf None Seen   RBC UA /hpf 2-4*   BACTERIA UA /hpf Occasional   EPITHELIAL CELLS WET PREP /hpf Occasional   MUCUS THREADS  Occasional*         Diet: Bariatric full liq  Mobility: Activity as tolerated  DVT Prophylaxis: SCD, Enoxaparin SC    Medications/Pain Control:   Scheduled Medications:  enoxaparin (LOVENOX) subcutaneous injection 40 mg Daily SC  Famotidine (PF) (PEPCID) injection 20 mg q12h IV   FLUoxetine (PROzac) capsule 40 mg daily po  folic acid 1 mg, thiamine (VITAMIN B1) 100 mg in sodium chloride 0.9 % 100 mL IV piggyback IV daily   pravastatin (PRAVACHOL) tablet 20 mg po daily      Continuous IV Infusions:  lactated ringers infusion  Rate: 75 mL/hr Dose: 75 mL/hr  Freq: Continuous Route: IV  Indications of Use: IV Hydration  Last Dose: Stopped (05/05/24 0947)  Start: 05/04/24 1215 End: 05/05/24 0939     PRN Meds:  oxyCODONE (ROXICODONE) oral solution 10 mg q4h po prn 05/04 x 1, 05/05 x 2  ondansetron (ZOFRAN) injection 4 mg q4h IV prn 05/04 x 1  morphine injection 4 mg q4h IV  prn 05/04x 1      Network Utilization Review Department  ATTENTION: Please call with any questions or concerns to [phone] and carefully listen to the prompts so that you are directed to the right person. All voicemails are confidential.   For Discharge needs, contact Care Management DC Support Team at [phone] opt. 2  Send all requests for admission clinical reviews, approved or denied determinations and any other requests to dedicated fax number below belonging to the campus where the patient is receiving treatment. List of dedicated fax numbers for the Facilities:  FACILITY NAME UR FAX NUMBER   ADMISSION DENIALS (Administrative/Medical Necessity) [phone]   DISCHARGE SUPPORT TEAM (NETWORK) [phone]   PARENT CHILD HEALTH (Maternity/NICU/Pediatrics) [phone]   [facility] [phone]   [facility] [phone]   formerly [facility] [phone]   [facility] [phone]   [facility] [phone]   [facility] [phone]   [facility] [phone]   [facility] [phone]   [facility] [phone]   [facility] [phone]   [facility] [phone]   [facility] [phone]

## 2024-05-06 NOTE — TELEPHONE ENCOUNTER
Post op follow up phone call completed.  Pt is tolerating full liquid diet.  Offered suggestions on types of food with consistency of pudding/yogurt.  Pt is no using IS as instructed as she did not remember to take it home.  Reinforced importance of taking frequent deep breaths to help prevent pneumonia. Should do 10 x's per hour while awake for one week.  Ambulating about home without difficulty.  Pain controlled with analgesia.  Reaffirmed examples of full liquid diet over the next week.  Passing gas, no BM since Friday 5/3/24.  Suggested pt start miralax today and if no BM by Wednesday to use dulcolax supp.  Also suggested using lite prune juice or plum smart lite.    Pt stated understanding about discharge instructions and medication adjustments.  Follow up appt to be scheduled for next week.   Instructed to call with any additional questions or concerns.

## 2024-05-06 NOTE — TELEPHONE ENCOUNTER
Patient of Dr. Farooq.  Patient had emergency bowel surgery for obstruction on 5/4/24.  Patient needs to make follow up appointment.  Attempted transfer to office with no success.  Routed to office instead.      Patient called since she has not had bm since surgery.  Explained that it could take a few days, if nothing by Wed to call back.

## 2024-05-07 ENCOUNTER — PATIENT MESSAGE (OUTPATIENT)
Dept: SURGERY | Facility: CLINIC | Age: 59
End: 2024-05-07

## 2024-05-07 LAB
ATRIAL RATE: 51 BPM
P AXIS: 29 DEGREES
PR INTERVAL: 162 MS
QRS AXIS: 68 DEGREES
QRSD INTERVAL: 82 MS
QT INTERVAL: 454 MS
QTC INTERVAL: 418 MS
T WAVE AXIS: 66 DEGREES
VENTRICULAR RATE: 51 BPM

## 2024-05-07 PROCEDURE — 93010 ELECTROCARDIOGRAM REPORT: CPT | Performed by: INTERNAL MEDICINE

## 2024-05-07 NOTE — UTILIZATION REVIEW
NOTIFICATION OF ADMISSION DISCHARGE   This is a Notification of Discharge from [facility]. Please be advised that this patient has been discharge from our facility. Below you will find the admission and discharge date and time including the patient’s disposition.   UTILIZATION REVIEW CONTACT:  Dionna Lucero MA  Utilization   Network Utilization Review Department  Phone: [phone] x carefully listen to the prompts. All voicemails are confidential.  Email: [email or web address]     ADMISSION INFORMATION  PRESENTATION DATE: 5/4/2024  8:49 AM  OBERVATION ADMISSION DATE:   INPATIENT ADMISSION DATE: 5/4/24  9:25 AM   DISCHARGE DATE: 5/5/2024  1:10 PM   DISPOSITION:Home/Self Care    Network Utilization Review Department  ATTENTION: Please call with any questions or concerns to [phone] and carefully listen to the prompts so that you are directed to the right person. All voicemails are confidential.   For Discharge needs, contact Care Management DC Support Team at [phone] opt. 2  Send all requests for admission clinical reviews, approved or denied determinations and any other requests to dedicated fax number below belonging to the campus where the patient is receiving treatment. List of dedicated fax numbers for the Facilities:  FACILITY NAME UR FAX NUMBER   ADMISSION DENIALS (Administrative/Medical Necessity) [phone]   DISCHARGE SUPPORT TEAM ([facility]) [phone]   PARENT CHILD HEALTH (Maternity/NICU/Pediatrics) [phone]   [facility] [phone]   [facility] [phone]   [facility] [phone]   [facility] [phone]   [facility] [phone]   [facility] [phone]   [facility] [phone]   [facility] [phone]    [facility] [phone]   [facility] [phone]   [facility] [phone]   [facility] [phone]

## 2024-05-07 NOTE — ED ATTENDING ATTESTATION
5/4/2024  I, Shayy Barba MD, saw and evaluated the patient. I have discussed the patient with the resident/non-physician practitioner and agree with the resident's/non-physician practitioner's findings, Plan of Care, and MDM as documented in the resident's/non-physician practitioner's note, except where noted. All available labs and Radiology studies were reviewed.  I was present for key portions of any procedure(s) performed by the resident/non-physician practitioner and I was immediately available to provide assistance.       At this point I agree with the current assessment done in the Emergency Department.  I have conducted an independent evaluation of this patient a history and physical is as follows:    59-year-old female with history gastric bypass surgery presenting to the ER with abdominal pain and persistent vomiting.  No chest pain or trouble breathing.  No fevers or chills.    Abdomen soft, tender throughout.  Agree with checking abdominal labs CT, pain control, Zofran, fluids    Internal hernia noted.  Case was discussed with surgery at [facility] who recommended transfer to [facility] for bariatrics.  Case was discussed with bariatric surgeon at [facility] who accepted the patient for transfer    ED Course         Critical Care Time  Procedures

## 2024-05-09 ENCOUNTER — OFFICE VISIT (OUTPATIENT)
Dept: FAMILY MEDICINE CLINIC | Facility: CLINIC | Age: 59
End: 2024-05-09
Payer: COMMERCIAL

## 2024-05-09 VITALS
TEMPERATURE: 96.6 F | HEART RATE: 82 BPM | BODY MASS INDEX: 34.41 KG/M2 | WEIGHT: 194.2 LBS | DIASTOLIC BLOOD PRESSURE: 78 MMHG | SYSTOLIC BLOOD PRESSURE: 112 MMHG | OXYGEN SATURATION: 98 % | HEIGHT: 63 IN

## 2024-05-09 DIAGNOSIS — K35.32 PERFORATED APPENDICITIS: ICD-10-CM

## 2024-05-09 DIAGNOSIS — K46.0 OBSTRUCTION CONCURRENT WITH AND DUE TO INTERNAL HERNIA OF ABDOMEN: ICD-10-CM

## 2024-05-09 DIAGNOSIS — Z76.89 ENCOUNTER FOR SUPPORT AND COORDINATION OF TRANSITION OF CARE: Primary | ICD-10-CM

## 2024-05-09 PROCEDURE — 99495 TRANSJ CARE MGMT MOD F2F 14D: CPT | Performed by: FAMILY MEDICINE

## 2024-05-09 PROCEDURE — 88305 TISSUE EXAM BY PATHOLOGIST: CPT | Performed by: STUDENT IN AN ORGANIZED HEALTH CARE EDUCATION/TRAINING PROGRAM

## 2024-05-09 NOTE — PROGRESS NOTES
Assessment & Plan     1. Encounter for support and coordination of transition of care    2. Obstruction concurrent with and due to internal hernia of abdomen    3. Perforated appendicitis    Discussion:  I reviewed all with pt.    - in regards to her acute bowel obstruction, this appears to be secondary to band like mesenteric scar tissue. Obstruction released itself spontaneously during surgery. Pt doing well now.  Appetite has improved. Continue present care. Recheck 2m - to ED if symptoms return  - in re: to hx of perforated appendix, pt scheduled for appendectomy in June. Pt without fever/chills, and no significant RLQ pain noted on exam. Continue present care. Recheck 2m - to ED if symptoms should return    Pt to f/u as above. Pt to call for problems or concerns in the interim     Subjective     Transitional Care Management Review:   Florida Clinton is a 59 y.o. female here for TCM follow up.     During the TCM phone call patient stated:  TCM Call     Date and time call was made  5/6/2024  9:25 AM    Hospital care reviewed  Records reviewed    Patient was hospitialized at  [facility]    Date of Admission  05/04/24    Date of discharge  05/05/24    Diagnosis  Obstruction concurrent with and due to internal hernia of abdomen    Disposition  Home    Were the patients medications reviewed and updated  Yes    Current Symptoms  None    Lower abdominal pain severity  Mild    Lower abdominal pain onset  Ongoing      TCM Call     Post hospital issues  None    Should patient be enrolled in anticoag monitoring?  No    Scheduled for follow up?  Yes    Patients specialists  Other (comment)    Other specialists names  [facility]    Referrals needed  none    Did you obtain your prescribed medications  Yes    Do you need help managing your prescriptions or medications  No    Is transportation to your appointment needed  No    I have advised the patient to call PCP with any new or worsening  "symptoms  K Ashu        Pt here for TCM visit  - 58 yo female with hx of gastric bypass and recent appendicitis with perforation/abscess, developed severe abd pain with vomiting on day of admission. Pt brought to [facility] ED where she was found to have findings of bowel obstruction and possible internal hernia on CT.  B/c of hx of Gastric Bypass, and previous paraesophageal hernia repaired by Dr Farooq, she was transferred to [facility]. Pt subsequently underwent laparoscopy which revealed \"Single dense scar band from the mesentery of the distal/mid common channel to the base mesentery of the cecum encroaching the common channel\" with obstruction. Op note reports that the band \"released itself\" and the obstruction resolved with bowel manipulation.  Pt was subsequently closed and monitored overnight. By the next day, she was feeling much better and was able to be discahrged to home. Pt here for TCM visit  - since discharge, pt has noted some abd wall discomfort, but otherwise has been doing well. Appetite has improved, and pt is having Bms. She denies fever/chills, leg swelling, CP, SOB or other symptoms. Pt is scheduled to have an elective appendectomy next month.   - I reviewed available hospital records, lab results and study reports with pt      Review of Systems   Constitutional:  Positive for activity change. Negative for appetite change, chills, fatigue and fever.   HENT: Negative.     Eyes: Negative.    Respiratory: Negative.     Cardiovascular: Negative.    Gastrointestinal:  Positive for abdominal pain. Negative for abdominal distention, nausea and vomiting.   Genitourinary: Negative.    Musculoskeletal:  Negative for arthralgias, gait problem, joint swelling and myalgias.   Skin: Negative.    Neurological: Negative.        Objective     /78 (BP Location: Left arm, Patient Position: Sitting, Cuff Size: Large)   Pulse 82   Temp (!) 96.6 °F (35.9 °C)   Ht 5' 2.95\" (1.599 m)   Wt 88.1 kg " (194 lb 3.2 oz)   SpO2 98%   BMI 34.45 kg/m²      Physical Exam  Vitals reviewed.   Constitutional:       Appearance: Normal appearance.   HENT:      Head: Normocephalic.      Mouth/Throat:      Mouth: Mucous membranes are moist.   Eyes:      Extraocular Movements: Extraocular movements intact.      Conjunctiva/sclera: Conjunctivae normal.      Pupils: Pupils are equal, round, and reactive to light.   Cardiovascular:      Rate and Rhythm: Normal rate.   Pulmonary:      Effort: Pulmonary effort is normal.      Breath sounds: No wheezing or rales.   Abdominal:      General: There is no distension.      Palpations: There is no mass.      Tenderness: There is no abdominal tenderness.   Musculoskeletal:         General: No swelling, tenderness or deformity. Normal range of motion.      Cervical back: No tenderness.      Right lower leg: No edema.      Left lower leg: No edema.   Lymphadenopathy:      Cervical: No cervical adenopathy.   Skin:     General: Skin is warm.      Capillary Refill: Capillary refill takes less than 2 seconds.      Comments: Abd wall wound healing well. No erythema/cellulitis/wound infection appreciated   Neurological:      General: No focal deficit present.      Mental Status: She is alert and oriented to person, place, and time.       Medications have been reviewed by provider in current encounter    Rayray Knight MD

## 2024-05-16 ENCOUNTER — OFFICE VISIT (OUTPATIENT)
Dept: BARIATRICS | Facility: CLINIC | Age: 59
End: 2024-05-16

## 2024-05-16 ENCOUNTER — TELEMEDICINE (OUTPATIENT)
Dept: GASTROENTEROLOGY | Facility: CLINIC | Age: 59
End: 2024-05-16
Payer: COMMERCIAL

## 2024-05-16 VITALS
BODY MASS INDEX: 35.79 KG/M2 | TEMPERATURE: 97 F | SYSTOLIC BLOOD PRESSURE: 116 MMHG | WEIGHT: 194.5 LBS | HEIGHT: 62 IN | DIASTOLIC BLOOD PRESSURE: 70 MMHG | HEART RATE: 87 BPM

## 2024-05-16 VITALS — BODY MASS INDEX: 35.88 KG/M2 | WEIGHT: 195 LBS | HEIGHT: 62 IN

## 2024-05-16 DIAGNOSIS — R93.2 ABNORMAL CT OF LIVER: ICD-10-CM

## 2024-05-16 DIAGNOSIS — R10.9 ABDOMINAL PAIN: ICD-10-CM

## 2024-05-16 DIAGNOSIS — Z09 POSTOP CHECK: ICD-10-CM

## 2024-05-16 DIAGNOSIS — Z98.84 BARIATRIC SURGERY STATUS: Primary | ICD-10-CM

## 2024-05-16 DIAGNOSIS — K56.609 BOWEL OBSTRUCTION (HCC): ICD-10-CM

## 2024-05-16 DIAGNOSIS — Z12.11 SCREENING FOR COLON CANCER: Primary | ICD-10-CM

## 2024-05-16 DIAGNOSIS — Z98.84 HISTORY OF GASTRIC BYPASS: ICD-10-CM

## 2024-05-16 DIAGNOSIS — K21.9 GASTROESOPHAGEAL REFLUX DISEASE WITHOUT ESOPHAGITIS: ICD-10-CM

## 2024-05-16 DIAGNOSIS — R10.13 EPIGASTRIC PAIN: ICD-10-CM

## 2024-05-16 PROCEDURE — 99024 POSTOP FOLLOW-UP VISIT: CPT | Performed by: SURGERY

## 2024-05-16 PROCEDURE — 99214 OFFICE O/P EST MOD 30 MIN: CPT | Performed by: PHYSICIAN ASSISTANT

## 2024-05-16 NOTE — PROGRESS NOTES
Virtual Regular Visit    Verification of patient location:    Patient is located at Home in the following state in which I hold an active license [place]      Assessment/Plan:    1. Screening for colon cancer  She was referred by general surgery for colonoscopy after diagnosis of complicated appendicitis in February. Colonoscopy showed normal terminal ileum and entire colon. No evidence of cecal lesion. She was advised to have repeat screening colonoscopy in 10 years. She will follow-up with general surgery for appendectomy in June.     2. Gastroesophageal reflux disease without esophagitis  3. Epigastric pain  She reports chronic GERD for 10+ years which did improve following gastric bypass and hiatal hernia repair in 2015.  Recent EGD showed 2 cm hiatal hernia, otherwise normal-appearing esophagus and bypass anatomy. Biopsies negative for H. Pylori and celiac disease. She is currently doing well on omeprazole 20 mg daily at bedtime as needed. Continue GERD diet and lifestyle precautions.     4. History of gastric bypass  Status post Peyton-en-Y gastric bypass in 2015. She has been following with the bariatric dietitian and she is on iron, calcium, vitamin D and general multivitamin due to postgastrectomy malabsorption.     5. Abnormal CT of liver  Recent CT scans show mild surface nodularity of the liver concerning for possible cirrhosis. There is no evidence of suspicious hepatic lesion or ductal dilatation. Her hepatic and portal veins are patent. Labs are normal though including liver enzymes, bilirubin, albumin, INR, platelets which goes against chronic liver disease and cirrhosis. When surgery performed appendectomy, they are planning core needle liver biopsy to assess for liver pathology.     Follow-up as needed    Problem List Items Addressed This Visit          Digestive    Esophageal reflux     Other Visit Diagnoses       Screening for colon cancer    -  Primary    Epigastric pain        History of gastric  bypass        Abnormal CT of liver                     Reason for visit is   Chief Complaint   Patient presents with    Follow-up        Encounter provider Anne-Marie Dupree PA-C      Recent Visits  Date Type Provider Dept   05/09/24 Office Visit Rayray Knight MD [facility] [place]   Showing recent visits within past 7 days and meeting all other requirements  Today's Visits  Date Type Provider Dept   05/16/24 Telemedicine Anne-Marie Dupree PA-C [facility]   Showing today's visits and meeting all other requirements  Future Appointments  No visits were found meeting these conditions.  Showing future appointments within next 150 days and meeting all other requirements       The patient was identified by name and date of birth. Florida Clinton was informed that this is a telemedicine visit and that the visit is being conducted through the Epic Embedded platform. She agrees to proceed..  My office door was closed. No one else was in the room.  She acknowledged consent and understanding of privacy and security of the video platform. The patient has agreed to participate and understands they can discontinue the visit at any time.    Patient is aware this is a billable service.     Subjective  Florida Clinton is a 59 y.o. female with history of Peyton-en-Y gastric bypass and hiatal hernia repair in 2015, post gastrectomy malabsorption, chronic GERD, hypercholesterolemia, recent complicated appendicitis presenting for follow-up after EGD and colonoscopy.     Unfortunately, last week she suffered an internal hernia and needed emergency surgery.  She is currently recovering and has a follow-up visit with her surgeon later today.  Overall, she has been doing okay.  She has not had any Sudnik thickened issues with reflux or epigastric pain.  She has been on modified diet.  She is taking omeprazole daily at bedtime as needed.  No complaints of difficulty swallowing.  Her bowel movements have been regular.   She denies seeing any blood in the stool.    Past Medical History:   Diagnosis Date    Abdominal pain     Chronic pain disorder     abdominal    Depression     Diverticula of colon     Former smoker     Resolved 2012     GERD (gastroesophageal reflux disease)     Headache(784.0) 10/1/2020    Hemorrhoids     Hiatal hernia     Hyperlipidemia     Intestinal malabsorption following gastrectomy     Morbid obesity (HCC)     Resolved 10/2/2015     Obesity     Postgastrectomy malabsorption     Vitamin D insufficiency     Resolved 2016        Past Surgical History:   Procedure Laterality Date    ABDOMINAL SURGERY      gastric bipass    ANKLE SURGERY       SECTION      (2) ,      COLONOSCOPY      ESOPHAGOGASTRODUODENOSCOPY      x2    GASTRIC BYPASS  2015    HERNIA REPAIR  2015    Paraesophageal hiatus hernia. Managed by Eliezer Mishra (General Surgery)    MO EGD TRANSORAL BIOPSY SINGLE/MULTIPLE N/A 2017    Procedure: ESOPHAGOGASTRODUODENOSCOPY (EGD);  Surgeon: Emmie Rosen MD;  Location: [facility];  Service: Bariatrics    MO LAPS ABD PRTM&OMENTUM DX W/WO SPEC BR/WA SPX N/A 2024    Procedure: LAPAROSCOPY DIAGNOSTIC;  Surgeon: Eliezer Farooq MD;  Location: [facility];  Service: Bariatrics    TONSILLECTOMY  1984    UTERINE FIBROID SURGERY         Current Outpatient Medications   Medication Sig Dispense Refill    Calcium Citrate-Vitamin D 250-200 MG-UNIT TABS Take 1 tablet by mouth 2 (two) times a day      Cholecalciferol (D3 5000) 125 MCG (5000 UT) capsule       ferrous gluconate (FERGON) 324 mg tablet Take 324 mg by mouth every other day Every other day      FLUoxetine (PROzac) 20 mg capsule TAKE 2 CAPSULES BY MOUTH EVERY  capsule 1    Melatonin 5 MG CAPS Take 5 mg by mouth daily at bedtime       MULTIPLE VITAMIN PO Take 1 tablet by mouth daily      omeprazole (PriLOSEC) 20 mg delayed release capsule TAKE 1 CAPSULE (20 MG TOTAL) BY MOUTH 2 (TWO) TIMES A DAY AS NEEDED  "(REFLUX) 180 capsule 1    pravastatin (PRAVACHOL) 20 mg tablet TAKE 1 TABLET BY MOUTH EVERY DAY 90 tablet 2     No current facility-administered medications for this visit.        Allergies   Allergen Reactions    Ibuprofen Other (See Comments)     Gastric bypass       REVIEW OF SYSTEMS:    CONSTITUTIONAL: Denies any fever, chills, rigors, and weight loss.  HEENT: No earache or tinnitus. Denies hearing loss or visual disturbances.  CARDIOVASCULAR: No chest pain or palpitations.   RESPIRATORY: Denies any cough, hemoptysis, shortness of breath or dyspnea on exertion.  GASTROINTESTINAL: As noted in the History of Present Illness.   GENITOURINARY: No problems with urination. Denies any hematuria or dysuria.  NEUROLOGIC: No dizziness or vertigo, denies headaches.   MUSCULOSKELETAL: Denies any muscle or joint pain.   SKIN: Denies skin rashes or itching.   ENDOCRINE: Denies excessive thirst. Denies intolerance to heat or cold.  PSYCHOSOCIAL: Denies depression or anxiety. Denies any recent memory loss.       VIDEO EXAMINATION:  Appearance and vitals taken from home devices    General Appearance:   Alert, cooperative, no distress   HEENT:  Normocephalic, atraumatic, anicteric. Neck supple, symmetrical, trachea midline.   Lungs:   Equal chest rise and unlabored breathing, normal effort, no coughing.   Cardiovascular:   No visualized JVD.   Abdomen:   No abdominal distension.   Skin:   No jaundice, rashes, or lesions.    Musculoskeletal:   Normal range of motion visualized.   Psych:  Normal affect and normal insight.   Neuro:  Alert and appropriate.       Vitals:    05/16/24 0934   Weight: 88.5 kg (195 lb)   Height: 5' 2\" (1.575 m)           Visit Time  Total Visit Duration: 15 minutes        "

## 2024-05-16 NOTE — PROGRESS NOTES
POST OP UP VISIT - BARIATRIC SURGERY  Florida Clinton 59 y.o. female MRN: [id number]  Unit/Bed#:  Encounter: [id number]      HPI:  Florida Clinton is a 59 y.o. female status post laparoscopic RNYGB performed by Dr. Farooq on 2015 returning to office for first post op visit following diagnostic laparoscopy and lysis of adhesions of scar tissue causing obstruction of common channel.    Tolerating diet.  Denies nausea and vomiting. Pain improving    Review of Systems   All other systems reviewed and are negative.      Historical Information   Past Medical History:   Diagnosis Date    Abdominal pain     Chronic pain disorder     abdominal    Depression     Diverticula of colon     Former smoker     Resolved 2012     GERD (gastroesophageal reflux disease)     Headache(784.0) 10/1/2020    Hemorrhoids     Hiatal hernia     Hyperlipidemia     Intestinal malabsorption following gastrectomy     Morbid obesity (HCC)     Resolved 10/2/2015     Obesity     Postgastrectomy malabsorption     Vitamin D insufficiency     Resolved 2016      Past Surgical History:   Procedure Laterality Date    ABDOMINAL SURGERY      gastric bipass    ANKLE SURGERY       SECTION      (2) ,      COLONOSCOPY      ESOPHAGOGASTRODUODENOSCOPY      x2    GASTRIC BYPASS  2015    HERNIA REPAIR  2015    Paraesophageal hiatus hernia. Managed by Eliezer Mishra (General Surgery)    ND EGD TRANSORAL BIOPSY SINGLE/MULTIPLE N/A 2017    Procedure: ESOPHAGOGASTRODUODENOSCOPY (EGD);  Surgeon: Emmie Rosen MD;  Location: [facility];  Service: Bariatrics    ND LAPS ABD PRTM&OMENTUM DX W/WO SPEC BR/WA SPX N/A 2024    Procedure: LAPAROSCOPY DIAGNOSTIC;  Surgeon: Eliezer Farooq MD;  Location: [facility];  Service: Bariatrics    TONSILLECTOMY  1984    UTERINE FIBROID SURGERY       Social History   Social History     Substance and Sexual Activity   Alcohol Use Yes    Comment: social     Social History     Substance and Sexual  "Activity   Drug Use No     Social History     Tobacco Use   Smoking Status Former    Current packs/day: 0.00    Types: Cigarettes    Quit date:     Years since quittin.3   Smokeless Tobacco Never   Tobacco Comments    20+ pack year hx - As per [employer]      Family History: non-contributory    Meds/Allergies   all medications and allergies reviewed  Allergies   Allergen Reactions    Ibuprofen Other (See Comments)     Gastric bypass       Objective       Current Vitals:   Blood Pressure: 116/70 (24 1608)  Pulse: 87 (24 1608)  Temperature: (!) 97 °F (36.1 °C) (24 1608)  Temp Source: Tympanic (24 1608)  Height: 5' 2\" (157.5 cm) (24 160)  Weight - Scale: 88.2 kg (194 lb 8 oz) (24 160)      Invasive Devices       None                   Physical Exam  Constitutional:       Appearance: Normal appearance.   HENT:      Head: Normocephalic and atraumatic.   Cardiovascular:      Rate and Rhythm: Normal rate.      Pulses: Normal pulses.   Pulmonary:      Effort: Pulmonary effort is normal.   Abdominal:      Palpations: Abdomen is soft.      Comments: Incisions clean dry and intact   Neurological:      General: No focal deficit present.      Mental Status: She is alert and oriented to person, place, and time.             Assessment/PLAN:    59 y.o. female status post status post laparoscopic RNYGB performed by Dr. Farooq on 2015 returning to office for first post op visit following diagnostic laparoscopy and lysis of adhesions of scar tissue causing obstruction of common channel.      Increase physical activity slowly as tolerated and instructed.  Advance diet as tolerated  Will plan for annual follow-up  Recommend she follow-up with her general surgeon for appendectomy    Follow up in one month as scheduled.          Juve Cannon MD  Bariatric Surgery   2024  4:10 PM      .   "

## 2024-05-27 ENCOUNTER — PATIENT MESSAGE (OUTPATIENT)
Dept: SURGERY | Facility: CLINIC | Age: 59
End: 2024-05-27

## 2024-05-31 DIAGNOSIS — E78.00 HYPERCHOLESTEROLEMIA: ICD-10-CM

## 2024-05-31 RX ORDER — PRAVASTATIN SODIUM 20 MG
TABLET ORAL
Qty: 90 TABLET | Refills: 1 | Status: SHIPPED | OUTPATIENT
Start: 2024-05-31

## 2024-06-18 ENCOUNTER — ANESTHESIA EVENT (OUTPATIENT)
Dept: PERIOP | Facility: HOSPITAL | Age: 59
End: 2024-06-18
Payer: COMMERCIAL

## 2024-06-20 ENCOUNTER — PATIENT MESSAGE (OUTPATIENT)
Dept: SURGERY | Facility: CLINIC | Age: 59
End: 2024-06-20

## 2024-06-25 NOTE — PRE-PROCEDURE INSTRUCTIONS
Pre-Surgery Instructions:   Medication Instructions    Calcium Citrate-Vitamin D 250-200 MG-UNIT TABS Stop taking 7 days prior to surgery. LD 6/24/24    Cholecalciferol (D3 5000) 125 MCG (5000 UT) capsule Stop taking 7 days prior to surgery. LD 6/24/24    ferrous gluconate (FERGON) 324 mg tablet Stop taking 7 days prior to surgery.LD 6/24/24    FLUoxetine (PROzac) 20 mg capsule Take as directed    Melatonin 5 MG CAPS Take night before surgery    MULTIPLE VITAMIN PO Stop taking 7 days prior to surgery. LD 6/24/24    omeprazole (PriLOSEC) 20 mg delayed release capsule Take as directed    pravastatin (PRAVACHOL) 20 mg tablet Take day of surgery.        Medication instructions for day surgery reviewed. Please use only a sip of water to take your instructed medications. Avoid all over the counter vitamins, supplements and NSAIDS for one week prior to surgery per anesthesia guidelines. Tylenol is ok to take as needed.     You will receive a call one business day prior to surgery with an arrival time and hospital directions. If your surgery is scheduled on a Monday, the hospital will be calling you on the Friday prior to your surgery. If you have not heard from anyone by 8pm, please call the hospital supervisor through the hospital  at [phone]. ([place] [phone] or [place] [phone]).    Do not eat or drink anything after midnight the night before your surgery, including candy, mints, lifesavers, or chewing gum. Do not drink alcohol 24hrs before your surgery. Try not to smoke at least 24hrs before your surgery.       Follow the pre surgery showering instructions as listed in the “My Surgical Experience Booklet” or otherwise provided by your surgeon's office. Do not use a blade to shave the surgical area 1 week before surgery. It is okay to use a clean electric clippers up to 24 hours before surgery. Do not apply any lotions, creams, including makeup, cologne, deodorant, or perfumes after showering on  the day of your surgery. Do not use dry shampoo, hair spray, hair gel, or any type of hair products.     No contact lenses, eye make-up, or artificial eyelashes. Remove nail polish, including gel polish, and any artificial, gel, or acrylic nails if possible. Remove all jewelry including rings and body piercing jewelry.     Wear causal clothing that is easy to take on and off. Consider your type of surgery.    Keep any valuables, jewelry, piercings at home. Please bring any specially ordered equipment (sling, braces) if indicated.    Arrange for a responsible person to drive you to and from the hospital on the day of your surgery. Please confirm the visitor policy for the day of your procedure when you receive your phone call with an arrival time.     Call the surgeon's office with any new illnesses, exposures, or additional questions prior to surgery.    Please reference your “My Surgical Experience Booklet” for additional information to prepare for your upcoming surgery.

## 2024-06-27 ENCOUNTER — ANESTHESIA (OUTPATIENT)
Dept: PERIOP | Facility: HOSPITAL | Age: 59
End: 2024-06-27
Payer: COMMERCIAL

## 2024-06-27 ENCOUNTER — HOSPITAL ENCOUNTER (OUTPATIENT)
Facility: HOSPITAL | Age: 59
Setting detail: OUTPATIENT SURGERY
Discharge: HOME | End: 2024-06-27
Attending: SURGERY | Admitting: SURGERY
Payer: COMMERCIAL

## 2024-06-27 VITALS
HEART RATE: 54 BPM | HEIGHT: 62 IN | BODY MASS INDEX: 34.96 KG/M2 | OXYGEN SATURATION: 98 % | SYSTOLIC BLOOD PRESSURE: 140 MMHG | WEIGHT: 190 LBS | TEMPERATURE: 97.4 F | RESPIRATION RATE: 18 BRPM | DIASTOLIC BLOOD PRESSURE: 80 MMHG

## 2024-06-27 DIAGNOSIS — K35.80 ACUTE APPENDICITIS: ICD-10-CM

## 2024-06-27 DIAGNOSIS — K35.32 PERFORATED APPENDICITIS: Primary | ICD-10-CM

## 2024-06-27 LAB — GLUCOSE SERPL-MCNC: 89 MG/DL (ref 65–140)

## 2024-06-27 PROCEDURE — NC001 PR NO CHARGE: Performed by: SURGERY

## 2024-06-27 PROCEDURE — 88313 SPECIAL STAINS GROUP 2: CPT | Performed by: PATHOLOGY

## 2024-06-27 PROCEDURE — 88342 IMHCHEM/IMCYTCHM 1ST ANTB: CPT | Performed by: PATHOLOGY

## 2024-06-27 PROCEDURE — 44970 LAPAROSCOPY APPENDECTOMY: CPT | Performed by: SURGERY

## 2024-06-27 PROCEDURE — 88307 TISSUE EXAM BY PATHOLOGIST: CPT | Performed by: PATHOLOGY

## 2024-06-27 PROCEDURE — 88341 IMHCHEM/IMCYTCHM EA ADD ANTB: CPT | Performed by: PATHOLOGY

## 2024-06-27 PROCEDURE — 82948 REAGENT STRIP/BLOOD GLUCOSE: CPT

## 2024-06-27 PROCEDURE — 88312 SPECIAL STAINS GROUP 1: CPT | Performed by: PATHOLOGY

## 2024-06-27 PROCEDURE — 47001 NDL BIOPSY LVR TM OTH MAJ PX: CPT | Performed by: SURGERY

## 2024-06-27 PROCEDURE — 88304 TISSUE EXAM BY PATHOLOGIST: CPT | Performed by: PATHOLOGY

## 2024-06-27 RX ORDER — FENTANYL CITRATE/PF 50 MCG/ML
25 SYRINGE (ML) INJECTION
Status: COMPLETED | OUTPATIENT
Start: 2024-06-27 | End: 2024-06-27

## 2024-06-27 RX ORDER — CEFAZOLIN SODIUM 2 G/50ML
2000 SOLUTION INTRAVENOUS ONCE
Status: COMPLETED | OUTPATIENT
Start: 2024-06-27 | End: 2024-06-27

## 2024-06-27 RX ORDER — DEXAMETHASONE SODIUM PHOSPHATE 10 MG/ML
INJECTION, SOLUTION INTRAMUSCULAR; INTRAVENOUS AS NEEDED
Status: DISCONTINUED | OUTPATIENT
Start: 2024-06-27 | End: 2024-06-27

## 2024-06-27 RX ORDER — ONDANSETRON 2 MG/ML
4 INJECTION INTRAMUSCULAR; INTRAVENOUS EVERY 4 HOURS PRN
Status: DISCONTINUED | OUTPATIENT
Start: 2024-06-27 | End: 2024-06-27 | Stop reason: HOSPADM

## 2024-06-27 RX ORDER — HYDROMORPHONE HCL/PF 1 MG/ML
0.5 SYRINGE (ML) INJECTION
Status: DISCONTINUED | OUTPATIENT
Start: 2024-06-27 | End: 2024-06-27 | Stop reason: HOSPADM

## 2024-06-27 RX ORDER — ONDANSETRON 2 MG/ML
INJECTION INTRAMUSCULAR; INTRAVENOUS AS NEEDED
Status: DISCONTINUED | OUTPATIENT
Start: 2024-06-27 | End: 2024-06-27

## 2024-06-27 RX ORDER — FENTANYL CITRATE 50 UG/ML
INJECTION, SOLUTION INTRAMUSCULAR; INTRAVENOUS AS NEEDED
Status: DISCONTINUED | OUTPATIENT
Start: 2024-06-27 | End: 2024-06-27

## 2024-06-27 RX ORDER — OXYCODONE HYDROCHLORIDE 5 MG/1
5 TABLET ORAL EVERY 4 HOURS PRN
Qty: 10 TABLET | Refills: 0 | Status: SHIPPED | OUTPATIENT
Start: 2024-06-27 | End: 2024-07-07

## 2024-06-27 RX ORDER — SODIUM CHLORIDE, SODIUM LACTATE, POTASSIUM CHLORIDE, CALCIUM CHLORIDE 600; 310; 30; 20 MG/100ML; MG/100ML; MG/100ML; MG/100ML
INJECTION, SOLUTION INTRAVENOUS CONTINUOUS PRN
Status: DISCONTINUED | OUTPATIENT
Start: 2024-06-27 | End: 2024-06-27

## 2024-06-27 RX ORDER — OXYCODONE HYDROCHLORIDE 5 MG/1
5 TABLET ORAL EVERY 4 HOURS PRN
Status: DISCONTINUED | OUTPATIENT
Start: 2024-06-27 | End: 2024-06-27 | Stop reason: HOSPADM

## 2024-06-27 RX ORDER — BUPIVACAINE HYDROCHLORIDE 2.5 MG/ML
INJECTION, SOLUTION EPIDURAL; INFILTRATION; INTRACAUDAL AS NEEDED
Status: DISCONTINUED | OUTPATIENT
Start: 2024-06-27 | End: 2024-06-27 | Stop reason: HOSPADM

## 2024-06-27 RX ORDER — LIDOCAINE HYDROCHLORIDE 10 MG/ML
INJECTION, SOLUTION EPIDURAL; INFILTRATION; INTRACAUDAL; PERINEURAL AS NEEDED
Status: DISCONTINUED | OUTPATIENT
Start: 2024-06-27 | End: 2024-06-27

## 2024-06-27 RX ORDER — GLYCOPYRROLATE 0.2 MG/ML
INJECTION INTRAMUSCULAR; INTRAVENOUS AS NEEDED
Status: DISCONTINUED | OUTPATIENT
Start: 2024-06-27 | End: 2024-06-27

## 2024-06-27 RX ORDER — MIDAZOLAM HYDROCHLORIDE 2 MG/2ML
INJECTION, SOLUTION INTRAMUSCULAR; INTRAVENOUS AS NEEDED
Status: DISCONTINUED | OUTPATIENT
Start: 2024-06-27 | End: 2024-06-27

## 2024-06-27 RX ORDER — ACETAMINOPHEN 325 MG/1
650 TABLET ORAL EVERY 4 HOURS PRN
Status: DISCONTINUED | OUTPATIENT
Start: 2024-06-27 | End: 2024-06-27 | Stop reason: HOSPADM

## 2024-06-27 RX ORDER — ONDANSETRON 2 MG/ML
4 INJECTION INTRAMUSCULAR; INTRAVENOUS ONCE AS NEEDED
Status: DISCONTINUED | OUTPATIENT
Start: 2024-06-27 | End: 2024-06-27 | Stop reason: HOSPADM

## 2024-06-27 RX ORDER — PROPOFOL 10 MG/ML
INJECTION, EMULSION INTRAVENOUS AS NEEDED
Status: DISCONTINUED | OUTPATIENT
Start: 2024-06-27 | End: 2024-06-27

## 2024-06-27 RX ORDER — ROCURONIUM BROMIDE 10 MG/ML
INJECTION, SOLUTION INTRAVENOUS AS NEEDED
Status: DISCONTINUED | OUTPATIENT
Start: 2024-06-27 | End: 2024-06-27

## 2024-06-27 RX ADMIN — CEFAZOLIN SODIUM 2000 MG: 2 SOLUTION INTRAVENOUS at 13:01

## 2024-06-27 RX ADMIN — PROPOFOL 200 MG: 10 INJECTION, EMULSION INTRAVENOUS at 13:00

## 2024-06-27 RX ADMIN — ONDANSETRON 4 MG: 2 INJECTION INTRAMUSCULAR; INTRAVENOUS at 13:49

## 2024-06-27 RX ADMIN — FENTANYL CITRATE 25 MCG: 50 INJECTION INTRAMUSCULAR; INTRAVENOUS at 14:36

## 2024-06-27 RX ADMIN — ONDANSETRON 4 MG: 2 INJECTION INTRAMUSCULAR; INTRAVENOUS at 12:44

## 2024-06-27 RX ADMIN — ROCURONIUM BROMIDE 50 MG: 10 INJECTION, SOLUTION INTRAVENOUS at 13:00

## 2024-06-27 RX ADMIN — GLYCOPYRROLATE 0.1 MG: 0.2 INJECTION, SOLUTION INTRAMUSCULAR; INTRAVENOUS at 12:44

## 2024-06-27 RX ADMIN — FENTANYL CITRATE 50 MCG: 50 INJECTION INTRAMUSCULAR; INTRAVENOUS at 12:58

## 2024-06-27 RX ADMIN — SODIUM CHLORIDE, SODIUM LACTATE, POTASSIUM CHLORIDE, AND CALCIUM CHLORIDE: .6; .31; .03; .02 INJECTION, SOLUTION INTRAVENOUS at 12:22

## 2024-06-27 RX ADMIN — SODIUM CHLORIDE, SODIUM LACTATE, POTASSIUM CHLORIDE, AND CALCIUM CHLORIDE: .6; .31; .03; .02 INJECTION, SOLUTION INTRAVENOUS at 13:33

## 2024-06-27 RX ADMIN — DEXAMETHASONE SODIUM PHOSPHATE 10 MG: 10 INJECTION, SOLUTION INTRAMUSCULAR; INTRAVENOUS at 13:22

## 2024-06-27 RX ADMIN — MIDAZOLAM 2 MG: 1 INJECTION INTRAMUSCULAR; INTRAVENOUS at 12:44

## 2024-06-27 RX ADMIN — FENTANYL CITRATE 25 MCG: 50 INJECTION INTRAMUSCULAR; INTRAVENOUS at 15:07

## 2024-06-27 RX ADMIN — HYDROMORPHONE HYDROCHLORIDE 0.5 MG: 1 INJECTION, SOLUTION INTRAMUSCULAR; INTRAVENOUS; SUBCUTANEOUS at 14:42

## 2024-06-27 RX ADMIN — FENTANYL CITRATE 50 MCG: 50 INJECTION INTRAMUSCULAR; INTRAVENOUS at 12:53

## 2024-06-27 RX ADMIN — HYDROMORPHONE HYDROCHLORIDE 0.5 MG: 1 INJECTION, SOLUTION INTRAMUSCULAR; INTRAVENOUS; SUBCUTANEOUS at 15:15

## 2024-06-27 RX ADMIN — SUGAMMADEX 200 MG: 100 INJECTION, SOLUTION INTRAVENOUS at 14:02

## 2024-06-27 RX ADMIN — FENTANYL CITRATE 25 MCG: 50 INJECTION INTRAMUSCULAR; INTRAVENOUS at 14:28

## 2024-06-27 RX ADMIN — OXYCODONE HYDROCHLORIDE 5 MG: 5 TABLET ORAL at 16:06

## 2024-06-27 RX ADMIN — LIDOCAINE HYDROCHLORIDE 50 MG: 10 INJECTION, SOLUTION EPIDURAL; INFILTRATION; INTRACAUDAL; PERINEURAL at 13:00

## 2024-06-27 RX ADMIN — FENTANYL CITRATE 25 MCG: 50 INJECTION INTRAMUSCULAR; INTRAVENOUS at 15:02

## 2024-06-27 NOTE — OP NOTE
OPERATIVE REPORT  PATIENT NAME: Florida Clinton    :  1965  MRN: [id number]  Pt Location: [facility] OR ROOM 14    SURGERY DATE: 2024    Surgeons and Role:     * Jona Araiza MD - Primary     * Stephie Hamlin MD    Preop Diagnosis:  Acute appendicitis [K35.80]    Post-Op Diagnosis Codes:     * Acute appendicitis [K35.80]    Procedure(s):  APPENDECTOMY LAPAROSCOPIC  EXCISION BIOPSY  LIVER LAPAROSCOPIC    Specimen(s):  ID Type Source Tests Collected by Time Destination   1 :  Tissue Appendix TISSUE EXAM Jona Araiza MD 2024 1336    2 : liver biopsy  segment 5 Tissue Liver TISSUE EXAM Jona Araiza MD 2024 1338        Estimated Blood Loss:   Minimal    Drains:  * No LDAs found *    Anesthesia Type:   General    Operative Indications:  Acute appendicitis [K35.80]  59-year-old female with a history of perforated appendicitis with periappendiceal abscess presented for interval appendectomy.  After discussion of risk and benefits she opted to proceed to the operating room today for planned laparoscopic appendectomy.  Additionally based on some imaging findings of some hepatic steatosis we opted for a liver biopsy.    Operative Findings:  Mild fat infiltration uniformly within the liver.  Segment 5 liver biopsy taken.  No significant inflammatory changes around the appendix, but was fused to the base of the cecum.  No bleeding from the staple line at completion.      Complications:   None    Procedure and Technique:  The patient was seen in the Holding Area. The risks, benefits, complications, treatment options, and expected outcomes were discussed with the patient and/or family. There was concurrence with the proposed plan and informed consent was obtained. The site of surgery was properly noted/marked. The patient was taken to Operating Room, identified as Florida Clinton and the procedure verified as Appendectomy.    The patient was placed in the supine position with the left arm tucked and general  anesthesia was induced, along with placement of orogastric tube. The abdomen was prepped and draped in a sterile fashion. A Timeout was preformed with all members of the surgical team confirming the correct patient and procedure. Antibiotic prophylaxis was given in accordance with the patient's stated allergies. An infraumbilical incision was made and a Veress needle was used to insufflate the abdomen to 15mmHg. The veress needle was removed and 12mm port was placed using an optical entry technique.  Additional 5mm ports were placed under direct vision in the left lower quadrant and suprapubic positions.  A careful evaluation of the entire abdomen was carried out. The patient was placed in Trendelenburg and left lateral decubitus position. The small intestines were retracted in the cephalad and left lateral direction away from the pelvis and right lower quadrant. The visualized appendix was uninflamed, but was adherent to the cecal base.  No further pathology was noted in the abdomen.    The appendix was carefully dissected. A window was made in the mesoappendix at the base of the appendix using a [place] dissector. A endo-KULWANT stapler with a blue load was fire at the base of the appendix at the level of the cecum.  An ultrasonic electrosurgical device was then used to carefully divide the mesoappendix.  There was no evidence of bleeding, leakage, or complication after division of the appendix and mesoappendix. Irrigation was performed and the irrigate suctioned from the abdomen and pelvic.  We then used a Blaine-Cut core needle biopsy and took a specimen from segment 5 of the liver.  There was no bleeding after biopsy.    The 12mm port site fascia was closed with an 0-Vicryl figure-of-eight using a laparoscopic suture passer. All skin incisions were closed using MonocryI suture in a subcuticular fashion.  The instrument, sponge, and needle counts were correct at the conclusion of the case. The patient was then taken  to PACU in stable condition.      I was present for the entire procedure.    Patient Disposition:  PACU  and hemodynamically stable    This procedure was not performed to treat colon cancer through resection      SIGNATURE: Jona Araiza MD  DATE: June 27, 2024  TIME: 1:47 PM

## 2024-06-27 NOTE — ANESTHESIA POSTPROCEDURE EVALUATION
Post-Op Assessment Note    CV Status:  Stable  Pain Score: 0    Pain management: adequate       Mental Status:  Awake   Hydration Status:  Stable   PONV Controlled:  None   Airway Patency:  Patent    No anethesia notable event occurred.    Staff: CRNA           /86 (06/27/24 1425)    Temp 97.7 °F (36.5 °C) (06/27/24 1425)    Pulse 67 (06/27/24 1425)   Resp 14 (06/27/24 1425)    SpO2 95 % (06/27/24 1425)

## 2024-06-27 NOTE — H&P
H&P Exam - General Surgery   Florida Clinton 59 y.o. female MRN: [id number]  Unit/Bed#: OR [place] Encounter: [id number]    Assessment & Plan     Assessment:  59-year-old female with a history of perforated appendicitis, here for elective appendectomy  Plan:  Will plan for diagnostic laparoscopy with laparoscopic appendectomy, and percutaneous liver biopsy.  Patient is amenable to risk and benefits, to proceed back to the operating room expeditiously.    History of Present Illness     HPI:  Florida Clinton is a 59 y.o. female who presents with history of appendicitis.  She had appendicitis with periappendiceal abscess, which subsequently resolved.  She is doing well without complaints when she developed a bowel obstruction in early May.  This was treated surgically with lysis of adhesions.  She is now feeling well and is without complaints.  Of note does have a history of some nodularity on imaging within her liver.    Review of Systems   Constitutional:  Negative for chills, fatigue and fever.   HENT:  Negative for ear pain, facial swelling, sinus pressure and sinus pain.    Eyes:  Negative for pain.   Respiratory:  Negative for cough, shortness of breath and wheezing.    Cardiovascular:  Negative for chest pain.   Gastrointestinal:  Negative for abdominal pain, constipation, diarrhea, nausea and vomiting.   Endocrine: Negative for cold intolerance and heat intolerance.   Genitourinary:  Negative for dysuria and flank pain.   Musculoskeletal:  Negative for back pain and neck pain.   Skin:  Negative for wound.   Neurological:  Negative for syncope, facial asymmetry, light-headedness and numbness.   Psychiatric/Behavioral:  Negative for behavioral problems, confusion and suicidal ideas.        Historical Information   Past Medical History:   Diagnosis Date    Abdominal pain     Chronic pain disorder     abdominal    Depression     Diverticula of colon     Former smoker     Resolved 7/2012     GERD (gastroesophageal reflux  disease)     Headache(784.0) 10/1/2020    Hemorrhoids     Hiatal hernia     Hyperlipidemia     Intestinal malabsorption following gastrectomy     Morbid obesity (HCC)     Resolved 10/2/2015     Obesity     Postgastrectomy malabsorption     Vitamin D insufficiency     Resolved 2016      Past Surgical History:   Procedure Laterality Date    ABDOMINAL SURGERY      gastric bipass    ANKLE SURGERY       SECTION      (2) ,      COLONOSCOPY      ESOPHAGOGASTRODUODENOSCOPY      x2    GASTRIC BYPASS  2015    HERNIA REPAIR  2015    Paraesophageal hiatus hernia. Managed by Eliezer Mishra (General Surgery)    VT EGD TRANSORAL BIOPSY SINGLE/MULTIPLE N/A 2017    Procedure: ESOPHAGOGASTRODUODENOSCOPY (EGD);  Surgeon: Emmie Rosen MD;  Location: [facility];  Service: Bariatrics    VT LAPS ABD PRTM&OMENTUM DX W/WO SPEC BR/WA SPX N/A 2024    Procedure: LAPAROSCOPY DIAGNOSTIC;  Surgeon: Eliezer Farooq MD;  Location[facility];  Service: Bariatrics    TONSILLECTOMY  1984    UTERINE FIBROID SURGERY       Social History   Social History     Substance and Sexual Activity   Alcohol Use Yes    Comment: social     Social History     Substance and Sexual Activity   Drug Use No     Social History     Tobacco Use   Smoking Status Former    Current packs/day: 0.00    Types: Cigarettes    Quit date:     Years since quittin.4   Smokeless Tobacco Never   Tobacco Comments    20+ pack year hx - As per [employer]      E-Cigarette/Vaping    E-Cigarette Use Never User      E-Cigarette/Vaping Substances    Nicotine No     THC No     CBD No      Family History:   Family History   Problem Relation Age of Onset    Breast cancer Mother 31    Prostate cancer Father 50    Hypertension Father     Heart disease Father         Coronary arteriosclerosis     Hyperlipidemia Father     Heart disease Maternal Grandmother         Coronary arteriosclerosis     Heart disease Maternal Grandfather         Coronary  "arteriosclerosis     COPD Maternal Grandfather         heavy smoker    Diabetes Paternal Grandmother     No Known Problems Brother     No Known Problems Daughter     No Known Problems Daughter     No Known Problems Paternal Aunt     Stroke Neg Hx     Thyroid disease Neg Hx        Meds/Allergies   PTA meds:   Prior to Admission Medications   Prescriptions Last Dose Informant Patient Reported? Taking?   Calcium Citrate-Vitamin D 250-200 MG-UNIT TABS 6/24/2024 Self Yes Yes   Sig: Take 1 tablet by mouth 2 (two) times a day   Cholecalciferol (D3 5000) 125 MCG (5000 UT) capsule 6/24/2024 Self Yes Yes   FLUoxetine (PROzac) 20 mg capsule 6/26/2024 Self No Yes   Sig: TAKE 2 CAPSULES BY MOUTH EVERY DAY   MULTIPLE VITAMIN PO 6/24/2024 Self Yes Yes   Sig: Take 1 tablet by mouth daily   Melatonin 5 MG CAPS 6/26/2024 Self Yes Yes   Sig: Take 5 mg by mouth daily at bedtime    ferrous gluconate (FERGON) 324 mg tablet 6/24/2024 Self Yes Yes   Sig: Take 324 mg by mouth every other day Every other day   omeprazole (PriLOSEC) 20 mg delayed release capsule 6/26/2024 Self No Yes   Sig: TAKE 1 CAPSULE (20 MG TOTAL) BY MOUTH 2 (TWO) TIMES A DAY AS NEEDED (REFLUX)   pravastatin (PRAVACHOL) 20 mg tablet 6/26/2024  No Yes   Sig: TAKE 1 TABLET BY MOUTH EVERY DAY      Facility-Administered Medications: None     Allergies   Allergen Reactions    Ibuprofen Other (See Comments)     Gastric bypass       Objective   First Vitals:   Blood Pressure: 125/89 (06/27/24 1009)  Pulse: 59 (06/27/24 1009)  Temperature: 98 °F (36.7 °C) (06/27/24 1009)  Temp Source: Temporal (06/27/24 1009)  Height: 5' 2\" (157.5 cm) (06/27/24 0948)  Weight - Scale: 86.2 kg (190 lb) (06/27/24 0948)  SpO2: 98 % (06/27/24 1009)    Current Vitals:   Blood Pressure: 125/89 (06/27/24 1009)  Pulse: 59 (06/27/24 1009)  Temperature: 98 °F (36.7 °C) (06/27/24 1009)  Temp Source: Temporal (06/27/24 1009)  Height: 5' 2\" (157.5 cm) (06/27/24 0948)  Weight - Scale: 86.2 kg (190 lb) " "(06/27/24 0948)  SpO2: 98 % (06/27/24 1009)    No intake or output data in the 24 hours ending 06/27/24 1126    Invasive Devices       Peripheral Intravenous Line  Duration             Peripheral IV 06/27/24 Dorsal (posterior);Left Hand <1 day                    Physical Exam  Vitals and nursing note reviewed.   Constitutional:       General: She is not in acute distress.     Appearance: Normal appearance. She is not ill-appearing.   HENT:      Head: Normocephalic and atraumatic.      Mouth/Throat:      Mouth: Mucous membranes are moist.   Eyes:      Extraocular Movements: Extraocular movements intact.   Cardiovascular:      Rate and Rhythm: Normal rate and regular rhythm.   Pulmonary:      Effort: Pulmonary effort is normal. No respiratory distress.      Breath sounds: Normal breath sounds. No stridor.   Abdominal:      General: There is no distension.      Palpations: Abdomen is soft. There is no mass.      Tenderness: There is no abdominal tenderness.      Hernia: No hernia is present.      Comments: Well-healed port sites, no evidence of hernia.   Musculoskeletal:         General: No swelling or tenderness. Normal range of motion.   Skin:     General: Skin is warm and dry.      Findings: No lesion.   Neurological:      General: No focal deficit present.      Mental Status: She is alert and oriented to person, place, and time.   Psychiatric:         Mood and Affect: Mood normal.         Behavior: Behavior normal.         Lab Results: I have personally reviewed pertinent lab results.  , CBC: No results found for: \"WBC\", \"HGB\", \"HCT\", \"MCV\", \"PLT\", \"ADJUSTEDWBC\", \"RBC\", \"MCH\", \"MCHC\", \"RDW\", \"MPV\", \"NRBC\", CMP: No results found for: \"SODIUM\", \"K\", \"CL\", \"CO2\", \"ANIONGAP\", \"BUN\", \"CREATININE\", \"GLUCOSE\", \"CALCIUM\", \"AST\", \"ALT\", \"ALKPHOS\", \"PROT\", \"BILITOT\", \"EGFR\", Coagulation: No results found for: \"PT\", \"INR\", \"APTT\", Urinalysis: No results found for: \"COLORU\", \"CLARITYU\", \"SPECGRAV\", \"PHUR\", \"LEUKOCYTESUR\", " "\"NITRITE\", \"PROTEINUA\", \"GLUCOSEU\", \"KETONESU\", \"BILIRUBINUR\", \"BLOODU\"  Imaging: I have personally reviewed pertinent reports.    EKG, Pathology, and Other Studies: I have personally reviewed pertinent reports.      Code Status: Prior  Advance Directive and Living Will:      Power of :    POLST:    "

## 2024-06-27 NOTE — DISCHARGE INSTR - AVS FIRST PAGE
Post-Operative Care Instructions             Dr. Jona Araiza M.D.    1. General: You will feel pulling sensations around the wound and/or aches and pains around the incisions. This is normal. Even minor surgery is a change in your body and this is your body’s way of reaction to it. If you have had abdominal surgery, it may help to support the incision with a small pillow or blanket for comfort when moving or coughing.    2. Wound care:    Bandage/Dressing - Make sure to remove the bandage in about 48 hours, unless instructed otherwise. You usually don't have to redress the wound after 24-48 hours, unless for comfort. Keep the incision clean and dry. Let air get to it. If the Steri-Strips fall off, just keep the wound clean.     Glue - Leave glue alone, it will fall off on its own, no need for an additional dressings    3. Water: You may shower over the wound, unless there are drain tubes left in place. Do not bathe or use a pool or hot tub until cleared by the physician. You may shower right over the staples, glue or Steri-Strips and rinse wound with soapy water but do not scrub incision pat dry when you are done.    4. Activity: You may go up and down stairs, walk as much as you are comfortable, but walk at least 3 times each day. If you have had abdominal or hernia surgery, do not lift anything heavier than 15 pounds for at least 4 weeks.    5. Diet: You may resume a regular diet. If you had a same-day surgery or overnight stay surgery, you may wish to eat lightly for a few days: soups, crackers, and sandwiches. You may resume a regular diet when ready.    6. Medications: Resume all of your previous medications, unless told otherwise by the doctor. Tylenol and ibuoprofen is always fine, unless you are taking any narcotic pain medication containing Tylenol (such as Percocet, Darvocet, Vicodin, or anything containing acetaminophen). Do not take Tylenol if you're taking these medications. You do not need to take  the narcotic pain medications unless you are having significant pain and discomfort.    7. Driving: He will need someone to drive you home on the day of surgery. Do not drive or make any important decisions while on narcotic pain medication or 24 hours and after anesthesia or sedation for surgery. Generally, you may drive when your off all narcotic pain medications, and you can turn in your seat comfortably to check your blind spot.     8. Upset Stomach: You may take Maalox, Tums, or similar items for an upset stomach. If your narcotic pain medication causes an upset stomach, do not take it on an empty stomach. Try taking it with at least some crackers or toast.     9. Constipation: Patients often experienced constipation after surgery. You may take over-the-counter medication for this, such as Metamucil, Senokot, Dulcolax, milk of magnesia, etc. You may take a suppository unless you have had anorectal surgery such as a procedure on your hemorrhoids. If you experience significant nausea or vomiting after abdominal surgery, call the office before trying any of these medications.    10. Call the office: If you are experiencing any of the following, fevers above 101.5°, significant nausea or vomiting, if the wound develops drainage and/or is excessive redness around the wound, or if you have significant diarrhea or other worsening symptoms.    11. Pain: You may be given a prescription for pain. This will be given to the hospital, the day of surgery.    12. Sexual Activity: You may resume sexual activity when you feel ready and comfortable and your incision is sealed and healed without apparent infection risk.    [facility]  Phone: [phone]

## 2024-06-27 NOTE — ANESTHESIA PREPROCEDURE EVALUATION
Procedure:  APPENDECTOMY LAPAROSCOPIC (Abdomen)  EXCISION BIOPSY  LIVER LAPAROSCOPIC (Abdomen)    Relevant Problems   ANESTHESIA   (+) Sea sickness      CARDIO   (+) Hypercholesterolemia      ENDO   (+) Hyperparathyroidism (HCC)      GI/HEPATIC   (+) Esophageal reflux      MUSCULOSKELETAL   (+) Chronic bilateral low back pain without sciatica      NEURO/PSYCH   (+) Chronic bilateral low back pain without sciatica   (+) Major depressive disorder with single episode, in partial remission (HCC)      Neurology/Sleep   (+) Memory difficulties      Rheumatology   (+) Obstruction concurrent with and due to internal hernia of abdomen      Surgery/Wound/Pain   (+) Postgastrectomy malabsorption      FEN/Gastrointestinal   (+) Perforated appendicitis      Other   (+) Class 2 obesity without serious comorbidity with body mass index (BMI) of 36.0 to 36.9 in adult        Physical Exam    Airway    Mallampati score: II  TM Distance: >3 FB  Neck ROM: full     Dental       Cardiovascular  Cardiovascular exam normal    Pulmonary  Pulmonary exam normal     Other Findings  post-pubertal.      Anesthesia Plan  ASA Score- 3     Anesthesia Type- general with ASA Monitors.         Additional Monitors:     Airway Plan: ETT.           Plan Factors-Exercise tolerance (METS): >4 METS.    Chart reviewed. EKG reviewed. Imaging results reviewed. Existing labs reviewed. Patient summary reviewed.    Patient is not a current smoker.              Induction- intravenous.    Postoperative Plan-         Informed Consent- Anesthetic plan and risks discussed with patient.  I personally reviewed this patient with the CRNA. Discussed and agreed on the Anesthesia Plan with the CRNA..

## 2024-07-02 PROCEDURE — 88304 TISSUE EXAM BY PATHOLOGIST: CPT | Performed by: PATHOLOGY

## 2024-07-02 PROCEDURE — 88307 TISSUE EXAM BY PATHOLOGIST: CPT | Performed by: PATHOLOGY

## 2024-07-02 PROCEDURE — 88342 IMHCHEM/IMCYTCHM 1ST ANTB: CPT | Performed by: PATHOLOGY

## 2024-07-02 PROCEDURE — 88312 SPECIAL STAINS GROUP 1: CPT | Performed by: PATHOLOGY

## 2024-07-02 PROCEDURE — 88341 IMHCHEM/IMCYTCHM EA ADD ANTB: CPT | Performed by: PATHOLOGY

## 2024-07-02 PROCEDURE — 88313 SPECIAL STAINS GROUP 2: CPT | Performed by: PATHOLOGY

## 2024-07-03 ENCOUNTER — TELEPHONE (OUTPATIENT)
Age: 59
End: 2024-07-03

## 2024-07-03 DIAGNOSIS — R93.2 ABNORMAL CT SCAN, LIVER: Primary | ICD-10-CM

## 2024-07-03 NOTE — TELEPHONE ENCOUNTER
Patient calling in and asking for a call back on her liver results    Tried calling office but Heike was out at the moment     Please give patient a call back to discuss

## 2024-07-08 ENCOUNTER — TELEPHONE (OUTPATIENT)
Dept: GASTROENTEROLOGY | Facility: CLINIC | Age: 59
End: 2024-07-08

## 2024-07-08 NOTE — TELEPHONE ENCOUNTER
Called patient, scheduled ov with Dr. Gore on 7/9/24 at [facility] to confirm appnt. Sent message regarding the ov as well.

## 2024-07-08 NOTE — TELEPHONE ENCOUNTER
"----- Message from Lisa Gore MD sent at 7/8/2024  1:27 PM EDT -----  Thanks!     Clerical team, can you please contact the patient to schedule an appointment to see me?  ----- Message -----  From: Rayray Knight MD  Sent: 7/3/2024   3:32 PM EDT  To: Lisa Gore MD    Good afternoon    I am referring this young lady to you. She has been through quite an ordeal over the last 3+ months, after suffering a perforated appendix. One of her Cts suggested mild liver cirrhosis, so surgery performed a bx during her definitive appendectomy recently.  Path revealed the following:    \"B. Liver, segment 5, core needle biopsy:  -   Ground glass appearance of hepatocytes.  -   HBsAg and HBcAg immunostains are pending and will be reported in an addendum.  -   Negative for significant steatosis, inflammation and fibrosis.  -   See comment.     Comment:  B) Trichrome stain is negative for significant fibrosis.  PAS with diastase stains are negative for cytoplasmic globules.  Iron stain is negative for iron deposition.  Reticulin stain highlights normal hepatocyte plates.  The ground glass like change in hepatocytes can be associated with hepatitis B infection, drug effect, immunosuppression, polypharmacy, glycogen storage disease, fibrinogen storage disease, etc.  Clinical correlation with viral serology is strongly recommended.\"    I was hoping you could see her to help determine the cause of her \"ground glass appearing hepatocytes\".  Thanks    Pogo  "

## 2024-07-09 ENCOUNTER — OFFICE VISIT (OUTPATIENT)
Dept: GASTROENTEROLOGY | Facility: CLINIC | Age: 59
End: 2024-07-09
Payer: COMMERCIAL

## 2024-07-09 ENCOUNTER — APPOINTMENT (OUTPATIENT)
Dept: LAB | Facility: CLINIC | Age: 59
End: 2024-07-09
Payer: COMMERCIAL

## 2024-07-09 VITALS
BODY MASS INDEX: 36.07 KG/M2 | SYSTOLIC BLOOD PRESSURE: 130 MMHG | OXYGEN SATURATION: 98 % | HEIGHT: 62 IN | HEART RATE: 76 BPM | DIASTOLIC BLOOD PRESSURE: 70 MMHG | WEIGHT: 196 LBS | TEMPERATURE: 98.4 F

## 2024-07-09 DIAGNOSIS — R93.2 ABNORMAL CT SCAN, LIVER: ICD-10-CM

## 2024-07-09 LAB
ALBUMIN SERPL BCG-MCNC: 4.3 G/DL (ref 3.5–5)
ALP SERPL-CCNC: 80 U/L (ref 34–104)
ALT SERPL W P-5'-P-CCNC: 16 U/L (ref 7–52)
ANION GAP SERPL CALCULATED.3IONS-SCNC: 10 MMOL/L (ref 4–13)
AST SERPL W P-5'-P-CCNC: 22 U/L (ref 13–39)
BILIRUB SERPL-MCNC: 0.49 MG/DL (ref 0.2–1)
BUN SERPL-MCNC: 13 MG/DL (ref 5–25)
CALCIUM SERPL-MCNC: 10.1 MG/DL (ref 8.4–10.2)
CHLORIDE SERPL-SCNC: 106 MMOL/L (ref 96–108)
CO2 SERPL-SCNC: 26 MMOL/L (ref 21–32)
CREAT SERPL-MCNC: 0.8 MG/DL (ref 0.6–1.3)
GFR SERPL CREATININE-BSD FRML MDRD: 80 ML/MIN/1.73SQ M
GLUCOSE SERPL-MCNC: 64 MG/DL (ref 65–140)
HAV IGM SER QL: NORMAL
HBV CORE AB SER QL: NORMAL
HBV SURFACE AB SER-ACNC: <3 MIU/ML
HBV SURFACE AG SER QL: NORMAL
POTASSIUM SERPL-SCNC: 3.7 MMOL/L (ref 3.5–5.3)
PROT SERPL-MCNC: 6.7 G/DL (ref 6.4–8.4)
SODIUM SERPL-SCNC: 142 MMOL/L (ref 135–147)

## 2024-07-09 PROCEDURE — 80053 COMPREHEN METABOLIC PANEL: CPT

## 2024-07-09 PROCEDURE — 86704 HEP B CORE ANTIBODY TOTAL: CPT

## 2024-07-09 PROCEDURE — 99204 OFFICE O/P NEW MOD 45 MIN: CPT | Performed by: STUDENT IN AN ORGANIZED HEALTH CARE EDUCATION/TRAINING PROGRAM

## 2024-07-09 PROCEDURE — 86709 HEPATITIS A IGM ANTIBODY: CPT

## 2024-07-09 PROCEDURE — 86803 HEPATITIS C AB TEST: CPT

## 2024-07-09 PROCEDURE — 87340 HEPATITIS B SURFACE AG IA: CPT

## 2024-07-09 PROCEDURE — 86706 HEP B SURFACE ANTIBODY: CPT

## 2024-07-09 PROCEDURE — 36415 COLL VENOUS BLD VENIPUNCTURE: CPT

## 2024-07-09 NOTE — PROGRESS NOTES
[facility] - Outpatient Consultation  [place] Clinton 59 y.o. female MRN: [id number]  Encounter: [id number]        ASSESSMENT AND PLAN     60 yo F with history of CTS, meningioma, MDD, vertigo, obesity s/p RYGB and GERD on PPI who presents for outpt hepatology consult. CT abd from 3/20 showed mild surface nodularity without suspicious lesion. She underwent appendectomy on 6/27/24 with bx of liver performed given imaging which resulted as ground glass appearance of hepatocytes, negative for significant steatosis, inflammation or fibrosis.    Abnormal CT  Groundglass appearance of hepatocytes  - check hepatitis serologies   - repeat liver enzymes   - overall finding non-specific with bx otherwise reassuring. HBV staining negative. Will send confirmatory testing with serologies     - Ambulatory Referral to Hepatology  - Hepatitis B surface antibody; Future  - Hepatitis B surface antigen; Future  - Hepatitis C antibody; Future  - Hepatitis B core antibody, total; Future  - Hepatitis A antibody IgM, total; Future  - Comprehensive metabolic panel; Future    HISTORY OF PRESENT ILLNESS     Florida Clinton is our 60 yo F with history of CTS, meningioma, MDD, vertigo, obesity s/p RYGB and GERD on PPI who presents for outpt hepatology consult. CT abd from 3/20 showed mild surface nodularity without suspicious lesion. She underwent appendectomy on 6/27/24 for perforated appendix with bx of liver performed given the imaging findings which showed ground glass appearance of hepatocytes, negative for significant steatosis, inflammation or fibrosis. Iron stain negative for iron deposition. Occasional alcohol. No tobacco use. No FH of liver disease. She takes Ca, Vit D3, iron and MVI, fish oil. Denies fevers, chills, N/V, abd pain, diarrhea, constipation. Recent abx she has been on include cefazolin, metronidazole, Augmentin, Zosyn. No risk factors for viral hepatitis.       REVIEW OF SYSTEMS      CONSTITUTIONAL: Denies any fever, chills, rigors, and weight loss.  HEENT: No earache or tinnitus. Denies hearing loss or visual disturbances.  CARDIOVASCULAR: No chest pain or palpitations.   RESPIRATORY: Denies any cough, hemoptysis, shortness of breath or dyspnea on exertion.  GASTROINTESTINAL: As noted in the History of Present Illness.   GENITOURINARY: No problems with urination. Denies any hematuria or dysuria.  NEUROLOGIC: No dizziness or vertigo, denies headaches.   MUSCULOSKELETAL: Denies any muscle or joint pain.   SKIN: Denies skin rashes or itching.   ENDOCRINE: Denies excessive thirst. Denies intolerance to heat or cold.  PSYCHOSOCIAL: Denies depression or anxiety. Denies any recent memory loss.       Historical information     Past Medical History:   Diagnosis Date    Abdominal pain     Chronic pain disorder     abdominal    Depression     Diverticula of colon     Former smoker     Resolved 2012     GERD (gastroesophageal reflux disease)     Headache(784.0) 10/1/2020    Hemorrhoids     Hiatal hernia     Hyperlipidemia     Intestinal malabsorption following gastrectomy     Morbid obesity (HCC)     Resolved 10/2/2015     Obesity     Postgastrectomy malabsorption     Vitamin D insufficiency     Resolved 2016      Past Surgical History:   Procedure Laterality Date    ABDOMINAL SURGERY      gastric bipass    ANKLE SURGERY       SECTION      (2) ,      COLONOSCOPY      ESOPHAGOGASTRODUODENOSCOPY      x2    GASTRIC BYPASS  2015    HERNIA REPAIR  2015    Paraesophageal hiatus hernia. Managed by Eliezer Mishra (General Surgery)    NE EGD TRANSORAL BIOPSY SINGLE/MULTIPLE N/A 2017    Procedure: ESOPHAGOGASTRODUODENOSCOPY (EGD);  Surgeon: Emmie Rosen MD;  Location: [facility];  Service: Bariatrics    NE LAPAROSCOPIC APPENDECTOMY N/A 2024    Procedure: APPENDECTOMY LAPAROSCOPIC;  Surgeon: Jona Araiza MD;  Location: [facility];  Service: General    NE LAPS ABD  PRTM&OMENTUM DX W/WO SPEC BR/WA SPX N/A 2024    Procedure: LAPAROSCOPY DIAGNOSTIC;  Surgeon: Eliezer Farooq MD;  Location: [facility];  Service: Bariatrics    NY LAPS SURG ABLTJ 1/> LVR YELITZA RF N/A 2024    Procedure: EXCISION BIOPSY  LIVER LAPAROSCOPIC;  Surgeon: Jona Araiza MD;  Location: [facility];  Service: General    TONSILLECTOMY  1984    UTERINE FIBROID SURGERY       Social History   Social History     Substance and Sexual Activity   Alcohol Use Yes    Comment: social     Social History     Substance and Sexual Activity   Drug Use No     Social History     Tobacco Use   Smoking Status Former    Current packs/day: 0.00    Types: Cigarettes    Quit date:     Years since quittin.5   Smokeless Tobacco Never   Tobacco Comments    20+ pack year hx - As per [employer]      Family History   Problem Relation Age of Onset    Breast cancer Mother 31    Prostate cancer Father 50    Hypertension Father     Heart disease Father         Coronary arteriosclerosis     Hyperlipidemia Father     Heart disease Maternal Grandmother         Coronary arteriosclerosis     Heart disease Maternal Grandfather         Coronary arteriosclerosis     COPD Maternal Grandfather         heavy smoker    Diabetes Paternal Grandmother     No Known Problems Brother     No Known Problems Daughter     No Known Problems Daughter     No Known Problems Paternal Aunt     Stroke Neg Hx     Thyroid disease Neg Hx        Allergies       Current Outpatient Medications:     Calcium Citrate-Vitamin D 250-200 MG-UNIT TABS    Cholecalciferol (D3 5000) 125 MCG (5000 UT) capsule    ferrous gluconate (FERGON) 324 mg tablet    FLUoxetine (PROzac) 20 mg capsule    Melatonin 5 MG CAPS    MULTIPLE VITAMIN PO    omeprazole (PriLOSEC) 20 mg delayed release capsule    pravastatin (PRAVACHOL) 20 mg tablet    Allergies   Allergen Reactions    Ibuprofen Other (See Comments)     Gastric bypass           Objective assessment       There were no vitals  taken for this visit. There is no height or weight on file to calculate BMI.        PHYSICAL EXAM:         Physical Exam:   GENERAL: NAD  HEENT:  NC/AT, MMM, no scleral icterus  CARDIAC:  Regular rate and rhythm  PULMONARY:  No respiratory distress, no wheezing/rales/rhonci, non-labored breathing  ABDOMEN:  Soft, NT/ND, +BS, no rebound/guarding/rigidity  Extremities:  No edema, cyanosis, or clubbing  NEUROLOGIC:  Alert/oriented x3.   SKIN:  No rashes or erythema      Lab Results:      No visits with results within 1 Day(s) from this visit.   Latest known visit with results is:   Admission on 06/27/2024, Discharged on 06/27/2024   Component Date Value    Case Report 06/27/2024                      Value:Surgical Pathology Report                         Case: [id number]                                  Authorizing Provider:  Jona Araiza MD         Collected:           06/27/2024 1336              Ordering Location:     [facility]      Received:            06/27/2024 1531                                     Hospital Operating Room                                                      Pathologist:           Augusta Friedman MD                                                                    Specimens:   A) - Appendix                                                                                       B) - Liver, liver biopsy  segment 5                                                        Addendum 2 06/27/2024                      Value:HBsAg [facility] HBcAg [facility] ID: [id number]) are negative.       Addendum 06/27/2024                      Value:A) Immunohistochemical stains on A3 show the foamy histiocytes are positive for CD68 and negative for CK AE1/AE3.  There are no definitive acid-fast microorganism and fungal organism by AFB and GMS stains on A3, respectively.         Final Diagnosis 06/27/2024                      Value:A. Appendix, appendectomy:  -   Appendix with  mucosal acute inflammation, dilated lumen, multinucleate giant cell reaction and foamy histiocytes.  -   CK AE1/AE3 and CD68 immunostains, AFB and GMS stains are pending.    B. Liver, segment 5, core needle biopsy:  -   Ground glass appearance of hepatocytes.  -   HBsAg and HBcAg immunostains are pending and will be reported in an addendum.  -   Negative for significant steatosis, inflammation and fibrosis.  -   See comment.    Comment:  B) Trichrome stain is negative for significant fibrosis.  PAS with diastase stains are negative for cytoplasmic globules.  Iron stain is negative for iron deposition.  Reticulin stain highlights normal hepatocyte plates.  The ground glass like change in hepatocytes can be associated with hepatitis B infection, drug effect, immunosuppression, polypharmacy, glycogen storage disease, fibrinogen storage disease, etc.  Clinical correlation with viral serology is strongly recommended.     Reference: Steffi ANTOINE,                           Dalia J, Pavel M, Clayton RA, Elmer J, Juan ANTOINET, Husam STONE, Rey M. Glycogen pseudoground glass change in hepatocytes. Am J Surg Pathol. 2006 Sep;30(9):1085-90.      Interpretation performed at [facility], [facility] [place] [address]        Additional Information 06/27/2024                      Value:All reported additional testing was performed with appropriately reactive controls.  These tests were developed and their performance characteristics determined by [facility] or appropriate performing facility, though some tests may be performed on tissues which have not been validated for performance characteristics (such as staining performed on alcohol exposed cell blocks and decalcified tissues).  Results should be interpreted with caution and in the context of the patients’ clinical condition. These tests may not be cleared or approved by the U.S. Food and Drug Administration, though the FDA has determined that such  "clearance or approval is not necessary. These tests are used for clinical purposes and they should not be regarded as investigational or for research. This laboratory has been approved by CLIA 88, designated as a high-complexity laboratory and is qualified to perform these tests.  .      Gross Description 06/27/2024                      Value:A. The specimen is received in formalin, labeled with the patient's name and hospital number, and is designated \" appendix.\"  It consists of a 4.0 cm in length, 0.8-1.3 cm in diameter vermiform appendix with a stapled margin (inked blue).  The serosa is smooth tan-white to pink-red.  No perforations or exudates are identified.  There is a small amount of attached mesoappendix, measuring up to 1.1 cm in width.  Sectioning reveals a patent to dilated lumen, filled with cloudy tan, possibly mucoid fluid.  The mucosa is tan-pink.  The wall measures up to 0.2 cm thick.  The appendix is submitted entirely as follows:  1: En face proximal margin and one half of distal tip  2: Remaining one half of distal tip  3-4: Appendix cross-sections, submitted from proximal to distal  B. The specimen is received in formalin, labeled with the patient's name and hospital number, and is designated \" liver biopsy segment 5.\"  It consists of a 1.4 cm in length, 0.1 cm in diameter core of tan-red soft tissue.                            The specimen is submitted in toto in 1 cassette, between sponges.    Note: The estimated total formalin fixation time based upon information provided by the submitting clinician and the standard processing schedule is under 72 hours.    MScheib      Clinical Information 06/27/2024                      Value:Per op note,   Operative Indications:  Acute appendicitis [K35.80]  59-year-old female with a history of perforated appendicitis with periappendiceal abscess presented for interval appendectomy.  After discussion of risk and benefits she opted to proceed to the " operating room today for planned laparoscopic appendectomy.  Additionally based on some imaging findings of some hepatic steatosis we opted for a liver biopsy.     Operative Findings:  Mild fat infiltration uniformly within the liver.  Segment 5 liver biopsy taken.  No significant inflammatory changes around the appendix, but was fused to the base of the cecum.  No bleeding from the staple line at completion.     Latest Reference Range & Units Most Recent  AST 13 - 39 U/L 18  5/4/24 04:39  ALT 7 - 52 U/L 14  5/4/24 04:39  ALK PHOS 34 - 104 U/L 77  5/4/24 04:39    POC Glucose 06/27/2024 89          Radiology Results:      No results found.      Esau Kruger MD  Gastroenteroloy Fellow

## 2024-07-10 LAB — HCV AB SER QL: NORMAL

## 2024-07-19 ENCOUNTER — CLINICAL SUPPORT (OUTPATIENT)
Dept: FAMILY MEDICINE CLINIC | Facility: CLINIC | Age: 59
End: 2024-07-19
Payer: COMMERCIAL

## 2024-07-19 ENCOUNTER — OFFICE VISIT (OUTPATIENT)
Dept: SURGERY | Facility: CLINIC | Age: 59
End: 2024-07-19

## 2024-07-19 VITALS
SYSTOLIC BLOOD PRESSURE: 120 MMHG | DIASTOLIC BLOOD PRESSURE: 78 MMHG | WEIGHT: 197.2 LBS | HEART RATE: 61 BPM | RESPIRATION RATE: 18 BRPM | HEIGHT: 62 IN | BODY MASS INDEX: 36.29 KG/M2 | OXYGEN SATURATION: 99 %

## 2024-07-19 DIAGNOSIS — K35.32 APPENDICITIS WITH PERFORATION: Primary | ICD-10-CM

## 2024-07-19 DIAGNOSIS — Z23 ENCOUNTER FOR IMMUNIZATION: Primary | ICD-10-CM

## 2024-07-19 PROCEDURE — 90746 HEPB VACCINE 3 DOSE ADULT IM: CPT

## 2024-07-19 PROCEDURE — 99024 POSTOP FOLLOW-UP VISIT: CPT | Performed by: SURGERY

## 2024-07-19 PROCEDURE — 90471 IMMUNIZATION ADMIN: CPT

## 2024-07-19 NOTE — ASSESSMENT & PLAN NOTE
58yo F s/p laparoscopic interval appendectomy and liver biopsy on 6/27/24    Plan:  - The patient's pathology was reviewed, and understanding was acknowledged.  - The patient may return to all normal activities at this time.   - I reiterated the lifting restriction of 20 lb until 4 weeks postoperatively, and advised that there are no restrictions from a dietary perspective   - The patient may return to clinic as needed, and can call with any questions should they arise.

## 2024-07-19 NOTE — PROGRESS NOTES
Ambulatory Visit  Name: Florida Clinton      : 1965      MRN: [id number]  Encounter Provider: Jona Araiza MD  Encounter Date: 2024   Encounter department: [facility] [place]    Assessment & Plan   1. Appendicitis with perforation  Assessment & Plan:  58yo F s/p laparoscopic interval appendectomy and liver biopsy on 24    Plan:  - The patient's pathology was reviewed, and understanding was acknowledged.  - The patient may return to all normal activities at this time.   - I reiterated the lifting restriction of 20 lb until 4 weeks postoperatively, and advised that there are no restrictions from a dietary perspective   - The patient may return to clinic as needed, and can call with any questions should they arise.        History of Present Illness     Florida Clinton is a 59 y.o. female who presents s/p laparoscopic appendectomy and liver biopsy on 24 for follow-up.  Overall, they are doing quite well without significant complaints.  They deny any significant pain, fevers, chills, chest pain, or shortness of breath.  They are tolerating regular diet, and moving their bowels normally.  They have been doing most activities around the house, without restriction or limitation, while abiding by their lifting restrictions.  She has followed up with hepatology for her liver biopsy results.       Review of Systems   Constitutional:  Negative for chills, fatigue and fever.   HENT:  Negative for ear pain, facial swelling, sinus pressure and sinus pain.    Eyes:  Negative for pain.   Respiratory:  Negative for cough, shortness of breath and wheezing.    Cardiovascular:  Negative for chest pain.   Gastrointestinal:  Negative for abdominal pain, constipation, diarrhea, nausea and vomiting.   Endocrine: Negative for cold intolerance and heat intolerance.   Genitourinary:  Negative for dysuria and flank pain.   Musculoskeletal:  Negative for back pain and neck pain.   Skin:  Negative for  wound.   Neurological:  Negative for syncope, facial asymmetry, light-headedness and numbness.   Psychiatric/Behavioral:  Negative for behavioral problems, confusion and suicidal ideas.      Past Medical History   Past Medical History:   Diagnosis Date    Abdominal pain     Chronic pain disorder     abdominal    Depression     Diverticula of colon     Former smoker     Resolved 2012     GERD (gastroesophageal reflux disease)     Headache(784.0) 10/1/2020    Hemorrhoids     Hiatal hernia     Hyperlipidemia     Intestinal malabsorption following gastrectomy     Morbid obesity (HCC)     Resolved 10/2/2015     Obesity     Postgastrectomy malabsorption     Vitamin D insufficiency     Resolved 2016      Past Surgical History:   Procedure Laterality Date    ABDOMINAL SURGERY      gastric bipass    ANKLE SURGERY       SECTION      (2) ,      COLONOSCOPY      ESOPHAGOGASTRODUODENOSCOPY      x2    GASTRIC BYPASS  2015    HERNIA REPAIR  2015    Paraesophageal hiatus hernia. Managed by Eliezer Mishra (General Surgery)    MT EGD TRANSORAL BIOPSY SINGLE/MULTIPLE N/A 2017    Procedure: ESOPHAGOGASTRODUODENOSCOPY (EGD);  Surgeon: Emmie Rosen MD;  Location: [facility];  Service: Bariatrics    MT LAPAROSCOPIC APPENDECTOMY N/A 2024    Procedure: APPENDECTOMY LAPAROSCOPIC;  Surgeon: Jona Araiza MD;  Location: [facility];  Service: General    MT LAPS ABD PRTM&OMENTUM DX W/WO SPEC BR/WA SPX N/A 2024    Procedure: LAPAROSCOPY DIAGNOSTIC;  Surgeon: Eliezer Farooq MD;  Location: [facility];  Service: Bariatrics    MT LAPS SURG ABLTJ 1/> LVR YELITZA RF N/A 2024    Procedure: EXCISION BIOPSY  LIVER LAPAROSCOPIC;  Surgeon: Jona Araiza MD;  Location: [facility];  Service: General    TONSILLECTOMY  1984    UTERINE FIBROID SURGERY       Family History   Problem Relation Age of Onset    Breast cancer Mother 31    Prostate cancer Father 50    Hypertension Father     Heart disease Father          Coronary arteriosclerosis     Hyperlipidemia Father     Heart disease Maternal Grandmother         Coronary arteriosclerosis     Heart disease Maternal Grandfather         Coronary arteriosclerosis     COPD Maternal Grandfather         heavy smoker    Diabetes Paternal Grandmother     No Known Problems Brother     No Known Problems Daughter     No Known Problems Daughter     No Known Problems Paternal Aunt     Stroke Neg Hx     Thyroid disease Neg Hx      Current Outpatient Medications on File Prior to Visit   Medication Sig Dispense Refill    Calcium Citrate-Vitamin D 250-200 MG-UNIT TABS Take 1 tablet by mouth 2 (two) times a day      Cholecalciferol (D3 5000) 125 MCG (5000 UT) capsule       ferrous gluconate (FERGON) 324 mg tablet Take 324 mg by mouth every other day Every other day      FLUoxetine (PROzac) 20 mg capsule TAKE 2 CAPSULES BY MOUTH EVERY  capsule 1    Melatonin 5 MG CAPS Take 5 mg by mouth daily at bedtime       MULTIPLE VITAMIN PO Take 1 tablet by mouth daily      omeprazole (PriLOSEC) 20 mg delayed release capsule TAKE 1 CAPSULE (20 MG TOTAL) BY MOUTH 2 (TWO) TIMES A DAY AS NEEDED (REFLUX) 180 capsule 1    pravastatin (PRAVACHOL) 20 mg tablet TAKE 1 TABLET BY MOUTH EVERY DAY 90 tablet 1     No current facility-administered medications on file prior to visit.     Allergies   Allergen Reactions    Ibuprofen Other (See Comments)     Gastric bypass      Current Outpatient Medications on File Prior to Visit   Medication Sig Dispense Refill    Calcium Citrate-Vitamin D 250-200 MG-UNIT TABS Take 1 tablet by mouth 2 (two) times a day      Cholecalciferol (D3 5000) 125 MCG (5000 UT) capsule       ferrous gluconate (FERGON) 324 mg tablet Take 324 mg by mouth every other day Every other day      FLUoxetine (PROzac) 20 mg capsule TAKE 2 CAPSULES BY MOUTH EVERY  capsule 1    Melatonin 5 MG CAPS Take 5 mg by mouth daily at bedtime       MULTIPLE VITAMIN PO Take 1 tablet by mouth daily       "omeprazole (PriLOSEC) 20 mg delayed release capsule TAKE 1 CAPSULE (20 MG TOTAL) BY MOUTH 2 (TWO) TIMES A DAY AS NEEDED (REFLUX) 180 capsule 1    pravastatin (PRAVACHOL) 20 mg tablet TAKE 1 TABLET BY MOUTH EVERY DAY 90 tablet 1     No current facility-administered medications on file prior to visit.      Social History     Tobacco Use    Smoking status: Former     Current packs/day: 0.00     Types: Cigarettes     Quit date:      Years since quittin.5    Smokeless tobacco: Never    Tobacco comments:     20+ pack year hx - As per [employer]    Vaping Use    Vaping status: Never Used   Substance and Sexual Activity    Alcohol use: Yes     Comment: social    Drug use: No    Sexual activity: Yes     Partners: Male     Birth control/protection: Post-menopausal, Male Sterilization, None     Objective     /78 (BP Location: Left arm, Patient Position: Sitting, Cuff Size: Standard)   Pulse 61   Resp 18   Ht 5' 2\" (1.575 m)   Wt 89.4 kg (197 lb 3.2 oz)   SpO2 99%   BMI 36.07 kg/m²     Physical Exam  Vitals and nursing note reviewed.   Constitutional:       General: She is not in acute distress.     Appearance: Normal appearance. She is not ill-appearing.   HENT:      Head: Normocephalic and atraumatic.      Mouth/Throat:      Mouth: Mucous membranes are moist.   Eyes:      Extraocular Movements: Extraocular movements intact.   Cardiovascular:      Rate and Rhythm: Normal rate and regular rhythm.   Pulmonary:      Effort: Pulmonary effort is normal. No respiratory distress.      Breath sounds: Normal breath sounds. No stridor.   Abdominal:      General: There is no distension.      Palpations: Abdomen is soft. There is no mass.      Tenderness: There is no abdominal tenderness.      Hernia: No hernia is present.      Comments: Well healed laparoscopic port sites   Musculoskeletal:         General: No swelling or tenderness. Normal range of motion.   Skin:     General: Skin is warm and dry.      Findings: No " lesion.   Neurological:      General: No focal deficit present.      Mental Status: She is alert and oriented to person, place, and time.   Psychiatric:         Mood and Affect: Mood normal.         Behavior: Behavior normal.       Administrative Statements

## 2024-08-12 ENCOUNTER — TELEPHONE (OUTPATIENT)
Dept: OTHER | Facility: HOSPITAL | Age: 59
End: 2024-08-12

## 2024-08-12 ENCOUNTER — TELEPHONE (OUTPATIENT)
Age: 59
End: 2024-08-12

## 2024-08-12 NOTE — QUICK NOTE
Spoke to patient about call in to answering service.     Feels a poking discomfort at one end of the incision which was not previously present.    No warmth, redness or worsening pain. No bulge noted.    Pain is present with straining but also light touch and the overlying skin is sensitive.    Discussed with the patient that given that this is at one end of the incision and is tender to light touch without redness or erythema it is likely the tails of a Monocryl suture causing discomfort which can take up to 120 days to fully hydrolyze.    Be that as it may, I offered to see the patient in the office or offered watchful waiting. We discussed warning signs and when to notify office.    If at any time patient is concerned, she is welcome to contact office and we will see her.    All questions answered.

## 2024-08-12 NOTE — TELEPHONE ENCOUNTER
"Patient of Dr.Andrew Araiza. Had appendectomy done on 6/27/24. Follow up call from message on 8/10/24. Patient is still having \"feeling like being poked\" at umbilical incision. Well healed. No redness or drainage.Explained to patient this could be from sutures underneath pulling as healing is occurring. Verbalized understanding. Would like return call from office staff.  "

## 2024-08-26 ENCOUNTER — NURSE TRIAGE (OUTPATIENT)
Age: 59
End: 2024-08-26

## 2024-08-26 NOTE — TELEPHONE ENCOUNTER
Regarding: rapid heart rate  ----- Message from Skyla GONZALEZ sent at 8/26/2024  3:46 PM EDT -----  Sent via My chart     Had a weird thing happen last night after eating some cashews (not sure if that's the reason). For about 10 minutes my heart was beating super fast and hard. Felt slightly dizzy. Sat with my feet up for awhile and it went away. Keisha tried to take my blood pressure but it wouldn't give a reading. My machine showed an error. Should I be concerned?  I feel ok today.

## 2024-08-26 NOTE — TELEPHONE ENCOUNTER
Spoke with patient, Per Dr. Lopez. Patient scheduled for 5 pm on 8/27    Patient advised if symptoms come back to report to the ER.

## 2024-08-26 NOTE — TELEPHONE ENCOUNTER
"Patient had one 10 minute episode, where she felt her heart racing.  Felt like \"body jerked\" and then became dizzy.  Daughter who is an MA took pressure and it read error.  Daughter took her pressure and it worked.  Rested in chair with legs elevated and drank some water and it resolved.  Patient denies any sick contacts.  She states she has a family history of A-Fib.  Transferred to office to accommodate an earlier appointment for evaluation.  Patient advised to go to the ED if this happens again.  Patient verbalized understanding.       Reason for Disposition   Patient wants to be seen    Answer Assessment - Initial Assessment Questions  1. DESCRIPTION: \"Please describe your heart rate or heartbeat that you are having\" (e.g., fast/slow, regular/irregular, skipped or extra beats, \"palpitations\")      Felt like heart rate was racing, last night around 9 pm.  Could not get BP on home machine.  Worked ok for family member.   2. ONSET: \"When did it start?\" (Minutes, hours or days)       Last night for 10 min.   3. DURATION: \"How long does it last\" (e.g., seconds, minutes, hours)      10 minutes   4. PATTERN \"Does it come and go, or has it been constant since it started?\"  \"Does it get worse with exertion?\"   \"Are you feeling it now?\"      Has not happened again.   5. TAP: \"Using your hand, can you tap out what you are feeling on a chair or table in front of you, so that I can hear?\" (Note: not all patients can do this)        N/A  6. HEART RATE: \"Can you tell me your heart rate?\" \"How many beats in 15 seconds?\"  (Note: not all patients can do this)        Feels normal  7. RECURRENT SYMPTOM: \"Have you ever had this before?\" If Yes, ask: \"When was the last time?\" and \"What happened that time?\"       Denies  8. CAUSE: \"What do you think is causing the palpitations?\"      Unknown  9. CARDIAC HISTORY: \"Do you have any history of heart disease?\" (e.g., heart attack, angina, bypass surgery, angioplasty, arrhythmia)       " "Family history of A-Fib  10. OTHER SYMPTOMS: \"Do you have any other symptoms?\" (e.g., dizziness, chest pain, sweating, difficulty breathing)        Dizzy during episode, felt like a jolting.   11. PREGNANCY: \"Is there any chance you are pregnant?\" \"When was your last menstrual period?\"        N/A    Protocols used: Heart Rate and Heartbeat Questions-ADULT-[place]    "

## 2024-08-27 ENCOUNTER — OFFICE VISIT (OUTPATIENT)
Dept: FAMILY MEDICINE CLINIC | Facility: CLINIC | Age: 59
End: 2024-08-27
Payer: COMMERCIAL

## 2024-08-27 VITALS
SYSTOLIC BLOOD PRESSURE: 120 MMHG | OXYGEN SATURATION: 98 % | WEIGHT: 198.2 LBS | HEIGHT: 62 IN | TEMPERATURE: 97.8 F | HEART RATE: 70 BPM | BODY MASS INDEX: 36.47 KG/M2 | DIASTOLIC BLOOD PRESSURE: 82 MMHG

## 2024-08-27 DIAGNOSIS — R42 LIGHTHEADEDNESS: ICD-10-CM

## 2024-08-27 DIAGNOSIS — R00.2 PALPITATIONS: Primary | ICD-10-CM

## 2024-08-27 PROCEDURE — 99214 OFFICE O/P EST MOD 30 MIN: CPT | Performed by: FAMILY MEDICINE

## 2024-08-27 PROCEDURE — 93000 ELECTROCARDIOGRAM COMPLETE: CPT | Performed by: FAMILY MEDICINE

## 2024-08-27 NOTE — PROGRESS NOTES
Ambulatory Visit  Name: Florida Clinton      : 1965      MRN: [id number]  Encounter Provider: Rayray Knihgt MD  Encounter Date: 2024   Encounter department: [facility]    Assessment & Plan   1. Palpitations  Assessment & Plan:  Occurred at rest. Associated with lightheadedness. ?resolved slowly, not abruptly?  ECG and exam unremarkable. Pt does have hx of bariatric surgery, which could cause electrolyte/metabolic issues.  Will check labs.  Refer pt to Cardio (Dr Em). Pt to avoid exertion until seen by Cardio.  To ED if symptoms return. Instructed pt re; taking pulse for rate and rhythm if symptoms return. Recheck 2m  Orders:  -     POCT ECG  -     Comprehensive metabolic panel; Future; Expected date: 2024  -     CBC and differential; Future  -     Magnesium; Future  -     TSH, 3rd generation with Free T4 reflex; Future  -     Ambulatory Referral to Cardiology; Future  2. Lightheadedness         History of Present Illness     59-year-old female with no cardiac history was sitting watching TV in a recliner on  evening when she suddenly developed hard, fast palpitations.  Patient states that her heart was beating so hard that she felt like her whole body was moving.  This was not associated with shortness of breath, diaphoresis, chest pain, jaw pain or shoulder pain.  Patient did feel little lightheaded.  She was able to go to the kitchen where she attempted to take her blood pressure.  Systolic was 120s but diastolic Reading error.  She did not get a pulse done.  Symptoms lasted approximately 10 minutes and then slowly resolved.  Yesterday, she had a mild chest discomfort that felt musculoskeletal.  Symptoms did not return.  Patient here for evaluation.  Patient denies any alcohol use that night, and denies any stimulant use such as decongestants.  She has not had any previous history of palpitations.  She feels that she is back to her baseline  today.      Review of Systems   Constitutional: Negative.    HENT: Negative.     Eyes: Negative.    Respiratory:  Negative for shortness of breath and wheezing.    Cardiovascular:  Positive for chest pain and palpitations. Negative for leg swelling.   Gastrointestinal: Negative.    Genitourinary: Negative.    Musculoskeletal: Negative.    Skin: Negative.    Neurological:  Positive for light-headedness. Negative for dizziness, weakness, numbness and headaches.     Past Medical History:   Diagnosis Date   • Abdominal pain    • Chronic pain disorder     abdominal   • Depression    • Diverticula of colon    • Former smoker     Resolved 2012    • GERD (gastroesophageal reflux disease)    • Headache(784.0) 10/1/2020   • Hemorrhoids    • Hiatal hernia    • Hyperlipidemia    • Intestinal malabsorption following gastrectomy    • Morbid obesity (HCC)     Resolved 10/2/2015    • Obesity    • Postgastrectomy malabsorption    • Vitamin D insufficiency     Resolved 2016      Past Surgical History:   Procedure Laterality Date   • ABDOMINAL SURGERY      gastric bipass   • ANKLE SURGERY     •  SECTION      (2) ,     • COLONOSCOPY     • ESOPHAGOGASTRODUODENOSCOPY      x2   • GASTRIC BYPASS  2015   • HERNIA REPAIR  2015    Paraesophageal hiatus hernia. Managed by Eliezer Mishra (General Surgery)   • GA EGD TRANSORAL BIOPSY SINGLE/MULTIPLE N/A 2017    Procedure: ESOPHAGOGASTRODUODENOSCOPY (EGD);  Surgeon: Emmie Rosen MD;  Location: [facility];  Service: Bariatrics   • GA LAPAROSCOPIC APPENDECTOMY N/A 2024    Procedure: APPENDECTOMY LAPAROSCOPIC;  Surgeon: Jona Araiza MD;  [facility];  Service: General   • GA LAPS ABD PRTM&OMENTUM DX W/WO SPEC BR/WA SPX N/A 2024    Procedure: LAPAROSCOPY DIAGNOSTIC;  Surgeon: Eliezer Farooq MD;  Location[facility];  Service: Bariatrics   • GA LAPS SURG ABLTJ 1/> LVR YELITZA RF N/A 2024    Procedure: EXCISION BIOPSY  LIVER  LAPAROSCOPIC;  Surgeon: Jona Araiza MD;  [facility];  Service: General   • TONSILLECTOMY     • UTERINE FIBROID SURGERY       Family History   Problem Relation Age of Onset   • Breast cancer Mother 31   • Prostate cancer Father 50   • Hypertension Father    • Heart disease Father         Coronary arteriosclerosis    • Hyperlipidemia Father    • Heart disease Maternal Grandmother         Coronary arteriosclerosis    • Heart disease Maternal Grandfather         Coronary arteriosclerosis    • COPD Maternal Grandfather         heavy smoker   • Diabetes Paternal Grandmother    • No Known Problems Brother    • No Known Problems Daughter    • No Known Problems Daughter    • No Known Problems Paternal Aunt    • Stroke Neg Hx    • Thyroid disease Neg Hx      Social History     Tobacco Use   • Smoking status: Former     Current packs/day: 0.00     Types: Cigarettes     Quit date:      Years since quittin.6   • Smokeless tobacco: Never   • Tobacco comments:     20+ pack year hx - As per [employer]    Vaping Use   • Vaping status: Never Used   Substance and Sexual Activity   • Alcohol use: Yes     Comment: social   • Drug use: No   • Sexual activity: Yes     Partners: Male     Birth control/protection: Post-menopausal, Male Sterilization, None     Current Outpatient Medications on File Prior to Visit   Medication Sig   • Calcium Citrate-Vitamin D 250-200 MG-UNIT TABS Take 1 tablet by mouth 2 (two) times a day   • Cholecalciferol (D3 5000) 125 MCG (5000 UT) capsule    • ferrous gluconate (FERGON) 324 mg tablet Take 324 mg by mouth every other day Every other day   • FLUoxetine (PROzac) 20 mg capsule TAKE 2 CAPSULES BY MOUTH EVERY DAY   • Melatonin 5 MG CAPS Take 5 mg by mouth daily at bedtime    • MULTIPLE VITAMIN PO Take 1 tablet by mouth daily   • omeprazole (PriLOSEC) 20 mg delayed release capsule TAKE 1 CAPSULE (20 MG TOTAL) BY MOUTH 2 (TWO) TIMES A DAY AS NEEDED (REFLUX)   • pravastatin (PRAVACHOL)  "20 mg tablet TAKE 1 TABLET BY MOUTH EVERY DAY     Allergies   Allergen Reactions   • Ibuprofen Other (See Comments)     Gastric bypass     Immunization History   Administered Date(s) Administered   • COVID-19 PFIZER VACCINE 0.3 ML IM 04/09/2021, 05/04/2021   • Fluzone Split Quad 0.25 mL 11/07/2014   • Fluzone Split Quad 0.5 mL 11/19/2019   • Hep B, adult 07/19/2024   • INFLUENZA 02/19/2016, 11/07/2016, 09/18/2017, 10/05/2018, 10/17/2020, 01/06/2022, 10/01/2022, 10/15/2023   • Influenza Quadrivalent 3 years and older 10/05/2018   • Influenza Quadrivalent Preservative Free 3 years and older IM 10/09/2020, 10/17/2020   • Influenza Quadrivalent, 6-35 Months IM 11/07/2014   • Influenza, Quadrivalent (nasal) 02/19/2016, 11/07/2016   • Influenza, injectable, quadrivalent, preservative free 0.5 mL 10/05/2018, 11/19/2019, 10/09/2020, 10/17/2020   • Influenza, seasonal, injectable 10/05/2011, 10/09/2012   • Tdap 11/07/2014   • Tuberculin Skin Test-PPD Intradermal 10/05/2011   • Zoster Vaccine Recombinant 03/23/2023, 08/17/2023     Objective     /82 (BP Location: Left arm, Patient Position: Sitting, Cuff Size: Adult)   Pulse 70   Temp 97.8 °F (36.6 °C)   Ht 5' 2\" (1.575 m)   Wt 89.9 kg (198 lb 3.2 oz)   SpO2 98%   BMI 36.25 kg/m²     Physical Exam  Vitals reviewed.   HENT:      Head: Normocephalic.      Mouth/Throat:      Mouth: Mucous membranes are moist.   Eyes:      Extraocular Movements: Extraocular movements intact.      Conjunctiva/sclera: Conjunctivae normal.      Pupils: Pupils are equal, round, and reactive to light.   Neck:      Comments: No thyromegaly or thyroid nodule appreciated  Cardiovascular:      Rate and Rhythm: Normal rate and regular rhythm.      Pulses: Normal pulses.   Pulmonary:      Effort: Pulmonary effort is normal.   Abdominal:      General: There is no distension.      Palpations: There is no mass.      Tenderness: There is no abdominal tenderness.   Musculoskeletal:         General: No " swelling or tenderness. Normal range of motion.      Cervical back: No tenderness.      Right lower leg: No edema.      Left lower leg: No edema.   Lymphadenopathy:      Cervical: No cervical adenopathy.   Skin:     General: Skin is warm.      Capillary Refill: Capillary refill takes less than 2 seconds.   Neurological:      General: No focal deficit present.      Mental Status: She is alert and oriented to person, place, and time.   Psychiatric:         Mood and Affect: Mood normal.

## 2024-08-28 PROBLEM — R00.2 PALPITATIONS: Status: ACTIVE | Noted: 2024-08-28

## 2024-08-28 NOTE — ASSESSMENT & PLAN NOTE
Occurred at rest. Associated with lightheadedness. ?resolved slowly, not abruptly?  ECG and exam unremarkable. Pt does have hx of bariatric surgery, which could cause electrolyte/metabolic issues.  Will check labs.  Refer pt to Cardio (Dr Em). Pt to avoid exertion until seen by Cardio.  To ED if symptoms return. Instructed pt re; taking pulse for rate and rhythm if symptoms return. Recheck 2m

## 2024-09-12 ENCOUNTER — APPOINTMENT (OUTPATIENT)
Age: 59
End: 2024-09-12
Payer: COMMERCIAL

## 2024-09-12 DIAGNOSIS — R00.2 PALPITATIONS: ICD-10-CM

## 2024-09-12 DIAGNOSIS — K21.9 GASTROESOPHAGEAL REFLUX DISEASE, UNSPECIFIED WHETHER ESOPHAGITIS PRESENT: ICD-10-CM

## 2024-09-12 LAB
ALBUMIN SERPL BCG-MCNC: 4.3 G/DL (ref 3.5–5)
ALP SERPL-CCNC: 76 U/L (ref 34–104)
ALT SERPL W P-5'-P-CCNC: 18 U/L (ref 7–52)
ANION GAP SERPL CALCULATED.3IONS-SCNC: 7 MMOL/L (ref 4–13)
AST SERPL W P-5'-P-CCNC: 21 U/L (ref 13–39)
BASOPHILS # BLD AUTO: 0.04 THOUSANDS/ΜL (ref 0–0.1)
BASOPHILS NFR BLD AUTO: 1 % (ref 0–1)
BILIRUB SERPL-MCNC: 0.45 MG/DL (ref 0.2–1)
BUN SERPL-MCNC: 15 MG/DL (ref 5–25)
CALCIUM SERPL-MCNC: 10 MG/DL (ref 8.4–10.2)
CHLORIDE SERPL-SCNC: 101 MMOL/L (ref 96–108)
CO2 SERPL-SCNC: 31 MMOL/L (ref 21–32)
CREAT SERPL-MCNC: 0.85 MG/DL (ref 0.6–1.3)
EOSINOPHIL # BLD AUTO: 0.17 THOUSAND/ΜL (ref 0–0.61)
EOSINOPHIL NFR BLD AUTO: 3 % (ref 0–6)
ERYTHROCYTE [DISTWIDTH] IN BLOOD BY AUTOMATED COUNT: 12.2 % (ref 11.6–15.1)
GFR SERPL CREATININE-BSD FRML MDRD: 74 ML/MIN/1.73SQ M
GLUCOSE SERPL-MCNC: 84 MG/DL (ref 65–140)
HCT VFR BLD AUTO: 43.5 % (ref 34.8–46.1)
HGB BLD-MCNC: 14.2 G/DL (ref 11.5–15.4)
IMM GRANULOCYTES # BLD AUTO: 0.01 THOUSAND/UL (ref 0–0.2)
IMM GRANULOCYTES NFR BLD AUTO: 0 % (ref 0–2)
LYMPHOCYTES # BLD AUTO: 2.78 THOUSANDS/ΜL (ref 0.6–4.47)
LYMPHOCYTES NFR BLD AUTO: 44 % (ref 14–44)
MAGNESIUM SERPL-MCNC: 2.1 MG/DL (ref 1.9–2.7)
MCH RBC QN AUTO: 30.8 PG (ref 26.8–34.3)
MCHC RBC AUTO-ENTMCNC: 32.6 G/DL (ref 31.4–37.4)
MCV RBC AUTO: 94 FL (ref 82–98)
MONOCYTES # BLD AUTO: 0.48 THOUSAND/ΜL (ref 0.17–1.22)
MONOCYTES NFR BLD AUTO: 8 % (ref 4–12)
NEUTROPHILS # BLD AUTO: 2.91 THOUSANDS/ΜL (ref 1.85–7.62)
NEUTS SEG NFR BLD AUTO: 44 % (ref 43–75)
NRBC BLD AUTO-RTO: 0 /100 WBCS
PLATELET # BLD AUTO: 320 THOUSANDS/UL (ref 149–390)
PMV BLD AUTO: 11 FL (ref 8.9–12.7)
POTASSIUM SERPL-SCNC: 4 MMOL/L (ref 3.5–5.3)
PROT SERPL-MCNC: 6.5 G/DL (ref 6.4–8.4)
RBC # BLD AUTO: 4.61 MILLION/UL (ref 3.81–5.12)
SODIUM SERPL-SCNC: 139 MMOL/L (ref 135–147)
TSH SERPL DL<=0.05 MIU/L-ACNC: 1.67 UIU/ML (ref 0.45–4.5)
WBC # BLD AUTO: 6.39 THOUSAND/UL (ref 4.31–10.16)

## 2024-09-12 PROCEDURE — 80053 COMPREHEN METABOLIC PANEL: CPT

## 2024-09-12 PROCEDURE — 83735 ASSAY OF MAGNESIUM: CPT

## 2024-09-12 PROCEDURE — 36415 COLL VENOUS BLD VENIPUNCTURE: CPT

## 2024-09-12 PROCEDURE — 85025 COMPLETE CBC W/AUTO DIFF WBC: CPT

## 2024-09-12 PROCEDURE — 84443 ASSAY THYROID STIM HORMONE: CPT

## 2024-09-20 DIAGNOSIS — E78.00 HYPERCHOLESTEROLEMIA: ICD-10-CM

## 2024-09-20 DIAGNOSIS — F32.A DEPRESSION, UNSPECIFIED DEPRESSION TYPE: ICD-10-CM

## 2024-09-20 RX ORDER — PRAVASTATIN SODIUM 20 MG
20 TABLET ORAL DAILY
Qty: 90 TABLET | Refills: 1 | Status: SHIPPED | OUTPATIENT
Start: 2024-09-20

## 2024-09-21 DIAGNOSIS — F32.A DEPRESSION, UNSPECIFIED DEPRESSION TYPE: ICD-10-CM

## 2024-09-23 DIAGNOSIS — Z00.6 ENCOUNTER FOR EXAMINATION FOR NORMAL COMPARISON OR CONTROL IN CLINICAL RESEARCH PROGRAM: ICD-10-CM

## 2024-10-31 ENCOUNTER — CONSULT (OUTPATIENT)
Dept: CARDIOLOGY CLINIC | Facility: CLINIC | Age: 59
End: 2024-10-31
Payer: COMMERCIAL

## 2024-10-31 VITALS
OXYGEN SATURATION: 97 % | BODY MASS INDEX: 36.62 KG/M2 | WEIGHT: 200.2 LBS | SYSTOLIC BLOOD PRESSURE: 120 MMHG | TEMPERATURE: 98.9 F | HEART RATE: 71 BPM | DIASTOLIC BLOOD PRESSURE: 80 MMHG

## 2024-10-31 DIAGNOSIS — E78.00 HYPERCHOLESTEROLEMIA: ICD-10-CM

## 2024-10-31 DIAGNOSIS — R00.2 PALPITATIONS: Primary | ICD-10-CM

## 2024-10-31 PROCEDURE — 99204 OFFICE O/P NEW MOD 45 MIN: CPT | Performed by: INTERNAL MEDICINE

## 2024-10-31 NOTE — ASSESSMENT & PLAN NOTE
Reviewed lab work. Lipid panel well controlled. She is on 20mg pravastatin. CT scan done earlier this year in the setting of bowel obstruction showed only minimal calcification noted in the iliac on the R. She is comfortable continuing 20mg pravastatin.

## 2024-10-31 NOTE — PROGRESS NOTES
Cardiology Consultation     Florida Clinton  [id number]  1965  [facility] [place] [id number] [facility] [address] [phone]    Orders Placed This Encounter   Procedures    Holter monitor    Echo complete w/ contrast if indicated         Assessment & Plan  Palpitations  Occurred at rest and lasted several minutes. She was getting an error reading on a automated blood pressure cuff. Check echocardiogram and 48-hour Holter monitor. If recurrence of symptoms, can do longer-term monitoring. If very intermittent, discussed the potential use of a [employer] mobile device in the future.  Reviewed blood work, all looks good. Continue conservative management at this time otherwise if testing is benign.  Hypercholesterolemia  Reviewed lab work. Lipid panel well controlled. She is on 20mg pravastatin. CT scan done earlier this year in the setting of bowel obstruction showed only minimal calcification noted in the iliac on the R. She is comfortable continuing 20mg pravastatin.        History of Present Illness:    59-year-old female. History of mild dyslipidemia she has been on pravastatin. She has prior gastric bypass.    No significant cardiac history. Parents have some cardiac problems mom with A-fib and father with coronary disease and also A-fib. Mom is required a few cardioversions.    At the end of August, she was sitting watching television and had an episode where she felt very strong and fast heartbeat which lasted for several minutes. Long enough for her daughter to be able to go get blood pressure cuff and check a few times when she was getting an error reading on it. Did not feel presyncopal, but thinks that if the symptoms lasted for very long she would have gotten dizzy/lightheaded. Daughter advised her to lay down and elevate her legs and gradually the symptoms resolved. She had contacted her primary care physician was  seen for an office visit within a few days had an EKG that is was normal. Referred for blood work which was unremarkable including thyroid function and electrolytes.    Nothing was out of the ordinary around that time to precipitate any sort of symptoms or arrhythmia.    She has not had any recurrence of the symptoms, either.    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    A Holter monitor was done in 2019 which was unremarkable and was done for lightheaded episodes and sweating episodes which subsequently were identified to be related to hypoglycemia after her bariatric surgery.    Patient Active Problem List   Diagnosis    Hypercholesterolemia    Esophageal reflux    Endometrial polyp    Major depressive disorder with single episode, in partial remission ([facility])    Postgastrectomy malabsorption    Sea sickness    Vertigo    Postural dizziness with near syncope    Meningioma, cerebral ([facility])    Encounter for surgical aftercare following surgery of digestive system    Weight gain following gastric bypass surgery    Class 2 obesity without serious comorbidity with body mass index (BMI) of 36.0 to 36.9 in adult    Hyperparathyroidism ([facility])    Bilateral carpal tunnel syndrome    Chronic bilateral low back pain without sciatica    Memory difficulties    LUQ pain    Perforated appendicitis    Abnormal CT of the abdomen    Obstruction concurrent with and due to internal hernia of abdomen    Appendicitis with perforation    Palpitations     Past Medical History:   Diagnosis Date    Abdominal pain     Chronic pain disorder     abdominal    Depression     Diverticula of colon     Former smoker     Resolved 7/2012     GERD (gastroesophageal reflux disease)     Headache(784.0) 10/1/2020    Hemorrhoids     Hiatal hernia     Hyperlipidemia     Intestinal malabsorption following gastrectomy     Morbid obesity (HCC)     Resolved 10/2/2015      Obesity     Postgastrectomy malabsorption     Vitamin D insufficiency     Resolved 2016      Social History     Tobacco Use    Smoking status: Former     Current packs/day: 0.00     Types: Cigarettes     Quit date:      Years since quittin.8    Smokeless tobacco: Never    Tobacco comments:     20+ pack year hx - As per [employer]    Vaping Use    Vaping status: Never Used   Substance Use Topics    Alcohol use: Yes     Comment: social    Drug use: No      Family History   Problem Relation Age of Onset    Breast cancer Mother 31    Prostate cancer Father 50    Hypertension Father     Heart disease Father         Coronary arteriosclerosis     Hyperlipidemia Father     Heart disease Maternal Grandmother         Coronary arteriosclerosis     Heart disease Maternal Grandfather         Coronary arteriosclerosis     COPD Maternal Grandfather         heavy smoker    Diabetes Paternal Grandmother     No Known Problems Brother     No Known Problems Daughter     No Known Problems Daughter     No Known Problems Paternal Aunt     Stroke Neg Hx     Thyroid disease Neg Hx      Past Surgical History:   Procedure Laterality Date    ABDOMINAL SURGERY      gastric bipass    ANKLE SURGERY       SECTION      (2) ,      COLONOSCOPY      ESOPHAGOGASTRODUODENOSCOPY      x2    GASTRIC BYPASS  2015    HERNIA REPAIR  2015    Paraesophageal hiatus hernia. Managed by Eliezer Mishra (General Surgery)    NM EGD TRANSORAL BIOPSY SINGLE/MULTIPLE N/A 2017    Procedure: ESOPHAGOGASTRODUODENOSCOPY (EGD);  Surgeon: Emmie Rosen MD;  [facility];  Service: Bariatrics    NM LAPAROSCOPIC APPENDECTOMY N/A 2024    Procedure: APPENDECTOMY LAPAROSCOPIC;  Surgeon: Jona Araiza MD;  [facility];  Service: General    NM LAPS ABD PRTM&OMENTUM DX W/WO SPEC BR/WA SPX N/A 2024    Procedure: LAPAROSCOPY DIAGNOSTIC;  Surgeon: Eliezer Farooq MD;  [facility];  Service: Bariatrics     MT LAPS SURG ABLTJ 1/> LVR YELITZA RF N/A 6/27/2024    Procedure: EXCISION BIOPSY  LIVER LAPAROSCOPIC;  Surgeon: Jona Araiza MD;  [facility];  Service: General    TONSILLECTOMY  1984    UTERINE FIBROID SURGERY         Current Outpatient Medications:     Calcium Citrate-Vitamin D 250-200 MG-UNIT TABS, Take 1 tablet by mouth 2 (two) times a day, Disp: , Rfl:     Cholecalciferol (D3 5000) 125 MCG (5000 UT) capsule, , Disp: , Rfl:     ferrous gluconate (FERGON) 324 mg tablet, Take 324 mg by mouth if needed Every other day, Disp: , Rfl:     FLUoxetine (PROzac) 20 mg capsule, TAKE 2 CAPSULES BY MOUTH EVERY DAY, Disp: 180 capsule, Rfl: 1    Melatonin 5 MG CAPS, Take 5 mg by mouth daily at bedtime , Disp: , Rfl:     MULTIPLE VITAMIN PO, Take 1 tablet by mouth daily, Disp: , Rfl:     omeprazole (PriLOSEC) 20 mg delayed release capsule, Take 1 capsule (20 mg total) by mouth daily as needed, Disp: 90 capsule, Rfl: 3    pravastatin (PRAVACHOL) 20 mg tablet, Take 1 tablet (20 mg total) by mouth daily, Disp: 90 tablet, Rfl: 1  Allergies   Allergen Reactions    Ibuprofen Other (See Comments)     Gastric bypass       Vitals:    10/31/24 1448   BP: 120/80   BP Location: Left arm   Patient Position: Sitting   Cuff Size: Standard   Pulse: 71   Temp: 98.9 °F (37.2 °C)   TempSrc: Tympanic   SpO2: 97%   Weight: 90.8 kg (200 lb 3.2 oz)     Vitals:    10/31/24 1448   Weight: 90.8 kg (200 lb 3.2 oz)          Body mass index is 36.62 kg/m².    Physical Exam:  GENERAL: Alert, well appearing, and in no distress  HEENT:  PERRL, EOMI, no scleral icterus, no conjunctival pallor  NECK:  Supple, No elevated JVP, no thyromegaly, no carotid bruits  HEART:  Regular rate and rhythm, normal S1/S2, no S3/S4, no murmur or rub  LUNGS:  Clear to auscultation bilaterally  ABDOMEN:  Soft, non-tender, positive bowel sounds, no rebound or guarding  EXTREMITIES:  No edema  VASCULAR:  Normal pedal pulses   NEURO: No focal deficits,  SKIN: Normal  "without suspicious lesions on exposed skin      ROS:  Positive for palpitations  Except as noted in HPI, is otherwise reviewed in detail and a 12 point review of systems is negative.    Labs:  Lab Results   Component Value Date    SODIUM 139 09/12/2024    K 4.0 09/12/2024     09/12/2024    CREATININE 0.85 09/12/2024    BUN 15 09/12/2024    CO2 31 09/12/2024    ALT 18 09/12/2024    AST 21 09/12/2024    INR 1.04 02/26/2024    GLUF 101 (H) 03/16/2024    HGBA1C 5.8 (H) 03/16/2024    WBC 6.39 09/12/2024    HGB 14.2 09/12/2024    HCT 43.5 09/12/2024     09/12/2024       No results found for: \"CHOL\"  Lab Results   Component Value Date    HDL 63 03/16/2024    HDL 78 01/13/2023    HDL 62 02/11/2020     Lab Results   Component Value Date    LDLCALC 96 03/16/2024    LDLCALC 89 01/13/2023    LDLCALC 94 02/11/2020     Lab Results   Component Value Date    TRIG 58 03/16/2024    TRIG 48 01/13/2023    TRIG 54 02/11/2020       EKG:  From PCP's office: normal    "

## 2024-11-13 NOTE — PROGRESS NOTES
Weight Management Medical Nutrition Assessment  Tesha Buchanan was here today for meal planning  Surgery lost 110 lbs weight regain 25 - broken ankle, covid and not exercsign  Taking vitamins  30/60 min rule is followiung       Patient seen by Medical Provider in past 6 months:  yes  Requested to schedule appointment with Medical Provider: No      Anthropometric Measurements  Start Weight (#):   Current Weight (#): 202 4  TBW % Change from start weight:0%  Ideal Body Weight (#):110  Goal Weight (#): 175  Highest:280  Lowest: 160    Weight Loss History  Previous weight loss attempts: surgery    Food and Nutrition Related History    Food Recall  Breakfast: cream of wheat (1 c cooked)  Or eggs, or protein shake  Snack:  Lunch: salad with protein (sometimes)  Snack:  Dinner:cod, veg  Snack:  Peanuts, popcorn, grazes      Beverages: water  Volume of beverage intake: not sure    Weekends: Same  Cravings: sweets  Trouble area of day:midday    Frequency of Eating out: irregularly  Food restrictions:none  Cooking: self   Food Shopping: self    Physical Activity Intake  Activity:Walking  Frequency:infrequently  Physical limitations/barriers to exercise: none    Estimated Needs  Energy  SECA: BMR:   X 1 3 -1000 =  [place] Energy Needs: BMR :   1-2# loss weekly sedentary:           1-2# loss weekly lightly active:  Maintenance calories for sedentary activity level:  Protein:     (1 2-1 5g/kg IBW)  Fluid:      (35mL/kg IBW)    Nutrition Diagnosis  Yes; Overweight/obesity  related to Excess energy intake as evidenced by  BMI more than normative standard for age and sex (obesity-grade II 35-39  9)       Nutrition Intervention    Nutrition Prescription  Calories:1200 - 1400  Protein: 61 - 75  Fluid:58    Meal Plan ([place]/Pro/Carb)    Nutrition Education:    Calorie controlled menu  Lean protein food choices  Healthy snack options  Food journaling tips      Nutrition Counseling:  Strategies: meal planning, portion sizes, healthy snack choices, hydration, fiber intake, protein intake, exercise, food journal      Monitoring and Evaluation:  Evaluation criteria:  Energy Intake  Meet protein needs  Maintain adequate hydration  Monitor weekly weight  Meal planning/preparation  Food journal   Decreased portions at mealtimes and snacks  Physical activity     Barriers to learning:none  Readiness to change: Action:  (Changing behavior)  Comprehension: good  Expected Compliance: good Improved

## 2024-11-29 ENCOUNTER — HOSPITAL ENCOUNTER (OUTPATIENT)
Dept: NON INVASIVE DIAGNOSTICS | Facility: HOSPITAL | Age: 59
Discharge: HOME | End: 2024-11-29
Attending: INTERNAL MEDICINE
Payer: COMMERCIAL

## 2024-11-29 VITALS
HEIGHT: 62 IN | BODY MASS INDEX: 36.8 KG/M2 | HEART RATE: 71 BPM | DIASTOLIC BLOOD PRESSURE: 80 MMHG | SYSTOLIC BLOOD PRESSURE: 120 MMHG | WEIGHT: 200 LBS

## 2024-11-29 DIAGNOSIS — R00.2 PALPITATIONS: ICD-10-CM

## 2024-11-29 LAB
AORTIC ROOT: 2.6 CM
APICAL FOUR CHAMBER EJECTION FRACTION: 67 %
BSA FOR ECHO PROCEDURE: 1.91 M2
E WAVE DECELERATION TIME: 203 MS
E/A RATIO: 1.16
FRACTIONAL SHORTENING: 31 (ref 28–44)
INTERVENTRICULAR SEPTUM IN DIASTOLE (PARASTERNAL SHORT AXIS VIEW): 0.9 CM
INTERVENTRICULAR SEPTUM: 0.9 CM (ref 0.6–1.1)
LAAS-AP2: 22.4 CM2
LAAS-AP4: 18.7 CM2
LEFT ATRIUM SIZE: 3.3 CM
LEFT ATRIUM VOLUME (MOD BIPLANE): 65 ML
LEFT ATRIUM VOLUME INDEX (MOD BIPLANE): 34 ML/M2
LEFT INTERNAL DIMENSION IN SYSTOLE: 2.9 CM (ref 2.1–4)
LEFT VENTRICULAR INTERNAL DIMENSION IN DIASTOLE: 4.2 CM (ref 3.5–6)
LEFT VENTRICULAR POSTERIOR WALL IN END DIASTOLE: 0.9 CM
LEFT VENTRICULAR STROKE VOLUME: 47 ML
LVSV (TEICH): 47 ML
MV E'TISSUE VEL-SEP: 11 CM/S
MV PEAK A VEL: 0.75 M/S
MV PEAK E VEL: 87 CM/S
MV STENOSIS PRESSURE HALF TIME: 59 MS
MV VALVE AREA P 1/2 METHOD: 3.73
RA PRESSURE ESTIMATED: 3 MMHG
RIGHT ATRIAL 2D VOLUME: 40 ML
RIGHT ATRIUM AREA SYSTOLE A4C: 15 CM2
RIGHT VENTRICLE ID DIMENSION: 4.1 CM
RV PSP: 28 MMHG
SL CV LEFT ATRIUM LENGTH A2C: 6 CM
SL CV LV EF: 60
SL CV PED ECHO LEFT VENTRICLE DIASTOLIC VOLUME (MOD BIPLANE) 2D: 79 ML
SL CV PED ECHO LEFT VENTRICLE SYSTOLIC VOLUME (MOD BIPLANE) 2D: 32 ML
TR MAX PG: 25 MMHG
TR PEAK VELOCITY: 2.5 M/S
TRICUSPID ANNULAR PLANE SYSTOLIC EXCURSION: 2.4 CM
TRICUSPID VALVE PEAK REGURGITATION VELOCITY: 2.51 M/S

## 2024-11-29 PROCEDURE — 93306 TTE W/DOPPLER COMPLETE: CPT

## 2024-11-29 PROCEDURE — 93226 XTRNL ECG REC<48 HR SCAN A/R: CPT

## 2024-11-29 PROCEDURE — 93225 XTRNL ECG REC<48 HRS REC: CPT

## 2024-11-29 PROCEDURE — 93306 TTE W/DOPPLER COMPLETE: CPT | Performed by: INTERNAL MEDICINE

## 2024-12-02 ENCOUNTER — HOSPITAL ENCOUNTER (OUTPATIENT)
Age: 59
Discharge: HOME | End: 2024-12-02
Payer: COMMERCIAL

## 2024-12-02 DIAGNOSIS — Z12.31 SCREENING MAMMOGRAM FOR BREAST CANCER: ICD-10-CM

## 2024-12-02 PROCEDURE — 77063 BREAST TOMOSYNTHESIS BI: CPT

## 2024-12-02 PROCEDURE — 77067 SCR MAMMO BI INCL CAD: CPT

## 2024-12-04 ENCOUNTER — RESULTS FOLLOW-UP (OUTPATIENT)
Dept: CARDIOLOGY CLINIC | Facility: CLINIC | Age: 59
End: 2024-12-04

## 2024-12-05 PROCEDURE — 93227 XTRNL ECG REC<48 HR R&I: CPT | Performed by: INTERNAL MEDICINE

## 2024-12-08 ENCOUNTER — RESULTS FOLLOW-UP (OUTPATIENT)
Dept: FAMILY MEDICINE CLINIC | Facility: CLINIC | Age: 59
End: 2024-12-08

## 2024-12-16 ENCOUNTER — HOSPITAL ENCOUNTER (OUTPATIENT)
Dept: MRI IMAGING | Facility: HOSPITAL | Age: 59
Discharge: HOME | End: 2024-12-16
Payer: COMMERCIAL

## 2024-12-16 DIAGNOSIS — D32.0 MENINGIOMA, CEREBRAL (HCC): ICD-10-CM

## 2024-12-16 PROCEDURE — A9585 GADOBUTROL INJECTION: HCPCS | Performed by: FAMILY MEDICINE

## 2024-12-16 PROCEDURE — 70553 MRI BRAIN STEM W/O & W/DYE: CPT

## 2024-12-16 PROCEDURE — G1004 CDSM NDSC: HCPCS

## 2024-12-16 RX ORDER — GADOBUTROL 604.72 MG/ML
9 INJECTION INTRAVENOUS
Status: COMPLETED | OUTPATIENT
Start: 2024-12-16 | End: 2024-12-16

## 2024-12-16 RX ADMIN — GADOBUTROL 9 ML: 604.72 INJECTION INTRAVENOUS at 19:41

## 2024-12-18 ENCOUNTER — TELEPHONE (OUTPATIENT)
Dept: NEUROSURGERY | Facility: CLINIC | Age: 59
End: 2024-12-18

## 2024-12-18 NOTE — TELEPHONE ENCOUNTER
12/20/2024- 2ND ATTEMPT CALLED PT AND LEFT MESSAGE ON MACHINE TO SCHEDULE THEIR 1 YR F/U W/ 12/16/2024 MRI BRAIN W/ AP.    12/18/2024- CALLED PT AND LEFT MESSAGE ON MACHINE TO SCHEDULE THEIR 1 YR F/U W/ 12/16/2024 MRI BRAIN W/ AP.    Rebeca Gomez PA-C  [facility]  Patient had her MRI. She needs a follow up appointment.

## 2024-12-27 DIAGNOSIS — G56.00 CARPAL TUNNEL SYNDROME, UNSPECIFIED LATERALITY: Primary | ICD-10-CM

## 2025-01-06 ENCOUNTER — OFFICE VISIT (OUTPATIENT)
Dept: OBGYN CLINIC | Facility: CLINIC | Age: 60
End: 2025-01-06
Payer: COMMERCIAL

## 2025-01-06 VITALS — BODY MASS INDEX: 36.8 KG/M2 | HEIGHT: 62 IN | WEIGHT: 200 LBS

## 2025-01-06 DIAGNOSIS — G56.00 CARPAL TUNNEL SYNDROME, UNSPECIFIED LATERALITY: Primary | ICD-10-CM

## 2025-01-06 PROCEDURE — 99204 OFFICE O/P NEW MOD 45 MIN: CPT | Performed by: ORTHOPAEDIC SURGERY

## 2025-01-06 NOTE — PROGRESS NOTES
Assessment:  1. Carpal tunnel syndrome, unspecified laterality  Ambulatory Referral to Orthopedic Surgery        Patient Active Problem List   Diagnosis    Hypercholesterolemia    Esophageal reflux    Endometrial polyp    Major depressive disorder with single episode, in partial remission (HCC)    Postgastrectomy malabsorption    Sea sickness    Vertigo    Postural dizziness with near syncope    Meningioma, cerebral (HCC)    Encounter for surgical aftercare following surgery of digestive system    Weight gain following gastric bypass surgery    Class 2 obesity without serious comorbidity with body mass index (BMI) of 36.0 to 36.9 in adult    Hyperparathyroidism (HCC)    Bilateral carpal tunnel syndrome    Chronic bilateral low back pain without sciatica    Memory difficulties    LUQ pain    Perforated appendicitis    Abnormal CT of the abdomen    Obstruction concurrent with and due to internal hernia of abdomen    Appendicitis with perforation    Palpitations    Carpal tunnel syndrome           Plan      EMGs will be ordered for both upper extremities she will come back to follow-up with us after the EMGs are completed.  In the meantime she will continue with her therapy exercises that she has been provided by her physical therapist.  She will also take vitamin B complex which has been shown to help with nerve healing.            Subjective:     Patient ID:    Chief Complaint:Florida Clinton 59 y.o. female      HPI    Patient comes in today with regards to bilateral hand pain and numbness.  The right is worse than the left.  Patient is right-handed.  The pain does wake her up at night but she does wear a brace it helps but it does not stop her from being woken up at night.  The patient reports that the pain is in the in the palm of the hand its mostly the middle finger but can be the whole hand except pinky.  And has been going on for couple years.  PT and bracing been the treatment.  The pain is rated at1 at its  best and9 at its worst.  The pain is described as tingling.  It is worsened with driving dominique knitting at night, and is made better with PT stretching.  The patient has taken red light therapy for treatment.      The following portions of the patient's history were reviewed and updated as appropriate: allergies, current medications, past family history, past social history, past surgical history and problem list.        Social History     Socioeconomic History    Marital status: /Civil Union     Spouse name: Not on file    Number of children: Not on file    Years of education: Not on file    Highest education level: Not on file   Occupational History    Not on file   Tobacco Use    Smoking status: Former     Current packs/day: 0.00     Types: Cigarettes     Quit date:      Years since quittin.0    Smokeless tobacco: Never    Tobacco comments:     20+ pack year hx - As per [employer]    Vaping Use    Vaping status: Never Used   Substance and Sexual Activity    Alcohol use: Yes     Comment: social    Drug use: No    Sexual activity: Yes     Partners: Male     Birth control/protection: Post-menopausal, Male Sterilization, None   Other Topics Concern    Not on file   Social History Narrative    Daily coffee consumption (2 cups/day)    Denied: History of daily cola consumption (__cans/day)    Daily tea consumption (__cups/day)    Former smoker: quit  - As per [employer]    Housewife or homemaker - As per [employer]    Uses safety equipment - Seatbelts      Social Drivers of Health     Financial Resource Strain: Not on file   Food Insecurity: Not on file   Transportation Needs: Not on file   Physical Activity: Not on file   Stress: Not on file   Social Connections: Not on file   Intimate Partner Violence: Not on file   Housing Stability: Not on file     Past Medical History:   Diagnosis Date    Abdominal pain     Chronic pain disorder     abdominal    Depression     Diverticula of colon     Former  smoker     Resolved 2012     GERD (gastroesophageal reflux disease)     Headache(784.0) 10/1/2020    Hemorrhoids     Hiatal hernia     Hyperlipidemia     Intestinal malabsorption following gastrectomy     Morbid obesity (HCC)     Resolved 10/2/2015     Obesity     Postgastrectomy malabsorption     Vitamin D insufficiency     Resolved 2016      Past Surgical History:   Procedure Laterality Date    ABDOMINAL SURGERY      gastric bipass    ANKLE SURGERY       SECTION      (2) ,      COLONOSCOPY      ESOPHAGOGASTRODUODENOSCOPY      x2    GASTRIC BYPASS  2015    HERNIA REPAIR  2015    Paraesophageal hiatus hernia. Managed by Eliezer Mishra (General Surgery)    [place] EGD TRANSORAL BIOPSY SINGLE/MULTIPLE N/A 2017    Procedure: ESOPHAGOGASTRODUODENOSCOPY (EGD);  Surgeon: Emmie Rosen MD;  [facility]: [facility];  Service: Bariatrics    NJ LAPAROSCOPIC APPENDECTOMY N/A 2024    Procedure: APPENDECTOMY LAPAROSCOPIC;  Surgeon: Jona Araiza MD;  [facility];  Service: General    NJ LAPS ABD PRTM&OMENTUM DX W/WO SPEC BR/WA SPX N/A 2024    Procedure: LAPAROSCOPY DIAGNOSTIC;  Surgeon: Eliezer Farooq MD;  [facility];  Service: Bariatrics    NJ LAPS SURG ABLTJ 1/> LVR YELITZA RF N/A 2024    Procedure: EXCISION BIOPSY  LIVER LAPAROSCOPIC;  Surgeon: Jona Araiza MD;  [facility];  Service: General    TONSILLECTOMY  1984    UTERINE FIBROID SURGERY       Allergies   Allergen Reactions    Ibuprofen Other (See Comments)     Gastric bypass     Current Outpatient Medications on File Prior to Visit   Medication Sig Dispense Refill    Calcium Citrate-Vitamin D 250-200 MG-UNIT TABS Take 1 tablet by mouth 2 (two) times a day      Cholecalciferol (D3 5000) 125 MCG (5000 UT) capsule       ferrous gluconate (FERGON) 324 mg tablet Take 324 mg by mouth if needed Every other day      FLUoxetine (PROzac) 20 mg capsule TAKE 2 CAPSULES BY MOUTH EVERY  capsule 1     "Melatonin 5 MG CAPS Take 5 mg by mouth daily at bedtime       MULTIPLE VITAMIN PO Take 1 tablet by mouth daily      omeprazole (PriLOSEC) 20 mg delayed release capsule Take 1 capsule (20 mg total) by mouth daily as needed 90 capsule 3    pravastatin (PRAVACHOL) 20 mg tablet Take 1 tablet (20 mg total) by mouth daily 90 tablet 1     No current facility-administered medications on file prior to visit.              Objective:    Review of Systems   Constitutional: Negative.    HENT: Negative.     Eyes: Negative.    Respiratory: Negative.     Cardiovascular: Negative.    Gastrointestinal: Negative.    Endocrine: Negative.    Genitourinary: Negative.    Skin: Negative.    Allergic/Immunologic: Negative.    Neurological: Negative.    Hematological: Negative.    Psychiatric/Behavioral: Negative.         Right Hand Exam     Muscle Strength   The patient has normal right wrist strength.    Tests   Tinel's sign (median nerve): positive    Comments:  No atrophy  Phalen's and reverse Phalen's are painful and he she does feel tingling.  There is no atrophy she is able to do the okay sign.        Left Hand Exam     Muscle Strength   The patient has normal left wrist strength.    Tests   Tinel's sign (median nerve): positive            Physical Exam  Vitals and nursing note reviewed.   Constitutional:       Appearance: She is well-developed.   HENT:      Head: Normocephalic.   Eyes:      Pupils: Pupils are equal, round, and reactive to light.   Cardiovascular:      Rate and Rhythm: Normal rate.   Pulmonary:      Effort: Pulmonary effort is normal.   Abdominal:      General: There is no distension.   Musculoskeletal:      Cervical back: Normal range of motion.   Skin:     General: Skin is warm.   Neurological:      Mental Status: She is alert and oriented to person, place, and time.         Procedures             Portions of the record may have been created with voice recognition software.  Occasional wrong word or \"sound a like\" " substitutions may have occurred due to the inherent limitations of voice recognition software.  Read the chart carefully and recognize, using context, where substitutions have occurred.

## 2025-02-24 ENCOUNTER — PROCEDURE VISIT (OUTPATIENT)
Dept: NEUROLOGY | Facility: CLINIC | Age: 60
End: 2025-02-24
Payer: COMMERCIAL

## 2025-02-24 DIAGNOSIS — G56.00 CARPAL TUNNEL SYNDROME, UNSPECIFIED LATERALITY: ICD-10-CM

## 2025-02-24 PROCEDURE — 95912 NRV CNDJ TEST 11-12 STUDIES: CPT | Performed by: PHYSICAL MEDICINE & REHABILITATION

## 2025-02-24 PROCEDURE — 95886 MUSC TEST DONE W/N TEST COMP: CPT | Performed by: PHYSICAL MEDICINE & REHABILITATION

## 2025-02-25 ENCOUNTER — OFFICE VISIT (OUTPATIENT)
Dept: FAMILY MEDICINE CLINIC | Facility: CLINIC | Age: 60
End: 2025-02-25
Payer: COMMERCIAL

## 2025-02-25 VITALS
WEIGHT: 199.3 LBS | HEART RATE: 65 BPM | TEMPERATURE: 97.4 F | HEIGHT: 62 IN | OXYGEN SATURATION: 99 % | BODY MASS INDEX: 36.67 KG/M2 | SYSTOLIC BLOOD PRESSURE: 120 MMHG | DIASTOLIC BLOOD PRESSURE: 82 MMHG

## 2025-02-25 DIAGNOSIS — F51.01 PRIMARY INSOMNIA: ICD-10-CM

## 2025-02-25 DIAGNOSIS — Z00.00 ANNUAL PHYSICAL EXAM: Primary | ICD-10-CM

## 2025-02-25 DIAGNOSIS — E78.00 HYPERCHOLESTEROLEMIA: ICD-10-CM

## 2025-02-25 DIAGNOSIS — Z13.89 SCREENING FOR CARDIOVASCULAR, RESPIRATORY, AND GENITOURINARY DISEASES: ICD-10-CM

## 2025-02-25 DIAGNOSIS — Z13.6 SCREENING FOR CARDIOVASCULAR, RESPIRATORY, AND GENITOURINARY DISEASES: ICD-10-CM

## 2025-02-25 DIAGNOSIS — Z13.83 SCREENING FOR CARDIOVASCULAR, RESPIRATORY, AND GENITOURINARY DISEASES: ICD-10-CM

## 2025-02-25 DIAGNOSIS — G56.00 CARPAL TUNNEL SYNDROME, UNSPECIFIED LATERALITY: ICD-10-CM

## 2025-02-25 DIAGNOSIS — D32.0 MENINGIOMA, CEREBRAL (HCC): ICD-10-CM

## 2025-02-25 DIAGNOSIS — F32.4 MAJOR DEPRESSIVE DISORDER WITH SINGLE EPISODE, IN PARTIAL REMISSION (HCC): ICD-10-CM

## 2025-02-25 PROBLEM — K35.32 PERFORATED APPENDICITIS: Status: RESOLVED | Noted: 2024-02-28 | Resolved: 2025-02-25

## 2025-02-25 PROBLEM — E21.3 HYPERPARATHYROIDISM (HCC): Status: RESOLVED | Noted: 2023-01-18 | Resolved: 2025-02-25

## 2025-02-25 PROCEDURE — 99214 OFFICE O/P EST MOD 30 MIN: CPT | Performed by: FAMILY MEDICINE

## 2025-02-25 PROCEDURE — 99396 PREV VISIT EST AGE 40-64: CPT | Performed by: FAMILY MEDICINE

## 2025-02-25 RX ORDER — ZOLPIDEM TARTRATE 12.5 MG/1
12.5 TABLET, FILM COATED, EXTENDED RELEASE ORAL
Qty: 30 TABLET | Refills: 0 | Status: SHIPPED | OUTPATIENT
Start: 2025-02-25 | End: 2025-03-05 | Stop reason: DRUGHIGH

## 2025-02-25 NOTE — ASSESSMENT & PLAN NOTE
Depression Screening Follow-up Plan: Patient's depression screening was positive with a PHQ-9 score of 6. Patient assessed for underlying major depression. They have no active suicidal ideations. Brief counseling provided and recommend additional follow-up/re-evaluation next office visit. PHQ-2 = 2. Check TSH. Continue to watch. Recheck 6m    Orders:  •  TSH, 3rd generation with Free T4 reflex; Future

## 2025-02-25 NOTE — PROGRESS NOTES
"Adult Annual Physical  Name: Florida Clinton      : 1965      MRN: [id number]  Encounter Provider: Rayray Knight MD  Encounter Date: 2025   Encounter department: [facility]    Assessment & Plan  Annual physical exam         Meningioma, cerebral (HCC)         Hypercholesterolemia    Orders:  •  Lipid panel; Future    Major depressive disorder with single episode, in partial remission (HCC)  Depression Screening Follow-up Plan: Patient's depression screening was positive with a PHQ-9 score of 6. {Depression screen follow-up plan:[id number]}  Orders:  •  TSH, 3rd generation with Free T4 reflex; Future    Carpal tunnel syndrome, unspecified laterality         Primary insomnia    Orders:  •  zolpidem (AMBIEN CR) 12.5 MG CR tablet; Take 1 tablet (12.5 mg total) by mouth daily at bedtime as needed for sleep    Screening for cardiovascular, respiratory, and genitourinary diseases    Orders:  •  CBC and differential; Future  •  Comprehensive metabolic panel; Future  •  Lipid panel; Future    Immunizations and preventive care screenings were discussed with patient today. Appropriate education was printed on patient's after visit summary.    Counseling:  {Annual Physical; Counselin}         History of Present Illness   {?Quick Links Encounters * My Last Note * Last Note in Specialty * Snapshot * Since Last Visit * History :73729}  Adult Annual Physical  Review of Systems  {Select to Display PMH (Optional):55871}    Objective {?Quick Links Trend Vitals * Enter New Vitals * Results Review * Timeline (Adult) * Labs * Imaging * Cardiology * Procedures * Lung Cancer Screening * Surgical eConsent :53863}  /82   Pulse 65   Temp (!) 97.4 °F (36.3 °C)   Ht 5' 2\" (1.575 m)   Wt 90.4 kg (199 lb 4.8 oz)   SpO2 99%   BMI 36.45 kg/m²     Physical Exam  {Administrative / Billing Section (Optional):02907}  "

## 2025-02-25 NOTE — PATIENT INSTRUCTIONS
"Patient Education     Routine physical for adults   The Basics   Written by the doctors and editors at [facility]   What is a physical? -- A physical is a routine visit, or \"check-up,\" with your doctor. You might also hear it called a \"wellness visit\" or \"preventive visit.\"  During each visit, the doctor will:   Ask about your physical and mental health   Ask about your habits, behaviors, and lifestyle   Do an exam   Give you vaccines if needed   Talk to you about any medicines you take   Give advice about your health   Answer your questions  Getting regular check-ups is an important part of taking care of your health. It can help your doctor find and treat any problems you have. But it's also important for preventing health problems.  A routine physical is different from a \"sick visit.\" A sick visit is when you see a doctor because of a health concern or problem. Since physicals are scheduled ahead of time, you can think about what you want to ask the doctor.  How often should I get a physical? -- It depends on your age and health. In general, for people age 21 years and older:   If you are younger than 50 years, you might be able to get a physical every 3 years.   If you are 50 years or older, your doctor might recommend a physical every year.  If you have an ongoing health condition, like diabetes or high blood pressure, your doctor will probably want to see you more often.  What happens during a physical? -- In general, each visit will include:   Physical exam - The doctor or nurse will check your height, weight, heart rate, and blood pressure. They will also look at your eyes and ears. They will ask about how you are feeling and whether you have any symptoms that bother you.   Medicines - It's a good idea to bring a list of all the medicines you take to each doctor visit. Your doctor will talk to you about your medicines and answer any questions. Tell them if you are having any side effects that bother you. You " "should also tell them if you are having trouble paying for any of your medicines.   Habits and behaviors - This includes:   Your diet   Your exercise habits   Whether you smoke, drink alcohol, or use drugs   Whether you are sexually active   Whether you feel safe at home  Your doctor will talk to you about things you can do to improve your health and lower your risk of health problems. They will also offer help and support. For example, if you want to quit smoking, they can give you advice and might prescribe medicines. If you want to improve your diet or get more physical activity, they can help you with this, too.   Lab tests, if needed - The tests you get will depend on your age and situation. For example, your doctor might want to check your:   Cholesterol   Blood sugar   Iron level   Vaccines - The recommended vaccines will depend on your age, health, and what vaccines you already had. Vaccines are very important because they can prevent certain serious or deadly infections.   Discussion of screening - \"Screening\" means checking for diseases or other health problems before they cause symptoms. Your doctor can recommend screening based on your age, risk, and preferences. This might include tests to check for:   Cancer, such as breast, prostate, cervical, ovarian, colorectal, prostate, lung, or skin cancer   Sexually transmitted infections, such as chlamydia and gonorrhea   Mental health conditions like depression and anxiety  Your doctor will talk to you about the different types of screening tests. They can help you decide which screenings to have. They can also explain what the results might mean.   Answering questions - The physical is a good time to ask the doctor or nurse questions about your health. If needed, they can refer you to other doctors or specialists, too.  Adults older than 65 years often need other care, too. As you get older, your doctor will talk to you about:   How to prevent falling at " home   Hearing or vision tests   Memory testing   How to take your medicines safely   Making sure that you have the help and support you need at home  All topics are updated as new evidence becomes available and our peer review process is complete.  This topic retrieved from [employer] on: May 02, 2024.  Topic 627650 Version 1.0  Release: 32.4.3 - C32.122  © 2024 UpToDate, Inc. and/or its affiliates. All rights reserved.  Consumer Information Use and Disclaimer   Disclaimer: This generalized information is a limited summary of diagnosis, treatment, and/or medication information. It is not meant to be comprehensive and should be used as a tool to help the user understand and/or assess potential diagnostic and treatment options. It does NOT include all information about conditions, treatments, medications, side effects, or risks that may apply to a specific patient. It is not intended to be medical advice or a substitute for the medical advice, diagnosis, or treatment of a health care provider based on the health care provider's examination and assessment of a patient's specific and unique circumstances. Patients must speak with a health care provider for complete information about their health, medical questions, and treatment options, including any risks or benefits regarding use of medications. This information does not endorse any treatments or medications as safe, effective, or approved for treating a specific patient. UpToDate, Inc. and its affiliates disclaim any warranty or liability relating to this information or the use thereof.The use of this information is governed by the Terms of Use, available at https://www.woltersAllSchoolStuff.comuwer.com/en/know/clinical-effectiveness-terms. 2024© UpToDate, Inc. and its affiliates and/or licensors. All rights reserved.  Copyright   © 2024 UpToDate, Inc. and/or its affiliates. All rights reserved.

## 2025-02-25 NOTE — ASSESSMENT & PLAN NOTE
Depression Screening Follow-up Plan: Patient's depression screening was positive with a PHQ-9 score of 6.   Orders:  •  TSH, 3rd generation with Free T4 reflex; Future

## 2025-02-25 NOTE — PROGRESS NOTES
Adult Annual Physical  Name: [place] Clinton      : 1965      MRN: [id number]  Encounter Provider: Rayray Knight MD  Encounter Date: 2025   Encounter department: [facility]    Assessment & Plan  Annual physical exam         Meningioma, cerebral (HCC)  Last Mri was unchanged. Pt asymptomatic. F/u with neurosurgery. Recheck 6m       Hypercholesterolemia  Check lipid panel. Monitor diet  Orders:  •  Lipid panel; Future    Major depressive disorder with single episode, in partial remission (HCC)  Depression Screening Follow-up Plan: Patient's depression screening was positive with a PHQ-9 score of 6. Patient assessed for underlying major depression. They have no active suicidal ideations. Brief counseling provided and recommend additional follow-up/re-evaluation next office visit. PHQ-2 = 2. Check TSH. Continue to watch. Recheck 6m    Orders:  •  TSH, 3rd generation with Free T4 reflex; Future    Carpal tunnel syndrome, unspecified laterality  Bilat - failed conservative measures. Pt to f/u with hand surgery       Primary insomnia  PT with issues falling asleep and remaining asleep. Start zolpidem 12.5mg qHS prn.  PT to call in 2w if not effective.   Orders:  •  zolpidem (AMBIEN CR) 12.5 MG CR tablet; Take 1 tablet (12.5 mg total) by mouth daily at bedtime as needed for sleep    Screening for cardiovascular, respiratory, and genitourinary diseases    Orders:  •  CBC and differential; Future  •  Comprehensive metabolic panel; Future  •  Lipid panel; Future      Immunizations and preventive care screenings were discussed with patient today. Appropriate education was printed on patient's after visit summary.    Counseling:  Exercise: the importance of regular exercise/physical activity was discussed. Recommend exercise 3-5 times per week for at least 30 minutes.          History of Present Illness     Adult Annual Physical:  Patient presents for annual physical.   -Patient had  EMG done of the arms bilaterally yesterday.  Results showed moderate right carpal tunnel and mild left carpal tunnel.  Patient does have bilateral symptoms.  Patient has already tried carpal tunnel wrist braces but found them to be ineffective  -Recent Pap was abnormal.  Patient is scheduled for colposcopy.  She is followed by [facility] but is considering switching over to [facility].     Diet and Physical Activity:  - Diet/Nutrition: well balanced diet.  - Exercise: no formal exercise.    Depression Screening:    - PHQ-9 Score: 6    General Health:  - Sleep:. struggles with falling asleep and staying asleep.  - Hearing: normal hearing bilateral ears.  - Vision: wears glasses and most recent eye exam < 1 year ago.  - Dental: regular dental visits and brushes teeth twice daily.    /GYN Health:  - Follows with GYN: yes.   - Menopause: postmenopausal.   - History of STDs: no    Advanced Care Planning:  - Has an advanced directive?: yes    - Has a durable medical POA?: yes    - ACP document given to patient?: yes      Review of Systems   Constitutional: Negative.    HENT: Negative.     Eyes: Negative.    Respiratory: Negative.     Cardiovascular: Negative.    Gastrointestinal: Negative.    Endocrine: Negative.    Genitourinary: Negative.    Musculoskeletal: Negative.    Skin: Negative.    Allergic/Immunologic: Negative.    Neurological: Negative.    Hematological: Negative.    Psychiatric/Behavioral: Negative.       Past Medical History   Past Medical History:   Diagnosis Date   • Abdominal pain    • Chronic pain disorder     abdominal   • Depression    • Diverticula of colon    • Former smoker     Resolved 7/2012    • GERD (gastroesophageal reflux disease)    • Headache(784.0) 10/1/2020   • Hemorrhoids    • Hiatal hernia    • Hyperlipidemia    • Intestinal malabsorption following gastrectomy    • Morbid obesity (HCC)     Resolved 10/2/2015    • Obesity    • Postgastrectomy malabsorption    • Vitamin D  insufficiency     Resolved 2016      Past Surgical History:   Procedure Laterality Date   • ABDOMINAL SURGERY      gastric bipass   • ANKLE SURGERY     •  SECTION      (2) ,     • COLONOSCOPY     • ESOPHAGOGASTRODUODENOSCOPY      x2   • GASTRIC BYPASS  2015   • HERNIA REPAIR  2015    Paraesophageal hiatus hernia. Managed by Eliezer Mishra (General Surgery)   • NE EGD TRANSORAL BIOPSY SINGLE/MULTIPLE N/A 2017    Procedure: ESOPHAGOGASTRODUODENOSCOPY (EGD);  Surgeon: Emmie Rosen MD;  Location: [facility];  Service: Bariatrics   • NE LAPAROSCOPIC APPENDECTOMY N/A 2024    Procedure: APPENDECTOMY LAPAROSCOPIC;  Surgeon: Jona Araiza MD;  Location: [facility];  Service: General   • NE LAPS ABD PRTM&OMENTUM DX W/WO SPEC BR/WA SPX N/A 2024    Procedure: LAPAROSCOPY DIAGNOSTIC;  Surgeon: Eliezer Farooq MD;  Location: [facility];  Service: Bariatrics   • NE LAPS SURG ABLTJ 1/> LVR YELITZA RF N/A 2024    Procedure: EXCISION BIOPSY  LIVER LAPAROSCOPIC;  Surgeon: Jona Araiza MD;  Location: [facility];  Service: General   • TONSILLECTOMY  1984   • UTERINE FIBROID SURGERY       Family History   Problem Relation Age of Onset   • Breast cancer Mother 31   • Prostate cancer Father 50   • Hypertension Father    • Heart disease Father         Coronary arteriosclerosis    • Hyperlipidemia Father    • No Known Problems Daughter    • No Known Problems Daughter    • Heart disease Maternal Grandmother         Coronary arteriosclerosis    • Heart disease Maternal Grandfather         Coronary arteriosclerosis    • COPD Maternal Grandfather         heavy smoker   • Diabetes Paternal Grandmother    • No Known Problems Brother    • No Known Problems Paternal Aunt    • Stroke Neg Hx    • Thyroid disease Neg Hx       reports that she quit smoking about 14 years ago. Her smoking use included cigarettes. She has never used smokeless tobacco. She reports current alcohol use. She reports that  she does not use drugs.  Current Outpatient Medications   Medication Instructions   • Calcium Citrate-Vitamin D 250-200 MG-UNIT TABS 1 tablet, 2 times daily   • Cholecalciferol (D3 5000) 125 MCG (5000 UT) capsule No dose, route, or frequency recorded.   • ferrous gluconate (FERGON) 324 mg, As needed   • FLUoxetine (PROZAC) 40 mg, Oral, Daily   • Melatonin 5 mg, Daily at bedtime   • MULTIPLE VITAMIN PO 1 tablet, Daily   • omeprazole (PRILOSEC) 20 mg, Oral, Daily, as needed   • pravastatin (PRAVACHOL) 20 mg, Oral, Daily   • zolpidem (AMBIEN CR) 12.5 mg, Oral, Daily at bedtime PRN     Allergies   Allergen Reactions   • Ibuprofen Other (See Comments)     Gastric bypass      Current Outpatient Medications on File Prior to Visit   Medication Sig Dispense Refill   • Calcium Citrate-Vitamin D 250-200 MG-UNIT TABS Take 1 tablet by mouth 2 (two) times a day     • Cholecalciferol (D3 5000) 125 MCG (5000 UT) capsule      • ferrous gluconate (FERGON) 324 mg tablet Take 324 mg by mouth if needed Every other day     • FLUoxetine (PROzac) 20 mg capsule TAKE 2 CAPSULES BY MOUTH EVERY  capsule 1   • Melatonin 5 MG CAPS Take 5 mg by mouth daily at bedtime      • MULTIPLE VITAMIN PO Take 1 tablet by mouth daily     • omeprazole (PriLOSEC) 20 mg delayed release capsule Take 1 capsule (20 mg total) by mouth daily as needed 90 capsule 3   • pravastatin (PRAVACHOL) 20 mg tablet Take 1 tablet (20 mg total) by mouth daily 90 tablet 1     No current facility-administered medications on file prior to visit.      Social History     Tobacco Use   • Smoking status: Former     Current packs/day: 0.00     Types: Cigarettes     Quit date:      Years since quittin.1   • Smokeless tobacco: Never   • Tobacco comments:     20+ pack year hx - As per [employer]    Vaping Use   • Vaping status: Never Used   Substance and Sexual Activity   • Alcohol use: Yes     Comment: social   • Drug use: No   • Sexual activity: Yes     Partners: Male      "Birth control/protection: Post-menopausal, Male Sterilization, None       Objective   /82   Pulse 65   Temp (!) 97.4 °F (36.3 °C)   Ht 5' 2\" (1.575 m)   Wt 90.4 kg (199 lb 4.8 oz)   SpO2 99%   BMI 36.45 kg/m²     Physical Exam  Vitals reviewed.   Constitutional:       Appearance: She is well-developed.   HENT:      Head: Normocephalic and atraumatic.      Right Ear: Tympanic membrane, ear canal and external ear normal.      Left Ear: Tympanic membrane, ear canal and external ear normal.      Nose: Nose normal.      Mouth/Throat:      Mouth: Mucous membranes are moist.   Eyes:      Extraocular Movements: Extraocular movements intact.      Conjunctiva/sclera: Conjunctivae normal.      Pupils: Pupils are equal, round, and reactive to light.   Neck:      Thyroid: No thyromegaly.      Vascular: No JVD.   Cardiovascular:      Rate and Rhythm: Normal rate and regular rhythm.      Pulses: Normal pulses.      Heart sounds: Normal heart sounds. No murmur heard.  Pulmonary:      Effort: Pulmonary effort is normal.      Breath sounds: Normal breath sounds. No wheezing.   Abdominal:      General: Bowel sounds are normal. There is no distension.      Palpations: Abdomen is soft. There is no mass.      Tenderness: There is no abdominal tenderness.   Musculoskeletal:         General: No swelling, tenderness or deformity. Normal range of motion.      Cervical back: Normal range of motion and neck supple. No tenderness. No muscular tenderness.      Right lower leg: No edema.      Left lower leg: No edema.   Lymphadenopathy:      Cervical: No cervical adenopathy.   Skin:     General: Skin is warm.      Capillary Refill: Capillary refill takes less than 2 seconds.   Neurological:      General: No focal deficit present.      Mental Status: She is alert and oriented to person, place, and time.      Cranial Nerves: No cranial nerve deficit.      Sensory: No sensory deficit.      Motor: No weakness or abnormal muscle tone.      " Coordination: Coordination normal.      Gait: Gait normal.      Deep Tendon Reflexes: Reflexes normal.   Psychiatric:         Mood and Affect: Mood normal.         Behavior: Behavior normal.         Thought Content: Thought content normal.         Judgment: Judgment normal.      Comments: PHQ-2/9 Depression Screening    Little interest or pleasure in doing things: 1 - several days  Feeling down, depressed, or hopeless: 1 - several days  Trouble falling or staying asleep, or sleeping too much: 2 - more than   half the days  Feeling tired or having little energy: 1 - several days  Poor appetite or overeatin - several days  Feeling bad about yourself - or that you are a failure or have let   yourself or your family down: 0 - not at all  Trouble concentrating on things, such as reading the newspaper or watching   television: 0 - not at all  Moving or speaking so slowly that other people could have noticed. Or the   opposite - being so fidgety or restless that you have been moving around a   lot more than usual: 0 - not at all  Thoughts that you would be better off dead, or of hurting yourself in some   way: 0 - not at all  PHQ-9 Score: 6  PHQ-9 Interpretation: Mild depression

## 2025-03-05 DIAGNOSIS — F51.01 PRIMARY INSOMNIA: Primary | ICD-10-CM

## 2025-03-05 RX ORDER — ZOLPIDEM TARTRATE 6.25 MG/1
6.25 TABLET, FILM COATED, EXTENDED RELEASE ORAL
Qty: 30 TABLET | Refills: 0 | Status: SHIPPED | OUTPATIENT
Start: 2025-03-05

## 2025-03-10 NOTE — ASSESSMENT & PLAN NOTE
Occurred at rest and lasted several minutes. She was getting an error reading on a automated blood pressure cuff. Check echocardiogram and 48-hour Holter monitor. If recurrence of symptoms, can do longer-term monitoring. If very intermittent, discussed the potential use of a Lolay device in the future.  Reviewed blood work, all looks good. Continue conservative management at this time otherwise if testing is benign.   No

## 2025-03-12 ENCOUNTER — OFFICE VISIT (OUTPATIENT)
Dept: OBGYN CLINIC | Facility: CLINIC | Age: 60
End: 2025-03-12
Payer: COMMERCIAL

## 2025-03-12 VITALS — HEIGHT: 62 IN | WEIGHT: 199 LBS | BODY MASS INDEX: 36.62 KG/M2

## 2025-03-12 DIAGNOSIS — Z01.812 ENCOUNTER FOR PRE-OPERATIVE LABORATORY TESTING: ICD-10-CM

## 2025-03-12 DIAGNOSIS — Z01.812 PRE-PROCEDURE LAB EXAM: ICD-10-CM

## 2025-03-12 DIAGNOSIS — G56.01 CARPAL TUNNEL SYNDROME OF RIGHT WRIST: Primary | ICD-10-CM

## 2025-03-12 PROCEDURE — 99204 OFFICE O/P NEW MOD 45 MIN: CPT | Performed by: SURGERY

## 2025-03-12 RX ORDER — SODIUM CHLORIDE 9 MG/ML
75 INJECTION, SOLUTION INTRAVENOUS CONTINUOUS
OUTPATIENT
Start: 2025-03-12

## 2025-03-12 RX ORDER — CHLORHEXIDINE GLUCONATE ORAL RINSE 1.2 MG/ML
15 SOLUTION DENTAL ONCE
OUTPATIENT
Start: 2025-03-12 | End: 2025-03-12

## 2025-03-12 NOTE — PROGRESS NOTES
Nilam Esteban M.D.  Attending, Orthopaedic Surgery  Hand, Wrist, and Elbow Surgery  [facility]      ORTHOPAEDIC HAND, WRIST, AND ELBOW OFFICE  VISIT       ASSESSMENT/PLAN:        Assessment & Plan  Carpal tunnel syndrome of right wrist  The etiology of carpal tunnel syndrome was discussed along with treatment options.   She has tried nighttime bracing, without improvement in her symptoms.   We discussed a carpal tunnel CSI vs carpal tunnel release. We dicussed the CSI will not resolve carpal tunnel syndrome but can improve symptoms for some time.   Common risks include bleeding, infection, injury to nearby structures (vessels, nerves, tendons, ligaments), blood clots (deep vein thrombosis / pulmonary embolus), and wound healing problems. Infection may result in an infection of the blood stream and/or the need for oral or intravenous antibiotics. Additional risks include impaired limb function, loss of limb and/or failure to achieve desired results. Nerve injury can result in numbness, incomplete or worsening nerve recovery. There is a risk for unacceptable cosmetic results (such as scarring). Surgery of the hand is associated with bleeding, infection, scar, pain, wound healing problems, damage to nerves, arteries, tendons, ligaments, failure to give desired result, instability, nonunion, malunion hardware issues, and need for more surgery.  We discussed nighttime symptoms do generally improve first post operatively. She can expect to see improvement in her symptoms up to 18 months post op but may not fully resolve.   We also discussed the possibility of pillar pain post operatively which can last 4-8 months and is best combated with scar massage.   The patient elected to proceed with a right carpal tunnel release and informed electronic surgical consent was signed.   Post operative instructions/expectations were reviewed and provided in AVS.   BMP and EKG will be obtained prior to surgery.    I will see her back in the office 10-14 days post op for suture removal.   Orders:    Case request operating room: Right carpal tunnel release; Standing    Basic metabolic panel; Future    EKG 12 lead; Future    Encounter for pre-operative laboratory testing    Orders:    Basic metabolic panel; Future    EKG 12 lead; Future      The patient verbalized understanding of exam findings and treatment plan. We engaged in the shared decision-making process and treatment options were discussed at length with the patient. Surgical and conservative management discussed today along with risks and benefits.    Diagnoses and all orders for this visit:    Carpal tunnel syndrome of right wrist    Encounter for pre-operative laboratory testing      Follow Up:  Return for 10-14 days, post op.    To Do Next Visit:  Sutures out    Operative Discussions:  Open Carpal Tunnel Release:   The anatomy and physiology of carpal tunnel syndrome was discussed with the patient today.  Increase pressure localized under the transverse carpal ligament can cause pain, numbness, tingling, or dysesthesias within the median nerve distribution as well as feelings of fatigue, clumsiness, or awkwardness.  These symptoms typically occur at night and worse in the morning upon waking.  Eventually, untreated carpal tunnel syndrome can result in weakness and permanent loss of muscle within the thenar compartment of the hand.  Treatment options were discussed with the patient.  Conservative treatment includes nocturnal resting splints to keep the nerve in a neutral position, ergonomic changes within the work or home environment, activity modification, and tendon gliding exercises.  Vitamin B6 one tablet daily over the counter may helpful to reduce symptoms.  Steroid injections within the carpal canal can help a majority of patients, however this is often self-limited in a majority of patients.  Surgical intervention to divide the transverse carpal ligament  typically results in a long-lasting relief of the patient's complaints, with the recurrence rate of less than 1%. The patient has elected to undergo an open carpal tunnel release.  The palmar incision technique was discussed in the office with the patient today.   In the postoperative period, light activities are allowed immediately, driving is allowed when narcotic medication has stopped, and the bandages may be removed and incision may get wet after 2 days.  Heavy activities (lifting more than approximately 10 pounds) will be allowed after follow up appointment in 1-2 weeks.  While night symptoms (waking from sleep, pain, and discomfort in the hands) generally improves rapidly, the numbness and tingling as well as the strength will slowly improve over weeks to months depending on the chronicity and severity of the carpal tunnel syndrome.  Pillar pain and scar discomfort were discussed with the patient which are self-limiting conditions.  The risks of bleeding and infection from the surgery are less than 1%.  Risk of recurrence is approximately 0.5%.  The risks of nerve injury or nerve damage or damage to the blood vessels is approximately 1 in 1200. The patient has an understanding of the above mentioned discussion.The risks and benefits of the procedure were explained to the patient, which include, but are not limited to: Bleeding, infection, recurrence, pain, scar, damage to tendons, damage to nerves, and damage to blood vessels, failure to give desired results and complications related to anesthesia.  These risks, along with alternative conservative treatment options, and postoperative protocols were voiced back and understood by the patient.  All questions were answered to the patient's satisfaction.  The patient agrees to comply with a standard postoperative protocol, and is willing to proceed.  Education was provided via written and auditory forms.  There were no barriers to learning. Written handouts  regarding wound care, incision and scar care, and general preoperative information was provided to the patient.  Prior to surgery, the patient may be requested to stop all anti-inflammatory medications.  Prophylactic aspirin, Plavix, and Coumadin may be allowed to be continued.  Medications including vitamin E., ginkgo, and fish oil are requested to be stopped approximately one week prior to surgery.     ____________________________________________________________________________________________________________________________________________      CHIEF COMPLAINT:  Chief Complaint   Patient presents with    Right Hand - Numbness, Pain     EMG 2/24/25 patient has tried bracing this does not help her     Left Hand - Numbness, Pain       SUBJECTIVE:  [place] Clinton is a 60 y.o. year old RHD female who presents to the office for evaluation of both hands. I am seeing the patient in consultation at the request of Dr. Rayray Knight. She notes intermittent numbness and tingling to both hands. She also notes pain on the right side that is in the same distribution as the numbness and tingling. Pain is described as throbbing and wakes her from sleep at night. She notes pain mainly affects her long finger on the right side. Numbness and tingling affects her radial digits, thumb, index, ce and ring fingers. She denies numbness and tingling to her small finger. She feels her right side is worse then her left. She has tried nighttime bracing and her symptoms are still waking her from sleep at night. She notes a + flick sign. Symptoms have been ongoing for years. She did try therapy for this issue as well, which did improve symptoms for a few months. She continues to perform nerve glides nightly.       Pain/symptom timing:  Worse during the day when active  Pain/symptom context:  Worse with activites and work  Pain/symptom modifying factors:  Rest makes better, activities make worse  Pain/symptom associated  signs/symptoms: none    Prior treatment   NSAIDsNo   Injections No   Bracing/Orthotics Yes    Physical Therapy Yes     I have personally reviewed all the relevant PMH, PSH, SH, FH, Medications and allergies      PAST MEDICAL HISTORY:  Past Medical History:   Diagnosis Date    Abdominal pain     Chronic pain disorder     abdominal    Depression     Diverticula of colon     Former smoker     Resolved 2012     GERD (gastroesophageal reflux disease)     Headache(784.0) 10/1/2020    Hemorrhoids     Hiatal hernia     Hyperlipidemia     Intestinal malabsorption following gastrectomy     Morbid obesity (HCC)     Resolved 10/2/2015     Obesity     Postgastrectomy malabsorption     Vitamin D insufficiency     Resolved 2016        PAST SURGICAL HISTORY:  Past Surgical History:   Procedure Laterality Date    ABDOMINAL SURGERY      gastric bipass    ANKLE SURGERY       SECTION      (2) ,      COLONOSCOPY      ESOPHAGOGASTRODUODENOSCOPY      x2    GASTRIC BYPASS  2015    HERNIA REPAIR  2015    Paraesophageal hiatus hernia. Managed by Eliezer Mishra (General Surgery)    [place] EGD TRANSORAL BIOPSY SINGLE/MULTIPLE N/A 2017    Procedure: ESOPHAGOGASTRODUODENOSCOPY (EGD);  Surgeon: Emmie Rosen MD;  Location: [facility];  Service: Bariatrics    [place] LAPAROSCOPIC APPENDECTOMY N/A 2024    Procedure: APPENDECTOMY LAPAROSCOPIC;  Surgeon: Jona Araiza MD;  Location: [facility];  Service: General    [place] LAPS ABD PRTM&OMENTUM DX W/WO SPEC BR/WA SPX N/A 2024    Procedure: LAPAROSCOPY DIAGNOSTIC;  Surgeon: Eliezer Farooq MD;  Location: [facility];  Service: Bariatrics    [place] LAPS SURG ABLTJ 1/> LVR YELITZA RF N/A 2024    Procedure: EXCISION BIOPSY  LIVER LAPAROSCOPIC;  Surgeon: Jona Araiza MD;  Location: [facility];  Service: General    TONSILLECTOMY  1984    UTERINE FIBROID SURGERY         FAMILY HISTORY:  Family History   Problem Relation Age of Onset    Breast cancer Mother 31     Prostate cancer Father 50    Hypertension Father     Heart disease Father         Coronary arteriosclerosis     Hyperlipidemia Father     No Known Problems Daughter     No Known Problems Daughter     Heart disease Maternal Grandmother         Coronary arteriosclerosis     Heart disease Maternal Grandfather         Coronary arteriosclerosis     COPD Maternal Grandfather         heavy smoker    Diabetes Paternal Grandmother     No Known Problems Brother     No Known Problems Paternal Aunt     Stroke Neg Hx     Thyroid disease Neg Hx        SOCIAL HISTORY:  Social History     Tobacco Use    Smoking status: Former     Current packs/day: 0.00     Types: Cigarettes     Quit date:      Years since quittin.2    Smokeless tobacco: Never    Tobacco comments:     20+ pack year hx - As per [employer]    Vaping Use    Vaping status: Never Used   Substance Use Topics    Alcohol use: Yes     Comment: social    Drug use: No       MEDICATIONS:    Current Outpatient Medications:     Calcium Citrate-Vitamin D 250-200 MG-UNIT TABS, Take 1 tablet by mouth 2 (two) times a day, Disp: , Rfl:     Cholecalciferol (D3 5000) 125 MCG (5000 UT) capsule, , Disp: , Rfl:     ferrous gluconate (FERGON) 324 mg tablet, Take 324 mg by mouth if needed Every other day, Disp: , Rfl:     FLUoxetine (PROzac) 20 mg capsule, TAKE 2 CAPSULES BY MOUTH EVERY DAY, Disp: 180 capsule, Rfl: 1    Melatonin 5 MG CAPS, Take 5 mg by mouth daily at bedtime , Disp: , Rfl:     MULTIPLE VITAMIN PO, Take 1 tablet by mouth daily, Disp: , Rfl:     pravastatin (PRAVACHOL) 20 mg tablet, Take 1 tablet (20 mg total) by mouth daily, Disp: 90 tablet, Rfl: 1    zolpidem (AMBIEN CR) 6.25 MG CR tablet, Take 1 tablet (6.25 mg total) by mouth daily at bedtime as needed for sleep, Disp: 30 tablet, Rfl: 0    omeprazole (PriLOSEC) 20 mg delayed release capsule, Take 1 capsule (20 mg total) by mouth daily as needed, Disp: 90 capsule, Rfl: 3    ALLERGIES:  Allergies   Allergen  Reactions    Ibuprofen Other (See Comments)     Gastric bypass           REVIEW OF SYSTEMS:  Review of Systems   Constitutional:  Negative for chills, fever and unexpected weight change.   HENT:  Negative for hearing loss, nosebleeds and sore throat.    Eyes:  Negative for pain, redness and visual disturbance.   Respiratory:  Negative for cough, shortness of breath and wheezing.    Cardiovascular:  Negative for chest pain, palpitations and leg swelling.   Gastrointestinal:  Negative for abdominal pain, nausea and vomiting.   Endocrine: Negative for polydipsia and polyuria.   Genitourinary:  Negative for difficulty urinating and hematuria.   Musculoskeletal:  Negative for arthralgias, joint swelling and myalgias.   Skin:  Negative for rash and wound.   Neurological:  Positive for numbness. Negative for dizziness and headaches.   Psychiatric/Behavioral:  Negative for decreased concentration, dysphoric mood and suicidal ideas. The patient is not nervous/anxious.        VITALS:  There were no vitals filed for this visit.    LABS:      _____________________________________________________  PHYSICAL EXAMINATION:  General: well developed and well nourished, alert, oriented times 3, and appears comfortable  Psychiatric: Normal  HEENT: Normocephalic, Atraumatic Trachea Midline, No torticollis  Pulmonary: No audible wheezing or respiratory distress   Abdomen/GI: Non tender, non distended   Cardiovascular: No pitting edema, 2+ radial pulse   Skin: No masses, erythema, lacerations, fluctation, ulcerations  Neurovascular: Sensation Intact to the Median, Ulnar, Radial Nerve, Motor Intact to the Median, Ulnar, Radial Nerve, and Pulses Intact  Musculoskeletal: Normal, except as noted in detailed exam and in HPI.      MUSCULOSKELETAL EXAMINATION:    Bilateral Carpal Tunnel Exam:  Negative thenar atrophy. Positive phalen's test. Positive carpal tunnel compression. Positive tinels over median nerve at the wrist.  Opposition strength  5/5.  Abduction strength 5/5.    ___________________________________________________  STUDIES REVIEWED:  I have personally reviewed and interpreted EMG/NCS bilateral upper extremity which demonstrates mild left carpal tunnel syndrome and moderate right carpal tunnel syndrome.     LABS REVIEWED:    HgA1c:   Lab Results   Component Value Date    HGBA1C 5.8 (H) 03/16/2024     BMP:   Lab Results   Component Value Date    GLUCOSE 130 06/17/2015    CALCIUM 10.0 09/12/2024     06/17/2015    K 4.0 09/12/2024    CO2 31 09/12/2024     09/12/2024    BUN 15 09/12/2024    CREATININE 0.85 09/12/2024               PROCEDURES PERFORMED:  Procedures  No Procedures performed today    _____________________________________________________      Scribe Attestation      I,:  Nancy Mccollum MA am acting as a scribe while in the presence of the attending physician.:       I,:  Nilam Esteban MD personally performed the services described in this documentation    as scribed in my presence.:

## 2025-03-12 NOTE — H&P
Nilam Esteban M.D.  Attending, Orthopaedic Surgery  Hand, Wrist, and Elbow Surgery  [facility]      ORTHOPAEDIC HAND, WRIST, AND ELBOW OFFICE  VISIT       ASSESSMENT/PLAN:        Assessment & Plan  Carpal tunnel syndrome of right wrist  The etiology of carpal tunnel syndrome was discussed along with treatment options.   She has tried nighttime bracing, without improvement in her symptoms.   We discussed a carpal tunnel CSI vs carpal tunnel release. We dicussed the CSI will not resolve carpal tunnel syndrome but can improve symptoms for some time.   Common risks include bleeding, infection, injury to nearby structures (vessels, nerves, tendons, ligaments), blood clots (deep vein thrombosis / pulmonary embolus), and wound healing problems. Infection may result in an infection of the blood stream and/or the need for oral or intravenous antibiotics. Additional risks include impaired limb function, loss of limb and/or failure to achieve desired results. Nerve injury can result in numbness, incomplete or worsening nerve recovery. There is a risk for unacceptable cosmetic results (such as scarring). Surgery of the hand is associated with bleeding, infection, scar, pain, wound healing problems, damage to nerves, arteries, tendons, ligaments, failure to give desired result, instability, nonunion, malunion hardware issues, and need for more surgery.  We discussed nighttime symptoms do generally improve first post operatively. She can expect to see improvement in her symptoms up to 18 months post op but may not fully resolve.   We also discussed the possibility of pillar pain post operatively which can last 4-8 months and is best combated with scar massage.   The patient elected to proceed with a right carpal tunnel release and informed electronic surgical consent was signed.   Post operative instructions/expectations were reviewed and provided in AVS.   BMP and EKG will be obtained prior to surgery.    I will see her back in the office 10-14 days post op for suture removal.   Orders:    Case request operating room: Right carpal tunnel release; Standing    Basic metabolic panel; Future    EKG 12 lead; Future    Encounter for pre-operative laboratory testing    Orders:    Basic metabolic panel; Future    EKG 12 lead; Future      The patient verbalized understanding of exam findings and treatment plan. We engaged in the shared decision-making process and treatment options were discussed at length with the patient. Surgical and conservative management discussed today along with risks and benefits.    Diagnoses and all orders for this visit:    Carpal tunnel syndrome of right wrist    Encounter for pre-operative laboratory testing      Follow Up:  Return for 10-14 days, post op.    To Do Next Visit:  Sutures out    Operative Discussions:  Open Carpal Tunnel Release:   The anatomy and physiology of carpal tunnel syndrome was discussed with the patient today.  Increase pressure localized under the transverse carpal ligament can cause pain, numbness, tingling, or dysesthesias within the median nerve distribution as well as feelings of fatigue, clumsiness, or awkwardness.  These symptoms typically occur at night and worse in the morning upon waking.  Eventually, untreated carpal tunnel syndrome can result in weakness and permanent loss of muscle within the thenar compartment of the hand.  Treatment options were discussed with the patient.  Conservative treatment includes nocturnal resting splints to keep the nerve in a neutral position, ergonomic changes within the work or home environment, activity modification, and tendon gliding exercises.  Vitamin B6 one tablet daily over the counter may helpful to reduce symptoms.  Steroid injections within the carpal canal can help a majority of patients, however this is often self-limited in a majority of patients.  Surgical intervention to divide the transverse carpal ligament  typically results in a long-lasting relief of the patient's complaints, with the recurrence rate of less than 1%. The patient has elected to undergo an open carpal tunnel release.  The palmar incision technique was discussed in the office with the patient today.   In the postoperative period, light activities are allowed immediately, driving is allowed when narcotic medication has stopped, and the bandages may be removed and incision may get wet after 2 days.  Heavy activities (lifting more than approximately 10 pounds) will be allowed after follow up appointment in 1-2 weeks.  While night symptoms (waking from sleep, pain, and discomfort in the hands) generally improves rapidly, the numbness and tingling as well as the strength will slowly improve over weeks to months depending on the chronicity and severity of the carpal tunnel syndrome.  Pillar pain and scar discomfort were discussed with the patient which are self-limiting conditions.  The risks of bleeding and infection from the surgery are less than 1%.  Risk of recurrence is approximately 0.5%.  The risks of nerve injury or nerve damage or damage to the blood vessels is approximately 1 in 1200. The patient has an understanding of the above mentioned discussion.The risks and benefits of the procedure were explained to the patient, which include, but are not limited to: Bleeding, infection, recurrence, pain, scar, damage to tendons, damage to nerves, and damage to blood vessels, failure to give desired results and complications related to anesthesia.  These risks, along with alternative conservative treatment options, and postoperative protocols were voiced back and understood by the patient.  All questions were answered to the patient's satisfaction.  The patient agrees to comply with a standard postoperative protocol, and is willing to proceed.  Education was provided via written and auditory forms.  There were no barriers to learning. Written handouts  regarding wound care, incision and scar care, and general preoperative information was provided to the patient.  Prior to surgery, the patient may be requested to stop all anti-inflammatory medications.  Prophylactic aspirin, Plavix, and Coumadin may be allowed to be continued.  Medications including vitamin E., ginkgo, and fish oil are requested to be stopped approximately one week prior to surgery.     ____________________________________________________________________________________________________________________________________________      CHIEF COMPLAINT:  Chief Complaint   Patient presents with    Right Hand - Numbness, Pain     EMG 2/24/25 patient has tried bracing this does not help her     Left Hand - Numbness, Pain       SUBJECTIVE:  [place] Clinton is a 60 y.o. year old RHD female who presents to the office for evaluation of both hands. I am seeing the patient in consultation at the request of Dr. Rayray Knight. She notes intermittent numbness and tingling to both hands. She also notes pain on the right side that is in the same distribution as the numbness and tingling. Pain is described as throbbing and wakes her from sleep at night. She notes pain mainly affects her long finger on the right side. Numbness and tingling affects her radial digits, thumb, index, ce and ring fingers. She denies numbness and tingling to her small finger. She feels her right side is worse then her left. She has tried nighttime bracing and her symptoms are still waking her from sleep at night. She notes a + flick sign. Symptoms have been ongoing for years. She did try therapy for this issue as well, which did improve symptoms for a few months. She continues to perform nerve glides nightly.       Pain/symptom timing:  Worse during the day when active  Pain/symptom context:  Worse with activites and work  Pain/symptom modifying factors:  Rest makes better, activities make worse  Pain/symptom associated  signs/symptoms: none    Prior treatment   NSAIDsNo   Injections No   Bracing/Orthotics Yes    Physical Therapy Yes     I have personally reviewed all the relevant PMH, PSH, SH, FH, Medications and allergies      PAST MEDICAL HISTORY:  Past Medical History:   Diagnosis Date    Abdominal pain     Chronic pain disorder     abdominal    Depression     Diverticula of colon     Former smoker     Resolved 2012     GERD (gastroesophageal reflux disease)     Headache(784.0) 10/1/2020    Hemorrhoids     Hiatal hernia     Hyperlipidemia     Intestinal malabsorption following gastrectomy     Morbid obesity (HCC)     Resolved 10/2/2015     Obesity     Postgastrectomy malabsorption     Vitamin D insufficiency     Resolved 2016        PAST SURGICAL HISTORY:  Past Surgical History:   Procedure Laterality Date    ABDOMINAL SURGERY      gastric bipass    ANKLE SURGERY       SECTION      (2) ,      COLONOSCOPY      ESOPHAGOGASTRODUODENOSCOPY      x2    GASTRIC BYPASS  2015    HERNIA REPAIR  2015    Paraesophageal hiatus hernia. Managed by Eliezer Mishra (General Surgery)    MI EGD TRANSORAL BIOPSY SINGLE/MULTIPLE N/A 2017    Procedure: ESOPHAGOGASTRODUODENOSCOPY (EGD);  Surgeon: Emmie Rosen MD;  Location: [facility];  Service: Bariatrics    MI LAPAROSCOPIC APPENDECTOMY N/A 2024    Procedure: APPENDECTOMY LAPAROSCOPIC;  Surgeon: Jona Araiza MD;  Location: [facility];  Service: General    MI LAPS ABD PRTM&OMENTUM DX W/WO SPEC BR/WA SPX N/A 2024    Procedure: LAPAROSCOPY DIAGNOSTIC;  Surgeon: Eliezer Farooq MD;  Location: [facility];  Service: Bariatrics    MI LAPS SURG ABLTJ 1/> LVR YELITZA RF N/A 2024    Procedure: EXCISION BIOPSY  LIVER LAPAROSCOPIC;  Surgeon: Jona Araiza MD;  Location: [facility];  Service: General    TONSILLECTOMY  1984    UTERINE FIBROID SURGERY         FAMILY HISTORY:  Family History   Problem Relation Age of Onset    Breast cancer Mother 31     Prostate cancer Father 50    Hypertension Father     Heart disease Father         Coronary arteriosclerosis     Hyperlipidemia Father     No Known Problems Daughter     No Known Problems Daughter     Heart disease Maternal Grandmother         Coronary arteriosclerosis     Heart disease Maternal Grandfather         Coronary arteriosclerosis     COPD Maternal Grandfather         heavy smoker    Diabetes Paternal Grandmother     No Known Problems Brother     No Known Problems Paternal Aunt     Stroke Neg Hx     Thyroid disease Neg Hx        SOCIAL HISTORY:  Social History     Tobacco Use    Smoking status: Former     Current packs/day: 0.00     Types: Cigarettes     Quit date:      Years since quittin.2    Smokeless tobacco: Never    Tobacco comments:     20+ pack year hx - As per [employer]    Vaping Use    Vaping status: Never Used   Substance Use Topics    Alcohol use: Yes     Comment: social    Drug use: No       MEDICATIONS:    Current Outpatient Medications:     Calcium Citrate-Vitamin D 250-200 MG-UNIT TABS, Take 1 tablet by mouth 2 (two) times a day, Disp: , Rfl:     Cholecalciferol (D3 5000) 125 MCG (5000 UT) capsule, , Disp: , Rfl:     ferrous gluconate (FERGON) 324 mg tablet, Take 324 mg by mouth if needed Every other day, Disp: , Rfl:     FLUoxetine (PROzac) 20 mg capsule, TAKE 2 CAPSULES BY MOUTH EVERY DAY, Disp: 180 capsule, Rfl: 1    Melatonin 5 MG CAPS, Take 5 mg by mouth daily at bedtime , Disp: , Rfl:     MULTIPLE VITAMIN PO, Take 1 tablet by mouth daily, Disp: , Rfl:     pravastatin (PRAVACHOL) 20 mg tablet, Take 1 tablet (20 mg total) by mouth daily, Disp: 90 tablet, Rfl: 1    zolpidem (AMBIEN CR) 6.25 MG CR tablet, Take 1 tablet (6.25 mg total) by mouth daily at bedtime as needed for sleep, Disp: 30 tablet, Rfl: 0    omeprazole (PriLOSEC) 20 mg delayed release capsule, Take 1 capsule (20 mg total) by mouth daily as needed, Disp: 90 capsule, Rfl: 3    ALLERGIES:  Allergies   Allergen  Reactions    Ibuprofen Other (See Comments)     Gastric bypass           REVIEW OF SYSTEMS:  Review of Systems   Constitutional:  Negative for chills, fever and unexpected weight change.   HENT:  Negative for hearing loss, nosebleeds and sore throat.    Eyes:  Negative for pain, redness and visual disturbance.   Respiratory:  Negative for cough, shortness of breath and wheezing.    Cardiovascular:  Negative for chest pain, palpitations and leg swelling.   Gastrointestinal:  Negative for abdominal pain, nausea and vomiting.   Endocrine: Negative for polydipsia and polyuria.   Genitourinary:  Negative for difficulty urinating and hematuria.   Musculoskeletal:  Negative for arthralgias, joint swelling and myalgias.   Skin:  Negative for rash and wound.   Neurological:  Positive for numbness. Negative for dizziness and headaches.   Psychiatric/Behavioral:  Negative for decreased concentration, dysphoric mood and suicidal ideas. The patient is not nervous/anxious.        VITALS:  There were no vitals filed for this visit.    LABS:      _____________________________________________________  PHYSICAL EXAMINATION:  General: well developed and well nourished, alert, oriented times 3, and appears comfortable  Psychiatric: Normal  HEENT: Normocephalic, Atraumatic Trachea Midline, No torticollis  Pulmonary: No audible wheezing or respiratory distress   Abdomen/GI: Non tender, non distended   Cardiovascular: No pitting edema, 2+ radial pulse   Skin: No masses, erythema, lacerations, fluctation, ulcerations  Neurovascular: Sensation Intact to the Median, Ulnar, Radial Nerve, Motor Intact to the Median, Ulnar, Radial Nerve, and Pulses Intact  Musculoskeletal: Normal, except as noted in detailed exam and in HPI.      MUSCULOSKELETAL EXAMINATION:    Bilateral Carpal Tunnel Exam:  Negative thenar atrophy. Positive phalen's test. Positive carpal tunnel compression. Positive tinels over median nerve at the wrist.  Opposition strength  5/5.  Abduction strength 5/5.    ___________________________________________________  STUDIES REVIEWED:  I have personally reviewed and interpreted EMG/NCS bilateral upper extremity which demonstrates mild left carpal tunnel syndrome and moderate right carpal tunnel syndrome.     LABS REVIEWED:    HgA1c:   Lab Results   Component Value Date    HGBA1C 5.8 (H) 03/16/2024     BMP:   Lab Results   Component Value Date    GLUCOSE 130 06/17/2015    CALCIUM 10.0 09/12/2024     06/17/2015    K 4.0 09/12/2024    CO2 31 09/12/2024     09/12/2024    BUN 15 09/12/2024    CREATININE 0.85 09/12/2024               PROCEDURES PERFORMED:  Procedures  No Procedures performed today    _____________________________________________________      Scribe Attestation      I,:  Nancy Mccollum MA am acting as a scribe while in the presence of the attending physician.:       I,:  Nilam Esteban MD personally performed the services described in this documentation    as scribed in my presence.:

## 2025-03-12 NOTE — ASSESSMENT & PLAN NOTE
The etiology of carpal tunnel syndrome was discussed along with treatment options.   She has tried nighttime bracing, without improvement in her symptoms.   We discussed a carpal tunnel CSI vs carpal tunnel release. We dicussed the CSI will not resolve carpal tunnel syndrome but can improve symptoms for some time.   Common risks include bleeding, infection, injury to nearby structures (vessels, nerves, tendons, ligaments), blood clots (deep vein thrombosis / pulmonary embolus), and wound healing problems. Infection may result in an infection of the blood stream and/or the need for oral or intravenous antibiotics. Additional risks include impaired limb function, loss of limb and/or failure to achieve desired results. Nerve injury can result in numbness, incomplete or worsening nerve recovery. There is a risk for unacceptable cosmetic results (such as scarring). Surgery of the hand is associated with bleeding, infection, scar, pain, wound healing problems, damage to nerves, arteries, tendons, ligaments, failure to give desired result, instability, nonunion, malunion hardware issues, and need for more surgery.  We discussed nighttime symptoms do generally improve first post operatively. She can expect to see improvement in her symptoms up to 18 months post op but may not fully resolve.   We also discussed the possibility of pillar pain post operatively which can last 4-8 months and is best combated with scar massage.   The patient elected to proceed with a right carpal tunnel release and informed electronic surgical consent was signed.   Post operative instructions/expectations were reviewed and provided in AVS.   BMP and EKG will be obtained prior to surgery.   I will see her back in the office 10-14 days post op for suture removal.   Orders:    Case request operating room: Right carpal tunnel release; Standing    Basic metabolic panel; Future    EKG 12 lead; Future

## 2025-03-20 DIAGNOSIS — E78.00 HYPERCHOLESTEROLEMIA: ICD-10-CM

## 2025-03-21 RX ORDER — PRAVASTATIN SODIUM 20 MG
20 TABLET ORAL DAILY
Qty: 30 TABLET | Refills: 0 | Status: SHIPPED | OUTPATIENT
Start: 2025-03-21

## 2025-04-04 ENCOUNTER — APPOINTMENT (OUTPATIENT)
Dept: LAB | Facility: AMBULARY SURGERY CENTER | Age: 60
End: 2025-04-04
Payer: COMMERCIAL

## 2025-04-04 ENCOUNTER — APPOINTMENT (OUTPATIENT)
Dept: LAB | Facility: AMBULARY SURGERY CENTER | Age: 60
End: 2025-04-04
Attending: SURGERY
Payer: COMMERCIAL

## 2025-04-04 ENCOUNTER — ANESTHESIA EVENT (OUTPATIENT)
Age: 60
End: 2025-04-04
Payer: COMMERCIAL

## 2025-04-04 DIAGNOSIS — Z01.812 PRE-PROCEDURE LAB EXAM: ICD-10-CM

## 2025-04-04 DIAGNOSIS — G56.01 CARPAL TUNNEL SYNDROME OF RIGHT WRIST: ICD-10-CM

## 2025-04-04 DIAGNOSIS — E78.00 HYPERCHOLESTEROLEMIA: ICD-10-CM

## 2025-04-04 DIAGNOSIS — Z13.83 SCREENING FOR CARDIOVASCULAR, RESPIRATORY, AND GENITOURINARY DISEASES: ICD-10-CM

## 2025-04-04 DIAGNOSIS — Z13.89 SCREENING FOR CARDIOVASCULAR, RESPIRATORY, AND GENITOURINARY DISEASES: ICD-10-CM

## 2025-04-04 DIAGNOSIS — Z01.812 ENCOUNTER FOR PRE-OPERATIVE LABORATORY TESTING: ICD-10-CM

## 2025-04-04 DIAGNOSIS — Z13.6 SCREENING FOR CARDIOVASCULAR, RESPIRATORY, AND GENITOURINARY DISEASES: ICD-10-CM

## 2025-04-04 DIAGNOSIS — F32.4 MAJOR DEPRESSIVE DISORDER WITH SINGLE EPISODE, IN PARTIAL REMISSION (HCC): ICD-10-CM

## 2025-04-04 LAB
ALBUMIN SERPL BCG-MCNC: 4.4 G/DL (ref 3.5–5)
ALP SERPL-CCNC: 75 U/L (ref 34–104)
ALT SERPL W P-5'-P-CCNC: 14 U/L (ref 7–52)
ANION GAP SERPL CALCULATED.3IONS-SCNC: 10 MMOL/L (ref 4–13)
AST SERPL W P-5'-P-CCNC: 24 U/L (ref 13–39)
ATRIAL RATE: 57 BPM
BASOPHILS # BLD AUTO: 0.01 THOUSANDS/ÂΜL (ref 0–0.1)
BASOPHILS NFR BLD AUTO: 0 % (ref 0–1)
BILIRUB SERPL-MCNC: 0.6 MG/DL (ref 0.2–1)
BUN SERPL-MCNC: 16 MG/DL (ref 5–25)
CALCIUM SERPL-MCNC: 9.6 MG/DL (ref 8.4–10.2)
CHLORIDE SERPL-SCNC: 104 MMOL/L (ref 96–108)
CHOLEST SERPL-MCNC: 179 MG/DL (ref ?–200)
CO2 SERPL-SCNC: 26 MMOL/L (ref 21–32)
CREAT SERPL-MCNC: 0.73 MG/DL (ref 0.6–1.3)
EOSINOPHIL # BLD AUTO: 0.1 THOUSAND/ÂΜL (ref 0–0.61)
EOSINOPHIL NFR BLD AUTO: 2 % (ref 0–6)
ERYTHROCYTE [DISTWIDTH] IN BLOOD BY AUTOMATED COUNT: 12.2 % (ref 11.6–15.1)
GFR SERPL CREATININE-BSD FRML MDRD: 89 ML/MIN/1.73SQ M
GLUCOSE P FAST SERPL-MCNC: 90 MG/DL (ref 65–99)
HCT VFR BLD AUTO: 44.2 % (ref 34.8–46.1)
HDLC SERPL-MCNC: 65 MG/DL
HGB BLD-MCNC: 14.7 G/DL (ref 11.5–15.4)
IMM GRANULOCYTES # BLD AUTO: 0.01 THOUSAND/UL (ref 0–0.2)
IMM GRANULOCYTES NFR BLD AUTO: 0 % (ref 0–2)
LDLC SERPL CALC-MCNC: 101 MG/DL (ref 0–100)
LYMPHOCYTES # BLD AUTO: 1.92 THOUSANDS/ÂΜL (ref 0.6–4.47)
LYMPHOCYTES NFR BLD AUTO: 40 % (ref 14–44)
MCH RBC QN AUTO: 31.3 PG (ref 26.8–34.3)
MCHC RBC AUTO-ENTMCNC: 33.3 G/DL (ref 31.4–37.4)
MCV RBC AUTO: 94 FL (ref 82–98)
MONOCYTES # BLD AUTO: 0.3 THOUSAND/ÂΜL (ref 0.17–1.22)
MONOCYTES NFR BLD AUTO: 6 % (ref 4–12)
NEUTROPHILS # BLD AUTO: 2.47 THOUSANDS/ÂΜL (ref 1.85–7.62)
NEUTS SEG NFR BLD AUTO: 52 % (ref 43–75)
NONHDLC SERPL-MCNC: 114 MG/DL
NRBC BLD AUTO-RTO: 0 /100 WBCS
P AXIS: 22 DEGREES
PLATELET # BLD AUTO: 291 THOUSANDS/UL (ref 149–390)
PMV BLD AUTO: 11.3 FL (ref 8.9–12.7)
POTASSIUM SERPL-SCNC: 4.4 MMOL/L (ref 3.5–5.3)
PR INTERVAL: 144 MS
PROT SERPL-MCNC: 6.5 G/DL (ref 6.4–8.4)
QRS AXIS: 13 DEGREES
QRSD INTERVAL: 80 MS
QT INTERVAL: 428 MS
QTC INTERVAL: 417 MS
RBC # BLD AUTO: 4.69 MILLION/UL (ref 3.81–5.12)
SODIUM SERPL-SCNC: 140 MMOL/L (ref 135–147)
T WAVE AXIS: 20 DEGREES
TRIGL SERPL-MCNC: 63 MG/DL (ref ?–150)
TSH SERPL DL<=0.05 MIU/L-ACNC: 1.49 UIU/ML (ref 0.45–4.5)
VENTRICULAR RATE: 57 BPM
WBC # BLD AUTO: 4.81 THOUSAND/UL (ref 4.31–10.16)

## 2025-04-04 PROCEDURE — 93010 ELECTROCARDIOGRAM REPORT: CPT | Performed by: STUDENT IN AN ORGANIZED HEALTH CARE EDUCATION/TRAINING PROGRAM

## 2025-04-04 PROCEDURE — 36415 COLL VENOUS BLD VENIPUNCTURE: CPT

## 2025-04-04 PROCEDURE — 84443 ASSAY THYROID STIM HORMONE: CPT

## 2025-04-04 PROCEDURE — 85025 COMPLETE CBC W/AUTO DIFF WBC: CPT

## 2025-04-04 PROCEDURE — 80053 COMPREHEN METABOLIC PANEL: CPT

## 2025-04-04 PROCEDURE — 80061 LIPID PANEL: CPT

## 2025-04-04 PROCEDURE — 93005 ELECTROCARDIOGRAM TRACING: CPT

## 2025-04-09 DIAGNOSIS — F51.01 PRIMARY INSOMNIA: ICD-10-CM

## 2025-04-10 RX ORDER — ZOLPIDEM TARTRATE 6.25 MG/1
6.25 TABLET, FILM COATED, EXTENDED RELEASE ORAL
Qty: 30 TABLET | Refills: 0 | Status: SHIPPED | OUTPATIENT
Start: 2025-04-10

## 2025-04-10 NOTE — TELEPHONE ENCOUNTER
1 [id number] 03/05/2025 03/05/2025 Zolpidem Tartrate (Tablet, Extended Release) 30.0 30 6.25 MG NA FRANCISCO QUINTERO POGODZINSKI [facility] Commercial Insurance 0 / 0 [place]    1 [id number] 02/25/2025 02/25/2025 Zolpidem Tartrate (Tablet, Extended Release) 30.0 30 12.5 MG NA FRANCISCO QUINTERO POGODZINSKI [id number] Commercial Insurance 0 / 0 [place]    1 [id number] 06/27/2024 06/27/2024 oxyCODONE HCL (Tablet) 10.0 1 5 MG 75.0 TONY, BROWN [facility] Commercial Insurance 0 / 0 [place]    1 [id number] 05/04/2024 05/04/2024 oxyCODONE HCL (Tablet) 5.0 2 5 MG 18.75 TONY ESTEVEZ [facility] Commercial Insurance 0 / 0 [place]

## 2025-04-17 PROCEDURE — NC001 PR NO CHARGE: Performed by: SURGERY

## 2025-04-17 RX ORDER — MULTIVIT WITH MINERALS/LUTEIN
1000 TABLET ORAL DAILY
COMMUNITY

## 2025-04-17 NOTE — H&P
SSESSMENT/PLAN:          Assessment & Plan  Carpal tunnel syndrome of right wrist  The etiology of carpal tunnel syndrome was discussed along with treatment options.   She has tried nighttime bracing, without improvement in her symptoms.   We discussed a carpal tunnel CSI vs carpal tunnel release. We dicussed the CSI will not resolve carpal tunnel syndrome but can improve symptoms for some time.   Common risks include bleeding, infection, injury to nearby structures (vessels, nerves, tendons, ligaments), blood clots (deep vein thrombosis / pulmonary embolus), and wound healing problems. Infection may result in an infection of the blood stream and/or the need for oral or intravenous antibiotics. Additional risks include impaired limb function, loss of limb and/or failure to achieve desired results. Nerve injury can result in numbness, incomplete or worsening nerve recovery. There is a risk for unacceptable cosmetic results (such as scarring). Surgery of the hand is associated with bleeding, infection, scar, pain, wound healing problems, damage to nerves, arteries, tendons, ligaments, failure to give desired result, instability, nonunion, malunion hardware issues, and need for more surgery.  We discussed nighttime symptoms do generally improve first post operatively. She can expect to see improvement in her symptoms up to 18 months post op but may not fully resolve.   We also discussed the possibility of pillar pain post operatively which can last 4-8 months and is best combated with scar massage.   The patient elected to proceed with a right carpal tunnel release and informed electronic surgical consent was signed.   Post operative instructions/expectations were reviewed and provided in AVS.   BMP and EKG will be obtained prior to surgery.   I will see her back in the office 10-14 days post op for suture removal.   Orders:    Case request operating room: Right carpal tunnel release; Standing    Basic metabolic panel;  Future    EKG 12 lead; Future     Encounter for pre-operative laboratory testing     Orders:    Basic metabolic panel; Future    EKG 12 lead; Future        The patient verbalized understanding of exam findings and treatment plan. We engaged in the shared decision-making process and treatment options were discussed at length with the patient. Surgical and conservative management discussed today along with risks and benefits.     Diagnoses and all orders for this visit:     Carpal tunnel syndrome of right wrist     Encounter for pre-operative laboratory testing        Follow Up:  Return for 10-14 days, post op.     To Do Next Visit:  Sutures out     Operative Discussions:  Open Carpal Tunnel Release:   The anatomy and physiology of carpal tunnel syndrome was discussed with the patient today.  Increase pressure localized under the transverse carpal ligament can cause pain, numbness, tingling, or dysesthesias within the median nerve distribution as well as feelings of fatigue, clumsiness, or awkwardness.  These symptoms typically occur at night and worse in the morning upon waking.  Eventually, untreated carpal tunnel syndrome can result in weakness and permanent loss of muscle within the thenar compartment of the hand.  Treatment options were discussed with the patient.  Conservative treatment includes nocturnal resting splints to keep the nerve in a neutral position, ergonomic changes within the work or home environment, activity modification, and tendon gliding exercises.  Vitamin B6 one tablet daily over the counter may helpful to reduce symptoms.  Steroid injections within the carpal canal can help a majority of patients, however this is often self-limited in a majority of patients.  Surgical intervention to divide the transverse carpal ligament typically results in a long-lasting relief of the patient's complaints, with the recurrence rate of less than 1%. The patient has elected to undergo an open carpal tunnel  release.  The palmar incision technique was discussed in the office with the patient today.   In the postoperative period, light activities are allowed immediately, driving is allowed when narcotic medication has stopped, and the bandages may be removed and incision may get wet after 2 days.  Heavy activities (lifting more than approximately 10 pounds) will be allowed after follow up appointment in 1-2 weeks.  While night symptoms (waking from sleep, pain, and discomfort in the hands) generally improves rapidly, the numbness and tingling as well as the strength will slowly improve over weeks to months depending on the chronicity and severity of the carpal tunnel syndrome.  Pillar pain and scar discomfort were discussed with the patient which are self-limiting conditions.  The risks of bleeding and infection from the surgery are less than 1%.  Risk of recurrence is approximately 0.5%.  The risks of nerve injury or nerve damage or damage to the blood vessels is approximately 1 in 1200. The patient has an understanding of the above mentioned discussion.The risks and benefits of the procedure were explained to the patient, which include, but are not limited to: Bleeding, infection, recurrence, pain, scar, damage to tendons, damage to nerves, and damage to blood vessels, failure to give desired results and complications related to anesthesia.  These risks, along with alternative conservative treatment options, and postoperative protocols were voiced back and understood by the patient.  All questions were answered to the patient's satisfaction.  The patient agrees to comply with a standard postoperative protocol, and is willing to proceed.  Education was provided via written and auditory forms.  There were no barriers to learning. Written handouts regarding wound care, incision and scar care, and general preoperative information was provided to the patient.  Prior to surgery, the patient may be requested to stop all  anti-inflammatory medications.  Prophylactic aspirin, Plavix, and Coumadin may be allowed to be continued.  Medications including vitamin E., ginkgo, and fish oil are requested to be stopped approximately one week prior to surgery.      ____________________________________________________________________________________________________________________________________________        CHIEF COMPLAINT:       Chief Complaint   Patient presents with    Right Hand - Numbness, Pain       EMG 2/24/25 patient has tried bracing this does not help her     Left Hand - Numbness, Pain         SUBJECTIVE:  [place] Clinton is a 60 y.o. year old RHD female who presents to the office for evaluation of both hands. I am seeing the patient in consultation at the request of Dr. Rayray Knight. She notes intermittent numbness and tingling to both hands. She also notes pain on the right side that is in the same distribution as the numbness and tingling. Pain is described as throbbing and wakes her from sleep at night. She notes pain mainly affects her long finger on the right side. Numbness and tingling affects her radial digits, thumb, index, ce and ring fingers. She denies numbness and tingling to her small finger. She feels her right side is worse then her left. She has tried nighttime bracing and her symptoms are still waking her from sleep at night. She notes a + flick sign. Symptoms have been ongoing for years. She did try therapy for this issue as well, which did improve symptoms for a few months. She continues to perform nerve glides nightly.       Pain/symptom timing:  Worse during the day when active  Pain/symptom context:  Worse with activites and work  Pain/symptom modifying factors:  Rest makes better, activities make worse  Pain/symptom associated signs/symptoms: none     Prior treatment   NSAIDsNo   Injections No   Bracing/Orthotics Yes    Physical Therapy Yes      I have personally reviewed all the relevant PMH,  PSH, SH, FH, Medications and allergies        PAST MEDICAL HISTORY:       Past Medical History:   Diagnosis Date    Abdominal pain      Chronic pain disorder       abdominal    Depression      Diverticula of colon      Former smoker       Resolved 2012     GERD (gastroesophageal reflux disease)      Headache(784.0) 10/1/2020    Hemorrhoids      Hiatal hernia      Hyperlipidemia      Intestinal malabsorption following gastrectomy      Morbid obesity (HCC)       Resolved 10/2/2015     Obesity      Postgastrectomy malabsorption      Vitamin D insufficiency       Resolved 2016          PAST SURGICAL HISTORY:        Past Surgical History:   Procedure Laterality Date    ABDOMINAL SURGERY         gastric bipass    ANKLE SURGERY         SECTION         (2) ,      COLONOSCOPY        ESOPHAGOGASTRODUODENOSCOPY         x2    GASTRIC BYPASS   2015    HERNIA REPAIR   2015     Paraesophageal hiatus hernia. Managed by Eliezer Mishra (General Surgery)    GA EGD TRANSORAL BIOPSY SINGLE/MULTIPLE N/A 2017     Procedure: ESOPHAGOGASTRODUODENOSCOPY (EGD);  Surgeon: Emmie Rosen MD;  Location: [facility];  Service: Bariatrics    GA LAPAROSCOPIC APPENDECTOMY N/A 2024     Procedure: APPENDECTOMY LAPAROSCOPIC;  Surgeon: Jona Araiza MD;  Location: [facility];  Service: General    GA LAPS ABD PRTM&OMENTUM DX W/WO SPEC BR/WA SPX N/A 2024     Procedure: LAPAROSCOPY DIAGNOSTIC;  Surgeon: Eliezer Farooq MD;  Location: [facility];  Service: Bariatrics    GA LAPS SURG ABLTJ 1/> LVR YELITZA RF N/A 2024     Procedure: EXCISION BIOPSY  LIVER LAPAROSCOPIC;  Surgeon: Jona Araiza MD;  Location: [facility];  Service: General    TONSILLECTOMY   1984    UTERINE FIBROID SURGERY             FAMILY HISTORY:        Family History   Problem Relation Age of Onset    Breast cancer Mother 31    Prostate cancer Father 50    Hypertension Father      Heart disease Father           Coronary arteriosclerosis      Hyperlipidemia Father      No Known Problems Daughter      No Known Problems Daughter      Heart disease Maternal Grandmother           Coronary arteriosclerosis     Heart disease Maternal Grandfather           Coronary arteriosclerosis     COPD Maternal Grandfather           heavy smoker    Diabetes Paternal Grandmother      No Known Problems Brother      No Known Problems Paternal Aunt      Stroke Neg Hx      Thyroid disease Neg Hx           SOCIAL HISTORY:  Social History            Tobacco Use    Smoking status: Former       Current packs/day: 0.00       Types: Cigarettes       Quit date:        Years since quittin.2    Smokeless tobacco: Never    Tobacco comments:       20+ pack year hx - As per [employer]    Vaping Use    Vaping status: Never Used   Substance Use Topics    Alcohol use: Yes       Comment: social    Drug use: No         MEDICATIONS:     Current Outpatient Medications:     Calcium Citrate-Vitamin D 250-200 MG-UNIT TABS, Take 1 tablet by mouth 2 (two) times a day, Disp: , Rfl:     Cholecalciferol (D3 5000) 125 MCG (5000 UT) capsule, , Disp: , Rfl:     ferrous gluconate (FERGON) 324 mg tablet, Take 324 mg by mouth if needed Every other day, Disp: , Rfl:     FLUoxetine (PROzac) 20 mg capsule, TAKE 2 CAPSULES BY MOUTH EVERY DAY, Disp: 180 capsule, Rfl: 1    Melatonin 5 MG CAPS, Take 5 mg by mouth daily at bedtime , Disp: , Rfl:     MULTIPLE VITAMIN PO, Take 1 tablet by mouth daily, Disp: , Rfl:     pravastatin (PRAVACHOL) 20 mg tablet, Take 1 tablet (20 mg total) by mouth daily, Disp: 90 tablet, Rfl: 1    zolpidem (AMBIEN CR) 6.25 MG CR tablet, Take 1 tablet (6.25 mg total) by mouth daily at bedtime as needed for sleep, Disp: 30 tablet, Rfl: 0    omeprazole (PriLOSEC) 20 mg delayed release capsule, Take 1 capsule (20 mg total) by mouth daily as needed, Disp: 90 capsule, Rfl: 3     ALLERGIES:        Allergies   Allergen Reactions    Ibuprofen Other (See Comments)       Gastric bypass                REVIEW OF SYSTEMS:  Review of Systems   Constitutional:  Negative for chills, fever and unexpected weight change.   HENT:  Negative for hearing loss, nosebleeds and sore throat.    Eyes:  Negative for pain, redness and visual disturbance.   Respiratory:  Negative for cough, shortness of breath and wheezing.    Cardiovascular:  Negative for chest pain, palpitations and leg swelling.   Gastrointestinal:  Negative for abdominal pain, nausea and vomiting.   Endocrine: Negative for polydipsia and polyuria.   Genitourinary:  Negative for difficulty urinating and hematuria.   Musculoskeletal:  Negative for arthralgias, joint swelling and myalgias.   Skin:  Negative for rash and wound.   Neurological:  Positive for numbness. Negative for dizziness and headaches.   Psychiatric/Behavioral:  Negative for decreased concentration, dysphoric mood and suicidal ideas. The patient is not nervous/anxious.          VITALS:  There were no vitals filed for this visit.     LABS:        _____________________________________________________  PHYSICAL EXAMINATION:  General: well developed and well nourished, alert, oriented times 3, and appears comfortable  Psychiatric: Normal  HEENT: Normocephalic, Atraumatic Trachea Midline, No torticollis  Pulmonary: No audible wheezing or respiratory distress   Abdomen/GI: Non tender, non distended   Cardiovascular: No pitting edema, 2+ radial pulse   Skin: No masses, erythema, lacerations, fluctation, ulcerations  Neurovascular: Sensation Intact to the Median, Ulnar, Radial Nerve, Motor Intact to the Median, Ulnar, Radial Nerve, and Pulses Intact  Musculoskeletal: Normal, except as noted in detailed exam and in HPI.        MUSCULOSKELETAL EXAMINATION:     Bilateral Carpal Tunnel Exam:  Negative thenar atrophy. Positive phalen's test. Positive carpal tunnel compression. Positive tinels over median nerve at the wrist.  Opposition strength 5/5.  Abduction strength 5/5.     ___________________________________________________  STUDIES REVIEWED:  I have personally reviewed and interpreted EMG/NCS bilateral upper extremity which demonstrates mild left carpal tunnel syndrome and moderate right carpal tunnel syndrome.      LABS REVIEWED:     HgA1c:         Lab Results   Component Value Date     HGBA1C 5.8 (H) 03/16/2024      BMP:         Lab Results   Component Value Date     GLUCOSE 130 06/17/2015     CALCIUM 10.0 09/12/2024      06/17/2015     K 4.0 09/12/2024     CO2 31 09/12/2024      09/12/2024     BUN 15 09/12/2024     CREATININE 0.85 09/12/2024

## 2025-04-17 NOTE — PRE-PROCEDURE INSTRUCTIONS
Pre-Surgery Instructions:   Medication Instructions    Ascorbic Acid (vitamin C) 1000 MG tablet Take day of surgery.    Calcium Citrate-Vitamin D 250-200 MG-UNIT TABS Take day of surgery.    Cholecalciferol (D3 5000) 125 MCG (5000 UT) capsule Take day of surgery.    ferrous gluconate (FERGON) 324 mg tablet Take day of surgery.    FLUoxetine (PROzac) 20 mg capsule Take day of surgery.    Melatonin 5 MG CAPS Take night before surgery if needed    MULTIPLE VITAMIN PO Take day of surgery.    omeprazole (PriLOSEC) 20 mg delayed release capsule Take day of surgery.    pravastatin (PRAVACHOL) 20 mg tablet Take night before surgery    zolpidem (AMBIEN CR) 6.25 MG CR tablet Take night before surgery if needed    Medication instructions for day of surgery reviewed. Please take all instructed medications with only a sip of water.       You will receive a call one business day prior to surgery with an arrival time and hospital directions. If your surgery is scheduled on a Monday, the hospital will be calling you on the Friday prior to your surgery. If you have not heard from anyone by 8pm, please call the hospital supervisor through the hospital  at [phone]. ([place] [phone] or [place] [phone]).    Do not eat or drink anything after midnight the night before your surgery, including candy, mints, lifesavers, or chewing gum. Do not drink alcohol 24hrs before your surgery. Try not to smoke at least 24hrs before your surgery.       Follow the pre surgery showering instructions as listed in the “My Surgical Experience Booklet” or otherwise provided by your surgeon's office. Do not use a blade to shave the surgical area 1 week before surgery. It is okay to use a clean electric clippers up to 24 hours before surgery. Do not apply any lotions, creams, including makeup, cologne, deodorant, or perfumes after showering on the day of your surgery. Do not use dry shampoo, hair spray, hair gel, or any type of hair  products.     No contact lenses, eye make-up, or artificial eyelashes. Remove nail polish, including gel polish, and any artificial, gel, or acrylic nails if possible. Remove all jewelry including rings and body piercing jewelry.     Wear causal clothing that is easy to take on and off. Consider your type of surgery.    Keep any valuables, jewelry, piercings at home. Please bring any specially ordered equipment (sling, braces) if indicated.    Arrange for a responsible person to drive you to and from the hospital on the day of your surgery. Please confirm the visitor policy for the day of your procedure when you receive your phone call with an arrival time.     Call the surgeon's office with any new illnesses, exposures, or additional questions prior to surgery.    Please reference your “My Surgical Experience Booklet” for additional information to prepare for your upcoming surgery.

## 2025-04-18 ENCOUNTER — ANESTHESIA (OUTPATIENT)
Age: 60
End: 2025-04-18
Payer: COMMERCIAL

## 2025-04-18 ENCOUNTER — HOSPITAL ENCOUNTER (OUTPATIENT)
Age: 60
Setting detail: OUTPATIENT SURGERY
Discharge: HOME | End: 2025-04-18
Attending: SURGERY | Admitting: SURGERY
Payer: COMMERCIAL

## 2025-04-18 VITALS
HEART RATE: 51 BPM | OXYGEN SATURATION: 100 % | SYSTOLIC BLOOD PRESSURE: 140 MMHG | DIASTOLIC BLOOD PRESSURE: 75 MMHG | BODY MASS INDEX: 34.02 KG/M2 | WEIGHT: 192 LBS | RESPIRATION RATE: 14 BRPM | TEMPERATURE: 98.1 F | HEIGHT: 63 IN

## 2025-04-18 DIAGNOSIS — G56.01 CARPAL TUNNEL SYNDROME OF RIGHT WRIST: Primary | ICD-10-CM

## 2025-04-18 DIAGNOSIS — Z47.89 AFTERCARE FOLLOWING SURGERY OF THE MUSCULOSKELETAL SYSTEM: ICD-10-CM

## 2025-04-18 PROCEDURE — 64721 CARPAL TUNNEL SURGERY: CPT | Performed by: PHYSICIAN ASSISTANT

## 2025-04-18 PROCEDURE — 64721 CARPAL TUNNEL SURGERY: CPT | Performed by: SURGERY

## 2025-04-18 RX ORDER — SODIUM CHLORIDE 9 MG/ML
75 INJECTION, SOLUTION INTRAVENOUS CONTINUOUS
Status: DISCONTINUED | OUTPATIENT
Start: 2025-04-18 | End: 2025-04-18 | Stop reason: HOSPADM

## 2025-04-18 RX ORDER — HYDROCODONE BITARTRATE AND ACETAMINOPHEN 5; 325 MG/1; MG/1
1 TABLET ORAL EVERY 6 HOURS PRN
Refills: 0 | Status: DISCONTINUED | OUTPATIENT
Start: 2025-04-18 | End: 2025-04-18 | Stop reason: HOSPADM

## 2025-04-18 RX ORDER — ONDANSETRON 2 MG/ML
INJECTION INTRAMUSCULAR; INTRAVENOUS AS NEEDED
Status: DISCONTINUED | OUTPATIENT
Start: 2025-04-18 | End: 2025-04-18

## 2025-04-18 RX ORDER — ONDANSETRON 2 MG/ML
4 INJECTION INTRAMUSCULAR; INTRAVENOUS ONCE AS NEEDED
Status: DISCONTINUED | OUTPATIENT
Start: 2025-04-18 | End: 2025-04-18 | Stop reason: HOSPADM

## 2025-04-18 RX ORDER — PROPOFOL 10 MG/ML
INJECTION, EMULSION INTRAVENOUS AS NEEDED
Status: DISCONTINUED | OUTPATIENT
Start: 2025-04-18 | End: 2025-04-18

## 2025-04-18 RX ORDER — HYDROCODONE BITARTRATE AND ACETAMINOPHEN 5; 325 MG/1; MG/1
1 TABLET ORAL EVERY 6 HOURS PRN
Qty: 5 TABLET | Refills: 0 | Status: SHIPPED | OUTPATIENT
Start: 2025-04-18 | End: 2025-04-28

## 2025-04-18 RX ORDER — FENTANYL CITRATE/PF 50 MCG/ML
25 SYRINGE (ML) INJECTION
Status: DISCONTINUED | OUTPATIENT
Start: 2025-04-18 | End: 2025-04-18 | Stop reason: HOSPADM

## 2025-04-18 RX ORDER — MIDAZOLAM HYDROCHLORIDE 2 MG/2ML
INJECTION, SOLUTION INTRAMUSCULAR; INTRAVENOUS AS NEEDED
Status: DISCONTINUED | OUTPATIENT
Start: 2025-04-18 | End: 2025-04-18

## 2025-04-18 RX ORDER — CHLORHEXIDINE GLUCONATE ORAL RINSE 1.2 MG/ML
15 SOLUTION DENTAL ONCE
Status: COMPLETED | OUTPATIENT
Start: 2025-04-18 | End: 2025-04-18

## 2025-04-18 RX ORDER — CEFAZOLIN SODIUM 2 G/50ML
2000 SOLUTION INTRAVENOUS ONCE
Status: COMPLETED | OUTPATIENT
Start: 2025-04-18 | End: 2025-04-18

## 2025-04-18 RX ADMIN — CEFAZOLIN SODIUM 2000 MG: 2 SOLUTION INTRAVENOUS at 08:32

## 2025-04-18 RX ADMIN — PROPOFOL 50 MG: 10 INJECTION, EMULSION INTRAVENOUS at 08:37

## 2025-04-18 RX ADMIN — MIDAZOLAM 2 MG: 1 INJECTION INTRAMUSCULAR; INTRAVENOUS at 08:37

## 2025-04-18 RX ADMIN — CHLORHEXIDINE GLUCONATE 15 ML: 1.2 SOLUTION ORAL at 07:38

## 2025-04-18 RX ADMIN — SODIUM CHLORIDE 75 ML/HR: 0.9 INJECTION, SOLUTION INTRAVENOUS at 07:52

## 2025-04-18 RX ADMIN — PROPOFOL 120 MCG/KG/MIN: 10 INJECTION, EMULSION INTRAVENOUS at 08:38

## 2025-04-18 RX ADMIN — ONDANSETRON 4 MG: 2 INJECTION INTRAMUSCULAR; INTRAVENOUS at 08:37

## 2025-04-18 NOTE — DISCHARGE INSTR - AVS FIRST PAGE
Post Operative Instructions    You have had surgery on your arm today, please read and follow the information below:  Elevate your hand above your elbow during the next 24-48 hours to help with swelling.  Place your hand and arm over your head with motion at your shoulder three times a day.  Do not apply any cream/ointment/oil to your incisions including antibiotics.  Do not soak your hands in standing water (dishwater, tubs, Jacuzzi's, pools, etc.) until given permission (typically 2-3 weeks after injury)    Call the office if you notice any:  Increased numbness or tingling of your hand or fingers that is not relieved with elevation.  Increasing pain that is not controlled with medication.  Difficulty chewing, breathing, swallowing.  Chest pains or shortness of breath.  Fever over 101.4 degrees.    Bandage: Please keep bandages clean and dry. Remove bandage after 5 days. Once bandages are removed you may wash hands with soap and water. Short showers are okay as well, but please avoid soaking the hand as described above (ie no pools, baths, dirty dish water, hot tubs, ocean/lake water, etc). Sutures will be removed in the office at your first follow up visit, please do not remove them yourself.    Please do NOT put any topical agents on the surgical wound including neosporin, peroxide, tea tree oil, vitamin E, etc. as these can delay wound healing.    Motion: Move fingers into a fist 5 times a day, DO NOT move any splinted fingers.    Weight bearing status: Avoid heavy lifting (>5 pounds) with the extremity that was operated on until follow up appointment. Normal activities of daily living are OK.    Ice: Ice for 10 minutes every hour as needed for swelling x 24 hours.    Sling: No sling necessary.    Pain medication:   Naproxen 220 mg two times a day (this is over the counter!)  *do not take this medication if you were told by your doctor that you cannot take anti-inflammatories or NSAIDS  Tylenol Extended Release  650 mg every 8 hours (this is over the counter!)   Norco/Hydrocodone one tab every 6 hours ONLY AS NEEDED for severe pain        Follow-up Appointment: 10-14 days with Dr Esteban        Please call the office if you have any questions or concerns regarding your post-operative care.

## 2025-04-18 NOTE — ANESTHESIA POSTPROCEDURE EVALUATION
Post-Op Assessment Note    CV Status:  Stable    Pain management: adequate       Mental Status:  Alert and awake   Hydration Status:  Euvolemic   PONV Controlled:  Controlled   Airway Patency:  Patent     Post Op Vitals Reviewed: Yes    No anethesia notable event occurred.    Staff: Anesthesiologist           Last Filed PACU Vitals:  Vitals Value Taken Time   Temp 98 °F (36.7 °C) 04/18/25 0910   Pulse 55 04/18/25 0910   /71 04/18/25 0910   Resp 15 04/18/25 0910   SpO2 98 % 04/18/25 0910       Modified Ian:     Vitals Value Taken Time   Activity 2 04/18/25 0910   Respiration 2 04/18/25 0910   Circulation 2 04/18/25 0910   Consciousness 1 04/18/25 0910   Oxygen Saturation 2 04/18/25 0910     Modified Ian Score: 9

## 2025-04-18 NOTE — ANESTHESIA PREPROCEDURE EVALUATION
Procedure:  Right carpal tunnel release (Right: Wrist)    Relevant Problems   ANESTHESIA   (+) Sea sickness      CARDIO   (+) Hypercholesterolemia      GI/HEPATIC   (+) Esophageal reflux      MUSCULOSKELETAL   (+) Chronic bilateral low back pain without sciatica      NEURO/PSYCH   (+) Chronic bilateral low back pain without sciatica   (+) Major depressive disorder with single episode, in partial remission (HCC)      Obstetrics/Gynecology   (+) Endometrial polyp      Neurology/Sleep   (+) Memory difficulties   (+) Postural dizziness with near syncope   (+) Vertigo      Oncology   (+) Meningioma, cerebral (HCC)      Rheumatology   (+) Obstruction concurrent with and due to internal hernia of abdomen      Surgery/Wound/Pain   (+) LUQ pain   (+) Postgastrectomy malabsorption   (+) Weight gain following gastric bypass surgery      Orthopedic/Musculoskeletal   (+) Bilateral carpal tunnel syndrome   (+) Carpal tunnel syndrome      FEN/Gastrointestinal   (+) Appendicitis with perforation      Other   (+) Abnormal CT of the abdomen   (+) Class 2 obesity without serious comorbidity with body mass index (BMI) of 36.0 to 36.9 in adult   (+) Palpitations      Lab Results   Component Value Date     06/17/2015    SODIUM 140 04/04/2025    K 4.4 04/04/2025     04/04/2025    CO2 26 04/04/2025    ANIONGAP 5 06/17/2015    AGAP 10 04/04/2025    BUN 16 04/04/2025    CREATININE 0.73 04/04/2025    GLUC 84 09/12/2024    GLUF 90 04/04/2025    CALCIUM 9.6 04/04/2025    AST 24 04/04/2025    ALT 14 04/04/2025    ALKPHOS 75 04/04/2025    TP 6.5 04/04/2025    TBILI 0.60 04/04/2025    EGFR 89 04/04/2025     Lab Results   Component Value Date    WBC 4.81 04/04/2025    HGB 14.7 04/04/2025    HCT 44.2 04/04/2025    MCV 94 04/04/2025     04/04/2025       Physical Exam    Airway    Mallampati score: II  TM Distance: >3 FB  Neck ROM: full     Dental       Cardiovascular      Pulmonary      Other  Findings  post-pubertal.      Anesthesia Plan  ASA Score- 2     Anesthesia Type- IV sedation with anesthesia with ASA Monitors.         Additional Monitors:     Airway Plan:            Plan Factors-Exercise tolerance (METS): >4 METS.    Chart reviewed. EKG reviewed. Imaging results reviewed. Existing labs reviewed. Patient summary reviewed.                  Induction- intravenous.    Postoperative Plan-         Informed Consent- Anesthetic plan and risks discussed with patient.  I personally reviewed this patient with the CRNA. Discussed and agreed on the Anesthesia Plan with the CRNA..      NPO Status:  Vitals Value Taken Time   Date of last liquid 04/17/25 04/18/25 0733   Time of last liquid 2100 04/18/25 0733   Date of last solid 04/17/25 04/18/25 0733   Time of last solid 2030 04/18/25 0733

## 2025-04-18 NOTE — ANESTHESIA POSTPROCEDURE EVALUATION
Post-Op Assessment Note    CV Status:  Stable  Pain Score: 0    Pain management: adequate       Mental Status:  Alert and awake   Hydration Status:  Euvolemic   PONV Controlled:  Controlled   Airway Patency:  Patent     Post Op Vitals Reviewed: Yes    No anethesia notable event occurred.    Staff: CRNA           Last Filed PACU Vitals:  Vitals Value Taken Time   Temp 98.5    Pulse 78    /66    Resp 14    SpO2 97 ra

## 2025-04-18 NOTE — OP NOTE
OPERATIVE REPORT  PATIENT NAME: Florida Clinton  :  1965  MRN: [id number]  Pt Location: [facility]    SURGERY DATE: 25    Surgeons and Role:     * Nilam Esteban MD - Primary     * Cheryl Ennis PA-C - Assisting    Pre-Op Diagnosis:  Carpal tunnel syndrome of right wrist [G56.01]    Post-Op Diagnosis Codes:     * Carpal tunnel syndrome of right wrist [G56.01]    Procedure(s):  Right carpal tunnel release (Right)    Specimen(s):  No specimens collected during this procedure.    Estimated Blood Loss:   Minimal    Anesthesia Type:   IV Sedation with Anesthesia    IMPLANTS:  * No implants in log *    PERIOPERATIVE ANTIBIOTICS:    cefazolin, 2 grams    Tourniquet Time: 4 min  at 250 mmHg          Operative Indications:  The patient has a history of Carpal Tunnel Syndrome  right that was recalcitrant to conservative management.  The decision was made to bring the patient to the operating room for Open Carpal Tunnel Release  right.  Risks of the procedure were explained which include, but are not limited to bleeding; infection; damage to nerves, arteries,veins, tendons; scar; pain; need for reoperation; failure to give desired result; and risks of anaesthesia.  All questions were answered to satisfaction and they were willing to proceed.         Operative Findings:  Hypertrophy transverse carpal ligament    Complications:   None    Procedure and Technique:  After the patient, site, and procedure were identified, the patient was brought into the operating room in a supine position.  Local anaesthesia and sedation were provided.  A well padded tourniquet was applied to the extremity, set at 250 mmHg.  The  right upper extremity was then prepped and drapped in a normal, sterile, orthopedic fashion.    An anterior approach to the carpal tunnel was undertaken. A 2 cm incision was made in line with the fourth digit, proximal to Leggett's line.  The skin and the subcutaneous tissue were sharply incised.  Under loupe  magnification, dissection was carried down to the palmar fascia and it was incised. The transverse carpal ligament was visualized and  Released distally with a #64 blade. A freer was was passed above and below the TCL in a retrograde fashion, freeing up any adhesions. A Knife Lite was used to release the proximal portion of the transverse carpal ligament under direct visualization.  Distally the superficial arch was identified.  A complete release was carried out and exploration of the carpal canal revealed no significant tenosynovitis. The median nerve and its branches were intact.        At the completion of the procedure, hemostasis was obtained with cautery and direct pressure.  The wounds were copiously irrigated with sterile solution.  The wounds were closed with Prolene.  Sterile dressings were applied, including Xeroform, gauze, tweeners, webril, ACE.  Please note, all sponge, needle, and instrument counts were correct prior to closure.  Loupe magnification was utilized.  The patient tolerated the procedure well.     I was present for the entire procedure., A qualified resident physician was not available., and A physician assistant was required during the procedure for retraction, tissue handling, dissection and suturing.    Patient Disposition:  PACU     SIGNATURE: Nilam Esteban MD  DATE: 04/18/25  TIME: 8:51 AM

## 2025-04-30 ENCOUNTER — OFFICE VISIT (OUTPATIENT)
Dept: OBGYN CLINIC | Facility: CLINIC | Age: 60
End: 2025-04-30

## 2025-04-30 VITALS — BODY MASS INDEX: 34.02 KG/M2 | HEIGHT: 63 IN | WEIGHT: 192 LBS

## 2025-04-30 DIAGNOSIS — G56.01 CARPAL TUNNEL SYNDROME OF RIGHT WRIST: Primary | ICD-10-CM

## 2025-04-30 PROCEDURE — 99024 POSTOP FOLLOW-UP VISIT: CPT | Performed by: SURGERY

## 2025-04-30 NOTE — ASSESSMENT & PLAN NOTE
Sutures removed today   Begin scar massage  Pain and nocturnal symptoms resolved, she still has some tingling which we discussed should continue to improve over time. The nerve will heal over the next 18 months or so, so some of these residual symptoms may take longer to resolve  Activity as tolerated   Discussed pillar pain which will improve over time

## 2025-04-30 NOTE — PROGRESS NOTES
Assessment/Plan:  Patient ID: Florida Clinton 60 y.o. female   Surgery: Right carpal tunnel release - Right  Date of Surgery: 4/18/2025      Assessment & Plan  Carpal tunnel syndrome of right wrist  Sutures removed today   Begin scar massage  Pain and nocturnal symptoms resolved, she still has some tingling which we discussed should continue to improve over time. The nerve will heal over the next 18 months or so, so some of these residual symptoms may take longer to resolve  Activity as tolerated   Discussed pillar pain which will improve over time            Follow Up:  PRN    To Do Next Visit:         CHIEF COMPLAINT:  Chief Complaint   Patient presents with    Post-op     Patient is doing well she is here for suture removal has some soreness at times         SUBJECTIVE:  Florida Clinton is a 60 y.o. year old female who presents for follow up after Right carpal tunnel release - Right. Today patient has some discomfort in the base of the palm, but otherwise is doing well. She has some residual tingling but her night time symptoms and pain have resolved.       PHYSICAL EXAMINATION:  General: well developed and well nourished, alert, oriented times 3, and appears comfortable  Psychiatric: Normal    MUSCULOSKELETAL EXAMINATION:  Incision: Clean, dry, intact  Surgery Site: normal, no evidence of infection   Range of Motion: opposition intact and full composite fist possible  Neurovascular status: Neuro intact, good cap refill  Activity Restrictions: No restrictions  Done today: Sutures out        STUDIES REVIEWED:  None     LABS REVIEWED:    HgA1c:   Lab Results   Component Value Date    HGBA1C 5.8 (H) 03/16/2024     BMP:   Lab Results   Component Value Date    GLUCOSE 130 06/17/2015    CALCIUM 9.6 04/04/2025     06/17/2015    K 4.4 04/04/2025    CO2 26 04/04/2025     04/04/2025    BUN 16 04/04/2025    CREATININE 0.73 04/04/2025           PROCEDURES PERFORMED:  Procedures  No Procedures performed today

## 2025-05-04 DIAGNOSIS — E78.00 HYPERCHOLESTEROLEMIA: ICD-10-CM

## 2025-05-05 RX ORDER — PRAVASTATIN SODIUM 20 MG
20 TABLET ORAL DAILY
Qty: 30 TABLET | Refills: 5 | Status: SHIPPED | OUTPATIENT
Start: 2025-05-05

## 2025-05-29 ENCOUNTER — OFFICE VISIT (OUTPATIENT)
Dept: BARIATRICS | Facility: CLINIC | Age: 60
End: 2025-05-29
Payer: COMMERCIAL

## 2025-05-29 VITALS
HEART RATE: 66 BPM | TEMPERATURE: 97.8 F | HEIGHT: 62 IN | BODY MASS INDEX: 35.88 KG/M2 | WEIGHT: 195 LBS | SYSTOLIC BLOOD PRESSURE: 130 MMHG | DIASTOLIC BLOOD PRESSURE: 80 MMHG

## 2025-05-29 DIAGNOSIS — Z98.84 BARIATRIC SURGERY STATUS: Primary | ICD-10-CM

## 2025-05-29 PROCEDURE — 99213 OFFICE O/P EST LOW 20 MIN: CPT | Performed by: SURGERY

## 2025-05-29 NOTE — PROGRESS NOTES
"POST-OP OFFICE VISIT - BARIATRIC SURGERY  Florida Clinton 60 y.o. female MRN: [id number]  Unit/Bed#:  Encounter: [id number]      HPI:  Florida Clinton is a 60 y.o. female status post Laparoscopic RNYGB by Dr. Farooq on 6/16/2015 presents to office for ANNUAL post-op visit.    Additionally she underwent diagnostic laparoscopy and lysis of adhesions of scar tissue causing obstruction of common channel in May of 2024.    Subjective     Patient presents to the office for post-op visit.  Tolerating regular diet: yes    Nausea/Vomiting/Constipation/Diarrhea: no  Eating at least 60 g of protein: yes  Following 30/60 minute role with liquids: yes  Drinking at least 64 oz of fluid: yes  Taking vitamin/mineral supplements:yes  Taking omeprazole: yes    Reviewed and advised patient on vitamins and supplements.  Exercising: yes, exercise encouraged.    Review of Systems   Constitutional:  Negative for chills and fever.   HENT:  Negative for ear pain and sore throat.    Eyes:  Negative for pain and visual disturbance.   Respiratory:  Negative for cough and shortness of breath.    Cardiovascular:  Negative for chest pain and palpitations.   Gastrointestinal:  Negative for abdominal pain and vomiting.   Genitourinary:  Negative for dysuria and hematuria.   Musculoskeletal:  Negative for arthralgias and back pain.   Skin:  Negative for color change and rash.   Neurological:  Negative for seizures and syncope.   All other systems reviewed and are negative.      Historical Information   Past Medical History[1]  Past Surgical History[2]  Social History   Social History     Substance and Sexual Activity   Alcohol Use Not Currently    Comment: rarely     Social History     Substance and Sexual Activity   Drug Use No     Tobacco Use History[3]    Objective       Current Vitals:   Blood Pressure: 130/80 (05/29/25 1625)  Pulse: 66 (05/29/25 1625)  Temperature: 97.8 °F (36.6 °C) (05/29/25 1625)  Height: 5' 2\" (157.5 cm) (05/29/25 1625)  Weight " - Scale: 88.5 kg (195 lb) (05/29/25 1625)    Invasive Devices       None                   Physical Exam  Vitals reviewed.   Constitutional:       General: She is not in acute distress.     Appearance: Normal appearance. She is not ill-appearing.   HENT:      Head: Normocephalic and atraumatic.      Nose: Nose normal.      Mouth/Throat:      Mouth: Mucous membranes are moist.      Pharynx: Oropharynx is clear.     Eyes:      Extraocular Movements: Extraocular movements intact.      Conjunctiva/sclera: Conjunctivae normal.       Cardiovascular:      Rate and Rhythm: Normal rate.   Pulmonary:      Effort: Pulmonary effort is normal. No respiratory distress.   Abdominal:      General: There is no distension.      Palpations: Abdomen is soft.      Tenderness: There is no abdominal tenderness. There is no guarding or rebound.     Musculoskeletal:         General: No deformity. Normal range of motion.      Cervical back: Normal range of motion and neck supple.     Skin:     General: Skin is warm and dry.      Coloration: Skin is not jaundiced.     Neurological:      General: No focal deficit present.      Mental Status: She is alert and oriented to person, place, and time.     Psychiatric:         Mood and Affect: Mood normal.         Thought Content: Thought content normal.             Assessment/PLAN:    Florida Clinton is a 60 y.o. female status post Laparoscopic RNYGB with Dr. Farooq returning for post-op visit.          If you have gotten a lab slip at this visit, please note that most labs are FASTING-but you need to drink water the night before and the morning before your labs are done. It is HIGHLY RECOMMENDED that you check with your insurance to make sure all the labs ordered are covered by your individual insurance policy.  This is especially important if you also get labs done by other providers outside of [facility].  You want to avoid having duplicate labs done.    Note you will be given a lab slip AFTER  your  annual visit next year to check your vitamin and mineral levels.  You will not need to make a second appointment after the labs are received/reviewed. You will receive a letter/and or phone call with your results. Most labs do NOT honor a lab slip dated one year in advance now.    Follow up with dietitians  Follow up with [facility]  Follow diet as discussed.   Follow vitamin and mineral recommendations as reviewed with you. Bariatric vitamins are highly recommended. Vitamins are important for a life-time to avoid low levels which can lead to other medical problems.  Exercise as tolerated.   Call the office if you have any problems with abdominal pain especially associated with fever, chills, nausea, vomiting or any other concerns.    All Post-bariatric surgery patients should be aware that very small quantities of any alcohol can cause impairment and it is very possible not to feel the effect. The effect can be in the system for several hours and it is also a stomach irritant.  It is advised to AVOID alcohol, Nonsteroidal anti-inflammatory drugs (NSAIDS) and nicotine of all forms. Any of these can cause stomach irritation/pain.    Most, if not all, post-gastric surgery patients would benefit from having a regular counselor for at least 2 years post-op to help with need for multiple life-style changes. If you are interested in this and need help finding a regular counselor, you can also make a follow-up with our  to help you find one.    Follow-up in 1 YEAR.  We kindly ask that you arrive 15 minutes before appointment time to allow for our staff to room you and check your vital signs and update your chart. We thank you for your patience at your visit.      Toby Kent MD  Bariatric Surgery  5/29/2025  4:38 PM              [1]   Past Medical History:  Diagnosis Date    Abdominal pain     Chronic pain disorder     abdominal    Depression     Diverticula of colon     Former smoker     Resolved 7/2012      GERD (gastroesophageal reflux disease)     Headache(784.0) 10/1/2020    Hemorrhoids     Hiatal hernia     Hyperlipidemia     Intestinal malabsorption following gastrectomy     Morbid obesity (HCC)     Resolved 10/2/2015     Obesity     Postgastrectomy malabsorption     Vitamin D insufficiency     Resolved 2016    [2]   Past Surgical History:  Procedure Laterality Date    ABDOMINAL SURGERY      gastric bipass    ANKLE SURGERY       SECTION      (2) ,      COLONOSCOPY      ESOPHAGOGASTRODUODENOSCOPY      x2    GASTRIC BYPASS  2015    HERNIA REPAIR  2015    Paraesophageal hiatus hernia. Managed by Eliezer Mishra (General Surgery)    OK EGD TRANSORAL BIOPSY SINGLE/MULTIPLE N/A 2017    Procedure: ESOPHAGOGASTRODUODENOSCOPY (EGD);  Surgeon: Emmie Rosen MD;  Location: [facility];  Service: Bariatrics    OK LAPAROSCOPIC APPENDECTOMY N/A 2024    Procedure: APPENDECTOMY LAPAROSCOPIC;  Surgeon: Jona Araiza MD;  [facility];  Service: General    OK LAPS ABD PRTM&OMENTUM DX W/WO SPEC BR/WA SPX N/A 2024    Procedure: LAPAROSCOPY DIAGNOSTIC;  Surgeon: Eliezer Farooq MD;  Location: [facility];  Service: Bariatrics    OK LAPS SURG ABLTJ 1/> LVR YELITZA RF N/A 2024    Procedure: EXCISION BIOPSY  LIVER LAPAROSCOPIC;  Surgeon: Jona Araiza MD;  Location: [facility];  Service: General    OK NEUROPLASTY &/TRANSPOS MEDIAN NRV CARPAL TUNNE Right 2025    Procedure: Right carpal tunnel release;  Surgeon: Nilam Esteban MD;  Location: [facility];  Service: Orthopedics    TONSILLECTOMY  1984    UTERINE FIBROID SURGERY     [3]   Social History  Tobacco Use   Smoking Status Former    Current packs/day: 0.00    Average packs/day: 0.5 packs/day for 26.0 years (13.0 ttl pk-yrs)    Types: Cigarettes    Start date:     Quit date:     Years since quittin.4   Smokeless Tobacco Never   Tobacco Comments    20+ pack year hx - As per [employer]

## 2025-05-29 NOTE — PROGRESS NOTES
Date of surgery:6/16/2015  Procedure:RNY  Performing surgeon:    Initial Weight -271.5 Ibs   Current Weight -195.0 Ibs  Joaquin Weight - 168.5 Ibs  Total Body Weight Loss (EWL)- 76.5  EWL% - 59%  TWB % -28%    For *NEW/TRANSFER* patients only: Who referred the patient? Please provide name and specialty (PCP, GI, etc.).

## 2025-06-02 ENCOUNTER — APPOINTMENT (OUTPATIENT)
Dept: LAB | Facility: MEDICAL CENTER | Age: 60
End: 2025-06-02
Payer: COMMERCIAL

## 2025-06-02 DIAGNOSIS — Z98.84 BARIATRIC SURGERY STATUS: ICD-10-CM

## 2025-06-02 LAB
25(OH)D3 SERPL-MCNC: 95.3 NG/ML (ref 30–100)
ALBUMIN SERPL BCG-MCNC: 4.4 G/DL (ref 3.5–5)
ALP SERPL-CCNC: 82 U/L (ref 34–104)
ALT SERPL W P-5'-P-CCNC: 16 U/L (ref 7–52)
ANION GAP SERPL CALCULATED.3IONS-SCNC: 6 MMOL/L (ref 4–13)
AST SERPL W P-5'-P-CCNC: 23 U/L (ref 13–39)
BASOPHILS # BLD AUTO: 0.03 THOUSANDS/ÂΜL (ref 0–0.1)
BASOPHILS NFR BLD AUTO: 1 % (ref 0–1)
BILIRUB SERPL-MCNC: 0.7 MG/DL (ref 0.2–1)
BUN SERPL-MCNC: 18 MG/DL (ref 5–25)
CALCIUM SERPL-MCNC: 9.6 MG/DL (ref 8.4–10.2)
CHLORIDE SERPL-SCNC: 102 MMOL/L (ref 96–108)
CO2 SERPL-SCNC: 30 MMOL/L (ref 21–32)
CREAT SERPL-MCNC: 0.85 MG/DL (ref 0.6–1.3)
EOSINOPHIL # BLD AUTO: 0.11 THOUSAND/ÂΜL (ref 0–0.61)
EOSINOPHIL NFR BLD AUTO: 3 % (ref 0–6)
ERYTHROCYTE [DISTWIDTH] IN BLOOD BY AUTOMATED COUNT: 12.1 % (ref 11.6–15.1)
FERRITIN SERPL-MCNC: 18 NG/ML (ref 30–307)
FOLATE SERPL-MCNC: >22.3 NG/ML
GFR SERPL CREATININE-BSD FRML MDRD: 74 ML/MIN/1.73SQ M
GLUCOSE P FAST SERPL-MCNC: 110 MG/DL (ref 65–99)
HCT VFR BLD AUTO: 44.4 % (ref 34.8–46.1)
HGB BLD-MCNC: 14.5 G/DL (ref 11.5–15.4)
IMM GRANULOCYTES # BLD AUTO: 0.01 THOUSAND/UL (ref 0–0.2)
IMM GRANULOCYTES NFR BLD AUTO: 0 % (ref 0–2)
IRON SATN MFR SERPL: 35 % (ref 15–50)
IRON SERPL-MCNC: 138 UG/DL (ref 50–212)
LYMPHOCYTES # BLD AUTO: 1.98 THOUSANDS/ÂΜL (ref 0.6–4.47)
LYMPHOCYTES NFR BLD AUTO: 44 % (ref 14–44)
MCH RBC QN AUTO: 30.7 PG (ref 26.8–34.3)
MCHC RBC AUTO-ENTMCNC: 32.7 G/DL (ref 31.4–37.4)
MCV RBC AUTO: 94 FL (ref 82–98)
MONOCYTES # BLD AUTO: 0.34 THOUSAND/ÂΜL (ref 0.17–1.22)
MONOCYTES NFR BLD AUTO: 8 % (ref 4–12)
NEUTROPHILS # BLD AUTO: 2 THOUSANDS/ÂΜL (ref 1.85–7.62)
NEUTS SEG NFR BLD AUTO: 44 % (ref 43–75)
NRBC BLD AUTO-RTO: 0 /100 WBCS
PLATELET # BLD AUTO: 298 THOUSANDS/UL (ref 149–390)
PMV BLD AUTO: 11 FL (ref 8.9–12.7)
POTASSIUM SERPL-SCNC: 4.3 MMOL/L (ref 3.5–5.3)
PROT SERPL-MCNC: 6.5 G/DL (ref 6.4–8.4)
PTH-INTACT SERPL-MCNC: 48.1 PG/ML (ref 12–88)
RBC # BLD AUTO: 4.73 MILLION/UL (ref 3.81–5.12)
SODIUM SERPL-SCNC: 138 MMOL/L (ref 135–147)
TIBC SERPL-MCNC: 390.6 UG/DL (ref 250–450)
TRANSFERRIN SERPL-MCNC: 279 MG/DL (ref 203–362)
UIBC SERPL-MCNC: 253 UG/DL (ref 155–355)
VIT B12 SERPL-MCNC: 811 PG/ML (ref 180–914)
WBC # BLD AUTO: 4.47 THOUSAND/UL (ref 4.31–10.16)

## 2025-06-02 PROCEDURE — 85025 COMPLETE CBC W/AUTO DIFF WBC: CPT

## 2025-06-02 PROCEDURE — 84590 ASSAY OF VITAMIN A: CPT

## 2025-06-02 PROCEDURE — 82728 ASSAY OF FERRITIN: CPT

## 2025-06-02 PROCEDURE — 84630 ASSAY OF ZINC: CPT

## 2025-06-02 PROCEDURE — 83540 ASSAY OF IRON: CPT

## 2025-06-02 PROCEDURE — 82746 ASSAY OF FOLIC ACID SERUM: CPT

## 2025-06-02 PROCEDURE — 82607 VITAMIN B-12: CPT

## 2025-06-02 PROCEDURE — 36415 COLL VENOUS BLD VENIPUNCTURE: CPT

## 2025-06-02 PROCEDURE — 83970 ASSAY OF PARATHORMONE: CPT

## 2025-06-02 PROCEDURE — 82306 VITAMIN D 25 HYDROXY: CPT

## 2025-06-02 PROCEDURE — 80053 COMPREHEN METABOLIC PANEL: CPT

## 2025-06-02 PROCEDURE — 83550 IRON BINDING TEST: CPT

## 2025-06-02 PROCEDURE — 84425 ASSAY OF VITAMIN B-1: CPT

## 2025-06-03 LAB — ZINC SERPL-MCNC: 73 UG/DL (ref 44–115)

## 2025-06-04 LAB — VIT A SERPL-MCNC: 54.7 UG/DL (ref 22–69.5)

## 2025-06-05 LAB — VIT B1 BLD-SCNC: 185.8 NMOL/L (ref 66.5–200)

## 2025-06-26 ENCOUNTER — TELEPHONE (OUTPATIENT)
Age: 60
End: 2025-06-26

## 2025-06-26 NOTE — TELEPHONE ENCOUNTER
Patient called need to reschedule appointment 06/26/2025 at 1:30 pm. New appointment 06/27/2025 at 1:30 pm

## 2025-06-27 ENCOUNTER — OFFICE VISIT (OUTPATIENT)
Dept: BARIATRICS | Facility: CLINIC | Age: 60
End: 2025-06-27
Attending: STUDENT IN AN ORGANIZED HEALTH CARE EDUCATION/TRAINING PROGRAM

## 2025-06-27 VITALS
HEIGHT: 62 IN | WEIGHT: 193 LBS | OXYGEN SATURATION: 97 % | BODY MASS INDEX: 35.51 KG/M2 | SYSTOLIC BLOOD PRESSURE: 122 MMHG | HEART RATE: 64 BPM | DIASTOLIC BLOOD PRESSURE: 70 MMHG

## 2025-06-27 DIAGNOSIS — E66.812 OBESITY, CLASS II, BMI 35-39.9: Primary | ICD-10-CM

## 2025-06-27 DIAGNOSIS — Z98.84 BARIATRIC SURGERY STATUS: ICD-10-CM

## 2025-06-27 RX ORDER — TOPIRAMATE 25 MG/1
25 TABLET, FILM COATED ORAL 2 TIMES DAILY
Qty: 180 TABLET | Refills: 1 | Status: SHIPPED | OUTPATIENT
Start: 2025-06-27

## 2025-06-27 NOTE — PATIENT INSTRUCTIONS
- Discussed options of HealthyCORE-Intensive Lifestyle Intervention Program, Very Low Calorie Diet-VLCD, and Conservative Program and the role of weight loss medications.  - Patient is interested in pursuing Conservative Program and follow up visits with medical weight management provider.  - Explained the importance of making lifestyle changes in addition to starting any anti-obesity medications.   - Initial weight loss goal of 5-10% weight loss for improved health. Weight loss can improve patient's co-morbid conditions and/or prevent weight-related complications.  - Weight is not at goal and patient has been unable to achieve a meaningful weight loss above 5% using various programs and tools for more than 6 months  - Labs reviewed.     General Recommendations:  Nutrition:  Eat breakfast daily.  Do not skip meals.      Food log (ie.) www.[employer].com, sparkpeople.com, loseit.com, calorieking.com, etc.     Practice mindful eating.  Be sure to set aside time to eat, eat slowly, and savor your food.     Hydration:    At least 64oz of water daily.  No sugar sweetened beverages.  No juice (eat the fruit instead).     Exercise:  Studies have shown that the ideal exercise goal is somewhere between 150 to 300 minutes of moderate intensity exercise a week.  Start with exercising 10 minutes every other day and gradually increase physical activity with a goal of at least 150 minutes of moderate intensity exercise a week, divided over at least 3 days a week.  An example of this would be exercising 30 minutes a day, 5 days a week.  Resistance training can increase muscle mass and increase our resting metabolic rate.   FULL BODY resistance training is recommended 2-3 times a week.  Do not do this on consecutive days to allow for muscle recovery.     Aim for a bare minimum 5000 steps, even on days you do not exercise.     Monitoring:   Weigh yourself daily.  If this causes undue stress, then just weigh yourself once a week.   Weigh yourself the same time of the day with the same amount of clothing on.  Preferably this should be done after waking up, before you eat, and with no clothing or minimal clothing on.     Specific Goals:  Calorie goal:  1300 sintia/day (Provided with meal plan to follow)    Topiramate Instructions:  - Begin with 25mg once daily in the evening for the 1st week then start taking 25mg in the morning and evening  - Take medication at the same time everyday.  - Stay well hydrated to avoid overheating  - May reduce the effectiveness of hormonal birth control - backup method such as condoms recommended to avoid pregnancy    Reviewed the potential side effects of topiramate (Topamax) which may include - numbness or tingling, difficulty with word finding, metabolic acidosis, occurrence of gallstones and kidney stones, glaucoma, fatigue, upper respiratory infection symptoms, depression/anxiety, changes in taste, abdominal upset/heartburn, and trouble sleeping

## 2025-06-27 NOTE — ASSESSMENT & PLAN NOTE
- Discussed options of HealthyCORE-Intensive Lifestyle Intervention Program, Very Low Calorie Diet-VLCD, and Conservative Program and the role of weight loss medications.  - Patient is interested in pursuing Conservative Program and follow up visits with medical weight management provider.  - Explained the importance of making lifestyle changes in addition to starting any anti-obesity medications.   - Initial weight loss goal of 5-10% weight loss for improved health. Weight loss can improve patient's co-morbid conditions and/or prevent weight-related complications.  - Weight is not at goal and patient has been unable to achieve a meaningful weight loss above 5% using various programs and tools for more than 6 months  - Labs reviewed.     General Recommendations:  Nutrition:  Eat breakfast daily.  Do not skip meals.      Food log (ie.) www.[employer].com, sparkpeople.com, loseit.com, calorieking.com, etc.     Practice mindful eating.  Be sure to set aside time to eat, eat slowly, and savor your food.     Hydration:    At least 64oz of water daily.  No sugar sweetened beverages.  No juice (eat the fruit instead).     Exercise:  Studies have shown that the ideal exercise goal is somewhere between 150 to 300 minutes of moderate intensity exercise a week.  Start with exercising 10 minutes every other day and gradually increase physical activity with a goal of at least 150 minutes of moderate intensity exercise a week, divided over at least 3 days a week.  An example of this would be exercising 30 minutes a day, 5 days a week.  Resistance training can increase muscle mass and increase our resting metabolic rate.   FULL BODY resistance training is recommended 2-3 times a week.  Do not do this on consecutive days to allow for muscle recovery.     Aim for a bare minimum 5000 steps, even on days you do not exercise.     Monitoring:   Weigh yourself daily.  If this causes undue stress, then just weigh yourself once a week.   Weigh yourself the same time of the day with the same amount of clothing on.  Preferably this should be done after waking up, before you eat, and with no clothing or minimal clothing on.     Specific Goals:  Calorie goal:  1300 sintia/day (Provided with meal plan to follow)    Topiramate Instructions:  - Begin with 25mg once daily in the evening for the 1st week then start taking 25mg in the morning and evening  - Take medication at the same time everyday.  - Stay well hydrated to avoid overheating  - May reduce the effectiveness of hormonal birth control - backup method such as condoms recommended to avoid pregnancy    Reviewed the potential side effects of topiramate (Topamax) which may include - numbness or tingling, difficulty with word finding, metabolic acidosis, occurrence of gallstones and kidney stones, glaucoma, fatigue, upper respiratory infection symptoms, depression/anxiety, changes in taste, abdominal upset/heartburn, and trouble sleeping

## 2025-06-27 NOTE — PROGRESS NOTES
Assessment/Plan:     Obesity, Class II, BMI 35-39.9  - Discussed options of HealthyCORE-Intensive Lifestyle Intervention Program, Very Low Calorie Diet-VLCD, and Conservative Program and the role of weight loss medications.  - Patient is interested in pursuing Conservative Program and follow up visits with medical weight management provider.  - Explained the importance of making lifestyle changes in addition to starting any anti-obesity medications.   - Initial weight loss goal of 5-10% weight loss for improved health. Weight loss can improve patient's co-morbid conditions and/or prevent weight-related complications.  - Weight is not at goal and patient has been unable to achieve a meaningful weight loss above 5% using various programs and tools for more than 6 months  - Labs reviewed.     General Recommendations:  Nutrition:  Eat breakfast daily.  Do not skip meals.      Food log (ie.) www.Selventa.com, sparkpeople.com, loseit.com, calorieking.com, etc.     Practice mindful eating.  Be sure to set aside time to eat, eat slowly, and savor your food.     Hydration:    At least 64oz of water daily.  No sugar sweetened beverages.  No juice (eat the fruit instead).     Exercise:  Studies have shown that the ideal exercise goal is somewhere between 150 to 300 minutes of moderate intensity exercise a week.  Start with exercising 10 minutes every other day and gradually increase physical activity with a goal of at least 150 minutes of moderate intensity exercise a week, divided over at least 3 days a week.  An example of this would be exercising 30 minutes a day, 5 days a week.  Resistance training can increase muscle mass and increase our resting metabolic rate.   FULL BODY resistance training is recommended 2-3 times a week.  Do not do this on consecutive days to allow for muscle recovery.     Aim for a bare minimum 5000 steps, even on days you do not exercise.     Monitoring:   Weigh yourself daily.  If this causes  undue stress, then just weigh yourself once a week.  Weigh yourself the same time of the day with the same amount of clothing on.  Preferably this should be done after waking up, before you eat, and with no clothing or minimal clothing on.     Specific Goals:  Calorie goal:  1300 sintia/day (Provided with meal plan to follow)    Topiramate Instructions:  - Begin with 25mg once daily in the evening for the 1st week then start taking 25mg in the morning and evening  - Take medication at the same time everyday.  - Stay well hydrated to avoid overheating  - May reduce the effectiveness of hormonal birth control - backup method such as condoms recommended to avoid pregnancy    Reviewed the potential side effects of topiramate (Topamax) which may include - numbness or tingling, difficulty with word finding, metabolic acidosis, occurrence of gallstones and kidney stones, glaucoma, fatigue, upper respiratory infection symptoms, depression/anxiety, changes in taste, abdominal upset/heartburn, and trouble sleeping           [place] was seen today for follow-up.    Diagnoses and all orders for this visit:    Obesity, Class II, BMI 35-39.9  -     topiramate (Topamax) 25 mg tablet; Take 1 tablet (25 mg total) by mouth 2 (two) times a day Take 1 tablet by mouth once daily in the evening for 1 week then increase to 1 tablet twice daily in the morning and evening    Bariatric surgery status  -     Ambulatory Referral to Weight Management        Total time spent reviewing chart, interviewing patient, examining patient, discussing plan, answering all questions, and documentin minutes with >50% face-to-face time with the patient.    Follow up in approximately 3 months with Non-Surgical Physician/Advanced Practitioner.    Subjective:   Chief Complaint   Patient presents with    Follow-up       Patient ID: [place] Clinton  is a 60 y.o. female with excess weight/obesity here to pursue weight management.  Patient is pursuing Conservative  Program.   Most recent notes and records were reviewed. Not covered for GLP-1 medication in the past.     HPI    Wt Readings from Last 20 Encounters:   06/27/25 87.5 kg (193 lb)   05/29/25 88.5 kg (195 lb)   04/30/25 87.1 kg (192 lb)   04/18/25 87.1 kg (192 lb)   03/12/25 90.3 kg (199 lb)   02/25/25 90.4 kg (199 lb 4.8 oz)   01/06/25 90.7 kg (200 lb)   11/29/24 90.7 kg (200 lb)   10/31/24 90.8 kg (200 lb 3.2 oz)   08/27/24 89.9 kg (198 lb 3.2 oz)   07/19/24 89.4 kg (197 lb 3.2 oz)   07/09/24 88.9 kg (196 lb)   06/27/24 86.2 kg (190 lb)   05/16/24 88.2 kg (194 lb 8 oz)   05/16/24 88.5 kg (195 lb)   05/09/24 88.1 kg (194 lb 3.2 oz)   05/04/24 88.5 kg (195 lb 1.7 oz)   04/23/24 89.2 kg (196 lb 10.4 oz)   04/11/24 88.5 kg (195 lb 3.2 oz)   03/21/24 90.5 kg (199 lb 9.6 oz)       Patient presents today to medical weight management office for follow up.    Weight Loss Surgery: s/p Peyton-En-Y Gastric Bypass with Dr. Farooq on 06/16/15.     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    Patient denies personal history of pancreatitis, gastroparesis, diabetic retinopathy, or gallbladder disease. Patient also denies personal and family history of medullary thyroid cancer and multiple endocrine neoplasia type 2 (MEN 2 tumor).     Denies personal history of kidney stones, glaucoma, CAD, seizures, or suicidal ideation.    Endorses  "PMHx of HTN, anxiety, palpitations/arrhythmias, insomnia, depression.    Phentermine CI    Topiramate Instructions:  - Begin with 25mg once daily in the evening for the 1st week then start taking 25mg in the morning and evening  - Take medication at the same time everyday.  - Stay well hydrated to avoid overheating  - May reduce the effectiveness of hormonal birth control - backup method such as condoms recommended to avoid pregnancy    Reviewed the potential side effects of topiramate (Topamax) which may include - numbness or tingling, difficulty with word finding, metabolic acidosis, occurrence of gallstones and kidney stones, glaucoma, fatigue, upper respiratory infection symptoms, depression/anxiety, changes in taste, abdominal upset/heartburn, and trouble sleeping             Family History[1]     Review of Systems   Constitutional:  Negative for chills and fever.   HENT:  Negative for ear pain and sore throat.    Eyes:  Negative for pain and visual disturbance.   Respiratory:  Negative for cough and shortness of breath.    Cardiovascular:  Negative for chest pain and palpitations.   Gastrointestinal:  Negative for abdominal pain and vomiting.   Genitourinary:  Negative for dysuria and hematuria.   Musculoskeletal:  Negative for arthralgias and back pain.   Skin:  Negative for color change and rash.   Neurological:  Negative for seizures and syncope.   All other systems reviewed and are negative.      Objective:  /70 (BP Location: Left arm, Patient Position: Sitting, Cuff Size: Large)   Pulse 64   Ht 5' 2\" (1.575 m)   Wt 87.5 kg (193 lb)   SpO2 97%   BMI 35.30 kg/m²     Physical Exam  Constitutional:       General: She is not in acute distress.     Appearance: Normal appearance. She is obese. She is not ill-appearing, toxic-appearing or diaphoretic.   HENT:      Head: Normocephalic and atraumatic.   Pulmonary:      Effort: Pulmonary effort is normal.     Musculoskeletal:         General: Normal range " of motion.      Cervical back: Normal range of motion.     Skin:     General: Skin is warm.     Neurological:      Mental Status: She is alert and oriented to person, place, and time.     Psychiatric:         Mood and Affect: Mood normal.         Behavior: Behavior normal.            Labs   Most recent labs reviewed   Lab Results   Component Value Date     06/17/2015    SODIUM 138 06/02/2025    K 4.3 06/02/2025     06/02/2025    CO2 30 06/02/2025    ANIONGAP 5 06/17/2015    AGAP 6 06/02/2025    BUN 18 06/02/2025    CREATININE 0.85 06/02/2025    GLUC 84 09/12/2024    GLUF 110 (H) 06/02/2025    CALCIUM 9.6 06/02/2025    AST 23 06/02/2025    ALT 16 06/02/2025    ALKPHOS 82 06/02/2025    TP 6.5 06/02/2025    TBILI 0.70 06/02/2025    EGFR 74 06/02/2025     Lab Results   Component Value Date    HGBA1C 5.8 (H) 03/16/2024     Lab Results   Component Value Date    KCC1IWWRFCAB 1.487 04/04/2025     Lab Results   Component Value Date    CHOLESTEROL 179 04/04/2025     Lab Results   Component Value Date    HDL 65 04/04/2025     Lab Results   Component Value Date    TRIG 63 04/04/2025     Lab Results   Component Value Date    LDLCALC 101 (H) 04/04/2025           Weight Management  Usman Paulino PA-C         [1]   Family History  Problem Relation Name Age of Onset    Breast cancer Mother Reba 31    Prostate cancer Father Rayo 50    Hypertension Father Rayo     Heart disease Father Rayo         Coronary arteriosclerosis     Hyperlipidemia Father Rayo     No Known Problems Daughter      No Known Problems Daughter      Heart disease Maternal Grandmother Amanda         Coronary arteriosclerosis     Heart disease Maternal Grandfather August         Coronary arteriosclerosis     COPD Maternal Grandfather August         heavy smoker    Diabetes Paternal Grandmother Gauri     No Known Problems Brother      No Known Problems Paternal Aunt      Stroke Neg Hx      Thyroid disease Neg Hx

## 2025-07-03 ENCOUNTER — TELEPHONE (OUTPATIENT)
Dept: BARIATRICS | Facility: CLINIC | Age: 60
End: 2025-07-03

## (undated) DEVICE — GLOVE INDICATOR PI UNDERGLOVE SZ 7 BLUE

## (undated) DEVICE — CHLORAPREP HI-LITE 26ML ORANGE

## (undated) DEVICE — PREP PAD BNS: Brand: CONVERTORS

## (undated) DEVICE — GLOVE SRG BIOGEL 6.5

## (undated) DEVICE — TISSUE RETRIEVAL SYSTEM: Brand: INZII RETRIEVAL SYSTEM

## (undated) DEVICE — GLOVE SRG BIOGEL 7.5

## (undated) DEVICE — EXOFIN PRECISION PEN HIGH VISCOSITY TOPICAL SKIN ADHESIVE: Brand: EXOFIN PRECISION PEN, 1G

## (undated) DEVICE — ENDOPATH PNEUMONEEDLE INSUFFLATION NEEDLES WITH LUER LOCK CONNECTORS 120MM: Brand: ENDOPATH

## (undated) DEVICE — INTENDED FOR TISSUE SEPARATION, AND OTHER PROCEDURES THAT REQUIRE A SHARP SURGICAL BLADE TO PUNCTURE OR CUT.: Brand: BARD-PARKER SAFETY BLADES SIZE 11, STERILE

## (undated) DEVICE — SUT ETHIBOND 0 SH 30 IN X834H

## (undated) DEVICE — HARMONIC ACE 5MM DIAMETER SHEARS 36CM SHAFT LENGTH + ADAPTIVE TISSUE TECHNOLOGY FOR USE WITH GENERATOR G11: Brand: HARMONIC ACE

## (undated) DEVICE — STERILE BETHLEHEM PLASTIC HAND: Brand: CARDINAL HEALTH

## (undated) DEVICE — [HIGH FLOW INSUFFLATOR,  DO NOT USE IF PACKAGE IS DAMAGED,  KEEP DRY,  KEEP AWAY FROM SUNLIGHT,  PROTECT FROM HEAT AND RADIOACTIVE SOURCES.]: Brand: PNEUMOSURE

## (undated) DEVICE — LAPAROSCOPIC TROCAR SLEEVE/SINGLE USE: Brand: KII® SLEEVE

## (undated) DEVICE — GLOVE SRG BIOGEL 8

## (undated) DEVICE — VISUALIZATION SYSTEM: Brand: CLEARIFY

## (undated) DEVICE — GLOVE SRG BIOGEL ECLIPSE 7.5

## (undated) DEVICE — GLOVE INDICATOR PI UNDERGLOVE SZ 6.5 BLUE

## (undated) DEVICE — INTENDED FOR TISSUE SEPARATION, AND OTHER PROCEDURES THAT REQUIRE A SHARP SURGICAL BLADE TO PUNCTURE OR CUT.: Brand: BARD-PARKER ® CARBON RIB-BACK BLADES

## (undated) DEVICE — DECANTER: Brand: UNBRANDED

## (undated) DEVICE — TROCAR: Brand: KII FIOS FIRST ENTRY

## (undated) DEVICE — Device: Brand: OMNICLOSE TROCAR SITE CLOSURE DEVICE

## (undated) DEVICE — PACK PBDS LAP CHOLE RF

## (undated) DEVICE — MAX-CORE® DISPOSABLE CORE BIOPSY INSTRUMENT, 18G X 20CM: Brand: MAX-CORE

## (undated) DEVICE — CUFF TOURNIQUET 18 X 4 IN QUICK CONNECT DISP 1 BLADDER

## (undated) DEVICE — HARMONIC ACE +7 LAPAROSCOPIC SHEARS ADVANCED HEMOSTASIS 5MM DIAMETER 36CM SHAFT LENGTH  FOR USE WITH GRAY HAND PIECE ONLY: Brand: HARMONIC ACE

## (undated) DEVICE — INSUFFLATION NEEDLE TO ESTABLISH PNEUMOPERITONEUM.: Brand: INSUFFLATION NEEDLE

## (undated) DEVICE — KNIFE LIGHT 10,PK: Brand: KNIFELIGHT

## (undated) DEVICE — GLOVE SRG BIOGEL 7

## (undated) DEVICE — PENCIL ELECTROSURG E-Z CLEAN -0035H

## (undated) DEVICE — ALLENTOWN LAP CHOLE APP PACK: Brand: CARDINAL HEALTH

## (undated) DEVICE — TUBING SMOKE EVAC W/FILTRATION DEVICE PLUMEPORT ACTIV

## (undated) DEVICE — SYRINGE 30ML LL

## (undated) DEVICE — WET SKIN PREP TRAY: Brand: MEDLINE INDUSTRIES, INC.

## (undated) DEVICE — SUT MONOCRYL 4-0 PS-2 18 IN Y496G

## (undated) DEVICE — SCD SEQUENTIAL COMPRESSION COMFORT SLEEVE MEDIUM KNEE LENGTH: Brand: KENDALL SCD

## (undated) DEVICE — TROCAR: Brand: KII® SLEEVE

## (undated) DEVICE — NEEDLE HYPO 22G X 1-1/2 IN

## (undated) DEVICE — VIOLET BRAIDED (POLYGLACTIN 910), SYNTHETIC ABSORBABLE SUTURE: Brand: COATED VICRYL

## (undated) DEVICE — ADHESIVE SKIN HIGH VISCOSITY EXOFIN 1ML

## (undated) DEVICE — SYRINGE 20ML LL

## (undated) DEVICE — INTENDED FOR TISSUE SEPARATION, AND OTHER PROCEDURES THAT REQUIRE A SHARP SURGICAL BLADE TO PUNCTURE OR CUT.: Brand: BARD-PARKER SAFETY BLADES SIZE 15, STERILE

## (undated) DEVICE — LAPAROSCOPIC TROCAR SLEEVE/SINGLE USE: Brand: KII® OPTICAL ACCESS SYSTEM

## (undated) DEVICE — BLADE MINI RND TIP ONE SIDE SHARP

## (undated) DEVICE — ENDOPATH ETS-FLEX45 ARTICULATING ENDOSCOPIC LINEAR CUTTER, NO RELOAD: Brand: ENDOPATH

## (undated) DEVICE — WEBRIL 6 IN UNSTERILE

## (undated) DEVICE — SUT MONOCRYL 4-0 PS-2 27 IN Y426H

## (undated) DEVICE — NEEDLE 25G X 1 1/2

## (undated) DEVICE — ETS45 RELOAD STANDARD 45MM: Brand: ENDOPATH

## (undated) DEVICE — DRAPE EQUIPMENT RF WAND

## (undated) DEVICE — NEEDLE SPINAL18G X 3.5 IN QUINCKE

## (undated) DEVICE — SUT PROLENE 4-0 PS-2 18 IN 8682G

## (undated) DEVICE — SURGICAL GOWN, XL SMARTSLEEVE: Brand: CONVERTORS

## (undated) DEVICE — TIBURON LAPAROSCOPIC ABDOMINAL DRAPE: Brand: CONVERTORS